# Patient Record
Sex: MALE | Race: WHITE | NOT HISPANIC OR LATINO | Employment: UNEMPLOYED | URBAN - METROPOLITAN AREA
[De-identification: names, ages, dates, MRNs, and addresses within clinical notes are randomized per-mention and may not be internally consistent; named-entity substitution may affect disease eponyms.]

---

## 2024-05-03 ENCOUNTER — HOSPITAL ENCOUNTER (INPATIENT)
Facility: HOSPITAL | Age: 34
LOS: 2 days | Discharge: HOME | DRG: 897 | End: 2024-05-05
Attending: EMERGENCY MEDICINE | Admitting: EMERGENCY MEDICINE
Payer: COMMERCIAL

## 2024-05-03 ENCOUNTER — HOSPITAL ENCOUNTER (EMERGENCY)
Facility: HOSPITAL | Age: 34
End: 2024-05-03
Attending: EMERGENCY MEDICINE
Payer: COMMERCIAL

## 2024-05-03 VITALS
WEIGHT: 192.68 LBS | RESPIRATION RATE: 18 BRPM | TEMPERATURE: 98.5 F | DIASTOLIC BLOOD PRESSURE: 82 MMHG | SYSTOLIC BLOOD PRESSURE: 128 MMHG | HEART RATE: 92 BPM | OXYGEN SATURATION: 96 %

## 2024-05-03 DIAGNOSIS — R07.89 OTHER CHEST PAIN: ICD-10-CM

## 2024-05-03 DIAGNOSIS — F10.20 ALCOHOL USE DISORDER, SEVERE, DEPENDENCE (HCC): Primary | ICD-10-CM

## 2024-05-03 DIAGNOSIS — F10.939 ALCOHOL WITHDRAWAL (HCC): Primary | ICD-10-CM

## 2024-05-03 PROBLEM — F14.90 COCAINE USE: Status: ACTIVE | Noted: 2024-05-03

## 2024-05-03 PROBLEM — F41.9 ANXIETY: Status: ACTIVE | Noted: 2024-05-03

## 2024-05-03 PROBLEM — K29.20 ALCOHOLIC GASTRITIS: Status: ACTIVE | Noted: 2024-05-03

## 2024-05-03 PROBLEM — R07.9 CHEST PAIN: Status: ACTIVE | Noted: 2024-05-03

## 2024-05-03 LAB
ALBUMIN SERPL BCP-MCNC: 4.1 G/DL (ref 3.5–5)
ALP SERPL-CCNC: 63 U/L (ref 34–104)
ALT SERPL W P-5'-P-CCNC: 34 U/L (ref 7–52)
ANION GAP SERPL CALCULATED.3IONS-SCNC: 11 MMOL/L (ref 4–13)
APAP SERPL-MCNC: <2 UG/ML (ref 10–20)
AST SERPL W P-5'-P-CCNC: 34 U/L (ref 13–39)
ATRIAL RATE: 82 BPM
BASOPHILS # BLD AUTO: 0.09 THOUSANDS/ÂΜL (ref 0–0.1)
BASOPHILS NFR BLD AUTO: 1 % (ref 0–1)
BILIRUB SERPL-MCNC: 0.56 MG/DL (ref 0.2–1)
BUN SERPL-MCNC: 7 MG/DL (ref 5–25)
CALCIUM SERPL-MCNC: 7.8 MG/DL (ref 8.4–10.2)
CARDIAC TROPONIN I PNL SERPL HS: <2 NG/L
CHLORIDE SERPL-SCNC: 105 MMOL/L (ref 96–108)
CO2 SERPL-SCNC: 20 MMOL/L (ref 21–32)
CREAT SERPL-MCNC: 0.87 MG/DL (ref 0.6–1.3)
EOSINOPHIL # BLD AUTO: 0.03 THOUSAND/ÂΜL (ref 0–0.61)
EOSINOPHIL NFR BLD AUTO: 0 % (ref 0–6)
ERYTHROCYTE [DISTWIDTH] IN BLOOD BY AUTOMATED COUNT: 13.8 % (ref 11.6–15.1)
ETHANOL SERPL-MCNC: 129 MG/DL
GFR SERPL CREATININE-BSD FRML MDRD: 112 ML/MIN/1.73SQ M
GLUCOSE SERPL-MCNC: 85 MG/DL (ref 65–140)
HCT VFR BLD AUTO: 44.6 % (ref 36.5–49.3)
HGB BLD-MCNC: 15.3 G/DL (ref 12–17)
IMM GRANULOCYTES # BLD AUTO: 0.03 THOUSAND/UL (ref 0–0.2)
IMM GRANULOCYTES NFR BLD AUTO: 0 % (ref 0–2)
LIPASE SERPL-CCNC: 19 U/L (ref 11–82)
LYMPHOCYTES # BLD AUTO: 2.17 THOUSANDS/ÂΜL (ref 0.6–4.47)
LYMPHOCYTES NFR BLD AUTO: 23 % (ref 14–44)
MCH RBC QN AUTO: 30.8 PG (ref 26.8–34.3)
MCHC RBC AUTO-ENTMCNC: 34.3 G/DL (ref 31.4–37.4)
MCV RBC AUTO: 90 FL (ref 82–98)
MONOCYTES # BLD AUTO: 0.53 THOUSAND/ÂΜL (ref 0.17–1.22)
MONOCYTES NFR BLD AUTO: 6 % (ref 4–12)
NEUTROPHILS # BLD AUTO: 6.72 THOUSANDS/ÂΜL (ref 1.85–7.62)
NEUTS SEG NFR BLD AUTO: 70 % (ref 43–75)
NRBC BLD AUTO-RTO: 0 /100 WBCS
P AXIS: 77 DEGREES
PLATELET # BLD AUTO: 316 THOUSANDS/UL (ref 149–390)
PMV BLD AUTO: 8.7 FL (ref 8.9–12.7)
POTASSIUM SERPL-SCNC: 4 MMOL/L (ref 3.5–5.3)
PR INTERVAL: 144 MS
PROT SERPL-MCNC: 6.1 G/DL (ref 6.4–8.4)
QRS AXIS: 85 DEGREES
QRSD INTERVAL: 88 MS
QT INTERVAL: 346 MS
QTC INTERVAL: 404 MS
RBC # BLD AUTO: 4.97 MILLION/UL (ref 3.88–5.62)
SALICYLATES SERPL-MCNC: <5 MG/DL (ref 3–20)
SODIUM SERPL-SCNC: 136 MMOL/L (ref 135–147)
T WAVE AXIS: 23 DEGREES
VENTRICULAR RATE: 82 BPM
WBC # BLD AUTO: 9.57 THOUSAND/UL (ref 4.31–10.16)

## 2024-05-03 PROCEDURE — 93010 ELECTROCARDIOGRAM REPORT: CPT | Performed by: STUDENT IN AN ORGANIZED HEALTH CARE EDUCATION/TRAINING PROGRAM

## 2024-05-03 PROCEDURE — 82077 ASSAY SPEC XCP UR&BREATH IA: CPT | Performed by: EMERGENCY MEDICINE

## 2024-05-03 PROCEDURE — 96361 HYDRATE IV INFUSION ADD-ON: CPT

## 2024-05-03 PROCEDURE — 96375 TX/PRO/DX INJ NEW DRUG ADDON: CPT

## 2024-05-03 PROCEDURE — 83690 ASSAY OF LIPASE: CPT | Performed by: EMERGENCY MEDICINE

## 2024-05-03 PROCEDURE — 99291 CRITICAL CARE FIRST HOUR: CPT

## 2024-05-03 PROCEDURE — 80143 DRUG ASSAY ACETAMINOPHEN: CPT | Performed by: EMERGENCY MEDICINE

## 2024-05-03 PROCEDURE — 99285 EMERGENCY DEPT VISIT HI MDM: CPT | Performed by: EMERGENCY MEDICINE

## 2024-05-03 PROCEDURE — 93005 ELECTROCARDIOGRAM TRACING: CPT

## 2024-05-03 PROCEDURE — HZ2ZZZZ DETOXIFICATION SERVICES FOR SUBSTANCE ABUSE TREATMENT: ICD-10-PCS | Performed by: EMERGENCY MEDICINE

## 2024-05-03 PROCEDURE — 36415 COLL VENOUS BLD VENIPUNCTURE: CPT | Performed by: EMERGENCY MEDICINE

## 2024-05-03 PROCEDURE — 80179 DRUG ASSAY SALICYLATE: CPT | Performed by: EMERGENCY MEDICINE

## 2024-05-03 PROCEDURE — 96376 TX/PRO/DX INJ SAME DRUG ADON: CPT

## 2024-05-03 PROCEDURE — 84484 ASSAY OF TROPONIN QUANT: CPT | Performed by: EMERGENCY MEDICINE

## 2024-05-03 PROCEDURE — 85025 COMPLETE CBC W/AUTO DIFF WBC: CPT | Performed by: EMERGENCY MEDICINE

## 2024-05-03 PROCEDURE — 99285 EMERGENCY DEPT VISIT HI MDM: CPT

## 2024-05-03 PROCEDURE — 96365 THER/PROPH/DIAG IV INF INIT: CPT

## 2024-05-03 PROCEDURE — 80053 COMPREHEN METABOLIC PANEL: CPT | Performed by: EMERGENCY MEDICINE

## 2024-05-03 RX ORDER — ONDANSETRON 2 MG/ML
4 INJECTION INTRAMUSCULAR; INTRAVENOUS EVERY 6 HOURS PRN
Status: DISCONTINUED | OUTPATIENT
Start: 2024-05-03 | End: 2024-05-05 | Stop reason: HOSPADM

## 2024-05-03 RX ORDER — DEXTROSE, SODIUM CHLORIDE, SODIUM LACTATE, POTASSIUM CHLORIDE, AND CALCIUM CHLORIDE 5; .6; .31; .03; .02 G/100ML; G/100ML; G/100ML; G/100ML; G/100ML
1000 INJECTION, SOLUTION INTRAVENOUS ONCE
Status: COMPLETED | OUTPATIENT
Start: 2024-05-03 | End: 2024-05-03

## 2024-05-03 RX ORDER — ONDANSETRON 2 MG/ML
4 INJECTION INTRAMUSCULAR; INTRAVENOUS ONCE
Status: COMPLETED | OUTPATIENT
Start: 2024-05-03 | End: 2024-05-03

## 2024-05-03 RX ORDER — ONDANSETRON 2 MG/ML
1 INJECTION INTRAMUSCULAR; INTRAVENOUS ONCE
Status: COMPLETED | OUTPATIENT
Start: 2024-05-03 | End: 2024-05-03

## 2024-05-03 RX ORDER — ACETAMINOPHEN 325 MG/1
650 TABLET ORAL EVERY 6 HOURS PRN
Status: DISCONTINUED | OUTPATIENT
Start: 2024-05-03 | End: 2024-05-05 | Stop reason: HOSPADM

## 2024-05-03 RX ORDER — FAMOTIDINE 10 MG/ML
20 INJECTION, SOLUTION INTRAVENOUS ONCE
Status: COMPLETED | OUTPATIENT
Start: 2024-05-03 | End: 2024-05-03

## 2024-05-03 RX ORDER — DIAZEPAM 5 MG/ML
10 INJECTION, SOLUTION INTRAMUSCULAR; INTRAVENOUS ONCE
Status: COMPLETED | OUTPATIENT
Start: 2024-05-03 | End: 2024-05-03

## 2024-05-03 RX ORDER — DROPERIDOL 2.5 MG/ML
0.62 INJECTION, SOLUTION INTRAMUSCULAR; INTRAVENOUS ONCE
Status: COMPLETED | OUTPATIENT
Start: 2024-05-03 | End: 2024-05-03

## 2024-05-03 RX ORDER — MAGNESIUM HYDROXIDE/ALUMINUM HYDROXICE/SIMETHICONE 120; 1200; 1200 MG/30ML; MG/30ML; MG/30ML
30 SUSPENSION ORAL EVERY 4 HOURS PRN
Status: DISCONTINUED | OUTPATIENT
Start: 2024-05-03 | End: 2024-05-05 | Stop reason: HOSPADM

## 2024-05-03 RX ORDER — PHENOBARBITAL SODIUM 130 MG/ML
130 INJECTION, SOLUTION INTRAMUSCULAR; INTRAVENOUS ONCE
Status: COMPLETED | OUTPATIENT
Start: 2024-05-03 | End: 2024-05-03

## 2024-05-03 RX ORDER — SODIUM CHLORIDE 9 MG/ML
100 INJECTION, SOLUTION INTRAVENOUS CONTINUOUS
Status: DISCONTINUED | OUTPATIENT
Start: 2024-05-03 | End: 2024-05-05 | Stop reason: HOSPADM

## 2024-05-03 RX ORDER — ENOXAPARIN SODIUM 100 MG/ML
40 INJECTION SUBCUTANEOUS DAILY
Status: DISCONTINUED | OUTPATIENT
Start: 2024-05-04 | End: 2024-05-05 | Stop reason: HOSPADM

## 2024-05-03 RX ORDER — TRAZODONE HYDROCHLORIDE 50 MG/1
50 TABLET ORAL
Status: DISCONTINUED | OUTPATIENT
Start: 2024-05-03 | End: 2024-05-05 | Stop reason: HOSPADM

## 2024-05-03 RX ORDER — KETOROLAC TROMETHAMINE 30 MG/ML
15 INJECTION, SOLUTION INTRAMUSCULAR; INTRAVENOUS ONCE
Status: COMPLETED | OUTPATIENT
Start: 2024-05-03 | End: 2024-05-03

## 2024-05-03 RX ORDER — MAGNESIUM HYDROXIDE/ALUMINUM HYDROXICE/SIMETHICONE 120; 1200; 1200 MG/30ML; MG/30ML; MG/30ML
30 SUSPENSION ORAL ONCE
Status: COMPLETED | OUTPATIENT
Start: 2024-05-03 | End: 2024-05-03

## 2024-05-03 RX ORDER — ACETAMINOPHEN 325 MG/1
650 TABLET ORAL EVERY 6 HOURS PRN
Status: DISCONTINUED | OUTPATIENT
Start: 2024-05-03 | End: 2024-05-03

## 2024-05-03 RX ORDER — LORAZEPAM 2 MG/ML
1 INJECTION INTRAMUSCULAR ONCE
Status: COMPLETED | OUTPATIENT
Start: 2024-05-03 | End: 2024-05-03

## 2024-05-03 RX ORDER — LOPERAMIDE HYDROCHLORIDE 2 MG/1
2 CAPSULE ORAL EVERY 4 HOURS PRN
Status: DISCONTINUED | OUTPATIENT
Start: 2024-05-03 | End: 2024-05-05 | Stop reason: HOSPADM

## 2024-05-03 RX ADMIN — LORAZEPAM 1 MG: 2 INJECTION INTRAMUSCULAR; INTRAVENOUS at 15:24

## 2024-05-03 RX ADMIN — SODIUM CHLORIDE 100 ML/HR: 0.9 INJECTION, SOLUTION INTRAVENOUS at 18:09

## 2024-05-03 RX ADMIN — Medication 650 MG: at 18:20

## 2024-05-03 RX ADMIN — DROPERIDOL 0.62 MG: 2.5 INJECTION, SOLUTION INTRAMUSCULAR; INTRAVENOUS at 16:53

## 2024-05-03 RX ADMIN — ONDANSETRON 4 MG: 2 INJECTION INTRAMUSCULAR; INTRAVENOUS at 13:04

## 2024-05-03 RX ADMIN — FOLIC ACID: 5 INJECTION, SOLUTION INTRAMUSCULAR; INTRAVENOUS; SUBCUTANEOUS at 13:29

## 2024-05-03 RX ADMIN — ONDANSETRON 4 MG: 2 INJECTION INTRAMUSCULAR; INTRAVENOUS at 15:24

## 2024-05-03 RX ADMIN — ALUMINUM HYDROXIDE, MAGNESIUM HYDROXIDE, DIMETHICONE 30 ML: 200; 200; 20 LIQUID ORAL at 21:55

## 2024-05-03 RX ADMIN — DEXTROSE, SODIUM CHLORIDE, SODIUM LACTATE, POTASSIUM CHLORIDE, AND CALCIUM CHLORIDE 1000 ML: 5; .6; .31; .03; .02 INJECTION, SOLUTION INTRAVENOUS at 14:24

## 2024-05-03 RX ADMIN — FAMOTIDINE 20 MG: 10 INJECTION INTRAVENOUS at 15:11

## 2024-05-03 RX ADMIN — KETOROLAC TROMETHAMINE 15 MG: 30 INJECTION, SOLUTION INTRAMUSCULAR at 15:12

## 2024-05-03 RX ADMIN — LORAZEPAM 1 MG: 2 INJECTION INTRAMUSCULAR; INTRAVENOUS at 13:03

## 2024-05-03 RX ADMIN — DIAZEPAM 10 MG: 5 INJECTION INTRAMUSCULAR; INTRAVENOUS at 18:20

## 2024-05-03 RX ADMIN — ALUMINUM HYDROXIDE, MAGNESIUM HYDROXIDE, DIMETHICONE 30 ML: 400; 400; 40 SUSPENSION ORAL at 15:11

## 2024-05-03 RX ADMIN — PHENOBARBITAL SODIUM 130 MG: 130 INJECTION INTRAMUSCULAR; INTRAVENOUS at 23:11

## 2024-05-03 RX ADMIN — LOPERAMIDE HYDROCHLORIDE 2 MG: 2 CAPSULE ORAL at 22:57

## 2024-05-03 NOTE — ASSESSMENT & PLAN NOTE
Pt with a h/o chronic heavy alcohol use   Has been previously admitted to the hospital for alcohol withdrawal. His most recent admission was at [facility] from 4/18-4/19 where he was loaded with 10 mg/kg of phenobarbital.   Patient reports relapsing shortly after discharge- states he is drinking a fifth of vodka daily. Presented to [facility] for rehab placement however due severity of withdrawal symptoms requesting inpatient detox.     Denies H/o withdrawal seizures  Previously on naltrexone, would like to restart   Withdrawal management as above  Initiate IVFs, IV thiamine, folic acid, and MVI  Consult case management/CRS for assistance with aftercare resources - pt interested in inpatient drug and alcohol rehab  at this time

## 2024-05-03 NOTE — ASSESSMENT & PLAN NOTE
Patient endorses a h/o chronic anxiety and depression  No current medication  Can consider initiation of anxiolytic medication    Denies SI/HI/thoughts of self harm  Continue home medications  Recommend OP f/u with PCP/OP Psychiatry

## 2024-05-03 NOTE — EMTALA/ACUTE CARE TRANSFER
[facility] EMERGENCY DEPARTMENT  [address] [address]  Dept: [phone]      EMTALA TRANSFER CONSENT    NAME Juan GALVAN 1990                              MRN [id number]    I have been informed of my rights regarding examination, treatment, and transfer   by Dr. Gaurav Hawk MD    Benefits: Specialized equipment and/or services available at the receiving facility (Include comment)________________________    Risks: Potential for delay in receiving treatment, Potential deterioration of medical condition, Loss of IV      Consent for Transfer:  I acknowledge that my medical condition has been evaluated and explained to me by the emergency department physician or other qualified medical person and/or my attending physician, who has recommended that I be transferred to the service of  Accepting Physician: Dr. Godoy at  . The above potential benefits of such transfer, the potential risks associated with such transfer, and the probable risks of not being transferred have been explained to me, and I fully understand them.  The doctor has explained that, in my case, the benefits of transfer outweigh the risks.  I agree to be transferred.    I authorize the performance of emergency medical procedures and treatments upon me in both transit and upon arrival at the receiving facility.  Additionally, I authorize the release of any and all medical records to the receiving facility and request they be transported with me, if possible.  I understand that the safest mode of transportation during a medical emergency is an ambulance and that the Hospital advocates the use of this mode of transport. Risks of traveling to the receiving facility by car, including absence of medical control, life sustaining equipment, such as oxygen, and medical personnel has been explained to me and I fully understand them.    (ANCA CORRECT BOX BELOW)  [  ]   I consent to the stated transfer and to be transported by ambulance/helicopter.  [  ]  I consent to the stated transfer, but refuse transportation by ambulance and accept full responsibility for my transportation by car.  I understand the risks of non-ambulance transfers and I exonerate the Hospital and its staff from any deterioration in my condition that results from this refusal.    X___________________________________________    DATE  24  TIME________  Signature of patient or legally responsible individual signing on patient behalf           RELATIONSHIP TO PATIENT_________________________          Provider Certification    NAME Juan Luu                                         1990                              MRN [id number]    A medical screening exam was performed on the above named patient.  Based on the examination:    Condition Necessitating Transfer The encounter diagnosis was Alcohol withdrawal (HCC).    Patient Condition: The patient has been stabilized such that within reasonable medical probability, no material deterioration of the patient condition or the condition of the unborn child(leticia) is likely to result from the transfer    Reason for Transfer: Level of Care needed not available at this facility    Transfer Requirements: Facility     Space available and qualified personnel available for treatment as acknowledged by    Agreed to accept transfer and to provide appropriate medical treatment as acknowledged by       Dr. Godoy  Appropriate medical records of the examination and treatment of the patient are provided at the time of transfer   STAFF INITIAL WHEN COMPLETED _______  Transfer will be performed by qualified personnel from    and appropriate transfer equipment as required, including the use of necessary and appropriate life support measures.    Provider Certification: I have examined the patient and explained the following risks and benefits of being transferred/refusing  transfer to the patient/family:  General risk, such as traffic hazards, adverse weather conditions, rough terrain or turbulence, possible failure of equipment (including vehicle or aircraft), or consequences of actions of persons outside the control of the transport personnel      Based on these reasonable risks and benefits to the patient and/or the unborn child(leticia), and based upon the information available at the time of the patient’s examination, I certify that the medical benefits reasonably to be expected from the provision of appropriate medical treatments at another medical facility outweigh the increasing risks, if any, to the individual’s medical condition, and in the case of labor to the unborn child, from effecting the transfer.    X____________________________________________ DATE 05/03/24        TIME_______      ORIGINAL - SEND TO MEDICAL RECORDS   COPY - SEND WITH PATIENT DURING TRANSFER

## 2024-05-03 NOTE — ED NOTES
HOST on phone for patient. Call transferred to hospital cell phone and given to patient.     Madison Gonzales RN  05/03/24 9041

## 2024-05-03 NOTE — ASSESSMENT & PLAN NOTE
Last drink: 5/3/24   Ethanol lvl: 129  Follow SEWS protocol with symptom-triggered phenobarbital for medically-assisted alcohol withdrawal  Current symptoms include anxiety, tremors, tachycardia, HTN, diaphoresis, nausea   SEWS score upon admission 13  Administer 650 mg phenobarbital + valium 10 mg IV   Telemetry and pulse oximetry monitoring   Continue to monitor vitals, symptoms

## 2024-05-03 NOTE — ASSESSMENT & PLAN NOTE
Chest pain in the setting of concurrent cocaine use  Currently denies any chest pain or palpitations   EKG: no ischemic changes   Initial troponin < 1   Continue to monitor

## 2024-05-03 NOTE — ED CARE HANDOFF
[facility] Warm Handoff Outcome Note    Patient name Juan Luu  Location ED-24/ED-24 MRN [id number]  Age: 34 y.o.          Plan Type:  Warm Handoff                                                                                    Plan Date: 5/3/2024  Service:  ED Warm Handoff      Substance Use History:  Polysub    Warm Handoff Update:  Pt being transported to [facility]    Warm Handoff Outcome: Treatment Related Resources

## 2024-05-03 NOTE — ASSESSMENT & PLAN NOTE
Patient reports intermittent cocaine use   Last used yesterday  Currently denies chest pain or palpitations  Continue to monitor  Encourage Cessation of all substances

## 2024-05-03 NOTE — ED PROVIDER NOTES
History  Chief Complaint   Patient presents with    Chest Pain     [facility] since waking up at 3 am. Pt reports thinking he is going through alcohol withdrawal, woke up at 3 am vomiting/shaking with crushing chest pressure/pain. Last drink about 13 hours ago, previously drinking a fifth of vodka daily. 324 asa given 1145, 4 zofran given 1220. When asked, pt reports left arm is slightly tingly     35 yo M referred to ED from [facility] with concern for alcohol withdrawal, stating he drinks up to 1/5 vodka daily, last drank about 11pm, and he has been intentionally trying to taper himself down to stop drinking, but has begun to experience shakes, nausea, vomiting, and anxiety.  He experienced chest pain since 3am when he woke up and began vomiting. He tried to go to work but the symptoms were too intensive, and apparently his supervisor invoked a policy to help him seek rehab, so they went to [facility] initially.        History provided by:  Patient      None       History reviewed. No pertinent past medical history.    History reviewed. No pertinent surgical history.    History reviewed. No pertinent family history.  I have reviewed and agree with the history as documented.    E-Cigarette/Vaping    E-Cigarette Use Never User      E-Cigarette/Vaping Substances     Social History     Tobacco Use    Smoking status: Never    Smokeless tobacco: Never   Vaping Use    Vaping status: Never Used   Substance Use Topics    Alcohol use: Yes    Drug use: Not Currently       Review of Systems    Physical Exam  Physical Exam  Vitals and nursing note reviewed.   Constitutional:       Appearance: He is well-developed.   HENT:      Head: Normocephalic and atraumatic.      Right Ear: External ear normal.      Left Ear: External ear normal.      Nose: Nose normal.      Mouth/Throat:      Mouth: Mucous membranes are moist.   Eyes:      Conjunctiva/sclera: Conjunctivae normal.      Pupils: Pupils  are equal, round, and reactive to light.   Cardiovascular:      Rate and Rhythm: Normal rate.   Pulmonary:      Effort: Pulmonary effort is normal.   Abdominal:      Tenderness: There is no abdominal tenderness.   Musculoskeletal:         General: Normal range of motion.      Cervical back: Normal range of motion and neck supple.   Skin:     General: Skin is warm and dry.      Capillary Refill: Capillary refill takes less than 2 seconds.   Neurological:      Mental Status: He is alert and oriented to person, place, and time.      GCS: GCS eye subscore is 4. GCS verbal subscore is 5. GCS motor subscore is 6.      Cranial Nerves: No cranial nerve deficit.      Coordination: Coordination normal.   Psychiatric:         Attention and Perception: He does not perceive auditory or visual hallucinations.         Mood and Affect: Mood is anxious.         Speech: Speech is rapid and pressured.         Behavior: Behavior normal.         Thought Content: Thought content normal.         Cognition and Memory: Cognition normal.         Judgment: Judgment normal.         Vital Signs  ED Triage Vitals   Temperature Pulse Respirations Blood Pressure SpO2   05/03/24 1642 05/03/24 1229 05/03/24 1229 05/03/24 1229 05/03/24 1229   98.5 °F (36.9 °C) 91 18 135/83 96 %      Temp Source Heart Rate Source Patient Position - Orthostatic VS BP Location FiO2 (%)   05/03/24 1642 05/03/24 1229 05/03/24 1229 05/03/24 1229 --   Oral Monitor Sitting Right arm       Pain Score       05/03/24 1229       8           Vitals:    05/03/24 1545 05/03/24 1615 05/03/24 1638 05/03/24 1645   BP: 113/69 142/76 128/82 128/82   Pulse: 82 (!) 108 98 92   Patient Position - Orthostatic VS:             Visual Acuity      ED Medications  Medications   folic acid 1 mg, thiamine (VITAMIN B1) 100 mg in sodium chloride 0.9 % 100 mL IV piggyback ( Intravenous Stopped 5/3/24 1425)   ondansetron (FOR EMS ONLY) (ZOFRAN) 4 mg/2 mL injection 4 mg (0 mg Does not apply Given to  EMS 5/3/24 1229)   dextrose 5 % in lactated Ringer's infusion (0 mL Intravenous Stopped 5/3/24 1528)   LORazepam (ATIVAN) injection 1 mg (1 mg Intravenous Given 5/3/24 1303)   ondansetron (ZOFRAN) injection 4 mg (4 mg Intravenous Given 5/3/24 1304)   ketorolac (TORADOL) injection 15 mg (15 mg Intravenous Given 5/3/24 1512)   Famotidine (PF) (PEPCID) injection 20 mg (20 mg Intravenous Given 5/3/24 1511)   aluminum-magnesium hydroxide-simethicone (MAALOX) oral suspension 30 mL (30 mL Oral Given 5/3/24 1511)   ondansetron (ZOFRAN) injection 4 mg (4 mg Intravenous Given 5/3/24 1524)   LORazepam (ATIVAN) injection 1 mg (1 mg Intravenous Given 5/3/24 1524)   droperidol (INAPSINE) injection 0.625 mg (0.625 mg Intravenous Given 5/3/24 1653)       Diagnostic Studies  Results Reviewed       Procedure Component Value Units Date/Time    HS Troponin 0hr (reflex protocol) [[id number]]  (Normal) Collected: 05/03/24 1304    Lab Status: Final result Specimen: Blood from Arm, Left Updated: 05/03/24 1336     hs TnI 0hr <2 ng/L     Comprehensive metabolic panel [[id number]]  (Abnormal) Collected: 05/03/24 1304    Lab Status: Final result Specimen: Blood from Arm, Right Updated: 05/03/24 1336     Sodium 136 mmol/L      Potassium 4.0 mmol/L      Chloride 105 mmol/L      CO2 20 mmol/L      ANION GAP 11 mmol/L      BUN 7 mg/dL      Creatinine 0.87 mg/dL      Glucose 85 mg/dL      Calcium 7.8 mg/dL      AST 34 U/L      ALT 34 U/L      Alkaline Phosphatase 63 U/L      Total Protein 6.1 g/dL      Albumin 4.1 g/dL      Total Bilirubin 0.56 mg/dL      eGFR 112 ml/min/1.73sq m     Narrative:      National Kid[employer] Disease Foundation guidelines for Chronic Kidney Disease (CKD):     Stage 1 with normal or high GFR (GFR > 90 mL/min/1.73 square meters)    Stage 2 Mild CKD (GFR = 60-89 mL/min/1.73 square meters)    Stage 3A Moderate CKD (GFR = 45-59 mL/min/1.73 square meters)    Stage 3B Moderate CKD (GFR = 30-44 mL/min/1.73 square meters)    Stage 4  Severe CKD (GFR = 15-29 mL/min/1.73 square meters)    Stage 5 End Stage CKD (GFR <15 mL/min/1.73 square meters)  Note: GFR calculation is accurate only with a steady state creatinine    Lipase [[id number]]  (Normal) Collected: 05/03/24 1304    Lab Status: Final result Specimen: Blood from Arm, Right Updated: 05/03/24 1336     Lipase 19 u/L     Salicylate level [[id number]]  (Normal) Collected: 05/03/24 1304    Lab Status: Final result Specimen: Blood from Arm, Right Updated: 05/03/24 1336     Salicylate Lvl <5 mg/dL     Acetaminophen level-If concentration is detectable, please discuss with medical  on call. [[id number]]  (Abnormal) Collected: 05/03/24 1304    Lab Status: Final result Specimen: Blood from Arm, Right Updated: 05/03/24 1336     Acetaminophen Level <2 ug/mL     Ethanol [[id number]]  (Abnormal) Collected: 05/03/24 1304    Lab Status: Final result Specimen: Blood from Arm, Left Updated: 05/03/24 1329     Ethanol Lvl 129 mg/dL     CBC and differential [[id number]]  (Abnormal) Collected: 05/03/24 1304    Lab Status: Final result Specimen: Blood from Arm, Left Updated: 05/03/24 1316     WBC 9.57 Thousand/uL      RBC 4.97 Million/uL      Hemoglobin 15.3 g/dL      Hematocrit 44.6 %      MCV 90 fL      MCH 30.8 pg      MCHC 34.3 g/dL      RDW 13.8 %      MPV 8.7 fL      Platelets 316 Thousands/uL      nRBC 0 /100 WBCs      Segmented % 70 %      Immature Grans % 0 %      Lymphocytes % 23 %      Monocytes % 6 %      Eosinophils Relative 0 %      Basophils Relative 1 %      Absolute Neutrophils 6.72 Thousands/µL      Absolute Immature Grans 0.03 Thousand/uL      Absolute Lymphocytes 2.17 Thousands/µL      Absolute Monocytes 0.53 Thousand/µL      Eosinophils Absolute 0.03 Thousand/µL      Basophils Absolute 0.09 Thousands/µL                    No orders to display              Procedures  Procedures         ED Course  ED Course as of 05/03/24 1740   Fri May 03, 2024   1336 ETHANOL(!): 129                                SBIRT 20yo+      Flowsheet Row Most Recent Value   Initial Alcohol Screen: US AUDIT-C     1. How often do you have a drink containing alcohol? 6 Filed at: 05/03/2024 1233   2. How many drinks containing alcohol do you have on a typical day you are drinking?  6 Filed at: 05/03/2024 1233   3a. Male UNDER 65: How often do you have five or more drinks on one occasion? 6 Filed at: 05/03/2024 1233   3b. FEMALE Any Age, or MALE 65+: How often do you have 4 or more drinks on one occassion? 0 Filed at: 05/03/2024 1233   Audit-C Score 18 Filed at: 05/03/2024 1233   Full Alcohol Screen: US AUDIT    4. How often during the last year have you found that you were not able to stop drinking once you had started? 2 Filed at: 05/03/2024 1233   5. How often during past year have you failed to do what was normally expected of you because of drinking?  2 Filed at: 05/03/2024 1233   6. How often in past year have you needed a first drink in the morning to get yourself going after a heavy drinking session?  3 Filed at: 05/03/2024 1233   7. How often in past year have you had feeling of guilt or remorse after drinking?  3 Filed at: 05/03/2024 1233   8. How often in past year have you been unable to remember what happened night before because you had been drinking?  1 Filed at: 05/03/2024 1233   9. Have you or someone else been injured as a result of your drinking?  0 Filed at: 05/03/2024 1233   10. Has a relative, friend, doctor or other health worker been concerned about your drinking and suggested you cut down?  0 Filed at: 05/03/2024 1233   AUDIT Total Score 29 Filed at: 05/03/2024 1233   JEANNETTE: How many times in the past year have you...    Used an illegal drug or used a prescription medication for non-medical reasons? Never Filed at: 05/03/2024 1233                      Medical Decision Making  Based on amount of daily ingestion, and onset of symptoms, I am considering patient for detox unit, to which he  was agreeable . He has spoken with [facility] regarding resources for rehab.      Amount and/or Complexity of Data Reviewed  Labs: ordered. Decision-making details documented in ED Course.    Risk  OTC drugs.  Prescription drug management.             Disposition  Final diagnoses:   Alcohol withdrawal (HCC)     Time reflects when diagnosis was documented in both MDM as applicable and the Disposition within this note       Time User Action Codes Description Comment    5/3/2024  3:04 PM Gaurav Hawk Add [F10.939] Alcohol withdrawal (HCC)           ED Disposition       ED Disposition   Transfer to Another Facility-In Network    Condition   --    Date/Time   Fri May 3, 2024 1503    Comment   Juan Luu should be transferred out to [facility].               MD Documentation      Flowsheet Row Most Recent Value   Patient Condition The patient has been stabilized such that within reasonable medical probability, no material deterioration of the patient condition or the condition of the unborn child(leticia) is likely to result from the transfer   Reason for Transfer Level of Care needed not available at this facility   Benefits of Transfer Specialized equipment and/or services available at the receiving facility (Include comment)________________________   Risks of Transfer Potential for delay in receiving treatment, Potential deterioration of medical condition, Loss of IV   Accepting Physician Dr. Godoy   Sending MD Dr. Hawk   Provider Certification General risk, such as traffic hazards, adverse weather conditions, rough terrain or turbulence, possible failure of equipment (including vehicle or aircraft), or consequences of actions of persons outside the control of the transport personnel          Follow-up Information    None         There are no discharge medications for this patient.      No discharge procedures on file.    PDMP Review       None            ED Provider  Electronically Signed by             Gaurav Armendariz  MD Kenn  05/03/24 2059

## 2024-05-04 LAB
ALBUMIN SERPL BCP-MCNC: 3.9 G/DL (ref 3.5–5)
ALP SERPL-CCNC: 69 U/L (ref 34–104)
ALT SERPL W P-5'-P-CCNC: 29 U/L (ref 7–52)
ANION GAP SERPL CALCULATED.3IONS-SCNC: 10 MMOL/L (ref 4–13)
AST SERPL W P-5'-P-CCNC: 23 U/L (ref 13–39)
BILIRUB SERPL-MCNC: 0.81 MG/DL (ref 0.2–1)
BUN SERPL-MCNC: 8 MG/DL (ref 5–25)
CALCIUM SERPL-MCNC: 8.2 MG/DL (ref 8.4–10.2)
CHLORIDE SERPL-SCNC: 102 MMOL/L (ref 96–108)
CO2 SERPL-SCNC: 24 MMOL/L (ref 21–32)
CREAT SERPL-MCNC: 1.06 MG/DL (ref 0.6–1.3)
ERYTHROCYTE [DISTWIDTH] IN BLOOD BY AUTOMATED COUNT: 13.9 % (ref 11.6–15.1)
GFR SERPL CREATININE-BSD FRML MDRD: 91 ML/MIN/1.73SQ M
GLUCOSE SERPL-MCNC: 84 MG/DL (ref 65–140)
HCT VFR BLD AUTO: 45.3 % (ref 36.5–49.3)
HGB BLD-MCNC: 14.5 G/DL (ref 12–17)
MAGNESIUM SERPL-MCNC: 2 MG/DL (ref 1.9–2.7)
MCH RBC QN AUTO: 32.2 PG (ref 26.8–34.3)
MCHC RBC AUTO-ENTMCNC: 32 G/DL (ref 31.4–37.4)
MCV RBC AUTO: 100 FL (ref 82–98)
PLATELET # BLD AUTO: 233 THOUSANDS/UL (ref 149–390)
PMV BLD AUTO: 9.2 FL (ref 8.9–12.7)
POTASSIUM SERPL-SCNC: 3.7 MMOL/L (ref 3.5–5.3)
PROT SERPL-MCNC: 6.3 G/DL (ref 6.4–8.4)
RBC # BLD AUTO: 4.5 MILLION/UL (ref 3.88–5.62)
SODIUM SERPL-SCNC: 136 MMOL/L (ref 135–147)
WBC # BLD AUTO: 10.39 THOUSAND/UL (ref 4.31–10.16)

## 2024-05-04 PROCEDURE — 99233 SBSQ HOSP IP/OBS HIGH 50: CPT | Performed by: EMERGENCY MEDICINE

## 2024-05-04 PROCEDURE — 83735 ASSAY OF MAGNESIUM: CPT

## 2024-05-04 PROCEDURE — 85027 COMPLETE CBC AUTOMATED: CPT

## 2024-05-04 PROCEDURE — 80053 COMPREHEN METABOLIC PANEL: CPT

## 2024-05-04 RX ORDER — PHENOBARBITAL SODIUM 130 MG/ML
130 INJECTION, SOLUTION INTRAMUSCULAR; INTRAVENOUS ONCE
Status: COMPLETED | OUTPATIENT
Start: 2024-05-04 | End: 2024-05-04

## 2024-05-04 RX ORDER — PHENOBARBITAL 64.8 MG/1
64.8 TABLET ORAL ONCE
Status: COMPLETED | OUTPATIENT
Start: 2024-05-04 | End: 2024-05-04

## 2024-05-04 RX ADMIN — ONDANSETRON 4 MG: 2 INJECTION INTRAMUSCULAR; INTRAVENOUS at 16:32

## 2024-05-04 RX ADMIN — FOLIC ACID: 5 INJECTION, SOLUTION INTRAMUSCULAR; INTRAVENOUS; SUBCUTANEOUS at 08:38

## 2024-05-04 RX ADMIN — PHENOBARBITAL SODIUM 130 MG: 130 INJECTION INTRAMUSCULAR; INTRAVENOUS at 11:02

## 2024-05-04 RX ADMIN — SODIUM CHLORIDE 100 ML/HR: 0.9 INJECTION, SOLUTION INTRAVENOUS at 16:32

## 2024-05-04 RX ADMIN — PHENOBARBITAL SODIUM 130 MG: 130 INJECTION INTRAMUSCULAR; INTRAVENOUS at 20:10

## 2024-05-04 RX ADMIN — PHENOBARBITAL 64.8 MG: 64.8 TABLET ORAL at 22:44

## 2024-05-04 RX ADMIN — TRAZODONE HYDROCHLORIDE 50 MG: 50 TABLET ORAL at 21:01

## 2024-05-04 RX ADMIN — Medication 650 MG: at 11:20

## 2024-05-04 RX ADMIN — PHENOBARBITAL SODIUM 130 MG: 130 INJECTION INTRAMUSCULAR; INTRAVENOUS at 16:32

## 2024-05-04 RX ADMIN — ALUMINUM HYDROXIDE, MAGNESIUM HYDROXIDE, DIMETHICONE 30 ML: 200; 200; 20 LIQUID ORAL at 08:40

## 2024-05-04 RX ADMIN — PHENOBARBITAL SODIUM 130 MG: 130 INJECTION INTRAMUSCULAR; INTRAVENOUS at 08:40

## 2024-05-04 RX ADMIN — SODIUM CHLORIDE 100 ML/HR: 0.9 INJECTION, SOLUTION INTRAVENOUS at 04:32

## 2024-05-04 RX ADMIN — ONDANSETRON 4 MG: 2 INJECTION INTRAMUSCULAR; INTRAVENOUS at 08:41

## 2024-05-04 NOTE — UTILIZATION REVIEW
Initial Clinical Review    Pt initially presented to [facility] ED. Pt was transferred by EMS to [facility] for its Level IV medically managed intensive inpatient detox unit, not available at [facility].    Admission: Date/Time/Statement:   Admission Orders (From admission, onward)       Ordered        05/03/24 1754  Inpatient Admission  Once                          Orders Placed This Encounter   Procedures    Inpatient Admission     Standing Status:   Standing     Number of Occurrences:   1     Order Specific Question:   Level of Care     Answer:   Med Surg [16]     Order Specific Question:   Estimated length of stay     Answer:   More than 2 Midnights     Order Specific Question:   Certification     Answer:   I certify that inpatient services are medically necessary for this patient for a duration of greater than two midnights. See H&P and MD Progress Notes for additional information about the patient's course of treatment.     ED Arrival Information      Initial Presentation: 34 y.o. male who presented to medical detox. Inpatient admission for evaluation and treatment of alcohol withdrawal syndrome. Presented w/ need for detox from alcohol. Serum ETOH: 129. Reports vodka 1/5 daily, last drink on 5/2 @ 2300. Has prior rehab treatment for withdrawal. Reports no hx of withdrawal seizures. On exam, anxiety, insomnia, tremor, diaphoresis, tachycardia, hypertension, and headache.  SEWS 16. Plan: SEWS monitoring w/ phenobarbital management, IV thiamine/folic acid supplement, IVF, telemetry, continuous pulse ox, continue PTA meds, trend labs, replete electrolytes as needed.     Wt Readings from Last 1 Encounters:   05/03/24 83.5 kg (184 lb)     Vital Signs:   Date/Time Temp Pulse Resp BP MAP (mmHg) SpO2 O2 Device Patient Position - Orthostatic VS   05/04/24 0254 98.9 °F (37.2 °C) 66 18 137/87 103 97 % None (Room air) Lying   05/03/24 2305 97.7 °F (36.5 °C) 85 18 126/65 85 98 %  None (Room air) Lying   05/03/24 2001 98.5 °F (36.9 °C) 80 20 147/83 -- 96 % None (Room air) Lying   05/03/24 1757 98.2 °F (36.8 °C) 76 18 129/92 -- 96 % None (Room air) Lying       Severity of Ethanol Withdrawal Scale (SEWS):   SEWS    Row Name 05/04/24 0819 05/04/24 0258 05/03/24 2306 05/03/24 2000 05/03/24 1801   Severity of Ethanol Withdrawal Scale (SEWS)   ANXIETY: Do you feel that something bad is about to happen to you right now? 3  -RG 0  -DE 3  -DE 0  -DE 3  -LL   NAUSEA and DRY HEAVES or VOMITING? 0  -RG 0  -DE 0  -DE 0  -DE 3  -LL   SWEATING: (includes moist palms, sweating now)? Score 0 or 2 2  -RG 0  -DE 2  -DE 0  -DE 2  -LL   TREMOR: with arms extended eyes closed? 2  -RG 0  -DE 2  -DE 0  -DE 2  -LL   AGITATION: Fidgety, restless, pacing? 0  -RG 0  -DE 0  -DE 0  -DE 3  -LL   DISORIENTATION: 0  -RG 0  -DE 0  -DE 0  -DE 0  -LL   HALLUCINATIONS: 0  -RG 0  -DE 0  -DE 0  -DE 0  -LL   VITAL SIGNS: ANY (Pulse >110, Diastolic BP >90, Temp >99.6) 0  -RG 0  -DE 0  -DE 0  -DE 3  -LL   SEWS Total Score 7  -RG 0  -DE 7  -DE 0  -DE 16  -LL   Amry Agitation Sedation Scale (RASS)   Mary Agitation Sedation Scale (RASS) 0  -RG -2  -DE -1  -DE -2  -DE --       Pertinent Labs/Diagnostic Test Results:     5/3 ECG - Sinus rhythm with sinus arrhythmia  Normal ECG      Results from last 7 days   Lab Units 05/04/24  0431 05/03/24  1304   WBC Thousand/uL 10.39* 9.57   HEMOGLOBIN g/dL 14.5 15.3   HEMATOCRIT % 45.3 44.6   PLATELETS Thousands/uL 233 316   TOTAL NEUT ABS Thousands/µL  --  6.72         Results from last 7 days   Lab Units 05/04/24  0431 05/03/24  1304   SODIUM mmol/L 136 136   POTASSIUM mmol/L 3.7 4.0   CHLORIDE mmol/L 102 105   CO2 mmol/L 24 20*   ANION GAP mmol/L 10 11   BUN mg/dL 8 7   CREATININE mg/dL 1.06 0.87   EGFR ml/min/1.73sq m 91 112   CALCIUM mg/dL 8.2* 7.8*   MAGNESIUM mg/dL 2.0  --      Results from last 7 days   Lab Units 05/04/24  0431 05/03/24  1304   AST U/L 23 34   ALT U/L 29 34   ALK PHOS  U/L 69 63   TOTAL PROTEIN g/dL 6.3* 6.1*   ALBUMIN g/dL 3.9 4.1   TOTAL BILIRUBIN mg/dL 0.81 0.56         Results from last 7 days   Lab Units 05/04/24  0431 05/03/24  1304   GLUCOSE RANDOM mg/dL 84 85     Results from last 7 days   Lab Units 05/03/24  1304   HS TNI 0HR ng/L <2     Results from last 7 days   Lab Units 05/03/24  1304   LIPASE u/L 19     Results from last 7 days   Lab Units 05/03/24  1304   ETHANOL LVL mg/dL 129*   ACETAMINOPHEN LVL ug/mL <2*   SALICYLATE LVL mg/dL <5       Admitting Diagnosis: Alcohol withdrawal (HCC)  Age/Sex: 34 y.o. male  Admission Orders:  Regular Diet. SCDs.  Fall & Seizure Precautions.  SEWS monitoring.  Telemetry & Continuous Pulse Ox.    Scheduled Medications:  enoxaparin, 40 mg, Subcutaneous, Daily  folic acid 1 mg, thiamine (VITAMIN B1) 100 mg in sodium chloride 0.9 % 100 mL IV piggyback, , Intravenous, Daily  PHENobarbital, 130 mg, Intravenous, Once      Continuous IV Infusions:  sodium chloride, 100 mL/hr, Intravenous, Continuous      PRN Meds:  acetaminophen, 650 mg, Oral, Q6H PRN  aluminum-magnesium hydroxide-simethicone, 30 mL, Oral, Q4H PRN - x 1 5/3  loperamide, 2 mg, Oral, Q4H PRN - x 1 5/3  ondansetron, 4 mg, Intravenous, Q6H PRN  traZODone, 50 mg, Oral, HS PRN      diazepam, 10 mg, Intravenous; x 1 in ED  phenobarbital, 650 mg, Intravenous; x1 ED  phenobarbital, 130 mg, Intravenous; x1 ED      IP CONSULT TO CASE MANAGEMENT    Network Utilization Review Department  ATTENTION: Please call with any questions or concerns to [phone] and carefully listen to the prompts so that you are directed to the right person. All voicemails are confidential.   For Discharge needs, contact Care Management DC Support Team at [phone] opt. 2  Send all requests for admission clinical reviews, approved or denied determinations and any other requests to dedicated fax number below belonging to the campus where the patient is receiving treatment. List of dedicated fax numbers for  the Facilities:  FACILITY NAME UR FAX NUMBER   ADMISSION DENIALS (Administrative/Medical Necessity) [phone]   DISCHARGE SUPPORT TEAM (NETWORK) [phone]   PARENT CHILD HEALTH (Maternity/NICU/Pediatrics) [phone]   [facility] [phone]   [facility] [phone]   Count includes the [facility] [phone]   [facility] [phone]   [facility] [phone]   [facility] [phone]   [facility] [phone]   [facility] [phone]   [facility] [phone]   [facility] [phone]   [facility] [phone]   [facility], [place] [phone]

## 2024-05-04 NOTE — ASSESSMENT & PLAN NOTE
Pt with a h/o chronic heavy alcohol use   Has been previously admitted to the hospital for alcohol withdrawal. His most recent admission was at [facility] from 4/18-4/19 where he was loaded with 10 mg/kg of phenobarbital.   Patient reports relapsing shortly after discharge- states he is drinking a fifth of vodka daily. Presented to [facility] for rehab placement however due severity of withdrawal symptoms requesting inpatient detox.     Denies H/o withdrawal seizures  Previously on naltrexone, would like to restart   Withdrawal management as above  Initiate IVFs, IV thiamine, folic acid, and MVI  Consult case management/CRS for assistance with aftercare resources

## 2024-05-04 NOTE — PROGRESS NOTES
PROGRESS NOTE  DEPARTMENT OF MEDICAL TOXICOLOGY  LEVEL 4 MEDICAL DETOX UNIT  Juan Luu 34 y.o. male MRN: [id number]  Unit/Bed#: 5T DETOX 504-01 Encounter: [id number]      Reason for Admission/Principal Problem: Alcohol withdrawal    Rounding Provider: Daniel Tripathi MD  Attending Provider: Daniel Tripathi MD   5/3/2024  5:52 PM           Alcoholic gastritis  Assessment & Plan  Patient reporting epigastric pain   History of GERD   Will continue PPI   Initiate IVF     Anxiety  Assessment & Plan  Patient endorses a h/o chronic anxiety and depression  No current medication  Can consider initiation of anxiolytic medication    Denies SI/HI/thoughts of self harm  Continue home medications  Recommend OP f/u with PCP/OP Psychiatry     Chest pain  Assessment & Plan  Chest pain in the setting of concurrent cocaine use  Currently denies any chest pain or palpitations   EKG: no ischemic changes   Initial troponin < 1   Continue to monitor     Alcohol use disorder, severe, dependence (HCC)  Assessment & Plan  Pt with a h/o chronic heavy alcohol use   Has been previously admitted to the hospital for alcohol withdrawal. His most recent admission was at [facility] from 4/18-4/19 where he was loaded with 10 mg/kg of phenobarbital.   Patient reports relapsing shortly after discharge- states he is drinking a fifth of vodka daily. Presented to [facility] for rehab placement however due severity of withdrawal symptoms requesting inpatient detox.     Denies H/o withdrawal seizures  Previously on naltrexone, would like to restart   Withdrawal management as above  Initiate IVFs, IV thiamine, folic acid, and MVI  Consult case management/CRS for assistance with aftercare resources - pt interested in inpatient drug and alcohol rehab  at this time     * Alcohol withdrawal syndrome with complication (HCC)  Assessment & Plan  Continue SEWS  Continuous telemetry and pulse-ox              VTE Pharmacologic Prophylaxis:   Pharmacologic:  Enoxaparin (Lovenox)  Mechanical VTE Prophylaxis in Place: yes    Code Status: Level 1 - Full Code    Patient Centered Rounds: I have performed bedside rounds with nursing staff today.    Discussions with Specialists or Other Care Team Provider: Case management     Education and Discussions with Family / Patient: Discussed with patient    Time Spent for Care: 30 minutes.  More than 50% of total time spent on counseling and coordination of care as described above.    Current Length of Stay: 1 day(s)    Current Patient Status: Inpatient     Certification Statement: The patient will continue to require additional inpatient hospital stay due to Alcohol withdrawal  Discharge Plan: Case management consulted for assistance with disposition when patient medically stable      Subjective:   Patient continues to have withdrawal symptoms today including headache, nausea and vomiting, tremulousness.  He is having difficulty tolerating p.o. due to the nausea and vomiting.  He does endorse improvement of the abdominal pain with Maalox and says that now his abdominal muscles are just feeling achy from the vomiting.    Objective:     Clinical Opiate Withdrawal Scale  Pulse: 66    SEWS Total Score: 10 (5/4/2024 10:57 AM)        Last 24 Hours Medication List:   Current Facility-Administered Medications   Medication Dose Route Frequency Provider Last Rate    acetaminophen  650 mg Oral Q6H PRN Kathleen Greenberg PA-C      aluminum-magnesium hydroxide-simethicone  30 mL Oral Q4H PRN Crystal Andrade PA-C      enoxaparin  40 mg Subcutaneous Daily Kathleen Greenberg PA-C      folic acid 1 mg, thiamine (VITAMIN B1) 100 mg in sodium chloride 0.9 % 100 mL IV piggyback   Intravenous Daily Kathleen Greenberg PA-C      loperamide  2 mg Oral Q4H PRN Crystal Andrade PA-C      ondansetron  4 mg Intravenous Q6H PRN Kathleen Greenberg PA-C      PHENobarbital  650 mg Intravenous Once Daniel Tripathi MD      sodium  chloride  100 mL/hr Intravenous Continuous Kathleen Greenberg PA-C 100 mL/hr (24 0432)    traZODone  50 mg Oral HS PRN Kathleen Greenberg PA-C           Vitals:   Temp (24hrs), Av.3 °F (36.8 °C), Min:97.7 °F (36.5 °C), Max:98.9 °F (37.2 °C)    Temp:  [97.7 °F (36.5 °C)-98.9 °F (37.2 °C)] 98.2 °F (36.8 °C)  HR:  [] 66  Resp:  [18-20] 18  BP: (113-147)/(65-92) 127/81  SpO2:  [95 %-98 %] 95 %  Body mass index is 26.4 kg/m².     Input and Output Summary (last 24 hours):No intake or output data in the 24 hours ending 24 1111    Physical Exam:   Physical Exam  Vitals reviewed.   Constitutional:       Appearance: Normal appearance.      Comments: Uncomfortable appearing   HENT:      Head: Normocephalic.      Mouth/Throat:      Mouth: Mucous membranes are moist.   Eyes:      Conjunctiva/sclera: Conjunctivae normal.      Pupils: Pupils are equal, round, and reactive to light.   Cardiovascular:      Rate and Rhythm: Normal rate and regular rhythm.      Heart sounds: No murmur heard.     No friction rub. No gallop.   Pulmonary:      Effort: Pulmonary effort is normal. No respiratory distress.      Breath sounds: Normal breath sounds.   Abdominal:      General: There is no distension.      Palpations: Abdomen is soft.      Tenderness: There is no abdominal tenderness.   Musculoskeletal:      Cervical back: Neck supple.      Right lower leg: No edema.      Left lower leg: No edema.   Skin:     General: Skin is warm and dry.   Neurological:      General: No focal deficit present.      Mental Status: He is alert.      Comments: Tremulous   Psychiatric:      Comments: Anxious appearing         Additional Data:     Labs:   Results from last 7 days   Lab Units 24  0431 24  1304   WBC Thousand/uL 10.39* 9.57   HEMOGLOBIN g/dL 14.5 15.3   HEMATOCRIT % 45.3 44.6   PLATELETS Thousands/uL 233 316   SEGS PCT %  --  70   LYMPHO PCT %  --  23   MONO PCT %  --  6   EOS PCT %  --  0      Results from  last 7 days   Lab Units 05/04/24  0431   SODIUM mmol/L 136   POTASSIUM mmol/L 3.7   CHLORIDE mmol/L 102   CO2 mmol/L 24   BUN mg/dL 8   CREATININE mg/dL 1.06   ANION GAP mmol/L 10   CALCIUM mg/dL 8.2*   ALBUMIN g/dL 3.9   TOTAL BILIRUBIN mg/dL 0.81   ALK PHOS U/L 69   ALT U/L 29   AST U/L 23   GLUCOSE RANDOM mg/dL 84                              * I Have Reviewed All Lab Data Listed Above.  * Additional Pertinent Lab Tests Reviewed: All Labs For Current Hospital Admission Reviewed      Imaging Studies: I have personally reviewed pertinent reports.        Recent Cultures (last 7 days):          Today, Patient Was Seen By: Daniel Tripathi MD    ** Please Note: Dictation voice to text software may have been used in the creation of this document. **

## 2024-05-04 NOTE — DISCHARGE INSTR - OTHER ORDERS
CRISIS INFORMATION  If you are experiencing a mental health emergency, you may call the [facility] Crisis Intervention Office 24 hours a day, 7 days per week at [phone].    In [facility], call [phone].    Warmline is a confidential 24/7 telephone support service manned by trained mental health consumers.  [facility] provides support, a listening ear and can provide information about available services.[facility] specializes in the concerns of mental health consumers, their families and friends.  However, we are also here for anyone who has a mental health concern, is confused about or just doesn't know anything about mental health or where to get information.  To reach [facility], call [phone].    HOW TO GET SUBSTANCE ABUSE HELP:  If you or someone you know has a drug or alcohol problem, there is help:  [facility] Drug & Alcohol Abuse Services: [phone]  [facility] Drug & Alcohol Abuse Services: [phone]  An assessment is the first step.   In addition to those listed there are other programs available in the area but assessment is best to determine an appropriate level of care.  If you DO NOT have Medical Assistance (MA) or Private Insurance, an assessment can be scheduled at one of these providers:  [facility]  [address] [place] [address]  [phone]   [facility]  [address] [place] [address]  [phone]   [address] [place] [address]  [phone]   [facility]  [address] [place] [address]  [phone]   [facility]  [address] [place] [address]  [phone]   [facility]  [address] [place] [address]  [phone]   [facility]  [address], [place] [address]  [phone]     If you HAVE Medical Assistance, an assessment can be scheduled at one of these providers:  [facility]  [address] [place] [address]  [phone]   [facility]  [address] [place] [address]  [phone]   [facility] D[facility]ake [facility]  [address], 1st Floor, [facility] [address]  [phone]  [address] [place] [address], [address], [place] [address] [phone]   [address] [address]  [phone]   [address] [place] [address]  [phone]   [facility] ([facility])  [address] [place] [address]  [phone]   [facility]  [address] [place] [address]  [phone]   [facility]  [address] [place] [address]  [phone]   [facility]  [address] [place] [address]  [phone]   [facility]  [address], [place] [address]  [phone]     If you HAVE Private Insurance, an assessment can be scheduled at one of these providers.  Please contact these Providers to determine if they are in your network plan:  [facility]  [address], [place] [address]  [phone]   [address] [address]  [phone]   [address] [place] [address]  [phone]   [facility] ([facility])  [address] [facility] [place] [address]  [phone]   [facility]  [address] [place] [address]  [phone]   [facility]  [address], [place] [address]  [phone]

## 2024-05-05 VITALS
HEIGHT: 70 IN | RESPIRATION RATE: 16 BRPM | SYSTOLIC BLOOD PRESSURE: 128 MMHG | OXYGEN SATURATION: 97 % | TEMPERATURE: 97.6 F | WEIGHT: 184 LBS | HEART RATE: 82 BPM | DIASTOLIC BLOOD PRESSURE: 82 MMHG | BODY MASS INDEX: 26.34 KG/M2

## 2024-05-05 LAB
ALBUMIN SERPL BCP-MCNC: 4 G/DL (ref 3.5–5)
ALP SERPL-CCNC: 108 U/L (ref 34–104)
ALT SERPL W P-5'-P-CCNC: 31 U/L (ref 7–52)
ANION GAP SERPL CALCULATED.3IONS-SCNC: 10 MMOL/L (ref 4–13)
AST SERPL W P-5'-P-CCNC: 29 U/L (ref 13–39)
BILIRUB SERPL-MCNC: 0.67 MG/DL (ref 0.2–1)
BUN SERPL-MCNC: 7 MG/DL (ref 5–25)
CALCIUM SERPL-MCNC: 8.3 MG/DL (ref 8.4–10.2)
CHLORIDE SERPL-SCNC: 104 MMOL/L (ref 96–108)
CO2 SERPL-SCNC: 22 MMOL/L (ref 21–32)
CREAT SERPL-MCNC: 0.98 MG/DL (ref 0.6–1.3)
ERYTHROCYTE [DISTWIDTH] IN BLOOD BY AUTOMATED COUNT: 13.5 % (ref 11.6–15.1)
GFR SERPL CREATININE-BSD FRML MDRD: 100 ML/MIN/1.73SQ M
GLUCOSE SERPL-MCNC: 93 MG/DL (ref 65–140)
HCT VFR BLD AUTO: 43.6 % (ref 36.5–49.3)
HGB BLD-MCNC: 13.6 G/DL (ref 12–17)
MAGNESIUM SERPL-MCNC: 2.2 MG/DL (ref 1.9–2.7)
MCH RBC QN AUTO: 31.3 PG (ref 26.8–34.3)
MCHC RBC AUTO-ENTMCNC: 31.2 G/DL (ref 31.4–37.4)
MCV RBC AUTO: 101 FL (ref 82–98)
PLATELET # BLD AUTO: 201 THOUSANDS/UL (ref 149–390)
PMV BLD AUTO: 9.3 FL (ref 8.9–12.7)
POTASSIUM SERPL-SCNC: 3.6 MMOL/L (ref 3.5–5.3)
PROT SERPL-MCNC: 6.3 G/DL (ref 6.4–8.4)
RBC # BLD AUTO: 4.34 MILLION/UL (ref 3.88–5.62)
SODIUM SERPL-SCNC: 136 MMOL/L (ref 135–147)
WBC # BLD AUTO: 6.96 THOUSAND/UL (ref 4.31–10.16)

## 2024-05-05 PROCEDURE — 85027 COMPLETE CBC AUTOMATED: CPT | Performed by: EMERGENCY MEDICINE

## 2024-05-05 PROCEDURE — 83735 ASSAY OF MAGNESIUM: CPT | Performed by: EMERGENCY MEDICINE

## 2024-05-05 PROCEDURE — 80053 COMPREHEN METABOLIC PANEL: CPT | Performed by: EMERGENCY MEDICINE

## 2024-05-05 PROCEDURE — 99238 HOSP IP/OBS DSCHRG MGMT 30/<: CPT | Performed by: EMERGENCY MEDICINE

## 2024-05-05 RX ORDER — POTASSIUM CHLORIDE 14.9 MG/ML
20 INJECTION INTRAVENOUS ONCE
Qty: 100 ML | Refills: 0 | Status: COMPLETED | OUTPATIENT
Start: 2024-05-05 | End: 2024-05-05

## 2024-05-05 RX ORDER — NALTREXONE HYDROCHLORIDE 50 MG/1
50 TABLET, FILM COATED ORAL DAILY
Qty: 30 TABLET | Refills: 0 | Status: SHIPPED | OUTPATIENT
Start: 2024-05-05 | End: 2024-06-04

## 2024-05-05 RX ORDER — LORAZEPAM 2 MG/ML
1 INJECTION INTRAMUSCULAR ONCE
Status: COMPLETED | OUTPATIENT
Start: 2024-05-05 | End: 2024-05-05

## 2024-05-05 RX ORDER — MAGNESIUM HYDROXIDE/ALUMINUM HYDROXICE/SIMETHICONE 120; 1200; 1200 MG/30ML; MG/30ML; MG/30ML
30 SUSPENSION ORAL EVERY 4 HOURS PRN
Qty: 355 ML | Refills: 0 | Status: SHIPPED | OUTPATIENT
Start: 2024-05-05

## 2024-05-05 RX ORDER — NALTREXONE HYDROCHLORIDE 50 MG/1
50 TABLET, FILM COATED ORAL DAILY
Status: DISCONTINUED | OUTPATIENT
Start: 2024-05-05 | End: 2024-05-05 | Stop reason: HOSPADM

## 2024-05-05 RX ADMIN — POTASSIUM CHLORIDE 20 MEQ: 14.9 INJECTION, SOLUTION INTRAVENOUS at 12:11

## 2024-05-05 RX ADMIN — NALTREXONE HYDROCHLORIDE 50 MG: 50 TABLET, FILM COATED ORAL at 12:11

## 2024-05-05 RX ADMIN — LORAZEPAM 1 MG: 2 INJECTION INTRAMUSCULAR; INTRAVENOUS at 10:25

## 2024-05-05 RX ADMIN — POTASSIUM CHLORIDE 20 MEQ: 14.9 INJECTION, SOLUTION INTRAVENOUS at 08:10

## 2024-05-05 RX ADMIN — FOLIC ACID: 5 INJECTION, SOLUTION INTRAMUSCULAR; INTRAVENOUS; SUBCUTANEOUS at 08:11

## 2024-05-05 RX ADMIN — SODIUM CHLORIDE 100 ML/HR: 0.9 INJECTION, SOLUTION INTRAVENOUS at 00:49

## 2024-05-05 NOTE — CASE MANAGEMENT
Case Management Discharge Planning Note    Patient name Juan Luu  Location 5T DETOX 504/5T DETOX 50* MRN [id number]  : 1990 Date 2024       Current Admission Date: 5/3/2024  Current Admission Diagnosis:Alcohol withdrawal syndrome with complication (HCC)   Patient Active Problem List    Diagnosis Date Noted    Alcohol withdrawal syndrome with complication (HCC) 2024    Alcohol use disorder, severe, dependence (HCC) 2024    Chest pain 2024    Anxiety 2024    Alcoholic gastritis 2024      LOS (days): 2  Geometric Mean LOS (GMLOS) (days):   Days to GMLOS:     OBJECTIVE:  Risk of Unplanned Readmission Score: 9.13         Current admission status: Inpatient   Preferred Pharmacy:   [id number] [place] - [address] [place] [address] 100  [address] [place] [id number]  Phone: [phone] Fax: [phone]    Primary Care Provider: No primary care provider on file.    Primary Insurance:   Secondary Insurance:     DISCHARGE DETAILS:    Discharge planning discussed with:: Juan Martinez will discharge to his home today at 1730pm. Juan' boss will provide transportation. Juan declined family contacts at this time. Preferred pharmacy: [facility], [address] [place] [address], [phone].                  Other Referral/Resources/Interventions Provided:  Referrals Provided:: IOP, Crisis Hotline, Peer Specialist, Support Group, Therapist, Other (Specify) (information on SA recovery providers)    Would you like to participate in our [facility] service program?  : No - Declined          Transport at Discharge : Other (Comment) (friend will provide transport)                                   Family notified:: declined

## 2024-05-05 NOTE — PROGRESS NOTES
05/05/24 1147   Financial Resource Strain   How hard is it for you to pay for the very basics like food, housing, medical care, and heating? Not hard   Housing Stability   In the last 12 months, was there a time when you were not able to pay the mortgage or rent on time? N   In the last 12 months, how many places have you lived? 2   In the last 12 months, was there a time when you did not have a steady place to sleep or slept in a shelter (including now)? N   Transportation Needs   In the past 12 months, has lack of transportation kept you from medical appointments or from getting medications? no   In the past 12 months, has lack of transportation kept you from meetings, work, or from getting things needed for daily living? No   Food Insecurity   Within the past 12 months, you worried that your food would run out before you got the money to buy more. Never true   Within the past 12 months, the food you bought just didn't last and you didn't have money to get more. Never true   Intimate Partner Violence   Within the last year, have you been afraid of your partner or ex-partner? No   Within the last year, have you been humiliated or emotionally abused in other ways by your partner or ex-partner? No   Within the last year, have you been kicked, hit, slapped, or otherwise physically hurt by your partner or ex-partner? No   Within the last year, have you been raped or forced to have any kind of sexual activity by your partner or ex-partner? No   Alcohol Use   Q1: How often do you have a drink containing alcohol? 4 or more ti   Q2: How many drinks containing alcohol do you have on a typical day when you are drinking? 10 or more   Q3: How often do you have six or more drinks on one occasion? Daily   Utilities   In the past 12 months has the electric, gas, oil, or water company threatened to shut off services in your home? No   Health Literacy   How often do you need to have someone help you when you read instructions,  pamphlets, or other written material from your doctor or pharmacy? Never

## 2024-05-05 NOTE — PROGRESS NOTES
05/05/24 1125   [facility] Information   [facility]   Patient Information   Mental Status Alert   Primary Caregiver Self   Support System Extended family;Friends;Community   Hindu/Cultural Requests [employer]beterian   Legal Information   Legal Issues hx DUI (15 yrs ago); no current   Advance Directives   (none)   Advance Directives Status Discussed - Patient/Family Declined   Health Care Proxy Appointed No   Activities of Daily Living Prior to Admission   Functional Status Independent   Assistive Device No device needed   Living Arrangement Apartment;Lives alone   Ambulation Independent   Access to Firearms   Access to Firearms No   Income Information   Income Source Employed  (employed FT)   Means of Transportation   Means of Transport to Appts: Drives Self        05/05/24 1126   Substance Abuse Addendum Details   History of Withdrawal Symptoms Other withdrawal symptoms (specify in comment)  (shakes, anxiety, sweats, chest pain, vomit)   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 Yes   Alcohol Use Frequency Daily   Alcohol Drink of Choice Vodka   1st Use of Alcohol 15 yo   Last Use of Alcohol & Amount 5/3/24; one-fifth   Longest Abstinence from Alcohol 2.5 yrs     Pt is a 34 yr old, single male who admitted to the detox unit for alcohol withdrawal. Pt had presented to the [facility] ED requesting detoxification. Pt's name, address, date of birth and telephone number were confirmed with patient. Pt was informed of case management role and the purpose of completion of intake.      Pt states he lives alone in an apartment. Pt states trigger for relapse is recent move from [place] to [place]. Pt was cooperative with no withdrawal symptoms noted. Pt endorses daily ETOH use to include 1/5 vodka daily, last use: date of  "admission. Age of first use: 14 yrs old, longest sobriety: 2.5 yrs. Heavy use started approx 8-9 mons ago. Pt admits previous naltrexone use - prescribed by ED - no follow up. Pt reports history of withdrawal symptoms to include vomiting, anxious, sweats, shakes and, most recently, chest pain. Pt denies any hx withdrawal seizures; denies hallucinations. Pt denies hx family substance abuse.    AUDIT: 29  PAWSS: 4  UDS: not completed  Ethanol: 129     Pt denies hx of mental health treatment and diagnosis. Pt denies current SI/HI/AH/VH and hx suicide attempt. Pt endorses continued anxiety \"a lot right now.\" Pt denies hx family mental health.     Pt has no chronic medical conditions and has no current PCP. Pt expressed interest in appointment with cardiologist only. Pt states he has current health insurance but does not have card with him. Preferred pharmacy is [facility] [address], Ph: [phone].     Pt denies current legal issues; admits hx DUI (15 yrs ago). Pt denies access to firearms.    Pt reports she is currently employed FT; requested physician note for work. Pt has 0 children; no current relationship. Pt completed h/s - BS Engineering; no  hx. Pt states supports are his twin brother, Kp, and sister, Nohemy. Pt has reliable transportation; drives self to appts. No ROIs for family.     Pt and SW completed relapse prevention plan, signed and a copy was provided to pt. Pt declined referrals to IOP or IP Substance Abuse rehab. Pt states he will meet with human resources at his employment on Monday to work through the EAP program for assistance with payment for rehab services. Pt presents in the pre-contemplation stage of change.    "

## 2024-05-05 NOTE — DISCHARGE SUMMARY
[facility]  Discharge- Juan Luu 1990, 34 y.o. male MRN: [id number]  Unit/Bed#: 5T DETOX 504-01 Encounter: [id number]  Primary Care Provider: No primary care provider on file.   Date and time admitted to hospital: 5/3/2024  5:52 PM    Alcoholic gastritis  Assessment & Plan  Patient reporting epigastric pain   History of GERD   Will continue PPI   Initiate IVF     Anxiety  Assessment & Plan  Patient endorses a h/o chronic anxiety and depression  No current medication  Can consider initiation of anxiolytic medication    Denies SI/HI/thoughts of self harm  Continue home medications  Recommend OP f/u with PCP/OP Psychiatry     Chest pain  Assessment & Plan  Chest pain in the setting of concurrent cocaine use  Currently denies any chest pain or palpitations   EKG: no ischemic changes   Initial troponin < 1   Continue to monitor     Alcohol use disorder, severe, dependence (HCC)  Assessment & Plan  Pt with a h/o chronic heavy alcohol use   Has been previously admitted to the hospital for alcohol withdrawal. His most recent admission was at [facility] from 4/18-4/19 where he was loaded with 10 mg/kg of phenobarbital.   Patient reports relapsing shortly after discharge- states he is drinking a fifth of vodka daily. Presented to [facility] for rehab placement however due severity of withdrawal symptoms requesting inpatient detox.     Denies H/o withdrawal seizures  Previously on naltrexone, would like to restart   Withdrawal management as above  Initiate IVFs, IV thiamine, folic acid, and MVI  Consult case management/CRS for assistance with aftercare resources    * Alcohol withdrawal syndrome with complication (HCC)  Assessment & Plan  Completed SEWS protocol with resolution of symptoms  Continuous telemetry and pulse-ox                  Medical Problems        Admission Date:   Admission Orders (From admission, onward)       Ordered        05/03/24 1754  Inpatient Admission  Once                             Admitting Diagnosis: Alcohol withdrawal (HCC)    HPI: Patient initially presented to the [facility] ED requesting detoxification from alcohol. Endorsed 1/5 vodka daily with last drink the day prior at 11pm. He tried to self taper from drinking and started to experience withdrawal symptoms. He went to work and supervisor recommended him seek out rehab.    Procedures Performed: No orders of the defined types were placed in this encounter.      Summary of Hospital Course: Patient was admitted to the [facility] medical detox unit [facility] for medically assisted alcohol withdrawal and received a total of 2015 mg phenobarbital without complication, with last dose of phenobarbital administered 5/4 @ 22:44. Patient's alcohol withdrawal symptoms subsequently resolved, and he has remained without objective evidence of alcohol withdrawal at this time. During this hospitalization, patient was found to have chest pain, which had workup including nonischemic ECG and negative troponin, and has since resolved. Also experienced epigastric discomfort which improved. Case management and CRS were consulted for assistance with aftercare resources, and patient will be discharged home to follow up with EAP tomorrow.    Subjective: No complaints.    Discharge Day Exam:    Vitals:    05/05/24 0707   BP: 128/82   Pulse: 82   Resp: 16   Temp: 97.6 °F (36.4 °C)   SpO2: 97%       GENERAL APPEARANCE: NAD  NEURO: GCS 15, neuro grossly intact  HEENT: MMM  CV: RRR  LUNGS: CTAB  GI: soft, NT, ND  MSK: moves all extremities  SKIN: intact    Significant Findings, Care, Treatment and Services Provided:   -Alcohol withdrawal, severe, as above  -Alcoholic gastritis- symptoms resolved with supportive care    Complications: none    Condition at Discharge: good         Discharge instructions/Information to patient and family:   See after visit summary for information provided to patient and family.      Provisions for Follow-Up Care:  See  after visit summary for information related to follow-up care and any pertinent home health orders.      PCP: No primary care provider on file.    Disposition: Home    Planned Readmission: No    Discharge Statement   I spent 14 minutes discharging the patient. This time was spent on the day of discharge. I had direct contact with the patient on the day of discharge. Additional documentation is required if more than 30 minutes were spent on discharge.     Discharge Medications:  See after visit summary for reconciled discharge medications provided to patient and family.

## 2024-05-05 NOTE — NURSING NOTE
Patient discharged, IV removed, belongings accounted for. AVS reviewed with patient, questions answered and understanding stated. Patient ambulated to lobby with RN.

## 2024-05-06 NOTE — UTILIZATION REVIEW
NOTIFICATION OF INPATIENT MEDICAL ADMISSION   AUTHORIZATION REQUEST   SERVICING FACILITY:   [address] [address]  Tax ID: [id number]  NPI: [id number] ATTENDING PROVIDER:  Attending Name and NPI#: Daniel Tripathi Md [[id number]]  Address: [address] [place] [address]  Phone: [phone]     ADMISSION INFORMATION:  Place of Service: Inpatient Acute Care Hospital  Place of Service Code: 21  Inpatient Admission Date/Time: 5/3/24  5:52 PM  Discharge Date/Time: 5/5/2024  3:45 PM  Admitting Diagnosis Code/Description:  Alcohol withdrawal (HCC)     UTILIZATION REVIEW CONTACT:  Lupe Klein, Utilization   Network Utilization Review Department  Phone: [phone]  Fax [phone]  Email: [email or web address]  Contact for approvals/pending authorizations, clinical reviews, and discharge.     PHYSICIAN ADVISORY SERVICES:  Medical Necessity Denial & Skuz-kj-Ktzc Review  Phone: [phone]  Fax: [phone]  Email: [email or web address]     DISCHARGE SUPPORT TEAM:  For Patients Discharge Needs & Updates  Phone: [phone] opt. 2 Fax: [phone]  Email: [email or web address]           Larisa Eldridge RN  Registered Nurse  Specialty: Medical Surgical     Utilization Review      Signed     Date of Service: 5/4/2024  8:22 AM     Signed         Initial Clinical Review     Pt initially presented to [facility] ED. Pt was transferred by EMS to [facility] for its Level IV medically managed intensive inpatient detox unit, not available at [facility].     Admission: Date/Time/Statement:   Admission Orders (From admission, onward)          Ordered         05/03/24 1754   Inpatient Admission  Once                                     Orders Placed This Encounter   Procedures    Inpatient Admission       Standing Status:   Standing       Number of Occurrences:   1       Order  Specific Question:   Level of Care       Answer:   Med Surg [16]       Order Specific Question:   Estimated length of stay       Answer:   More than 2 Midnights       Order Specific Question:   Certification       Answer:   I certify that inpatient services are medically necessary for this patient for a duration of greater than two midnights. See H&P and MD Progress Notes for additional information about the patient's course of treatment.      ED Arrival Information       Initial Presentation: 34 y.o. male who presented to medical detox. Inpatient admission for evaluation and treatment of alcohol withdrawal syndrome. Presented w/ need for detox from alcohol. Serum ETOH: 129. Reports vodka 1/5 daily, last drink on 5/2 @ 2300. Has prior rehab treatment for withdrawal. Reports no hx of withdrawal seizures. On exam, anxiety, insomnia, tremor, diaphoresis, tachycardia, hypertension, and headache.  SEWS 16. Plan: SEWS monitoring w/ phenobarbital management, IV thiamine/folic acid supplement, IVF, telemetry, continuous pulse ox, continue PTA meds, trend labs, replete electrolytes as needed.          Wt Readings from Last 1 Encounters:   05/03/24 83.5 kg (184 lb)      Vital Signs:   Date/Time Temp Pulse Resp BP MAP (mmHg) SpO2 O2 Device Patient Position - Orthostatic VS   05/04/24 0254 98.9 °F (37.2 °C) 66 18 137/87 103 97 % None (Room air) Lying   05/03/24 2305 97.7 °F (36.5 °C) 85 18 126/65 85 98 % None (Room air) Lying   05/03/24 2001 98.5 °F (36.9 °C) 80 20 147/83 -- 96 % None (Room air) Lying   05/03/24 1757 98.2 °F (36.8 °C) 76 18 129/92 -- 96 % None (Room air) Lying         Severity of Ethanol Withdrawal Scale (SEWS):   SEWS     Row Name 05/04/24 0819 05/04/24 0258 05/03/24 2306 05/03/24 2000 05/03/24 1801   Severity of Ethanol Withdrawal Scale (SEWS)   ANXIETY: Do you feel that something bad is about to happen to you right now? 3  -RG 0  -DE 3  -DE 0  -DE 3  -LL   NAUSEA and DRY HEAVES or VOMITING? 0  -RG 0  -DE 0   -DE 0  -DE 3  -LL   SWEATING: (includes moist palms, sweating now)? Score 0 or 2 2  -RG 0  -DE 2  -DE 0  -DE 2  -LL   TREMOR: with arms extended eyes closed? 2  -RG 0  -DE 2  -DE 0  -DE 2  -LL   AGITATION: Fidgety, restless, pacing? 0  -RG 0  -DE 0  -DE 0  -DE 3  -LL   DISORIENTATION: 0  -RG 0  -DE 0  -DE 0  -DE 0  -LL   HALLUCINATIONS: 0  -RG 0  -DE 0  -DE 0  -DE 0  -LL   VITAL SIGNS: ANY (Pulse >110, Diastolic BP >90, Temp >99.6) 0  -RG 0  -DE 0  -DE 0  -DE 3  -LL   SEWS Total Score 7  -RG 0  -DE 7  -DE 0  -DE 16  -LL   Mary Agitation Sedation Scale (RASS)   Mary Agitation Sedation Scale (RASS) 0  -RG -2  -DE -1  -DE -2  -DE --         Pertinent Labs/Diagnostic Test Results:      5/3 ECG - Sinus rhythm with sinus arrhythmia  Normal ECG            Results from last 7 days   Lab Units 05/04/24  0431 05/03/24  1304   WBC Thousand/uL 10.39* 9.57   HEMOGLOBIN g/dL 14.5 15.3   HEMATOCRIT % 45.3 44.6   PLATELETS Thousands/uL 233 316   TOTAL NEUT ABS Thousands/µL  --  6.72                Results from last 7 days   Lab Units 05/04/24  0431 05/03/24  1304   SODIUM mmol/L 136 136   POTASSIUM mmol/L 3.7 4.0   CHLORIDE mmol/L 102 105   CO2 mmol/L 24 20*   ANION GAP mmol/L 10 11   BUN mg/dL 8 7   CREATININE mg/dL 1.06 0.87   EGFR ml/min/1.73sq m 91 112   CALCIUM mg/dL 8.2* 7.8*   MAGNESIUM mg/dL 2.0  --             Results from last 7 days   Lab Units 05/04/24  0431 05/03/24  1304   AST U/L 23 34   ALT U/L 29 34   ALK PHOS U/L 69 63   TOTAL PROTEIN g/dL 6.3* 6.1*   ALBUMIN g/dL 3.9 4.1   TOTAL BILIRUBIN mg/dL 0.81 0.56                Results from last 7 days   Lab Units 05/04/24  0431 05/03/24  1304   GLUCOSE RANDOM mg/dL 84 85           Results from last 7 days   Lab Units 05/03/24  1304   HS TNI 0HR ng/L <2           Results from last 7 days   Lab Units 05/03/24  1304   LIPASE u/L 19           Results from last 7 days   Lab Units 05/03/24  1304   ETHANOL LVL mg/dL 129*   ACETAMINOPHEN LVL ug/mL <2*   SALICYLATE LVL  mg/dL <5         Admitting Diagnosis: Alcohol withdrawal (HCC)  Age/Sex: 34 y.o. male  Admission Orders:  Regular Diet.   SCDs.  Fall & Seizure Precautions.  SEWS monitoring.  Telemetry & Continuous Pulse Ox.     Scheduled Medications:  enoxaparin, 40 mg, Subcutaneous, Daily  folic acid 1 mg, thiamine (VITAMIN B1) 100 mg in sodium chloride 0.9 % 100 mL IV piggyback, , Intravenous, Daily  PHENobarbital, 130 mg, Intravenous, Once        Continuous IV Infusions:  sodium chloride, 100 mL/hr, Intravenous, Continuous        PRN Meds:  acetaminophen, 650 mg, Oral, Q6H PRN  aluminum-magnesium hydroxide-simethicone, 30 mL, Oral, Q4H PRN - x 1 5/3  loperamide, 2 mg, Oral, Q4H PRN - x 1 5/3  ondansetron, 4 mg, Intravenous, Q6H PRN  traZODone, 50 mg, Oral, HS PRN        diazepam, 10 mg, Intravenous; x 1 in ED  phenobarbital, 650 mg, Intravenous; x1 ED  phenobarbital, 130 mg, Intravenous; x1 ED        IP CONSULT TO CASE MANAGEMENT     Network Utilization Review Department  ATTENTION: Please call with any questions or concerns to [phone] and carefully listen to the prompts so that you are directed to the right person. All voicemails are confidential.   For Discharge needs, contact Care Management DC Support Team at [phone] opt. 2  Send all requests for admission clinical reviews, approved or denied determinations and any other requests to dedicated fax number below belonging to the campus where the patient is receiving treatment. List of dedicated fax numbers for the Facilities:  FACILITY NAME UR FAX NUMBER   ADMISSION DENIALS (Administrative/Medical Necessity) [phone]   DISCHARGE SUPPORT TEAM (NETWORK) [phone]   PARENT CHILD HEALTH (Maternity/NICU/Pediatrics) [phone]   General acute hospital [phone]   [facility] [phone]   [facility] [phone]   [facility] [phone]   [facility]  [facility] [phone]   [facility] [phone]   [facility] [phone]   [facility] [phone]   [facility] [phone]   [facility] [phone]   [facility] [phone]   [facility] [phone]

## 2024-05-08 NOTE — UTILIZATION REVIEW
NOTIFICATION OF ADMISSION DISCHARGE   This is a Notification of Discharge from [facility]. Please be advised that this patient has been discharge from our facility. Below you will find the admission and discharge date and time including the patient’s disposition.   UTILIZATION REVIEW CONTACT:  Lupe Klein MA  Utilization   Network Utilization Review Department  Phone: [phone] x carefully listen to the prompts. All voicemails are confidential.  Email: [email or web address]     ADMISSION INFORMATION  PRESENTATION DATE: 5/3/2024  5:52 PM  OBERVATION ADMISSION DATE:   INPATIENT ADMISSION DATE: 5/3/24  5:52 PM   DISCHARGE DATE: 5/5/2024  3:45 PM   DISPOSITION:Home/Self Care    Network Utilization Review Department  ATTENTION: Please call with any questions or concerns to [phone] and carefully listen to the prompts so that you are directed to the right person. All voicemails are confidential.   For Discharge needs, contact Care Management DC Support Team at [phone] opt. 2  Send all requests for admission clinical reviews, approved or denied determinations and any other requests to dedicated fax number below belonging to the campus where the patient is receiving treatment. List of dedicated fax numbers for the Facilities:  FACILITY NAME UR FAX NUMBER   ADMISSION DENIALS (Administrative/Medical Necessity) [phone]   DISCHARGE SUPPORT TEAM ([facility]) [phone]   PARENT CHILD HEALTH (Maternity/NICU/Pediatrics) [phone]   [facility] [phone]   [facility] [phone]   [facility] [phone]   [facility] [phone]   Critical access hospital [phone]   [facility] [phone]   [facility] [phone]   [facility] [phone]    [facility] [phone]   [facility] [phone]   [facility] [phone]   [facility] [phone]

## 2024-05-10 ENCOUNTER — HOSPITAL ENCOUNTER (EMERGENCY)
Facility: HOSPITAL | Age: 34
Discharge: HOME | End: 2024-05-10
Attending: EMERGENCY MEDICINE
Payer: COMMERCIAL

## 2024-05-10 VITALS
BODY MASS INDEX: 26.6 KG/M2 | WEIGHT: 185.41 LBS | DIASTOLIC BLOOD PRESSURE: 88 MMHG | SYSTOLIC BLOOD PRESSURE: 135 MMHG | TEMPERATURE: 98.3 F | HEART RATE: 109 BPM | OXYGEN SATURATION: 97 % | RESPIRATION RATE: 22 BRPM

## 2024-05-10 DIAGNOSIS — Z78.9 ALCOHOL USE: ICD-10-CM

## 2024-05-10 DIAGNOSIS — F43.10 PTSD (POST-TRAUMATIC STRESS DISORDER): ICD-10-CM

## 2024-05-10 DIAGNOSIS — S81.811A LACERATION OF RIGHT LOWER EXTREMITY, INITIAL ENCOUNTER: Primary | ICD-10-CM

## 2024-05-10 LAB
BASOPHILS # BLD AUTO: 0.06 THOUSANDS/ÂΜL (ref 0–0.1)
BASOPHILS NFR BLD AUTO: 1 % (ref 0–1)
EOSINOPHIL # BLD AUTO: 0.08 THOUSAND/ÂΜL (ref 0–0.61)
EOSINOPHIL NFR BLD AUTO: 1 % (ref 0–6)
ERYTHROCYTE [DISTWIDTH] IN BLOOD BY AUTOMATED COUNT: 14.3 % (ref 11.6–15.1)
ETHANOL EXG-MCNC: 0.11 MG/DL
ETHANOL EXG-MCNC: 0.18 MG/DL
ETHANOL EXG-MCNC: 0.2 MG/DL
ETHANOL EXG-MCNC: 0.29 MG/DL
ETHANOL EXG-MCNC: 0.31 MG/DL
HCT VFR BLD AUTO: 47.4 % (ref 36.5–49.3)
HGB BLD-MCNC: 16.5 G/DL (ref 12–17)
IMM GRANULOCYTES # BLD AUTO: 0.03 THOUSAND/UL (ref 0–0.2)
IMM GRANULOCYTES NFR BLD AUTO: 0 % (ref 0–2)
LYMPHOCYTES # BLD AUTO: 2.09 THOUSANDS/ÂΜL (ref 0.6–4.47)
LYMPHOCYTES NFR BLD AUTO: 28 % (ref 14–44)
MCH RBC QN AUTO: 31.7 PG (ref 26.8–34.3)
MCHC RBC AUTO-ENTMCNC: 34.8 G/DL (ref 31.4–37.4)
MCV RBC AUTO: 91 FL (ref 82–98)
MONOCYTES # BLD AUTO: 0.35 THOUSAND/ÂΜL (ref 0.17–1.22)
MONOCYTES NFR BLD AUTO: 5 % (ref 4–12)
NEUTROPHILS # BLD AUTO: 5 THOUSANDS/ÂΜL (ref 1.85–7.62)
NEUTS SEG NFR BLD AUTO: 65 % (ref 43–75)
NRBC BLD AUTO-RTO: 0 /100 WBCS
PLATELET # BLD AUTO: 330 THOUSANDS/UL (ref 149–390)
PMV BLD AUTO: 8.9 FL (ref 8.9–12.7)
RBC # BLD AUTO: 5.21 MILLION/UL (ref 3.88–5.62)
WBC # BLD AUTO: 7.61 THOUSAND/UL (ref 4.31–10.16)

## 2024-05-10 PROCEDURE — 93005 ELECTROCARDIOGRAM TRACING: CPT

## 2024-05-10 PROCEDURE — 96361 HYDRATE IV INFUSION ADD-ON: CPT

## 2024-05-10 PROCEDURE — 85025 COMPLETE CBC W/AUTO DIFF WBC: CPT

## 2024-05-10 PROCEDURE — 99284 EMERGENCY DEPT VISIT MOD MDM: CPT

## 2024-05-10 PROCEDURE — 82075 ASSAY OF BREATH ETHANOL: CPT

## 2024-05-10 PROCEDURE — 96375 TX/PRO/DX INJ NEW DRUG ADDON: CPT

## 2024-05-10 PROCEDURE — 36415 COLL VENOUS BLD VENIPUNCTURE: CPT

## 2024-05-10 PROCEDURE — 12002 RPR S/N/AX/GEN/TRNK2.6-7.5CM: CPT

## 2024-05-10 PROCEDURE — 99285 EMERGENCY DEPT VISIT HI MDM: CPT

## 2024-05-10 PROCEDURE — 90715 TDAP VACCINE 7 YRS/> IM: CPT

## 2024-05-10 PROCEDURE — 96374 THER/PROPH/DIAG INJ IV PUSH: CPT

## 2024-05-10 PROCEDURE — 90471 IMMUNIZATION ADMIN: CPT

## 2024-05-10 RX ORDER — ONDANSETRON 4 MG/1
4 TABLET, ORALLY DISINTEGRATING ORAL EVERY 6 HOURS PRN
Qty: 12 TABLET | Refills: 0 | Status: SHIPPED | OUTPATIENT
Start: 2024-05-10

## 2024-05-10 RX ORDER — METOCLOPRAMIDE HYDROCHLORIDE 5 MG/ML
10 INJECTION INTRAMUSCULAR; INTRAVENOUS ONCE
Status: COMPLETED | OUTPATIENT
Start: 2024-05-10 | End: 2024-05-10

## 2024-05-10 RX ORDER — OXYCODONE HYDROCHLORIDE 5 MG/1
5 TABLET ORAL ONCE
Status: COMPLETED | OUTPATIENT
Start: 2024-05-10 | End: 2024-05-10

## 2024-05-10 RX ORDER — ONDANSETRON 2 MG/ML
4 INJECTION INTRAMUSCULAR; INTRAVENOUS ONCE
Status: COMPLETED | OUTPATIENT
Start: 2024-05-10 | End: 2024-05-10

## 2024-05-10 RX ORDER — LIDOCAINE HYDROCHLORIDE AND EPINEPHRINE 10; 10 MG/ML; UG/ML
10 INJECTION, SOLUTION INFILTRATION; PERINEURAL ONCE
Status: COMPLETED | OUTPATIENT
Start: 2024-05-10 | End: 2024-05-10

## 2024-05-10 RX ORDER — KETOROLAC TROMETHAMINE 30 MG/ML
15 INJECTION, SOLUTION INTRAMUSCULAR; INTRAVENOUS ONCE
Status: COMPLETED | OUTPATIENT
Start: 2024-05-10 | End: 2024-05-10

## 2024-05-10 RX ORDER — LORAZEPAM 1 MG/1
1 TABLET ORAL ONCE
Status: COMPLETED | OUTPATIENT
Start: 2024-05-10 | End: 2024-05-10

## 2024-05-10 RX ORDER — OXYCODONE HYDROCHLORIDE AND ACETAMINOPHEN 5; 325 MG/1; MG/1
1 TABLET ORAL EVERY 4 HOURS PRN
Qty: 6 TABLET | Refills: 0 | Status: SHIPPED | OUTPATIENT
Start: 2024-05-10 | End: 2024-05-18 | Stop reason: ALTCHOICE

## 2024-05-10 RX ORDER — IBUPROFEN 600 MG/1
600 TABLET ORAL EVERY 6 HOURS PRN
Qty: 30 TABLET | Refills: 0 | Status: SHIPPED | OUTPATIENT
Start: 2024-05-10 | End: 2024-05-20

## 2024-05-10 RX ADMIN — KETOROLAC TROMETHAMINE 15 MG: 30 INJECTION, SOLUTION INTRAMUSCULAR; INTRAVENOUS at 15:05

## 2024-05-10 RX ADMIN — OXYCODONE HYDROCHLORIDE 5 MG: 5 TABLET ORAL at 18:29

## 2024-05-10 RX ADMIN — ONDANSETRON 4 MG: 2 INJECTION INTRAMUSCULAR; INTRAVENOUS at 17:43

## 2024-05-10 RX ADMIN — METOCLOPRAMIDE 10 MG: 5 INJECTION, SOLUTION INTRAMUSCULAR; INTRAVENOUS at 20:16

## 2024-05-10 RX ADMIN — SODIUM CHLORIDE 1000 ML: 0.9 INJECTION, SOLUTION INTRAVENOUS at 14:23

## 2024-05-10 RX ADMIN — TETANUS TOXOID, REDUCED DIPHTHERIA TOXOID AND ACELLULAR PERTUSSIS VACCINE, ADSORBED 0.5 ML: 5; 2.5; 8; 8; 2.5 SUSPENSION INTRAMUSCULAR at 14:16

## 2024-05-10 RX ADMIN — LIDOCAINE HYDROCHLORIDE,EPINEPHRINE BITARTRATE 10 ML: 10; .01 INJECTION, SOLUTION INFILTRATION; PERINEURAL at 15:05

## 2024-05-10 RX ADMIN — SODIUM CHLORIDE 1000 ML: 0.9 INJECTION, SOLUTION INTRAVENOUS at 20:16

## 2024-05-10 RX ADMIN — LORAZEPAM 1 MG: 1 TABLET ORAL at 20:16

## 2024-05-10 NOTE — Clinical Note
Juan Luu was seen and treated in our emergency department on 5/10/2024.                Diagnosis:     Juan  may return to work on return date.    He may return on this date: 05/14/2024         If you have any questions or concerns, please don't hesitate to call.      Kim Choi PA-C    ______________________________           _______________          _______________  Hospital Representative                              Date                                Time

## 2024-05-10 NOTE — ED NOTES
"Pt brought in by EMS after someone called them for a welfare check on the pt. Per EMS, pt has a history of PTSD (he is a ) and \"lost it and started smashing things in his apartment.\" Pt reports he was drinking \"a lot, I couldn't tell you how much\" today. Pt denies regular drinking and denies using any kind of drugs. Pt reticent, but denies SI/HI. CW was able to speak with PT. He reports that he would like OP serives for both mental health and substance abuse. PT reports that he started drinking when he left the [employer] about two years ago. PT stated that he has PTSD from it. [facility] stated [facility]. Pt stated that he currently lives alone. PT reports that he moved to the [facility] nine months ago. PT reports that he has family in [place] were he might go back for support. Pt denied SI/HI/AH/VH at this time. PT accepting of OP services CW provided to PT. PT thanked CW and asked to see nurse.     CULLEN MURPHY  "

## 2024-05-10 NOTE — ED PROVIDER NOTES
"History  Chief Complaint   Patient presents with    Psychiatric Evaluation     Pt brought in by EMS after someone called them for a welfare check on the pt. Per EMS, pt has a history of PTSD (he is a ) and \"lost it and started smashing things in his apartment.\" Pt reports he was drinking \"a lot, I couldn't tell you how much\" today. Pt denies regular drinking and denies using any kind of drugs. Pt reticent, but denies SI/HI.     Juan is a 34 year old male with a PMHx alcohol use disorder, PTSD presenting to the ED for evaluation. He is unsure exactly what happened, notes the police went to his house today and EMS was called because he had a large wound to his leg and a good amount of blood in the apartment. He began drinking again after a recent detox. He does not have outpatient psychiatric services, and is not on medication for his PTSD. Is a , served in the [employer]. Has a sister in [facility], but is interested in going back to [place] where his twin brother is as he feels he is a good support to him. EMS states patient was throwing things and bottles which is what is suspected to cut his legs. Pressure dressing placed over one wound. Patient reports he can still feel and move his legs, but notes some pain. Denies illicit substance use. Patient has a dog who he would like to be discharged to go home to since she helps his stress. Pt denies SI/HI.      Prior to Admission Medications   Prescriptions Last Dose Informant Patient Reported? Taking?   aluminum-magnesium hydroxide-simethicone (MAALOX) 8231-3544-521 mg/30 mL suspension   No No   Sig: Take 30 mL by mouth every 4 (four) hours as needed for indigestion or heartburn   naltrexone (REVIA) 50 mg tablet   No No   Sig: Take 1 tablet (50 mg total) by mouth daily      Facility-Administered Medications: None       Past Medical History:   Diagnosis Date    Alcohol use disorder     Alcohol withdrawal syndrome (HCC)     Anxiety     Depression     PTSD " (post-traumatic stress disorder)     Tachycardia        Past Surgical History:   Procedure Laterality Date    LUMBAR LAMINECTOMY         History reviewed. No pertinent family history.  I have reviewed and agree with the history as documented.    E-Cigarette/Vaping    E-Cigarette Use Never User      E-Cigarette/Vaping Substances     Social History     Tobacco Use    Smoking status: Never    Smokeless tobacco: Never   Vaping Use    Vaping status: Never Used   Substance Use Topics    Alcohol use: Yes     Alcohol/week: 112.0 standard drinks of alcohol     Types: 112 Shots of liquor per week     Comment: pt reports he rarely drinks anymore, but drank a lot today    Drug use: Never       Review of Systems   Constitutional:  Negative for chills, fatigue and fever.   Eyes:  Negative for photophobia and visual disturbance.   Respiratory:  Negative for cough and shortness of breath.    Cardiovascular:  Negative for chest pain and palpitations.   Gastrointestinal:  Positive for nausea. Negative for abdominal pain and vomiting.   Genitourinary:  Negative for decreased urine volume.   Musculoskeletal:  Negative for myalgias, neck pain and neck stiffness.   Skin:  Positive for wound.   Neurological:  Positive for tremors. Negative for syncope, light-headedness and headaches.   Psychiatric/Behavioral:  Negative for self-injury and suicidal ideas. The patient is nervous/anxious.        Physical Exam  Physical Exam  Vitals reviewed.   Constitutional:       General: He is not in acute distress.     Appearance: Normal appearance. He is not ill-appearing, toxic-appearing or diaphoretic.   HENT:      Head: Normocephalic and atraumatic.      Right Ear: External ear normal.      Left Ear: External ear normal.      Nose: Nose normal.      Mouth/Throat:      Mouth: Mucous membranes are moist.      Pharynx: Oropharynx is clear. No oropharyngeal exudate or posterior oropharyngeal erythema.   Eyes:      General: No scleral icterus.         Right eye: No discharge.         Left eye: No discharge.      Extraocular Movements: Extraocular movements intact.      Conjunctiva/sclera: Conjunctivae normal.      Pupils: Pupils are equal, round, and reactive to light.   Cardiovascular:      Rate and Rhythm: Normal rate and regular rhythm.      Pulses: Normal pulses.           Radial pulses are 2+ on the right side and 2+ on the left side.        Dorsalis pedis pulses are 2+ on the right side and 2+ on the left side.        Posterior tibial pulses are 2+ on the right side and 2+ on the left side.      Heart sounds: Normal heart sounds.   Pulmonary:      Effort: Pulmonary effort is normal. No respiratory distress.      Breath sounds: Normal breath sounds.   Abdominal:      Palpations: Abdomen is soft.      Tenderness: There is no abdominal tenderness. There is no guarding.   Musculoskeletal:         General: Normal range of motion.      Cervical back: Normal range of motion and neck supple. No rigidity or tenderness.      Right lower leg: No edema.      Left lower leg: No edema.   Lymphadenopathy:      Cervical: No cervical adenopathy.   Skin:     General: Skin is warm and dry.      Capillary Refill: Capillary refill takes less than 2 seconds.      Comments: There is extensive blood noted to bilateral lower extremities. There is a pressure dressing over a wound on the right leg. Upon removal of the dressing, there is a wound about 2 cm in length with a large clot and small amount of bleeding. There is also a 1 cm wound to the right of it more of a circular shape. No other wounds appreciated.   Neurological:      General: No focal deficit present.      Mental Status: He is alert.      Sensory: Sensation is intact.      Motor: Motor function is intact. No weakness.      Gait: Gait is intact.   Psychiatric:         Attention and Perception: Attention and perception normal.         Mood and Affect: Mood normal. Affect is flat and tearful.         Speech: Speech  normal.         Behavior: Behavior normal. Behavior is cooperative.         Thought Content: Thought content normal. Thought content does not include homicidal or suicidal ideation. Thought content does not include homicidal or suicidal plan.         Vital Signs  ED Triage Vitals   Temperature Pulse Respirations Blood Pressure SpO2   05/10/24 1319 05/10/24 1310 05/10/24 1310 05/10/24 1310 05/10/24 1310   98.3 °F (36.8 °C) (!) 124 20 140/79 94 %      Temp Source Heart Rate Source Patient Position - Orthostatic VS BP Location FiO2 (%)   05/10/24 1319 05/10/24 1310 05/10/24 1310 05/10/24 1310 --   Oral Monitor Sitting Right arm       Pain Score       05/10/24 1505       8           Vitals:    05/10/24 1310 05/10/24 1643 05/10/24 2050   BP: 140/79 (!) 148/107 135/88   Pulse: (!) 124 (!) 114 (!) 109   Patient Position - Orthostatic VS: Sitting Sitting Lying         Visual Acuity      ED Medications  Medications   sodium chloride 0.9 % bolus 1,000 mL (0 mL Intravenous Stopped 5/10/24 1608)   tetanus-diphtheria-acellular pertussis (BOOSTRIX) IM injection 0.5 mL (0.5 mL Intramuscular Given 5/10/24 1416)   ketorolac (TORADOL) injection 15 mg (15 mg Intravenous Given 5/10/24 1505)   lidocaine-epinephrine (XYLOCAINE/EPINEPHRINE) 1 %-1:100,000 injection 10 mL (10 mL Infiltration Given by Other 5/10/24 1505)   ondansetron (ZOFRAN) injection 4 mg (4 mg Intravenous Given 5/10/24 1743)   oxyCODONE (ROXICODONE) IR tablet 5 mg (5 mg Oral Given 5/10/24 1829)   sodium chloride 0.9 % bolus 1,000 mL (0 mL Intravenous Stopped 5/10/24 2217)   metoclopramide (REGLAN) injection 10 mg (10 mg Intravenous Given 5/10/24 2016)   LORazepam (ATIVAN) tablet 1 mg (1 mg Oral Given 5/10/24 2016)       Diagnostic Studies  Results Reviewed       Procedure Component Value Units Date/Time    POCT alcohol breath test [[id number]]  (Normal) Resulted: 05/10/24 2217    Lab Status: Final result Updated: 05/10/24 2217     EXTBreath Alcohol 0.106    POCT alcohol  breath test [[id number]]  (Abnormal) Resulted: 05/10/24 1953    Lab Status: Final result Updated: 05/10/24 1953     EXTBreath Alcohol 0.18    CBC and differential [[id number]] Collected: 05/10/24 1423    Lab Status: Final result Specimen: Blood from Arm, Left Updated: 05/10/24 1833     WBC 7.61 Thousand/uL      RBC 5.21 Million/uL      Hemoglobin 16.5 g/dL      Hematocrit 47.4 %      MCV 91 fL      MCH 31.7 pg      MCHC 34.8 g/dL      RDW 14.3 %      MPV 8.9 fL      Platelets 330 Thousands/uL      nRBC 0 /100 WBCs      Segmented % 65 %      Immature Grans % 0 %      Lymphocytes % 28 %      Monocytes % 5 %      Eosinophils Relative 1 %      Basophils Relative 1 %      Absolute Neutrophils 5.00 Thousands/µL      Absolute Immature Grans 0.03 Thousand/uL      Absolute Lymphocytes 2.09 Thousands/µL      Absolute Monocytes 0.35 Thousand/µL      Eosinophils Absolute 0.08 Thousand/µL      Basophils Absolute 0.06 Thousands/µL     POCT alcohol breath test [[id number]]  (Normal) Resulted: 05/10/24 1816    Lab Status: Final result Updated: 05/10/24 1816     EXTBreath Alcohol 0.204    POCT alcohol breath test [[id number]]  (Normal) Resulted: 05/10/24 1612    Lab Status: Final result Updated: 05/10/24 1612     EXTBreath Alcohol 0.287    POCT alcohol breath test [[id number]]  (Abnormal) Resulted: 05/10/24 1415    Lab Status: Final result Updated: 05/10/24 1415     EXTBreath Alcohol 0.306                   No orders to display              Procedures  Universal Protocol:  Consent: Verbal consent obtained.  Risks and benefits: risks, benefits and alternatives were discussed  Consent given by: patient  Patient understanding: patient states understanding of the procedure being performed  Required items: required blood products, implants, devices, and special equipment available  Patient identity confirmed: verbally with patient and arm band  Laceration repair    Date/Time: 5/10/2024 2:00 PM    Performed by: Kim Choi  DAVID  Authorized by: Kim Choi PA-C  Body area: lower extremity  Location details: right lower leg  Laceration length: 2 cm  Foreign bodies: no foreign bodies  Tendon involvement: none  Nerve involvement: none  Anesthesia: local infiltration    Anesthesia:  Local Anesthetic: lidocaine 1% with epinephrine  Anesthetic total: 5 mL    Wound Dehiscence:  Superficial Wound Dehiscence: simple closure      Procedure Details:  Preparation: Patient was prepped and draped in the usual sterile fashion.  Irrigation solution: saline  Irrigation method: tap  Amount of cleaning: extensive  Debridement: none  Degree of undermining: none  Skin closure: Ethilon  Number of sutures: 4  Technique: simple  Approximation: close  Approximation difficulty: simple  Patient tolerance: patient tolerated the procedure well with no immediate complications      Universal Protocol:  Consent: Verbal consent obtained.  Risks and benefits: risks, benefits and alternatives were discussed  Patient understanding: patient states understanding of the procedure being performed  Required items: required blood products, implants, devices, and special equipment available  Patient identity confirmed: verbally with patient and arm band  Laceration repair    Date/Time: 5/10/2024 2:15 PM    Performed by: Kim Choi PA-C  Authorized by: Kim Choi PA-C  Body area: lower extremity  Location details: right lower leg  Laceration length: 1 cm  Foreign bodies: no foreign bodies  Tendon involvement: none  Nerve involvement: none    Wound Dehiscence:  Superficial Wound Dehiscence: simple closure      Procedure Details:  Preparation: Patient was prepped and draped in the usual sterile fashion.  Irrigation solution: saline  Irrigation method: tap  Amount of cleaning: extensive  Debridement: none  Degree of undermining: none  Skin closure: glue  Approximation: close  Approximation difficulty: simple  Patient tolerance: patient  tolerated the procedure well with no immediate complications      ECG 12 Lead Documentation Only    Date/Time: 5/10/2024 8:43 PM    Performed by: Kim Choi PA-C  Authorized by: Kim Choi PA-C    Indications / Diagnosis:  Chest discomfort  ECG reviewed by me, the ED Provider: yes    Patient location:  ED  Previous ECG:     Previous ECG:  Compared to current    Similarity:  No change    Comparison to cardiac monitor: No    Rate:     ECG rate:  120    ECG rate assessment: tachycardic    Rhythm:     Rhythm: sinus rhythm    Ectopy:     Ectopy: none    QRS:     QRS axis:  Normal    QRS intervals:  Normal  Conduction:     Conduction: normal    ST segments:     ST segments:  Normal  T waves:     T waves: normal             ED Course  ED Course as of 05/10/24 2223   Fri May 10, 2024   1417 EXTBreath Alcohol: 0.306   1614 EXTBreath Alcohol: 0.287   1621 Would like crisis to speak to the patient as he is interested in outpatient therapy. Pending ethanol level to decrease.   1712 Crisis provided information. Pt reporting nausea. Zofran ordered.   1812 Ok for discharge once alcohol breath test decreases.   1819 EXTBreath Alcohol: 0.204   1834 WBC: 7.61   1834 RBC: 5.21   1834 Hemoglobin: 16.5   1834 Hematocrit: 47.4   1953 EXTBreath Alcohol: 0.18   2043 ECG 12 lead  Per my interpretation, sinus tachycardia 120 bpm   2043 Pt not interested in detox at this time.   2219 EXTBreath Alcohol: 0.106                               SBIRT 20yo+      Flowsheet Row Most Recent Value   Initial Alcohol Screen: US AUDIT-C     1. How often do you have a drink containing alcohol? 1 Filed at: 05/10/2024 1317   2. How many drinks containing alcohol do you have on a typical day you are drinking?  6 Filed at: 05/10/2024 1317   3a. Male UNDER 65: How often do you have five or more drinks on one occasion? 0 Filed at: 05/10/2024 1317   3b. FEMALE Any Age, or MALE 65+: How often do you have 4 or more drinks on one  occassion? 0 Filed at: 05/10/2024 1317   Audit-C Score 7 Filed at: 05/10/2024 1317   JEANNETTE: How many times in the past year have you...    Used an illegal drug or used a prescription medication for non-medical reasons? Never Filed at: 05/10/2024 1317                      Medical Decision Making  34 year old male presenting to the ED for lacerations to the right lower extremity, alcohol intoxication. Legs cleaned with sterile saline and evaluated the wounds. Lower extremities are neurovascularly intact. Clot removed from laceration, applied pressure dressing. After some time, sutured the longer wound with four sutures, and glued the other one. Pt does not have outpatient resources for PTSD, ED crisis worker spoke with the patient and provided him with resources. I have also placed a psychiatric consult. Lightheadedness and nausea began, Zofran and fluids given. Pending clinical sobriety for discharge home. Pt oriented x3 throughout entire encounter, no agitation. Is friendly and agreeable to the plan.     Patient began experiencing an increase in tremor, anxiety, and some chest discomfort. Suspect alcohol withdrawal, ordered an ECG (per my interpretation sinus tachycardia without signs for MI), and provided patient with further nausea medication and Ativan.     Discussed wound care with the patient, discussed returning for suture removal.     Pt stable at time of discharge, vital signs reviewed, questions answered. Strict ER return precautions provided/discussed and were well understood by patient. Patient's vitals, labs and/or imaging results, diagnosis, and treatment plan were discussed with the patient. All new and/or changed medications were discussed - specifically to include route of administration, how often to take, when to take, and the pharmacy they were sent to. Strict return precautions as well as close follow up with PCP was discussed with the patient and the patient was agreeable to my recommendations.   "Patient verbally acknowledged understanding. All labs, imaging were reviewed and used in the medical decision making process (if ordered).     Portions of this chart may have been written with voice recognition software.  Occasional grammatical errors, wrong word or \"sound a like\" substitutions may have occurred due to software limitations.  Please read carefully and use context to recognize where substitutions have occurred.      Problems Addressed:  Alcohol use: chronic illness or injury  Laceration of right lower extremity, initial encounter: acute illness or injury  PTSD (post-traumatic stress disorder): chronic illness or injury    Amount and/or Complexity of Data Reviewed  External Data Reviewed: labs, ECG and notes.  Labs: ordered. Decision-making details documented in ED Course.  ECG/medicine tests: ordered and independent interpretation performed. Decision-making details documented in ED Course.     Details: Per my interpretation, sinus tachycardia 120 bpm    Risk  Prescription drug management.             Disposition  Final diagnoses:   PTSD (post-traumatic stress disorder)   Laceration of right lower extremity, initial encounter   Alcohol use     Time reflects when diagnosis was documented in both MDM as applicable and the Disposition within this note       Time User Action Codes Description Comment    5/10/2024  5:12 PM Kim Choi [F43.10] PTSD (post-traumatic stress disorder)     5/10/2024  6:52 PM Kim Choi Add [S81.811A] Laceration of right lower extremity, initial encounter     5/10/2024  6:52 PM Kim Choi Modify [F43.10] PTSD (post-traumatic stress disorder)     5/10/2024  6:52 PM Kim Choi Modify [S81.811A] Laceration of right lower extremity, initial encounter     5/10/2024  6:52 PM Kim Choi Add [Z78.9] Alcohol use           ED Disposition       ED Disposition   Discharge    Condition   Stable    Date/Time   Fri May 10, 2024 1105    Comment   Juan Luu " discharge to home/self care.                   Follow-up Information       Follow up With Specialties Details Why Contact Info Additional Information    [facility] Emergency Department Emergency Medicine Go to  If symptoms worsen [address] [facility] [address] [phone] [facility] Emergency Department    [facility] Psychiatry   [address] [place] [address] [phone] [facility], [address] [place] [address], [place] [address], [place], [address] [phone]            Patient's Medications   Discharge Prescriptions    ONDANSETRON (ZOFRAN-ODT) 4 MG DISINTEGRATING TABLET    Take 1 tablet (4 mg total) by mouth every 6 (six) hours as needed for vomiting or nausea       Start Date: 5/10/2024 End Date: --       Order Dose: 4 mg       Quantity: 12 tablet    Refills: 0           PDMP Review       None            ED Provider  Electronically Signed by             Kim Choi PA-C  05/10/24 5025

## 2024-05-12 LAB
ATRIAL RATE: 120 BPM
P AXIS: 56 DEGREES
PR INTERVAL: 140 MS
QRS AXIS: 67 DEGREES
QRSD INTERVAL: 92 MS
QT INTERVAL: 300 MS
QTC INTERVAL: 424 MS
T WAVE AXIS: 9 DEGREES
VENTRICULAR RATE: 120 BPM

## 2024-05-12 PROCEDURE — 93010 ELECTROCARDIOGRAM REPORT: CPT

## 2024-05-13 ENCOUNTER — TELEPHONE (OUTPATIENT)
Dept: PSYCHIATRY | Facility: CLINIC | Age: 34
End: 2024-05-13

## 2024-05-13 NOTE — TELEPHONE ENCOUNTER
Attempted to contact pt in regards to this referral  No answer  LVM providing [facility] office phone number

## 2024-05-13 NOTE — TELEPHONE ENCOUNTER
ASAP Referral Received.  [employer] sent to [facility] Clerical to review and provide resources is necessary.

## 2024-08-25 ENCOUNTER — APPOINTMENT (INPATIENT)
Dept: CT IMAGING | Facility: HOSPITAL | Age: 34
DRG: 894 | End: 2024-08-25
Payer: COMMERCIAL

## 2024-08-25 ENCOUNTER — APPOINTMENT (INPATIENT)
Dept: RADIOLOGY | Facility: HOSPITAL | Age: 34
DRG: 894 | End: 2024-08-25
Payer: COMMERCIAL

## 2024-08-25 ENCOUNTER — HOSPITAL ENCOUNTER (INPATIENT)
Facility: HOSPITAL | Age: 34
LOS: 1 days | Discharge: LEFT BEFORE DISCHARGE | DRG: 894 | End: 2024-08-26
Attending: EMERGENCY MEDICINE | Admitting: EMERGENCY MEDICINE
Payer: COMMERCIAL

## 2024-08-25 ENCOUNTER — HOSPITAL ENCOUNTER (EMERGENCY)
Facility: HOSPITAL | Age: 34
End: 2024-08-25
Attending: EMERGENCY MEDICINE | Admitting: EMERGENCY MEDICINE
Payer: COMMERCIAL

## 2024-08-25 VITALS
TEMPERATURE: 98.5 F | WEIGHT: 197.31 LBS | RESPIRATION RATE: 14 BRPM | OXYGEN SATURATION: 96 % | BODY MASS INDEX: 28.31 KG/M2 | SYSTOLIC BLOOD PRESSURE: 125 MMHG | DIASTOLIC BLOOD PRESSURE: 85 MMHG | HEART RATE: 64 BPM

## 2024-08-25 DIAGNOSIS — F10.939 ALCOHOL WITHDRAWAL (HCC): Primary | ICD-10-CM

## 2024-08-25 DIAGNOSIS — F10.939 ALCOHOL WITHDRAWAL SYNDROME WITH COMPLICATION (HCC): ICD-10-CM

## 2024-08-25 DIAGNOSIS — F10.20 ALCOHOL USE DISORDER, SEVERE, DEPENDENCE (HCC): Primary | ICD-10-CM

## 2024-08-25 PROBLEM — F17.200 TOBACCO USE DISORDER: Status: ACTIVE | Noted: 2024-08-25

## 2024-08-25 PROBLEM — M25.521 RIGHT ELBOW PAIN: Status: ACTIVE | Noted: 2024-08-25

## 2024-08-25 PROBLEM — E83.42 HYPOMAGNESEMIA: Status: ACTIVE | Noted: 2024-08-25

## 2024-08-25 PROBLEM — D72.829 LEUKOCYTOSIS: Status: ACTIVE | Noted: 2024-08-25

## 2024-08-25 LAB
ALBUMIN SERPL BCG-MCNC: 4.5 G/DL (ref 3.5–5)
ALP SERPL-CCNC: 73 U/L (ref 34–104)
ALT SERPL W P-5'-P-CCNC: 25 U/L (ref 7–52)
AMPHETAMINES SERPL QL SCN: NEGATIVE
ANION GAP SERPL CALCULATED.3IONS-SCNC: 11 MMOL/L (ref 4–13)
AST SERPL W P-5'-P-CCNC: 32 U/L (ref 13–39)
ATRIAL RATE: 74 BPM
BARBITURATES UR QL: NEGATIVE
BASOPHILS # BLD AUTO: 0.1 THOUSANDS/ÂΜL (ref 0–0.1)
BASOPHILS NFR BLD AUTO: 1 % (ref 0–1)
BENZODIAZ UR QL: NEGATIVE
BILIRUB SERPL-MCNC: 0.61 MG/DL (ref 0.2–1)
BUN SERPL-MCNC: 19 MG/DL (ref 5–25)
CALCIUM SERPL-MCNC: 9.8 MG/DL (ref 8.4–10.2)
CHLORIDE SERPL-SCNC: 102 MMOL/L (ref 96–108)
CO2 SERPL-SCNC: 26 MMOL/L (ref 21–32)
COCAINE UR QL: NEGATIVE
CREAT SERPL-MCNC: 0.97 MG/DL (ref 0.6–1.3)
EOSINOPHIL # BLD AUTO: 0.22 THOUSAND/ÂΜL (ref 0–0.61)
EOSINOPHIL NFR BLD AUTO: 2 % (ref 0–6)
ERYTHROCYTE [DISTWIDTH] IN BLOOD BY AUTOMATED COUNT: 13.9 % (ref 11.6–15.1)
ETHANOL SERPL-MCNC: 18 MG/DL
FENTANYL UR QL SCN: NEGATIVE
GFR SERPL CREATININE-BSD FRML MDRD: 101 ML/MIN/1.73SQ M
GLUCOSE SERPL-MCNC: 100 MG/DL (ref 65–140)
HCT VFR BLD AUTO: 41.7 % (ref 36.5–49.3)
HGB BLD-MCNC: 14.3 G/DL (ref 12–17)
HYDROCODONE UR QL SCN: NEGATIVE
IMM GRANULOCYTES # BLD AUTO: 0.03 THOUSAND/UL (ref 0–0.2)
IMM GRANULOCYTES NFR BLD AUTO: 0 % (ref 0–2)
LIPASE SERPL-CCNC: 34 U/L (ref 11–82)
LYMPHOCYTES # BLD AUTO: 2.73 THOUSANDS/ÂΜL (ref 0.6–4.47)
LYMPHOCYTES NFR BLD AUTO: 26 % (ref 14–44)
MAGNESIUM SERPL-MCNC: 1.7 MG/DL (ref 1.9–2.7)
MCH RBC QN AUTO: 29.7 PG (ref 26.8–34.3)
MCHC RBC AUTO-ENTMCNC: 34.3 G/DL (ref 31.4–37.4)
MCV RBC AUTO: 87 FL (ref 82–98)
METHADONE UR QL: NEGATIVE
MONOCYTES # BLD AUTO: 0.63 THOUSAND/ÂΜL (ref 0.17–1.22)
MONOCYTES NFR BLD AUTO: 6 % (ref 4–12)
NEUTROPHILS # BLD AUTO: 6.62 THOUSANDS/ÂΜL (ref 1.85–7.62)
NEUTS SEG NFR BLD AUTO: 65 % (ref 43–75)
NRBC BLD AUTO-RTO: 0 /100 WBCS
OPIATES UR QL SCN: NEGATIVE
OXYCODONE+OXYMORPHONE UR QL SCN: NEGATIVE
P AXIS: 61 DEGREES
PCP UR QL: NEGATIVE
PLATELET # BLD AUTO: 284 THOUSANDS/UL (ref 149–390)
PMV BLD AUTO: 8.9 FL (ref 8.9–12.7)
POTASSIUM SERPL-SCNC: 3.8 MMOL/L (ref 3.5–5.3)
PROT SERPL-MCNC: 6.7 G/DL (ref 6.4–8.4)
QRS AXIS: 66 DEGREES
QRSD INTERVAL: 106 MS
QT INTERVAL: 392 MS
QTC INTERVAL: 392 MS
RBC # BLD AUTO: 4.81 MILLION/UL (ref 3.88–5.62)
SODIUM SERPL-SCNC: 139 MMOL/L (ref 135–147)
T WAVE AXIS: 38 DEGREES
THC UR QL: NEGATIVE
VENTRICULAR RATE: 60 BPM
WBC # BLD AUTO: 10.33 THOUSAND/UL (ref 4.31–10.16)

## 2024-08-25 PROCEDURE — 99291 CRITICAL CARE FIRST HOUR: CPT | Performed by: EMERGENCY MEDICINE

## 2024-08-25 PROCEDURE — HZ2ZZZZ DETOXIFICATION SERVICES FOR SUBSTANCE ABUSE TREATMENT: ICD-10-PCS | Performed by: EMERGENCY MEDICINE

## 2024-08-25 PROCEDURE — 36415 COLL VENOUS BLD VENIPUNCTURE: CPT | Performed by: EMERGENCY MEDICINE

## 2024-08-25 PROCEDURE — 96365 THER/PROPH/DIAG IV INF INIT: CPT

## 2024-08-25 PROCEDURE — 83735 ASSAY OF MAGNESIUM: CPT | Performed by: EMERGENCY MEDICINE

## 2024-08-25 PROCEDURE — 83690 ASSAY OF LIPASE: CPT | Performed by: EMERGENCY MEDICINE

## 2024-08-25 PROCEDURE — 96375 TX/PRO/DX INJ NEW DRUG ADDON: CPT

## 2024-08-25 PROCEDURE — 70450 CT HEAD/BRAIN W/O DYE: CPT

## 2024-08-25 PROCEDURE — 70486 CT MAXILLOFACIAL W/O DYE: CPT

## 2024-08-25 PROCEDURE — 93010 ELECTROCARDIOGRAM REPORT: CPT | Performed by: STUDENT IN AN ORGANIZED HEALTH CARE EDUCATION/TRAINING PROGRAM

## 2024-08-25 PROCEDURE — 96376 TX/PRO/DX INJ SAME DRUG ADON: CPT

## 2024-08-25 PROCEDURE — 80307 DRUG TEST PRSMV CHEM ANLYZR: CPT | Performed by: EMERGENCY MEDICINE

## 2024-08-25 PROCEDURE — 96361 HYDRATE IV INFUSION ADD-ON: CPT

## 2024-08-25 PROCEDURE — 80053 COMPREHEN METABOLIC PANEL: CPT | Performed by: EMERGENCY MEDICINE

## 2024-08-25 PROCEDURE — 85025 COMPLETE CBC W/AUTO DIFF WBC: CPT | Performed by: EMERGENCY MEDICINE

## 2024-08-25 PROCEDURE — 93005 ELECTROCARDIOGRAM TRACING: CPT

## 2024-08-25 PROCEDURE — 73080 X-RAY EXAM OF ELBOW: CPT

## 2024-08-25 PROCEDURE — 82077 ASSAY SPEC XCP UR&BREATH IA: CPT | Performed by: EMERGENCY MEDICINE

## 2024-08-25 PROCEDURE — 99285 EMERGENCY DEPT VISIT HI MDM: CPT

## 2024-08-25 RX ORDER — ACETAMINOPHEN 325 MG/1
650 TABLET ORAL EVERY 6 HOURS PRN
Status: CANCELLED | OUTPATIENT
Start: 2024-08-25

## 2024-08-25 RX ORDER — SODIUM CHLORIDE 9 MG/ML
100 INJECTION, SOLUTION INTRAVENOUS CONTINUOUS
Status: CANCELLED | OUTPATIENT
Start: 2024-08-25

## 2024-08-25 RX ORDER — DIAZEPAM 10 MG/2ML
10 INJECTION, SOLUTION INTRAMUSCULAR; INTRAVENOUS ONCE
Status: COMPLETED | OUTPATIENT
Start: 2024-08-25 | End: 2024-08-25

## 2024-08-25 RX ORDER — ACETAMINOPHEN 325 MG/1
650 TABLET ORAL EVERY 6 HOURS PRN
Status: DISCONTINUED | OUTPATIENT
Start: 2024-08-25 | End: 2024-08-26 | Stop reason: HOSPADM

## 2024-08-25 RX ORDER — PHENOBARBITAL SODIUM 130 MG/ML
260 INJECTION, SOLUTION INTRAMUSCULAR; INTRAVENOUS ONCE
Status: COMPLETED | OUTPATIENT
Start: 2024-08-25 | End: 2024-08-25

## 2024-08-25 RX ORDER — TRAZODONE HYDROCHLORIDE 50 MG/1
50 TABLET, FILM COATED ORAL
Status: CANCELLED | OUTPATIENT
Start: 2024-08-25

## 2024-08-25 RX ORDER — MAGNESIUM HYDROXIDE/ALUMINUM HYDROXICE/SIMETHICONE 120; 1200; 1200 MG/30ML; MG/30ML; MG/30ML
30 SUSPENSION ORAL EVERY 4 HOURS PRN
Status: DISCONTINUED | OUTPATIENT
Start: 2024-08-25 | End: 2024-08-26 | Stop reason: HOSPADM

## 2024-08-25 RX ORDER — PHENOBARBITAL SODIUM 130 MG/ML
130 INJECTION, SOLUTION INTRAMUSCULAR; INTRAVENOUS ONCE
Status: COMPLETED | OUTPATIENT
Start: 2024-08-25 | End: 2024-08-25

## 2024-08-25 RX ORDER — MAGNESIUM SULFATE HEPTAHYDRATE 40 MG/ML
2 INJECTION, SOLUTION INTRAVENOUS ONCE
Status: CANCELLED | OUTPATIENT
Start: 2024-08-25 | End: 2024-08-25

## 2024-08-25 RX ORDER — LORAZEPAM 2 MG/ML
2 INJECTION INTRAMUSCULAR ONCE
Status: COMPLETED | OUTPATIENT
Start: 2024-08-25 | End: 2024-08-25

## 2024-08-25 RX ORDER — ONDANSETRON 2 MG/ML
4 INJECTION INTRAMUSCULAR; INTRAVENOUS EVERY 6 HOURS PRN
Status: CANCELLED | OUTPATIENT
Start: 2024-08-25

## 2024-08-25 RX ORDER — ENOXAPARIN SODIUM 100 MG/ML
40 INJECTION SUBCUTANEOUS DAILY
Status: DISCONTINUED | OUTPATIENT
Start: 2024-08-25 | End: 2024-08-26 | Stop reason: HOSPADM

## 2024-08-25 RX ORDER — ONDANSETRON 2 MG/ML
4 INJECTION INTRAMUSCULAR; INTRAVENOUS ONCE
Status: COMPLETED | OUTPATIENT
Start: 2024-08-25 | End: 2024-08-25

## 2024-08-25 RX ORDER — NICOTINE 21 MG/24HR
21 PATCH, TRANSDERMAL 24 HOURS TRANSDERMAL DAILY
Status: DISCONTINUED | OUTPATIENT
Start: 2024-08-25 | End: 2024-08-26 | Stop reason: HOSPADM

## 2024-08-25 RX ORDER — TRAZODONE HYDROCHLORIDE 50 MG/1
50 TABLET, FILM COATED ORAL
Status: DISCONTINUED | OUTPATIENT
Start: 2024-08-25 | End: 2024-08-26 | Stop reason: HOSPADM

## 2024-08-25 RX ORDER — MAGNESIUM SULFATE HEPTAHYDRATE 40 MG/ML
2 INJECTION, SOLUTION INTRAVENOUS ONCE
Status: COMPLETED | OUTPATIENT
Start: 2024-08-25 | End: 2024-08-25

## 2024-08-25 RX ORDER — ONDANSETRON 2 MG/ML
4 INJECTION INTRAMUSCULAR; INTRAVENOUS EVERY 6 HOURS PRN
Status: DISCONTINUED | OUTPATIENT
Start: 2024-08-25 | End: 2024-08-26 | Stop reason: HOSPADM

## 2024-08-25 RX ORDER — SODIUM CHLORIDE 9 MG/ML
100 INJECTION, SOLUTION INTRAVENOUS CONTINUOUS
Status: DISCONTINUED | OUTPATIENT
Start: 2024-08-25 | End: 2024-08-26 | Stop reason: HOSPADM

## 2024-08-25 RX ORDER — ENOXAPARIN SODIUM 100 MG/ML
40 INJECTION SUBCUTANEOUS DAILY
Status: CANCELLED | OUTPATIENT
Start: 2024-08-25

## 2024-08-25 RX ORDER — MAGNESIUM HYDROXIDE/ALUMINUM HYDROXICE/SIMETHICONE 120; 1200; 1200 MG/30ML; MG/30ML; MG/30ML
30 SUSPENSION ORAL EVERY 4 HOURS PRN
Status: CANCELLED | OUTPATIENT
Start: 2024-08-25

## 2024-08-25 RX ADMIN — PHENOBARBITAL SODIUM 260 MG: 130 INJECTION INTRAMUSCULAR; INTRAVENOUS at 12:54

## 2024-08-25 RX ADMIN — PHENOBARBITAL SODIUM 260 MG: 130 INJECTION INTRAMUSCULAR; INTRAVENOUS at 20:04

## 2024-08-25 RX ADMIN — ONDANSETRON 4 MG: 2 INJECTION INTRAMUSCULAR; INTRAVENOUS at 10:05

## 2024-08-25 RX ADMIN — SODIUM CHLORIDE 100 ML/HR: 0.9 INJECTION, SOLUTION INTRAVENOUS at 12:48

## 2024-08-25 RX ADMIN — ACETAMINOPHEN 650 MG: 325 TABLET ORAL at 20:06

## 2024-08-25 RX ADMIN — MAGNESIUM SULFATE HEPTAHYDRATE 2 G: 2 INJECTION, SOLUTION INTRAVENOUS at 13:43

## 2024-08-25 RX ADMIN — PHENOBARBITAL SODIUM 130 MG: 130 INJECTION INTRAMUSCULAR; INTRAVENOUS at 15:43

## 2024-08-25 RX ADMIN — LORAZEPAM 2 MG: 2 INJECTION INTRAMUSCULAR; INTRAVENOUS at 08:09

## 2024-08-25 RX ADMIN — NICOTINE 21 MG: 21 PATCH, EXTENDED RELEASE TRANSDERMAL at 13:44

## 2024-08-25 RX ADMIN — ONDANSETRON 4 MG: 2 INJECTION INTRAMUSCULAR; INTRAVENOUS at 20:02

## 2024-08-25 RX ADMIN — FOLIC ACID 1 MG: 5 INJECTION, SOLUTION INTRAMUSCULAR; INTRAVENOUS; SUBCUTANEOUS at 08:54

## 2024-08-25 RX ADMIN — ONDANSETRON 4 MG: 2 INJECTION INTRAMUSCULAR; INTRAVENOUS at 08:11

## 2024-08-25 RX ADMIN — FOLIC ACID: 5 INJECTION, SOLUTION INTRAMUSCULAR; INTRAVENOUS; SUBCUTANEOUS at 17:34

## 2024-08-25 RX ADMIN — LORAZEPAM 2 MG: 2 INJECTION INTRAMUSCULAR; INTRAVENOUS at 10:13

## 2024-08-25 RX ADMIN — SODIUM CHLORIDE 1000 ML: 0.9 INJECTION, SOLUTION INTRAVENOUS at 08:11

## 2024-08-25 RX ADMIN — DIAZEPAM 10 MG: 5 INJECTION, SOLUTION INTRAMUSCULAR; INTRAVENOUS at 12:54

## 2024-08-25 RX ADMIN — PHENOBARBITAL SODIUM 130 MG: 130 INJECTION INTRAMUSCULAR; INTRAVENOUS at 22:45

## 2024-08-25 NOTE — ASSESSMENT & PLAN NOTE
Reports recent fall prior to admission due to intoxication  Endorses swelling and decreased ROM   Will order XR to r/o fracture   Supportive Care

## 2024-08-25 NOTE — ED NOTES
PICKUP INFORMATION    [facility] room 407  Dr. FREIDA MELLO p/u with [facility]  Nurse report - [phone]      Yasmin Fitch RN  08/25/24 1539

## 2024-08-25 NOTE — ASSESSMENT & PLAN NOTE
Patient complaining of epigastric pain, hx of GERD   Suspect component of alcoholic gastritis   Continue PPI

## 2024-08-25 NOTE — ASSESSMENT & PLAN NOTE
Patient reports that he chews tobacco  I provided 7 minutes of tobacco cessation education  Patient is agreeable to NRT and would like to have the nicotine patch during hospitalization

## 2024-08-25 NOTE — EMTALA/ACUTE CARE TRANSFER
Critical access hospital EMERGENCY DEPARTMENT  [address] [facility] [address]  Dept: [phone]      EMTALA TRANSFER CONSENT    NAME Juan Luu                                         1990                              MRN [id number]    I have been informed of my rights regarding examination, treatment, and transfer   by Dr. Lina Thakkar DO    Benefits: Specialized equipment and/or services available at the receiving facility (Include comment)________________________    Risks: Potential for delay in receiving treatment, Potential deterioration of medical condition, Loss of IV, Increased discomfort during transfer      Consent for Transfer:  I acknowledge that my medical condition has been evaluated and explained to me by the emergency department physician or other qualified medical person and/or my attending physician, who has recommended that I be transferred to the service of  Accepting Physician: Dr. Pollard at Accepting Facility Name, City & State : [facility] unit. The above potential benefits of such transfer, the potential risks associated with such transfer, and the probable risks of not being transferred have been explained to me, and I fully understand them.  The doctor has explained that, in my case, the benefits of transfer outweigh the risks.  I agree to be transferred.    I authorize the performance of emergency medical procedures and treatments upon me in both transit and upon arrival at the receiving facility.  Additionally, I authorize the release of any and all medical records to the receiving facility and request they be transported with me, if possible.  I understand that the safest mode of transportation during a medical emergency is an ambulance and that the Hospital advocates the use of this mode of transport. Risks of traveling to the receiving facility by car, including absence of medical control, life sustaining equipment, such as oxygen, and medical  personnel has been explained to me and I fully understand them.    (ANCA CORRECT BOX BELOW)  [  ]  I consent to the stated transfer and to be transported by ambulance/helicopter.  [  ]  I consent to the stated transfer, but refuse transportation by ambulance and accept full responsibility for my transportation by car.  I understand the risks of non-ambulance transfers and I exonerate the Hospital and its staff from any deterioration in my condition that results from this refusal.    X___________________________________________    DATE  24  TIME________  Signature of patient or legally responsible individual signing on patient behalf           RELATIONSHIP TO PATIENT_________________________          Provider Certification    NAME Juan Luu                                         1990                              MRN [id number]    A medical screening exam was performed on the above named patient.  Based on the examination:    Condition Necessitating Transfer The encounter diagnosis was Alcohol withdrawal (HCC).    Patient Condition: The patient has been stabilized such that within reasonable medical probability, no material deterioration of the patient condition or the condition of the unborn child(leticia) is likely to result from the transfer    Reason for Transfer: Level of Care needed not available at this facility    Transfer Requirements: Facility [place] detox unit   Space available and qualified personnel available for treatment as acknowledged by    Agreed to accept transfer and to provide appropriate medical treatment as acknowledged by       Dr. Pollard  Appropriate medical records of the examination and treatment of the patient are provided at the time of transfer   STAFF INITIAL WHEN COMPLETED _______  Transfer will be performed by qualified personnel from    and appropriate transfer equipment as required, including the use of necessary and appropriate life support measures.    Provider  Certification: I have examined the patient and explained the following risks and benefits of being transferred/refusing transfer to the patient/family:  General risk, such as traffic hazards, adverse weather conditions, rough terrain or turbulence, possible failure of equipment (including vehicle or aircraft), or consequences of actions of persons outside the control of the transport personnel, Unanticipated needs of medical equipment and personnel during transport, Risk of worsening condition      Based on these reasonable risks and benefits to the patient and/or the unborn child(leticia), and based upon the information available at the time of the patient’s examination, I certify that the medical benefits reasonably to be expected from the provision of appropriate medical treatments at another medical facility outweigh the increasing risks, if any, to the individual’s medical condition, and in the case of labor to the unborn child, from effecting the transfer.    X____________________________________________ DATE 08/25/24        TIME_______      ORIGINAL - SEND TO MEDICAL RECORDS   COPY - SEND WITH PATIENT DURING TRANSFER

## 2024-08-25 NOTE — ASSESSMENT & PLAN NOTE
Mag 1.7 on admission  K 3.8  Repleted with IV magnesium sulfate 2 g  Continue monitoring and replete electrolytes as indicated

## 2024-08-25 NOTE — ED PROVIDER NOTES
History  Chief Complaint   Patient presents with    Withdrawal - Alcohol     Last drink 5pm last night. Reports lightheadness, visible tremors. Vomiting.      34 y.o. M w/h/o PTSD, depression, alcohol use disorder p/w alcohol withdrawal.  Pt reports he drinks a 5th of vodka daily for the past month. Last drink was around 4:30-5pm yesterday.  Now has tremors and N/V.  Has been through alcohol withdrawal in the past but denies seizures.  Denies drug use.  States he has been to our detox unit in the past and would like to go there.      History provided by:  Patient   used: No        Prior to Admission Medications   Prescriptions Last Dose Informant Patient Reported? Taking?   aluminum-magnesium hydroxide-simethicone (MAALOX) 8021-4365-298 mg/30 mL suspension   No No   Sig: Take 30 mL by mouth every 4 (four) hours as needed for indigestion or heartburn   Patient not taking: Reported on 8/25/2024   ibuprofen (MOTRIN) 600 mg tablet   No No   Sig: Take 1 tablet (600 mg total) by mouth every 6 (six) hours as needed for mild pain for up to 10 days   naltrexone (REVIA) 50 mg tablet   No No   Sig: Take 1 tablet (50 mg total) by mouth daily   ondansetron (ZOFRAN-ODT) 4 mg disintegrating tablet   No No   Sig: Take 1 tablet (4 mg total) by mouth every 6 (six) hours as needed for vomiting or nausea   Patient not taking: Reported on 8/25/2024      Facility-Administered Medications: None       Past Medical History:   Diagnosis Date    Alcohol use disorder     Alcohol withdrawal syndrome (HCC)     Anxiety     Depression     PTSD (post-traumatic stress disorder)     Tachycardia        Past Surgical History:   Procedure Laterality Date    LUMBAR LAMINECTOMY         History reviewed. No pertinent family history.  I have reviewed and agree with the history as documented.    E-Cigarette/Vaping    E-Cigarette Use Never User      E-Cigarette/Vaping Substances     Social History     Tobacco Use    Smoking status: Never     Smokeless tobacco: Never   Vaping Use    Vaping status: Never Used   Substance Use Topics    Alcohol use: Yes     Alcohol/week: 112.0 standard drinks of alcohol     Types: 112 Shots of liquor per week     Comment: pt reports he rarely drinks anymore, but drank a lot today    Drug use: Never       Review of Systems   Constitutional:  Negative for chills and fever.   Respiratory:  Negative for cough and shortness of breath.    Cardiovascular:  Negative for chest pain.   Gastrointestinal:  Positive for nausea and vomiting. Negative for abdominal pain.   Neurological:  Positive for tremors and light-headedness. Negative for headaches.       Physical Exam  Physical Exam  Vitals and nursing note reviewed.   Constitutional:       General: He is not in acute distress.     Appearance: He is well-developed. He is diaphoretic (Clammy). He is not ill-appearing or toxic-appearing.   HENT:      Head: Normocephalic and atraumatic.   Eyes:      General: No scleral icterus.     Extraocular Movements: Extraocular movements intact.      Conjunctiva/sclera:      Right eye: Right conjunctiva is not injected.      Left eye: Left conjunctiva is not injected.      Pupils: Pupils are equal, round, and reactive to light. Pupils are equal.   Neck:      Vascular: No JVD.      Trachea: Trachea normal.   Cardiovascular:      Rate and Rhythm: Normal rate and regular rhythm.      Pulses: Normal pulses.      Heart sounds: Normal heart sounds. No murmur heard.     No friction rub.   Pulmonary:      Effort: Pulmonary effort is normal. No accessory muscle usage or respiratory distress.      Breath sounds: Normal breath sounds. No stridor. No wheezing, rhonchi or rales.   Chest:      Chest wall: No tenderness.   Abdominal:      General: There is no distension.      Palpations: Abdomen is soft. Abdomen is not rigid.      Tenderness: There is no abdominal tenderness. There is no guarding or rebound.   Musculoskeletal:      Cervical back: Normal range  of motion.   Skin:     General: Skin is warm.      Coloration: Skin is not pale.      Findings: No rash.   Neurological:      Mental Status: He is alert.      GCS: GCS eye subscore is 4. GCS verbal subscore is 5. GCS motor subscore is 6.      Motor: Tremor present. No weakness (Grossly intact).         Vital Signs  ED Triage Vitals   Temperature Pulse Respirations Blood Pressure SpO2   08/25/24 0744 08/25/24 0747 08/25/24 0744 08/25/24 0744 08/25/24 0747   98.5 °F (36.9 °C) 96 20 136/93 98 %      Temp Source Heart Rate Source Patient Position - Orthostatic VS BP Location FiO2 (%)   08/25/24 0744 08/25/24 0744 08/25/24 0744 08/25/24 0744 --   Oral Monitor Sitting Right arm       Pain Score       08/25/24 0744       9           Vitals:    08/25/24 1045 08/25/24 1100 08/25/24 1130 08/25/24 1200   BP: 136/87 125/85 141/90 125/85   Pulse: 62 66 66 64   Patient Position - Orthostatic VS:  Lying Lying Lying         Visual Acuity      ED Medications  Medications   sodium chloride 0.9 % bolus 1,000 mL (0 mL Intravenous Stopped 8/25/24 0912)   ondansetron (ZOFRAN) injection 4 mg (4 mg Intravenous Given 8/25/24 0811)   LORazepam (ATIVAN) injection 2 mg (2 mg Intravenous Given 8/25/24 0809)   folic acid 1 mg in sodium chloride 0.9 % 50 mL IVPB (0 mg Intravenous Stopped 8/25/24 0939)   ondansetron (ZOFRAN) injection 4 mg (4 mg Intravenous Given 8/25/24 1005)   LORazepam (ATIVAN) injection 2 mg (2 mg Intravenous Given 8/25/24 1013)       Diagnostic Studies  Results Reviewed       Procedure Component Value Units Date/Time    Rapid drug screen, urine [[id number]]  (Normal) Collected: 08/25/24 0833    Lab Status: Final result Specimen: Urine, Clean Catch Updated: 08/25/24 0912     Amph/Meth UR Negative     Barbiturate Ur Negative     Benzodiazepine Urine Negative     Cocaine Urine Negative     Methadone Urine Negative     Opiate Urine Negative     PCP Ur Negative     THC Urine Negative     Oxycodone Urine Negative     Fentanyl Urine  Negative     HYDROCODONE URINE Negative    Narrative:      FOR MEDICAL PURPOSES ONLY.   IF CONFIRMATION NEEDED PLEASE CONTACT THE LAB WITHIN 5 DAYS.    Drug Screen Cutoff Levels:  AMPHETAMINE/METHAMPHETAMINES  1000 ng/mL  BARBITURATES     200 ng/mL  BENZODIAZEPINES     200 ng/mL  COCAINE      300 ng/mL  METHADONE      300 ng/mL  OPIATES      300 ng/mL  PHENCYCLIDINE     25 ng/mL  THC       50 ng/mL  OXYCODONE      100 ng/mL  FENTANYL      5 ng/mL  HYDROCODONE     300 ng/mL    Comprehensive metabolic panel [[id number]] Collected: 08/25/24 0806    Lab Status: Final result Specimen: Blood from Arm, Right Updated: 08/25/24 0836     Sodium 139 mmol/L      Potassium 3.8 mmol/L      Chloride 102 mmol/L      CO2 26 mmol/L      ANION GAP 11 mmol/L      BUN 19 mg/dL      Creatinine 0.97 mg/dL      Glucose 100 mg/dL      Calcium 9.8 mg/dL      AST 32 U/L      ALT 25 U/L      Alkaline Phosphatase 73 U/L      Total Protein 6.7 g/dL      Albumin 4.5 g/dL      Total Bilirubin 0.61 mg/dL      eGFR 101 ml/min/1.73sq m     Narrative:      National Kid[employer] Disease Foundation guidelines for Chronic Kidney Disease (CKD):     Stage 1 with normal or high GFR (GFR > 90 mL/min/1.73 square meters)    Stage 2 Mild CKD (GFR = 60-89 mL/min/1.73 square meters)    Stage 3A Moderate CKD (GFR = 45-59 mL/min/1.73 square meters)    Stage 3B Moderate CKD (GFR = 30-44 mL/min/1.73 square meters)    Stage 4 Severe CKD (GFR = 15-29 mL/min/1.73 square meters)    Stage 5 End Stage CKD (GFR <15 mL/min/1.73 square meters)  Note: GFR calculation is accurate only with a steady state creatinine    Lipase [[id number]]  (Normal) Collected: 08/25/24 0806    Lab Status: Final result Specimen: Blood from Arm, Right Updated: 08/25/24 0836     Lipase 34 u/L     Magnesium [[id number]]  (Abnormal) Collected: 08/25/24 0806    Lab Status: Final result Specimen: Blood from Arm, Right Updated: 08/25/24 0836     Magnesium 1.7 mg/dL     Ethanol [[id number]]  (Abnormal) Collected:  08/25/24 0806    Lab Status: Final result Specimen: Blood from Arm, Right Updated: 08/25/24 0836     Ethanol Lvl 18 mg/dL     CBC and differential [[id number]]  (Abnormal) Collected: 08/25/24 0806    Lab Status: Final result Specimen: Blood from Arm, Right Updated: 08/25/24 0821     WBC 10.33 Thousand/uL      RBC 4.81 Million/uL      Hemoglobin 14.3 g/dL      Hematocrit 41.7 %      MCV 87 fL      MCH 29.7 pg      MCHC 34.3 g/dL      RDW 13.9 %      MPV 8.9 fL      Platelets 284 Thousands/uL      nRBC 0 /100 WBCs      Segmented % 65 %      Immature Grans % 0 %      Lymphocytes % 26 %      Monocytes % 6 %      Eosinophils Relative 2 %      Basophils Relative 1 %      Absolute Neutrophils 6.62 Thousands/µL      Absolute Immature Grans 0.03 Thousand/uL      Absolute Lymphocytes 2.73 Thousands/µL      Absolute Monocytes 0.63 Thousand/µL      Eosinophils Absolute 0.22 Thousand/µL      Basophils Absolute 0.10 Thousands/µL                    No orders to display              Procedures  ECG 12 Lead Documentation Only    Date/Time: 8/25/2024 8:58 AM    Performed by: Lina Thakkar DO  Authorized by: Lina Thakkar DO    Indications / Diagnosis:  Alcohol withdrawal  ECG reviewed by me, the ED Provider: yes    Patient location:  Bedside  Rate:     ECG rate:  60    ECG rate assessment: normal    Rhythm:     Rhythm: sinus rhythm    Ectopy:     Ectopy: none    QRS:     QRS axis:  Normal    QRS intervals:  Normal  ST segments:     ST segments:  Normal  T waves:     T waves: normal    CriticalCare Time    Date/Time: 8/25/2024 8:00 AM    Performed by: Lina Thakkar DO  Authorized by: Lina Thakkar DO    Critical care provider statement:     Critical care time (minutes):  32    Critical care time was exclusive of:  Separately billable procedures and treating other patients and teaching time    Critical care was necessary to treat or prevent imminent or life-threatening deterioration of the following conditions:   Toxidrome    Critical care was time spent personally by me on the following activities:  Blood draw for specimens, obtaining history from patient or surrogate, development of treatment plan with patient or surrogate, discussions with consultants, evaluation of patient's response to treatment, examination of patient, ordering and performing treatments and interventions, ordering and review of laboratory studies, re-evaluation of patient's condition and review of old charts    I assumed direction of critical care for this patient from another provider in my specialty: no    Comments:      Pt with alcohol withdrawal requiring IV benzos           ED Course  ED Course as of 08/25/24 1302   Sun Aug 25, 2024   0747 Blood Pressure: 136/93  Not hypertensive   0750 Pulse: 96  Not tachycardic   0821 CBC and differential(!)  Unremarkable   0900 Discussed case with Kathleen Greenberg PA-C for detox unit. Pt accepted to [place] detox unit - Dr. Pollard.   6441 Reports nausea is returning. Zofran ordered.   1010 Pt with tremor and clammy.  Ativan ordered.            CIWA-Ar Score       Row Name 08/25/24 1045 08/25/24 1000 08/25/24 0814       CIWA-Ar    Nausea and Vomiting 1 3 3    Tactile Disturbances 1 2 2    Tremor 3 4 4    Auditory Disturbances 0 0 0    Paroxysmal Sweats 3 3 4    Visual Disturbances 0 0 0    Anxiety 3 4 4    Headache, Fullness in Head 2 4 4    Agitation 0 0 0    Orientation and Clouding of Sensorium 0 0 0    CIWA-Ar Total 13 20 21                                                    Medical Decision Making  EtOH withdrawal - Will check labs, EKG, UDS and discuss case with detox unit.    Amount and/or Complexity of Data Reviewed  Labs: ordered. Decision-making details documented in ED Course.    Risk  Prescription drug management.                 Disposition  Final diagnoses:   Alcohol withdrawal (HCC)   Alcohol withdrawal syndrome with complication (HCC)     Time reflects when diagnosis was documented in both  MDM as applicable and the Disposition within this note       Time User Action Codes Description Comment    8/25/2024  8:55 AM Lina Thakkar Add [F10.939] Alcohol withdrawal (HCC)     8/25/2024 10:38 AM Kathleen Greenberg Add [F10.939] Alcohol withdrawal syndrome with complication (HCC)           ED Disposition       ED Disposition   Transfer to Another Facility-In Network    Condition   --    Date/Time   Sun Aug 25, 2024  8:55 AM    Comment   Juan Luu should be transferred out to [facility].               MD Documentation      Flowsheet Row Most Recent Value   Patient Condition The patient has been stabilized such that within reasonable medical probability, no material deterioration of the patient condition or the condition of the unborn child(leticia) is likely to result from the transfer   Reason for Transfer Level of Care needed not available at this facility   Benefits of Transfer Specialized equipment and/or services available at the receiving facility (Include comment)________________________   Risks of Transfer Potential for delay in receiving treatment, Potential deterioration of medical condition, Loss of IV, Increased discomfort during transfer   Accepting Physician Dr. Pollard   Accepting Facility Name, [facility] detox unit   Sending MD Thakkar   Provider Certification General risk, such as traffic hazards, adverse weather conditions, rough terrain or turbulence, possible failure of equipment (including vehicle or aircraft), or consequences of actions of persons outside the control of the transport personnel, Unanticipated needs of medical equipment and personnel during transport, Risk of worsening condition          RN Documentation      Flowsheet Row Most Recent Value   Accepting Facility Name, [facility] unit          Follow-up Information    None         Discharge Medication List as of 8/25/2024 12:17 PM        CONTINUE these medications which have NOT  CHANGED    Details   aluminum-magnesium hydroxide-simethicone (MAALOX) 6003-5594-683 mg/30 mL suspension Take 30 mL by mouth every 4 (four) hours as needed for indigestion or heartburn, Starting Sun 5/5/2024, Normal      ibuprofen (MOTRIN) 600 mg tablet Take 1 tablet (600 mg total) by mouth every 6 (six) hours as needed for mild pain for up to 10 days, Starting Fri 5/10/2024, Until Mon 5/20/2024 at 2359, Normal      naltrexone (REVIA) 50 mg tablet Take 1 tablet (50 mg total) by mouth daily, Starting Sun 5/5/2024, Until Tue 6/4/2024, Normal      ondansetron (ZOFRAN-ODT) 4 mg disintegrating tablet Take 1 tablet (4 mg total) by mouth every 6 (six) hours as needed for vomiting or nausea, Starting Fri 5/10/2024, Normal             No discharge procedures on file.    PDMP Review         Value Time User    PDMP Reviewed  Yes 5/10/2024 10:24 PM Kim Choi PA-C            ED Provider  Electronically Signed by             Lina Thakkar DO  08/25/24 4712

## 2024-08-25 NOTE — ASSESSMENT & PLAN NOTE
Patient endorses a h/o chronic anxiety and depression  Previously prescribed Gabapentin for PAWS during last hospital admission   Reports that he is taking propranolol 20 mg TID   Will hold during acute alcohol withdrawal   Can consider initiation of anxiolytic medication    Denies SI/HI/thoughts of self harm  Continue home medications  Recommend OP f/u with PCP/OP Psychiatry

## 2024-08-25 NOTE — ASSESSMENT & PLAN NOTE
H/o chronic daily alcohol consumption, denies h/o withdrawal seizures  Last drink: 8/25/24   Ethanol lvl: 18 0806 8/25/24   Follow SEWS protocol with symptom-triggered phenobarbital for medically-assisted alcohol withdrawal  Current symptoms include:   SEWS score upon admission  Administer ? mg phenobarbital + valium 10 mg IV   Telemetry and pulse oximetry monitoring   Continue to monitor vitals, symptoms

## 2024-08-25 NOTE — ASSESSMENT & PLAN NOTE
Pt with a h/o chronic heavy alcohol use for the past three months   Drinks 1/5th of vodka daily  Previous admission to the [facility] May 2024  Denies H/o withdrawal seizures  Previously on  naltrexone at this time- not interested in restarting   Withdrawal management as above  Initiate IVFs, IV thiamine, folic acid, and MVI  Consult case management/CRS for assistance with aftercare resources - pt interested in inpatient drug and alcohol rehab upon discharge. States his family will set him up with rehab upon discharge, declines case management assistance at this time

## 2024-08-25 NOTE — H&P
HISTORY & PHYSICAL EXAM  DEPARTMENT OF MEDICAL TOXICOLOGY  LEVEL 4 MEDICAL DETOX UNIT  Juan Luu 34 y.o. male MRN: [id number]  Unit/Bed#: [address] [place] [address] 511-01 Encounter: [id number]      Reason for Admission/Principal Problem: Ethanol withdrawal, Ethanol use disorder  Admitting Provider: Kathleen Greenberg PA-C  Attending Provider: Thierno Mills*   8/25/2024 12:22 PM        * Alcohol withdrawal syndrome with complication (HCC)  Assessment & Plan  H/o chronic daily alcohol consumption, denies h/o withdrawal seizures  Last drink: 8/25/24   Ethanol lvl: 18 0806 8/25/24   Follow SEWS protocol with symptom-triggered phenobarbital for medically-assisted alcohol withdrawal  Current symptoms include:   SEWS score upon admission  Administer ? mg phenobarbital + valium 10 mg IV   Telemetry and pulse oximetry monitoring   Continue to monitor vitals, symptoms        Alcohol use disorder, severe, dependence (HCC)  Assessment & Plan  Pt with a h/o chronic heavy alcohol use for the past three months   Drinks 1/5th of vodka daily  Previous admission to the [facility] Medical Detox Unit May 2024  Denies H/o withdrawal seizures  Previously on  naltrexone at this time- not interested in restarting   Withdrawal management as above  Initiate IVFs, IV thiamine, folic acid, and MVI  Consult case management/CRS for assistance with aftercare resources - pt interested in inpatient drug and alcohol rehab upon discharge. States his family will set him up with rehab upon discharge, declines case management assistance at this time     Tobacco use disorder  Assessment & Plan  Patient reports that he chews tobacco  I provided 7 minutes of tobacco cessation education  Patient is agreeable to NRT and would like to have the nicotine patch during hospitalization       Right elbow pain  Assessment & Plan  Reports recent fall prior to admission due to intoxication  Endorses swelling and decreased ROM   Will order XR to r/o fracture    Supportive Care     Leukocytosis  Assessment & Plan  Lab Results   Component Value Date    WBC 10.33 (H) 08/25/2024        Elevated WBC on admission  Possibly 2/2 leukemoid reaction from acute withdrawal  No evidence of active infection   Pt afebrile, VSS  Continue monitoring CBC, VS     Hypomagnesemia  Assessment & Plan  Mag 1.7 on admission  K 3.8  Repleted with IV magnesium sulfate 2 g  Continue monitoring and replete electrolytes as indicated     Alcoholic gastritis  Assessment & Plan  Patient complaining of epigastric pain, hx of GERD   Suspect component of alcoholic gastritis   Continue PPI     Anxiety  Assessment & Plan  Patient endorses a h/o chronic anxiety and depression  Previously prescribed Gabapentin for PAWS during last hospital admission   Reports that he is taking propranolol 20 mg TID   Will hold during acute alcohol withdrawal   Can consider initiation of anxiolytic medication    Denies SI/HI/thoughts of self harm  Continue home medications  Recommend OP f/u with PCP/OP Psychiatry                   VTE Prophylaxis: Enoxaparin (Lovenox)  / sequential compression device   Code Status: Full Code       Anticipated Length of Stay:  Patient will be admitted on an Inpatient basis with an anticipated length of stay of  2  midnights.   Justification for Hospital Stay: alcohol withdrawal     For any questions or concerns, please Tiger Text the advanced practitioner in the role of [facility] [place] On Call      This patient qualifies for Level IV medically managed intensive inpatient services under the criteria set by the American Society of Addiction Medicine, including dimensions 1-3. The patient is in withdrawal (or is intoxicated with high risk of withdrawal), with severe and unstable medical and/or psychiatric (dual diagnosis) problems, requiring requires 24-hour medical and nursing care and the full resources of a licensed hospital.          NURYS PHENOBARBITAL PROTOCOL FOR ALCOHOL WITHDRAWAL    Admit  patient to medical detox unit and continue supportive care and stabilization of acute ethanol withdrawal per medical toxicology/detox treatment pathway. Monitor ethanol withdrawal severity via the Severity of Ethanol Withdrawal Scale (SEWS) Q4 hours and then hourly if/when SEWS > 6. Treat withdrawal per pathway and reassess Q30-60 minutes.           Mild SEWS Score 1-6  Administer medications* (IV or PO; PO preferred):  If initial SEWS score: diazepam 10mg PO/IV x 1 AND phenobarbital 65 mg PO/IV x 1  If repeat SEWS score 1-6: phenobarbital 65 mg PO/IV q1 hour x 5 doses maximum   Reassessment:   SEWS q1 hour after each dose until SEWS 0 x 2 hours  VS q1 hours (until SEWS 0, then q4 hours)  Notify provider for bedside evaluation if 5-dose maximum is reached, RASS of -3 to -5, or SEWS score escalates to moderate or severe.   Moderate SEWS Score 7-12  Administer medications* (IV):  If initial SEWS score: diazepam 10mg IV x 1 AND phenobarbital 260 mg IV x 1  If repeat SEWS score 7-12 or score escalated from mild: phenobarbital 130 mg IV q30 minutes x 5 doses maximum   Reassessment:  SEWS q30 minutes after each dose until SEWS < 7 (then hourly until SEWS 0 x 2 hours)  VS q30 minutes until SEWS < 7 (then hourly until SEWS 0, then q4 hours)  Notify provider for bedside evaluation if 5-dose maximum is reached, RASS of -3 to -5, or SEWS score escalates to severe.   Severe SEWS Score ? 13  Administer medications* (IV):  If initial SEWS score: Diazepam 10 mg IV x 1 AND phenobarbital 650 mg IV piggyback x 1 over 15-30 minutes  If repeat SEWS score ? 13 or score escalated from mild or moderate: phenobarbital 130 mg IV q30 minutes x 5 doses maximum   Reassessment:  SEWS q30 minutes after each dose until SEWS < 7 (then hourly until SEWS 0 x 2 hours)   VS q30 minutes until SEWS < 7 (then hourly until SEWS 0, then q4 hours)  Notify provider for bedside evaluation if 5-dose maximum is reached or RASS of -3 to -5   *Hold medications  and notify provider if CNS depression, respirations < 10/min, or RASS of -3 to -5.         Medications to be administered adjunctively if more than 2 grams of phenobarbital is needed for stabilization of withdrawal; require attending approval.   Dexmedetomidine infusion 0.1-1mcg/kg/hr IV infusion, titratable to reduced agitation (Goal: RASS -2)  Ketamine   Acute agitated delirium: 1-2 mg/kg IV or 4-5 mg/kg IM  Refractory withdrawal: 0.1-1mg/kg/hr IV infusion, titratable to reduced agitation (Goal: RASS -2)    Further evaluation, screening and treatment:  Evaluate complete metabolic panel, transaminases, INR, and lipase. Assess hepatic ultrasound for any sign of alcoholic liver disease or cirrhosis, and ultimately refer for further hepatic evaluation and care as/if indicated.    Additional medications for ethanol associated malnutrition:  Thiamine 100 mg IV daily, increase to 500 mg TID for signs/symptoms of Wernicke's Encephalopathy or Wernicke Korsakoff Syndrome   Folic acid 1 mg IV daily   Multivitamin PO daily      Will offer first monthly injection of Naltrexone 380 mg IM, once patient is stabilized, as it has been shown to assist in decreasing cravings for ethanol.     Evaluate and treat for coexisting substance use, such as opioids and nicotine. Discuss risk factors for infectious disease, such as history of intravenous drug abuse, and offer hepatitis and HIV screening if indicated.     Case management consultation to assist with coordination of subsequent treatment after discharge.          Hx and PE limited by: none     HPI: Juan Luu is a 34 y.o. year old male who presents with alcohol withdrawal seeking detoxification. Patient initially presented to the [facility]  ED requesting alcohol detox with sx of anxiety, tremors, nausea/vomiting and was subsequently transferred to the [facility] medical detox unit for medical management of alcohol withdrawal. Pt reports drinking 1/5th of vodka daily, and his last  drink was 8/24/24. Serum alcohol was 18 in the ED. Previous history of inpatient medical detox with his last detox in May 2024. He reports that he was previously on naltrexone. Not interested in restarting. Patient reports interest in entering inpatient drug and alcohol rehab through his employer after discharge. Patient states that he never successfully transitioned into rehab following an inpatient medical detox. Discussed Case management involvement with linking patient to rehab, patient declines as he states family member is arranging his rehab stay;     Preferred alcoholic beverage(s): Vodka   Quantity and frequency of alcohol intake: 1/5th of vodka daily   Use of any ethanol substitutes (toxic alcohols): no  Date/Time of last alcohol intake: 8/25/24   Current signs and symptoms of ethanol withdrawal: anxiety    SEWS Total Score: 10 (8/25/2024 12:37 PM)      Ethanol Withdrawal History  Previous ethanol withdrawal? yes  Prior inpatient treatment for ethanol withdrawal? yes  Prior outpatient treatment for ethanol withdrawal? yes  History of seizures with prior ethanol withdrawal? no  Prior treatment with naltrexone (Vivitrol)? yes  Current treatment with naltrexone (Vivitrol)? yes  Other current treatment for ethanol use disorder? no  Co-existing substance use? no    Review of PDMP: yes     Social History     Substance and Sexual Activity   Alcohol Use Yes    Alcohol/week: 112.0 standard drinks of alcohol    Types: 112 Shots of liquor per week    Comment: pt reports he rarely drinks anymore, but drank a lot today     Social History     Substance and Sexual Activity   Drug Use Never     Social History     Tobacco Use   Smoking Status Never   Smokeless Tobacco Never       Review of Systems   Respiratory:  Negative for chest tightness and shortness of breath.    Cardiovascular:  Negative for chest pain.   Gastrointestinal:  Positive for nausea. Negative for abdominal pain and vomiting.   Musculoskeletal:  Positive  for joint swelling (right elbow swelling).   Neurological:  Positive for tremors and headaches.   Psychiatric/Behavioral:  The patient is nervous/anxious.        Historical Information   Past Medical History:   Diagnosis Date    Alcohol use disorder     Alcohol withdrawal syndrome (HCC)     Anxiety     Depression     PTSD (post-traumatic stress disorder)     Tachycardia      Past Surgical History:   Procedure Laterality Date    LUMBAR LAMINECTOMY       No family history on file.  Social History   Marital Status: Single   Occupation: Construction  Patient Pre-hospital Living Situation: Lives alone  Patient Pre-hospital Level of Mobility: Independent  Patient Pre-hospital Diet Restrictions: None     No Known Allergies    Prior to Admission medications    Medication Sig Start Date End Date Taking? Authorizing Provider   aluminum-magnesium hydroxide-simethicone (MAALOX) 1559-8442-548 mg/30 mL suspension Take 30 mL by mouth every 4 (four) hours as needed for indigestion or heartburn  Patient not taking: Reported on 8/25/2024 5/5/24   Marv Franco DO   ibuprofen (MOTRIN) 600 mg tablet Take 1 tablet (600 mg total) by mouth every 6 (six) hours as needed for mild pain for up to 10 days 5/10/24 5/20/24  Kim Choi PA-C   naltrexone (REVIA) 50 mg tablet Take 1 tablet (50 mg total) by mouth daily 5/5/24 6/4/24  Marv Franco DO   ondansetron (ZOFRAN-ODT) 4 mg disintegrating tablet Take 1 tablet (4 mg total) by mouth every 6 (six) hours as needed for vomiting or nausea  Patient not taking: Reported on 8/25/2024 5/10/24   Kmi Choi PA-C       Current Facility-Administered Medications   Medication Dose Route Frequency    acetaminophen (TYLENOL) tablet 650 mg  650 mg Oral Q6H PRN    aluminum-magnesium hydroxide-simethicone (MAALOX) oral suspension 30 mL  30 mL Oral Q4H PRN    enoxaparin (LOVENOX) subcutaneous injection 40 mg  40 mg Subcutaneous Daily    folic acid 1 mg, thiamine  (VITAMIN B1) 100 mg in sodium chloride 0.9 % 100 mL IV piggyback   Intravenous Daily    magnesium sulfate 2 g/50 mL IVPB (premix) 2 g  2 g Intravenous Once    nicotine (NICODERM CQ) 21 mg/24 hr TD 24 hr patch 21 mg  21 mg Transdermal Daily    ondansetron (ZOFRAN) injection 4 mg  4 mg Intravenous Q6H PRN    sodium chloride 0.9 % infusion  100 mL/hr Intravenous Continuous    traZODone (DESYREL) tablet 50 mg  50 mg Oral HS PRN       Continuous Infusions:sodium chloride, 100 mL/hr, Last Rate: 100 mL/hr (08/25/24 1248)             Objective     No intake or output data in the 24 hours ending 08/25/24 1306    Invasive Devices:   Peripheral IV 08/25/24 Dorsal (posterior);Right Forearm (Active)       Vitals   Vitals:    08/25/24 1224   BP: 129/87   TempSrc: Temporal   Pulse: 63   Resp: 16   Patient Position - Orthostatic VS: Sitting   Temp: 97.6 °F (36.4 °C)       Physical Exam  Vitals reviewed.   Constitutional:       General: He is in acute distress.      Appearance: He is diaphoretic.   Cardiovascular:      Rate and Rhythm: Normal rate and regular rhythm.      Heart sounds: No murmur heard.  Pulmonary:      Effort: No respiratory distress.      Breath sounds: Normal breath sounds.   Abdominal:      General: Bowel sounds are normal. There is no distension.      Palpations: Abdomen is soft.   Musculoskeletal:      Right lower leg: No edema.      Left lower leg: No edema.   Neurological:      Mental Status: He is alert.      Motor: Tremor present.      Comments: + tongue fasciculations    Psychiatric:         Mood and Affect: Mood is anxious. Mood is not depressed.           Data:    EKG, Pathology, and Other Studies: I have personally reviewed pertinent reports.    EKG: NSR     Lab Results:  CBC ETOH     Lab Results   Component Value Date    WBC 10.33 (H) 08/25/2024    RBC 4.81 08/25/2024    HGB 14.3 08/25/2024    HCT 41.7 08/25/2024    MCV 87 08/25/2024    MCH 29.7 08/25/2024    MCHC 34.3 08/25/2024    RDW 13.9 08/25/2024  "    08/25/2024    MPV 8.9 08/25/2024      No results found for: \"LACTICACID\"   CMP UA         Component Value Date/Time    K 3.8 08/25/2024 0806    K 3.1 (L) 05/16/2024 0617     08/25/2024 0806    CL 99 (L) 05/16/2024 0617    CO2 26 08/25/2024 0806    CO2 24 05/16/2024 0617    BUN 19 08/25/2024 0806    BUN 8 05/16/2024 0617    CREATININE 0.97 08/25/2024 0806    CREATININE 0.85 05/16/2024 0617         Component Value Date/Time    CALCIUM 9.8 08/25/2024 0806    CALCIUM 8.9 05/16/2024 0617    ALKPHOS 73 08/25/2024 0806    ALKPHOS 82 05/14/2024 1052    AST 32 08/25/2024 0806    AST 32 05/14/2024 1052    AST 42 (H) 01/25/2024 0424    ALT 25 08/25/2024 0806    ALT 25 05/14/2024 1052    ALT 49 01/25/2024 0424      No results found for: \"CLARITYU\", \"COLORU\", \"SPECGRAV\", \"PHUR\", \"GLUCOSEU\", \"KETONESU\", \"BLOODU\", \"PROTEIN UA\", \"NITRITE\", \"BILIRUBINUR\", \"UROBILINOGEN\", \"LEUKOCYTESUR\", \"WBCUA\", \"RBCUA\", \"HYALINE\", \"BACTERIA\", \"EPIS\"     Liver Function Test: ASA     Lab Results   Component Value Date    TBILI 0.61 08/25/2024    TBILI 0.5 05/14/2024    BILIDIR 0.3 01/25/2024    ALKPHOS 73 08/25/2024    ALKPHOS 82 05/14/2024    AST 32 08/25/2024    AST 32 05/14/2024    AST 42 (H) 01/25/2024    ALT 25 08/25/2024    ALT 25 05/14/2024    ALT 49 01/25/2024    TP 6.7 08/25/2024    TP 7.6 05/14/2024    ALB 4.5 08/25/2024    ALB 4.6 05/14/2024      Lab Results   Component Value Date    SALICYLATE <5 05/03/2024      Troponin APAP     Lab Results   Component Value Date    TROPONINI <0.03 08/18/2023      Lab Results   Component Value Date    ACTMNPHEN <2 (L) 05/03/2024      VBG HCG     No results found for: \"PHVEN\", \"LWK9UIC\", \"PO2VEN\", \"SEY1PPL\", \"BEVEN\", \"Q6XVLWKTU\", \"Q8ZLVRG\"   No results found for: \"HCGQUANT\"   ABG Urine Drug Screen     No results found for: \"PHART\", \"GYD8MYY\", \"PO2ART\", \"XCU4VLI\", \"BEART\", \"C3XOVGIBD\", \"O2HGB\", \"SOURC\", \"ANGELIC\", \"VTAC\", \"ACRATE\", \"INSPIREDAIR\", \"PEEP\"   Lab Results   Component Value Date " "   AMPMETHUR Negative 08/25/2024    BARBTUR Negative 08/25/2024    BDZUR Negative 08/25/2024    COCAINEUR Negative 08/25/2024    METHADONEUR Negative 08/25/2024    OPIATEUR Negative 08/25/2024    PCPUR Negative 08/25/2024    THCUR Negative 08/25/2024    OXYCODONEUR Negative 08/25/2024      Lactate INR     No results found for: \"LACTICACID\"   No results found for: \"INR\"   PTT Protime     No results found for: \"PTT\"     No results found for: \"PROTIME\"           Imaging Studies: I have personally reviewed pertinent reports.        Counseling / Coordination of Care  Total floor / unit time spent today 35 minutes. Greater than 50% of total time was spent with the patient and / or family counseling and / or coordination of care.       Minutes of critical care time 32  -Critical care time was exclusive of separately billable procedures and teaching time.   -Critical care was necessary to treat or prevent imminent or life-threatening deterioration of the following condition: CNS failure/compromise, toxidrome (ethanol withdrawal),  withdrawal  -Critical care time was spent personally by me on the following activities as well as the above as per the course and rest of chart: obtaining history from patient/surrogate, development of a treatment plan, discussions with referring provider(s), evaluation of patient's response to the treatment, examination of the patient, performing treatments and interventions, re-evaluation of the patient's condition, review of old charts, ordering/interpreting laboratory studies, ordering/interpreting of radiographic studies.      ** Please Note: This note has been constructed using a voice recognition system. **   "

## 2024-08-25 NOTE — ASSESSMENT & PLAN NOTE
Lab Results   Component Value Date    WBC 10.33 (H) 08/25/2024        Elevated WBC on admission  Possibly 2/2 leukemoid reaction from acute withdrawal  No evidence of active infection   Pt afebrile, VSS  Continue monitoring CBC, VS

## 2024-08-26 VITALS
HEIGHT: 70 IN | DIASTOLIC BLOOD PRESSURE: 83 MMHG | SYSTOLIC BLOOD PRESSURE: 134 MMHG | RESPIRATION RATE: 16 BRPM | BODY MASS INDEX: 27.97 KG/M2 | OXYGEN SATURATION: 99 % | WEIGHT: 195.38 LBS | HEART RATE: 78 BPM | TEMPERATURE: 97.4 F

## 2024-08-26 PROBLEM — R51.9 HEADACHE: Status: ACTIVE | Noted: 2024-08-26

## 2024-08-26 LAB
ALBUMIN SERPL BCG-MCNC: 3.7 G/DL (ref 3.5–5)
ALP SERPL-CCNC: 63 U/L (ref 34–104)
ALT SERPL W P-5'-P-CCNC: 17 U/L (ref 7–52)
ANION GAP SERPL CALCULATED.3IONS-SCNC: 8 MMOL/L (ref 4–13)
AST SERPL W P-5'-P-CCNC: 24 U/L (ref 13–39)
BILIRUB SERPL-MCNC: 0.75 MG/DL (ref 0.2–1)
BUN SERPL-MCNC: 13 MG/DL (ref 5–25)
CALCIUM SERPL-MCNC: 8.3 MG/DL (ref 8.4–10.2)
CHLORIDE SERPL-SCNC: 103 MMOL/L (ref 96–108)
CO2 SERPL-SCNC: 25 MMOL/L (ref 21–32)
CREAT SERPL-MCNC: 0.8 MG/DL (ref 0.6–1.3)
ERYTHROCYTE [DISTWIDTH] IN BLOOD BY AUTOMATED COUNT: 13.6 % (ref 11.6–15.1)
GFR SERPL CREATININE-BSD FRML MDRD: 116 ML/MIN/1.73SQ M
GLUCOSE SERPL-MCNC: 95 MG/DL (ref 65–140)
HCT VFR BLD AUTO: 37.7 % (ref 36.5–49.3)
HGB BLD-MCNC: 12.9 G/DL (ref 12–17)
MAGNESIUM SERPL-MCNC: 2.1 MG/DL (ref 1.9–2.7)
MCH RBC QN AUTO: 30.1 PG (ref 26.8–34.3)
MCHC RBC AUTO-ENTMCNC: 34.2 G/DL (ref 31.4–37.4)
MCV RBC AUTO: 88 FL (ref 82–98)
PLATELET # BLD AUTO: 165 THOUSANDS/UL (ref 149–390)
PMV BLD AUTO: 9.2 FL (ref 8.9–12.7)
POTASSIUM SERPL-SCNC: 3.4 MMOL/L (ref 3.5–5.3)
PROT SERPL-MCNC: 5.8 G/DL (ref 6.4–8.4)
RBC # BLD AUTO: 4.28 MILLION/UL (ref 3.88–5.62)
SODIUM SERPL-SCNC: 136 MMOL/L (ref 135–147)
WBC # BLD AUTO: 8.62 THOUSAND/UL (ref 4.31–10.16)

## 2024-08-26 PROCEDURE — 85027 COMPLETE CBC AUTOMATED: CPT

## 2024-08-26 PROCEDURE — 80053 COMPREHEN METABOLIC PANEL: CPT

## 2024-08-26 PROCEDURE — 83735 ASSAY OF MAGNESIUM: CPT

## 2024-08-26 RX ORDER — PHENOBARBITAL SODIUM 130 MG/ML
260 INJECTION, SOLUTION INTRAMUSCULAR; INTRAVENOUS ONCE
Status: COMPLETED | OUTPATIENT
Start: 2024-08-26 | End: 2024-08-26

## 2024-08-26 RX ORDER — METOCLOPRAMIDE HYDROCHLORIDE 5 MG/ML
10 INJECTION INTRAMUSCULAR; INTRAVENOUS EVERY 6 HOURS PRN
Status: DISCONTINUED | OUTPATIENT
Start: 2024-08-26 | End: 2024-08-26 | Stop reason: HOSPADM

## 2024-08-26 RX ORDER — PHENOBARBITAL SODIUM 130 MG/ML
130 INJECTION, SOLUTION INTRAMUSCULAR; INTRAVENOUS ONCE
Status: COMPLETED | OUTPATIENT
Start: 2024-08-26 | End: 2024-08-26

## 2024-08-26 RX ORDER — KETOROLAC TROMETHAMINE 30 MG/ML
15 INJECTION, SOLUTION INTRAMUSCULAR; INTRAVENOUS ONCE
Status: COMPLETED | OUTPATIENT
Start: 2024-08-26 | End: 2024-08-26

## 2024-08-26 RX ORDER — PHENOBARBITAL SODIUM 65 MG/ML
65 INJECTION, SOLUTION INTRAMUSCULAR; INTRAVENOUS ONCE
Status: COMPLETED | OUTPATIENT
Start: 2024-08-26 | End: 2024-08-26

## 2024-08-26 RX ORDER — DIPHENHYDRAMINE HYDROCHLORIDE 50 MG/ML
25 INJECTION INTRAMUSCULAR; INTRAVENOUS ONCE
Status: COMPLETED | OUTPATIENT
Start: 2024-08-26 | End: 2024-08-26

## 2024-08-26 RX ADMIN — NICOTINE 21 MG: 21 PATCH, EXTENDED RELEASE TRANSDERMAL at 08:38

## 2024-08-26 RX ADMIN — DIPHENHYDRAMINE HYDROCHLORIDE 25 MG: 50 INJECTION, SOLUTION INTRAMUSCULAR; INTRAVENOUS at 11:02

## 2024-08-26 RX ADMIN — METOCLOPRAMIDE 10 MG: 5 INJECTION, SOLUTION INTRAMUSCULAR; INTRAVENOUS at 05:55

## 2024-08-26 RX ADMIN — PHENOBARBITAL SODIUM 130 MG: 130 INJECTION INTRAMUSCULAR; INTRAVENOUS at 05:55

## 2024-08-26 RX ADMIN — ENOXAPARIN SODIUM 40 MG: 40 INJECTION SUBCUTANEOUS at 08:39

## 2024-08-26 RX ADMIN — METOCLOPRAMIDE 10 MG: 5 INJECTION, SOLUTION INTRAMUSCULAR; INTRAVENOUS at 11:02

## 2024-08-26 RX ADMIN — PHENOBARBITAL SODIUM 65 MG: 65 INJECTION INTRAMUSCULAR; INTRAVENOUS at 17:04

## 2024-08-26 RX ADMIN — ACETAMINOPHEN 650 MG: 325 TABLET ORAL at 10:37

## 2024-08-26 RX ADMIN — PHENOBARBITAL SODIUM 130 MG: 130 INJECTION INTRAMUSCULAR; INTRAVENOUS at 02:04

## 2024-08-26 RX ADMIN — TRAZODONE HYDROCHLORIDE 50 MG: 50 TABLET ORAL at 02:04

## 2024-08-26 RX ADMIN — FOLIC ACID: 5 INJECTION, SOLUTION INTRAMUSCULAR; INTRAVENOUS; SUBCUTANEOUS at 08:39

## 2024-08-26 RX ADMIN — ONDANSETRON 4 MG: 2 INJECTION INTRAMUSCULAR; INTRAVENOUS at 02:04

## 2024-08-26 RX ADMIN — PHENOBARBITAL SODIUM 260 MG: 130 INJECTION INTRAMUSCULAR; INTRAVENOUS at 00:10

## 2024-08-26 RX ADMIN — METOCLOPRAMIDE 10 MG: 5 INJECTION, SOLUTION INTRAMUSCULAR; INTRAVENOUS at 00:10

## 2024-08-26 RX ADMIN — PHENOBARBITAL SODIUM 130 MG: 130 INJECTION INTRAMUSCULAR; INTRAVENOUS at 08:48

## 2024-08-26 RX ADMIN — SODIUM CHLORIDE 100 ML/HR: 0.9 INJECTION, SOLUTION INTRAVENOUS at 12:28

## 2024-08-26 RX ADMIN — ACETAMINOPHEN 650 MG: 325 TABLET ORAL at 02:04

## 2024-08-26 RX ADMIN — PHENOBARBITAL SODIUM 65 MG: 65 INJECTION INTRAMUSCULAR; INTRAVENOUS at 10:37

## 2024-08-26 RX ADMIN — KETOROLAC TROMETHAMINE 15 MG: 30 INJECTION, SOLUTION INTRAMUSCULAR; INTRAVENOUS at 11:01

## 2024-08-26 NOTE — DISCHARGE SUMMARY
[facility]  Discharge- Juan Luu 1990, 34 y.o. male MRN: [id number]  Unit/Bed#: 5T DETOX 511-01 Encounter: [id number]  Primary Care Provider: No primary care provider on file.   Date and time admitted to hospital: 8/25/2024 12:22 PM    * Alcohol withdrawal syndrome with complication (HCC)  Assessment & Plan  H/o chronic daily alcohol consumption, denies h/o withdrawal seizures  Last drink: 8/25/24   Ethanol lvl: 18 0806 8/25/24   Given 1300 Phenobarb since admission.  Requesting to leave, filled out AMA paperwork         Anxiety  Assessment & Plan  Patient endorses a h/o chronic anxiety and depression  Previously prescribed Gabapentin for PAWS during last hospital admission   Reports that he is taking propranolol 20 mg TID   Will hold during acute alcohol withdrawal   Can consider initiation of anxiolytic medication    Denies SI/HI/thoughts of self harm  Continue home medications  Recommend OP f/u with PCP/OP Psychiatry        Headache  Assessment & Plan  Pt with worsening headache. Started prior to admission when he was trying to detox himself at home  CT imaging without acute pathology, and no focal deficits  Will treat and re-evaluate    Tobacco use disorder  Assessment & Plan  Patient reports that he chews tobacco  I provided 7 minutes of tobacco cessation education  Patient is agreeable to NRT and would like to have the nicotine patch during hospitalization       Right elbow pain  Assessment & Plan  Reports recent fall prior to admission due to intoxication  XR without acute fracture  Supportive Care     Leukocytosis  Assessment & Plan  Lab Results   Component Value Date    WBC 10.33 (H) 08/25/2024        Elevated WBC on admission  Possibly 2/2 leukemoid reaction from acute withdrawal  No evidence of active infection   Pt afebrile, VSS  Continue monitoring CBC, VS     Afebrile upon leaving AMA    Hypomagnesemia  Assessment & Plan  Mag 1.7 on admission  K 3.4  Repleted with  IV magnesium sulfate 2 g  Left AMA prior to recheck of electrolytes      Alcoholic gastritis  Assessment & Plan  Patient complaining of epigastric pain, hx of GERD   Suspect component of alcoholic gastritis   Continue PPI     Alcohol use disorder, severe, dependence (HCC)  Assessment & Plan  Pt with a h/o chronic heavy alcohol use for the past three months   Drinks 1/5th of vodka daily  Previous admission to the [facility] Medical Detox Unit May 2024  Denies H/o withdrawal seizures  Previously on  naltrexone PO. He is interested in IM naltrexone. No evidence of hepatic dysfunction. We discussed possible GI adverse effects.  Withdrawal management as above  Initiate IVFs, IV thiamine, folic acid, and MVI  Consult case management/CRS for assistance with aftercare resources - today he states he is interested in outpatient rehab upon discharge    Left AMA prior to being seen by CM              Medical Problems       Resolved Problems  Date Reviewed: 8/26/2024   None         Admission Date:   Admission Orders (From admission, onward)       Ordered        08/25/24 1230  Inpatient Admission  Once                            Admitting Diagnosis: Alcohol withdrawal (HCC) [F10.939]    HPI: Patient is 34M with PMH of Tobacco/ETOH use disorder, and anxiety, who presents intoxicated, wishing to detox from ETOH.         Summary of Hospital Course: He was admitted and started on SEWS. During his hospital course had total of 1300mg of Phenobarb. In the evening of 8/26 he reported that he wanted to leave the hospital, and was aware of the risks/benefits.    Significant Findings, Care, Treatment and Services Provided: none     Complications: none    Condition at Discharge: good         Discharge instructions/Information to patient and family:   See after visit summary for information provided to patient and family.      Provisions for Follow-Up Care:  See after visit summary for information related to follow-up care and any pertinent home  health orders.      PCP: pt will find PCP and follow up    Disposition: Left against medical advice    Planned Readmission: No    Discharge Statement   I spent 29 minutes discharging the patient. This time was spent on the day of discharge. I had direct contact with the patient on the day of discharge. Additional documentation is required if more than 30 minutes were spent on discharge.     Discharge Medications:  See after visit summary for reconciled discharge medications provided to patient and family.

## 2024-08-26 NOTE — ASSESSMENT & PLAN NOTE
Mag 1.7 on admission  K 3.4  Repleted with IV magnesium sulfate 2 g  Left AMA prior to recheck of electrolytes

## 2024-08-26 NOTE — UTILIZATION REVIEW
Initial Clinical Review    Pt initially presented to [facility] ED. Pt was transferred by EMS to [facility] for its Level IV medically managed intensive inpatient detox unit, not available at [facility].    Admission: Date/Time/Statement:   Admission Orders (From admission, onward)       Ordered        08/25/24 1230  Inpatient Admission  Once                          Orders Placed This Encounter   Procedures    Inpatient Admission     Standing Status:   Standing     Number of Occurrences:   1     Order Specific Question:   Level of Care     Answer:   Med Surg [16]     Order Specific Question:   Estimated length of stay     Answer:   More than 2 Midnights     Order Specific Question:   Certification     Answer:   I certify that inpatient services are medically necessary for this patient for a duration of greater than two midnights. See H&P and MD Progress Notes for additional information about the patient's course of treatment.        Initial Presentation: 34 y.o. male who presented to medical detox. Inpatient admission for evaluation and treatment of alcohol withdrawal syndrome. Presented w/ need for detox from alcohol. Serum ETOH: 18. Reports a fifth of vodka daily, last drink on 8/24. Has prior rehab treatment for withdrawal. Reports no hx of withdrawal seizures. On exam, anxiety, diaphoretic, tremors, tongue fasciculations. SEWS 10. Plan: SEWS monitoring w/ phenobarbital management, IV thiamine/folic acid supplement, IVF, telemetry, continuous pulse ox, continue PTA meds except propranolol, trend labs, replete electrolytes as needed; I&O, fall & seizure precautions. NRT. Check R elbow XR s/t PTA fall while intoxicated, now w/ swelling and pain.    Anticipated Length of Stay:  Patient will be admitted on an Inpatient basis with an anticipated length of stay of  >2  midnights.   This patient qualifies for Level IV medically managed intensive inpatient services under the  criteria set by the American Society of Addiction Medicine, including dimensions 1-3. The patient is in withdrawal (or is intoxicated with high risk of withdrawal), with severe and unstable medical and/or psychiatric (dual diagnosis) problems, requiring requires 24-hour medical and nursing care and the full resources of a licensed hospital.        Date: 08/26/24       Day 2: Pt notes headache, intermittent nausea, decreased appetite. On exam, anxious, diaphoretic, nausea, ill-appearing, tremors. SEWS 5. Plan: continue SEWS monitoring w/ phenobarbital management, IV thiamine/folic acid supplement, telemetry, continuous pulse ox, continue current meds, trend labs, replete electrolytes as needed -- 2 g IV Mag. Received total of 1,495 mg IV phenobarbital since admission. IV Zofran x2, IV reglan x2.     ED Triage Vitals [08/25/24 1224]   Temperature Pulse Respirations Blood Pressure SpO2 Pain Score   97.6 °F (36.4 °C) 63 16 129/87 99 % 9     Weight (last 2 days)       Date/Time Weight    08/25/24 1224 88.6 (195.38)            Vital Signs (last 3 days)       Date/Time Temp Pulse Resp BP MAP (mmHg) SpO2 O2 Device Patient Position - Orthostatic VS [place] Coma Scale Score SEWS Total Score Pain    08/26/24 1037 -- -- -- -- -- -- -- -- -- -- 9    08/26/24 1031 -- -- -- -- -- -- -- -- -- 5 --    08/26/24 0839 -- -- -- -- -- -- -- -- 15 8 No Pain    08/26/24 0752 -- -- -- -- -- -- -- -- -- 0 --    08/26/24 0747 97.5 °F (36.4 °C) 87 18 132/80 97 99 % None (Room air) Lying -- -- --    08/26/24 0530 -- -- -- -- -- -- -- -- -- 10 --    08/26/24 0515 97.2 °F (36.2 °C) 70 16 128/81 96 99 % None (Room air) Lying -- -- --    08/26/24 0204 -- -- -- -- -- -- -- -- -- -- 9 08/26/24 0155 -- -- -- -- -- -- -- -- -- -- 9 08/26/24 0153 -- -- -- -- -- -- -- -- -- 7 --    08/26/24 0129 96.5 °F (35.8 °C) 66 16 129/84 99 99 % None (Room air) Lying -- -- --    08/26/24 0001 -- -- -- -- -- -- -- -- -- 16 --    08/25/24 4949 97.5 °F (36.4 °C)  65 16 131/91 -- 98 % None (Room air) Lying -- -- 7    08/25/24 2150 -- -- -- -- -- -- -- -- -- 10 --    08/25/24 2100 97.5 °F (36.4 °C) 77 16 144/85 104 98 % None (Room air) Lying -- -- --    08/25/24 2006 -- -- -- -- -- -- -- -- -- -- 8    08/25/24 1951 -- -- -- -- -- -- -- -- -- 13 --    08/25/24 1946 -- -- -- -- -- -- -- -- 15 -- --    08/25/24 1940 97.6 °F (36.4 °C) 62 16 131/95 107 99 % None (Room air) Lying -- -- 8    08/25/24 1537 -- -- -- -- -- -- -- -- -- 8 --    08/25/24 1510 97.5 °F (36.4 °C) 60 16 124/81 95 98 % None (Room air) Lying -- -- --    08/25/24 1237 -- -- -- -- -- -- -- -- 15 10 --    08/25/24 1227 -- -- -- -- -- 99 % None (Room air) -- -- -- --    08/25/24 1224 97.6 °F (36.4 °C) 63 16 129/87 101 99 % None (Room air) Sitting -- -- 9            Pertinent Labs/Diagnostic Test Results:   Radiology:  CT head wo contrast   Final Interpretation by Sameer Arias MD (08/25 4906)      No acute intracranial abnormality.                  Workstation performed: [id number]         CT facial bones wo contrast   Final Interpretation by Sameer Arias MD (08/25 7516)      There is no suspicious facial bone fracture.               Workstation performed: [id number]         XR elbow 3+ vw right   Final Interpretation by James Lowe MD (08/26 0842)      No acute osseous abnormality.         Computerized Assisted Algorithm (CAA) may have been used to analyze all applicable images.         Workstation performed: [id number]               Results from last 7 days   Lab Units 08/25/24  0806   WBC Thousand/uL 10.33*   HEMOGLOBIN g/dL 14.3   HEMATOCRIT % 41.7   PLATELETS Thousands/uL 284   TOTAL NEUT ABS Thousands/µL 6.62         Results from last 7 days   Lab Units 08/26/24  0523 08/25/24  0806   SODIUM mmol/L 136 139   POTASSIUM mmol/L 3.4* 3.8   CHLORIDE mmol/L 103 102   CO2 mmol/L 25 26   ANION GAP mmol/L 8 11   BUN mg/dL 13 19   CREATININE mg/dL 0.80 0.97   EGFR ml/min/1.73sq m 116 101   CALCIUM  mg/dL 8.3* 9.8   MAGNESIUM mg/dL 2.1 1.7*     Results from last 7 days   Lab Units 08/26/24  0523 08/25/24  0806   AST U/L 24 32   ALT U/L 17 25   ALK PHOS U/L 63 73   TOTAL PROTEIN g/dL 5.8* 6.7   ALBUMIN g/dL 3.7 4.5   TOTAL BILIRUBIN mg/dL 0.75 0.61         Results from last 7 days   Lab Units 08/26/24  0523 08/25/24  0806   GLUCOSE RANDOM mg/dL 95 100     Results from last 7 days   Lab Units 08/25/24  0806   LIPASE u/L 34     Results from last 7 days   Lab Units 08/25/24  0833   AMPH/METH  Negative   BARBITURATE UR  Negative   BENZODIAZEPINE UR  Negative   COCAINE UR  Negative   METHADONE URINE  Negative   OPIATE UR  Negative   PCP UR  Negative   THC UR  Negative     Results from last 7 days   Lab Units 08/25/24  0806   ETHANOL LVL mg/dL 18*         Past Medical History:   Diagnosis Date    Alcohol use disorder     Alcohol withdrawal syndrome (HCC)     Anxiety     Depression     PTSD (post-traumatic stress disorder)     Tachycardia      Present on Admission:   Alcohol withdrawal syndrome with complication (HCC)   Anxiety   Alcoholic gastritis   Alcohol use disorder, severe, dependence (HCC)      Admitting Diagnosis: Alcohol withdrawal (HCC) [F10.939]  Age/Sex: 34 y.o. male  Admission Orders:  Scheduled Medications:  diphenhydrAMINE, 25 mg, Intravenous, Once  enoxaparin, 40 mg, Subcutaneous, Daily  folic acid 1 mg, thiamine (VITAMIN B1) 100 mg in sodium chloride 0.9 % 100 mL IV piggyback, , Intravenous, Daily  ketorolac, 15 mg, Intravenous, Once  nicotine, 21 mg, Transdermal, Daily    Continuous IV Infusions:  sodium chloride, 100 mL/hr, Intravenous, Continuous    PRN Meds:  acetaminophen, 650 mg, Oral, Q6H PRN; 8/25 x1, 8/26 x2  aluminum-magnesium hydroxide-simethicone, 30 mL, Oral, Q4H PRN  magnesium sulfate, 2 g, Intravenous; 8/25 x1  metoclopramide, 10 mg, Intravenous, Q6H PRN; 8/26 x2  ondansetron, 4 mg, Intravenous, Q6H PRN; 8/25 x1, 8/26 x1  traZODone, 50 mg, Oral, HS PRN; 8/26 x1  diazepam, 10 mg,  Intravenous; 8/25 x1  phenobarbital, 260 mg, Intravenous; 8/25 x2, 8/26 x1  phenobarbital, 130 mg, Intravenous; 8/25 x2, 8/26 x3  phenobarbital, 65 mg, Intravenous; 8/26 x1        IP CONSULT TO CASE MANAGEMENT    Network Utilization Review Department  ATTENTION: Please call with any questions or concerns to [phone] and carefully listen to the prompts so that you are directed to the right person. All voicemails are confidential.   For Discharge needs, contact Care Management DC Support Team at [phone] opt. 2  Send all requests for admission clinical reviews, approved or denied determinations and any other requests to dedicated fax number below belonging to the campus where the patient is receiving treatment. List of dedicated fax numbers for the Facilities:  FACILITY NAME UR FAX NUMBER   ADMISSION DENIALS (Administrative/Medical Necessity) [phone]   DISCHARGE SUPPORT TEAM (NETWORK) [phone]   PARENT CHILD HEALTH (Maternity/NICU/Pediatrics) [phone]   [facility] [phone]   [facility] [phone]   [facility] [phone]   [facility] [phone]   [facility] [phone]   [facility] [phone]   [facility] [phone]   [facility] [phone]   [facility] [phone]   [facility] [phone]   [facility] [phone]   [facility] [phone]

## 2024-08-26 NOTE — ASSESSMENT & PLAN NOTE
Lab Results   Component Value Date    WBC 10.33 (H) 08/25/2024        Elevated WBC on admission  Possibly 2/2 leukemoid reaction from acute withdrawal  No evidence of active infection   Pt afebrile, VSS  Continue monitoring CBC, VS     Afebrile upon leaving AMA

## 2024-08-26 NOTE — ASSESSMENT & PLAN NOTE
H/o chronic daily alcohol consumption, denies h/o withdrawal seizures  Last drink: 8/25/24   Ethanol lvl: 18 0806 8/25/24   Given 1300 Phenobarb since admission.  Requesting to leave, filled out AMA paperwork

## 2024-08-26 NOTE — PROGRESS NOTES
PROGRESS NOTE  DEPARTMENT OF MEDICAL TOXICOLOGY  LEVEL 4 MEDICAL DETOX UNIT  Juan Luu 34 y.o. male MRN: [id number]  Unit/Bed#:  DETOX 511-01 Encounter: [id number]      Reason for Admission/Principal Problem:  Ethanol withdrawal, Ethanol use disorder   Rounding Provider: Thierno Pollard DO  Attending Provider: Thierno Mills*   8/25/2024 12:22 PM           Headache  Assessment & Plan  Pt with worsening headache. Started prior to admission when he was trying to detox himself at home  CT imaging without acute pathology, and no focal deficits  Will treat and re-evaluate    Right elbow pain  Assessment & Plan  Reports recent fall prior to admission due to intoxication  XR without acute fracture  Supportive Care     Leukocytosis  Assessment & Plan  Lab Results   Component Value Date    WBC 10.33 (H) 08/25/2024        Elevated WBC on admission  Possibly 2/2 leukemoid reaction from acute withdrawal  No evidence of active infection   Pt afebrile, VSS  Continue monitoring CBC, VS     Hypomagnesemia  Assessment & Plan  Mag 1.7 on admission  K 3.4  Repleted with IV magnesium sulfate 2 g  Continue monitoring and replete electrolytes as indicated     Alcoholic gastritis  Assessment & Plan  Patient complaining of epigastric pain, hx of GERD   Suspect component of alcoholic gastritis   Continue PPI     Anxiety  Assessment & Plan  Patient endorses a h/o chronic anxiety and depression  Previously prescribed Gabapentin for PAWS during last hospital admission   Reports that he is taking propranolol 20 mg TID   Will hold during acute alcohol withdrawal   Can consider initiation of anxiolytic medication    Denies SI/HI/thoughts of self harm  Continue home medications  Recommend OP f/u with PCP/OP Psychiatry        Alcohol use disorder, severe, dependence (HCC)  Assessment & Plan  Pt with a h/o chronic heavy alcohol use for the past three months   Drinks 1/5th of vodka daily  Previous admission to the [facility]  Medical Detox Unit May 2024  Denies H/o withdrawal seizures  Previously on  naltrexone PO. He is interested in IM naltrexone. No evidence of hepatic dysfunction. We discussed possible GI adverse effects.  Withdrawal management as above  Initiate IVFs, IV thiamine, folic acid, and MVI  Consult case management/CRS for assistance with aftercare resources - today he states he is interested in outpatient rehab upon discharge    * Alcohol withdrawal syndrome with complication (HCC)  Assessment & Plan  H/o chronic daily alcohol consumption, denies h/o withdrawal seizures  Last drink: 8/25/24   Ethanol lvl: 18 0806 8/25/24   Follow SEWS protocol with symptom-triggered phenobarbital for medically-assisted alcohol withdrawal  Current symptoms include: headache and anxiety. He is not showing any signs of hyperadrenergic state. Given additional 65mg phenobarbital this morning. Will treat headache and monitor  Telemetry and pulse oximetry monitoring   Continue to monitor vitals, symptoms                VTE Pharmacologic Prophylaxis:   Pharmacologic: Enoxaparin (Lovenox)  Mechanical VTE Prophylaxis in Place: no    Code Status: Level 1 - Full Code    Patient Centered Rounds: I have performed bedside rounds with nursing staff today.    Discussions with Specialists or Other Care Team Provider: Nursing     Education and Discussions with Family / Patient: Patient    Time Spent for Care: 45 minutes.  More than 50% of total time spent on counseling and coordination of care as described above.    Current Length of Stay: 1 day(s)    Current Patient Status: Inpatient     Certification Statement: The patient will continue to require additional inpatient hospital stay due to supportive care, monitoring withdrawal, CM evaluation  Discharge Plan: Interested in outpatient management        Subjective:   Patient states that he has a bad headache this morning.  It has been slowly worsening since he started detoxing and was present prior to  admission.  Does have intermittent nausea and decreased intake.  It was hard for him to describe the actual headache, but felt that late was making his headache worse.  He has previously been on oral naltrexone and did have interest in IM naltrexone.  Mother previously reported that he was interested in inpatient rehab, he states that he has not would be interested in outpatient secondary to employment.    Objective:     Clinical Opiate Withdrawal Scale  Pulse: 87    SEWS Total Score: 5 (2024 10:31 AM)        Last 24 Hours Medication List:   Current Facility-Administered Medications   Medication Dose Route Frequency Provider Last Rate    acetaminophen  650 mg Oral Q6H PRN Kathleen Lynne Glendale, PA-C      aluminum-magnesium hydroxide-simethicone  30 mL Oral Q4H PRN Kathleen Lynne Glendale, PA-C      enoxaparin  40 mg Subcutaneous Daily Kathleen Lynne Glendale, PA-C      folic acid 1 mg, thiamine (VITAMIN B1) 100 mg in sodium chloride 0.9 % 100 mL IV piggyback   Intravenous Daily Kathleen Batista Glendale, PA-C      metoclopramide  10 mg Intravenous Q6H PRN Crystal Andrade, PA-C      nicotine  21 mg Transdermal Daily Kathleen Lynne Yari, PA-C      ondansetron  4 mg Intravenous Q6H PRN Kathleen Lynne Glendale, PA-C      sodium chloride  100 mL/hr Intravenous Continuous Kathleen Lynne Yari, PA-C 100 mL/hr (24 1248)    traZODone  50 mg Oral HS PRN Kathleen Batista Glendale, PA-C           Vitals:   Temp (24hrs), Av.4 °F (36.3 °C), Min:96.5 °F (35.8 °C), Max:97.6 °F (36.4 °C)    Temp:  [96.5 °F (35.8 °C)-97.6 °F (36.4 °C)] 97.5 °F (36.4 °C)  HR:  [60-87] 87  Resp:  [12-18] 18  BP: (124-144)/(80-95) 132/80  SpO2:  [96 %-99 %] 99 %  Body mass index is 28.03 kg/m².     Input and Output Summary (last 24 hours):  Intake/Output Summary (Last 24 hours) at 2024 1115  Last data filed at 2024 0555  Gross per 24 hour   Intake 890 ml   Output --   Net 890 ml       Physical Exam:   Physical Exam  Vitals and  nursing note reviewed.   Constitutional:       Appearance: He is well-developed. He is ill-appearing.      Comments: Uncomfortable secondary to headache   HENT:      Head: Normocephalic.   Cardiovascular:      Rate and Rhythm: Normal rate and regular rhythm.      Heart sounds: No murmur heard.  Pulmonary:      Effort: Pulmonary effort is normal. No respiratory distress.      Breath sounds: Normal breath sounds.   Abdominal:      General: Abdomen is flat. Bowel sounds are normal.      Palpations: Abdomen is soft.      Tenderness: There is no abdominal tenderness.   Musculoskeletal:         General: Normal range of motion.      Cervical back: Neck supple.   Skin:     General: Skin is warm and dry.      Capillary Refill: Capillary refill takes less than 2 seconds.   Neurological:      General: No focal deficit present.      Mental Status: He is alert and oriented to person, place, and time.      Cranial Nerves: Cranial nerves 2-12 are intact.      Sensory: Sensation is intact.      Motor: Tremor present. No weakness, abnormal muscle tone or seizure activity.      Coordination: Finger-Nose-Finger Test normal.         Additional Data:     Labs:   Results from last 7 days   Lab Units 08/26/24  0824 08/25/24  0806   WBC Thousand/uL 8.62 10.33*   HEMOGLOBIN g/dL 12.9 14.3   HEMATOCRIT % 37.7 41.7   PLATELETS Thousands/uL 165 284   SEGS PCT %  --  65   LYMPHO PCT %  --  26   MONO PCT %  --  6   EOS PCT %  --  2      Results from last 7 days   Lab Units 08/26/24  0523   SODIUM mmol/L 136   POTASSIUM mmol/L 3.4*   CHLORIDE mmol/L 103   CO2 mmol/L 25   BUN mg/dL 13   CREATININE mg/dL 0.80   ANION GAP mmol/L 8   CALCIUM mg/dL 8.3*   ALBUMIN g/dL 3.7   TOTAL BILIRUBIN mg/dL 0.75   ALK PHOS U/L 63   ALT U/L 17   AST U/L 24   GLUCOSE RANDOM mg/dL 95                              * I Have Reviewed All Lab Data Listed Above.  * Additional Pertinent Lab Tests Reviewed: All Labs For Current Hospital Admission Reviewed      Imaging  Studies: I have personally reviewed pertinent reports.        Recent Cultures (last 7 days):          Today, Patient Was Seen By: Thierno Pollard DO    ** Please Note: Dictation voice to text software may have been used in the creation of this document. **

## 2024-08-26 NOTE — ASSESSMENT & PLAN NOTE
Pt with worsening headache. Started prior to admission when he was trying to detox himself at home  CT imaging without acute pathology, and no focal deficits  Will treat and re-evaluate

## 2024-08-26 NOTE — ASSESSMENT & PLAN NOTE
Reports recent fall prior to admission due to intoxication  XR without acute fracture  Supportive Care

## 2024-08-26 NOTE — NURSING NOTE
At 1855, pt informed day shift RN  that he wants to sign AMA.  Provider aware and AMA FORMED SIGNED BY PT after speaking and informING about what AMA entails.  IV and tele monitor rmoved, personal belongings given.  Pt escorted to [facility] by me at 1920.

## 2024-08-26 NOTE — ASSESSMENT & PLAN NOTE
Pt with a h/o chronic heavy alcohol use for the past three months   Drinks 1/5th of vodka daily  Previous admission to the [facility] Medical Detox Unit May 2024  Denies H/o withdrawal seizures  Previously on  naltrexone PO. He is interested in IM naltrexone. No evidence of hepatic dysfunction. We discussed possible GI adverse effects.  Withdrawal management as above  Initiate IVFs, IV thiamine, folic acid, and MVI  Consult case management/CRS for assistance with aftercare resources - today he states he is interested in outpatient rehab upon discharge    Left AMA prior to being seen by VIVIAN

## 2024-08-27 NOTE — UTILIZATION REVIEW
NOTIFICATION OF ADMISSION DISCHARGE   This is a Notification of Discharge from [facility]. Please be advised that this patient has been discharge from our facility. Below you will find the admission and discharge date and time including the patient’s disposition.   UTILIZATION REVIEW CONTACT:  Lupe Klein MA  Utilization   Network Utilization Review Department  Phone: [phone] x carefully listen to the prompts. All voicemails are confidential.  Email: [email or web address]     ADMISSION INFORMATION  PRESENTATION DATE: 8/25/2024 12:22 PM  OBERVATION ADMISSION DATE: N/A  INPATIENT ADMISSION DATE: 8/25/24 12:22 PM   DISCHARGE DATE: 8/26/2024  7:20 PM   DISPOSITION:Home/Self Care    Network Utilization Review Department  ATTENTION: Please call with any questions or concerns to [phone] and carefully listen to the prompts so that you are directed to the right person. All voicemails are confidential.   For Discharge needs, contact Care Management DC Support Team at [phone] opt. 2  Send all requests for admission clinical reviews, approved or denied determinations and any other requests to dedicated fax number below belonging to the campus where the patient is receiving treatment. List of dedicated fax numbers for the Facilities:  FACILITY NAME UR FAX NUMBER   ADMISSION DENIALS (Administrative/Medical Necessity) [phone]   DISCHARGE SUPPORT TEAM ([facility]) [phone]   PARENT CHILD HEALTH (Maternity/NICU/Pediatrics) [phone]   [facility] [phone]   [facility] [phone]   [facility] [phone]   [facility] [phone]   [facility] [phone]   [facility] [phone]   [facility] [phone]   [facility]  [phone]   [facility] [phone]   [facility] [phone]   [facility] [phone]   [facility] [phone]

## 2024-09-18 ENCOUNTER — HOSPITAL ENCOUNTER (INPATIENT)
Facility: HOSPITAL | Age: 34
LOS: 4 days | Discharge: HOME | End: 2024-09-22
Attending: EMERGENCY MEDICINE | Admitting: INTERNAL MEDICINE
Payer: COMMERCIAL

## 2024-09-18 DIAGNOSIS — F10.939 ALCOHOL WITHDRAWAL (HCC): Primary | ICD-10-CM

## 2024-09-18 DIAGNOSIS — F10.930 ALCOHOL WITHDRAWAL SYNDROME WITHOUT COMPLICATION (HCC): ICD-10-CM

## 2024-09-18 DIAGNOSIS — F41.9 ANXIETY: ICD-10-CM

## 2024-09-18 DIAGNOSIS — F10.20 ALCOHOL USE DISORDER, SEVERE, DEPENDENCE (HCC): ICD-10-CM

## 2024-09-18 LAB
ALBUMIN SERPL BCG-MCNC: 5 G/DL (ref 3.5–5)
ALP SERPL-CCNC: 109 U/L (ref 34–104)
ALT SERPL W P-5'-P-CCNC: 21 U/L (ref 7–52)
ANION GAP SERPL CALCULATED.3IONS-SCNC: 18 MMOL/L (ref 4–13)
AST SERPL W P-5'-P-CCNC: 29 U/L (ref 13–39)
BASOPHILS # BLD AUTO: 0.09 THOUSANDS/ΜL (ref 0–0.1)
BASOPHILS NFR BLD AUTO: 1 % (ref 0–1)
BILIRUB SERPL-MCNC: 0.29 MG/DL (ref 0.2–1)
BUN SERPL-MCNC: 13 MG/DL (ref 5–25)
CALCIUM SERPL-MCNC: 9.2 MG/DL (ref 8.4–10.2)
CHLORIDE SERPL-SCNC: 97 MMOL/L (ref 96–108)
CO2 SERPL-SCNC: 27 MMOL/L (ref 21–32)
CREAT SERPL-MCNC: 0.99 MG/DL (ref 0.6–1.3)
EOSINOPHIL # BLD AUTO: 0.04 THOUSAND/ΜL (ref 0–0.61)
EOSINOPHIL NFR BLD AUTO: 1 % (ref 0–6)
ERYTHROCYTE [DISTWIDTH] IN BLOOD BY AUTOMATED COUNT: 15.7 % (ref 11.6–15.1)
ETHANOL SERPL-MCNC: 408 MG/DL
GFR SERPL CREATININE-BSD FRML MDRD: 98 ML/MIN/1.73SQ M
GLUCOSE SERPL-MCNC: 102 MG/DL (ref 65–140)
HCT VFR BLD AUTO: 45.9 % (ref 36.5–49.3)
HGB BLD-MCNC: 16.1 G/DL (ref 12–17)
IMM GRANULOCYTES # BLD AUTO: 0.01 THOUSAND/UL (ref 0–0.2)
IMM GRANULOCYTES NFR BLD AUTO: 0 % (ref 0–2)
LIPASE SERPL-CCNC: 31 U/L (ref 11–82)
LYMPHOCYTES # BLD AUTO: 2.05 THOUSANDS/ΜL (ref 0.6–4.47)
LYMPHOCYTES NFR BLD AUTO: 27 % (ref 14–44)
MAGNESIUM SERPL-MCNC: 2.5 MG/DL (ref 1.9–2.7)
MCH RBC QN AUTO: 29.9 PG (ref 26.8–34.3)
MCHC RBC AUTO-ENTMCNC: 35.1 G/DL (ref 31.4–37.4)
MCV RBC AUTO: 85 FL (ref 82–98)
MONOCYTES # BLD AUTO: 0.47 THOUSAND/ΜL (ref 0.17–1.22)
MONOCYTES NFR BLD AUTO: 6 % (ref 4–12)
NEUTROPHILS # BLD AUTO: 4.85 THOUSANDS/ΜL (ref 1.85–7.62)
NEUTS SEG NFR BLD AUTO: 65 % (ref 43–75)
NRBC BLD AUTO-RTO: 0 /100 WBCS
PLATELET # BLD AUTO: 390 THOUSANDS/UL (ref 149–390)
PMV BLD AUTO: 8.2 FL (ref 8.9–12.7)
POTASSIUM SERPL-SCNC: 4.1 MMOL/L (ref 3.5–5.3)
PROT SERPL-MCNC: 8.2 G/DL (ref 6.4–8.4)
RBC # BLD AUTO: 5.39 MILLION/UL (ref 3.88–5.62)
SODIUM SERPL-SCNC: 142 MMOL/L (ref 135–147)
WBC # BLD AUTO: 7.51 THOUSAND/UL (ref 4.31–10.16)

## 2024-09-18 PROCEDURE — 85025 COMPLETE CBC W/AUTO DIFF WBC: CPT | Performed by: EMERGENCY MEDICINE

## 2024-09-18 PROCEDURE — 99285 EMERGENCY DEPT VISIT HI MDM: CPT

## 2024-09-18 PROCEDURE — 96374 THER/PROPH/DIAG INJ IV PUSH: CPT

## 2024-09-18 PROCEDURE — 80053 COMPREHEN METABOLIC PANEL: CPT | Performed by: EMERGENCY MEDICINE

## 2024-09-18 PROCEDURE — 36415 COLL VENOUS BLD VENIPUNCTURE: CPT | Performed by: EMERGENCY MEDICINE

## 2024-09-18 PROCEDURE — 96361 HYDRATE IV INFUSION ADD-ON: CPT

## 2024-09-18 PROCEDURE — 82077 ASSAY SPEC XCP UR&BREATH IA: CPT | Performed by: EMERGENCY MEDICINE

## 2024-09-18 PROCEDURE — 83690 ASSAY OF LIPASE: CPT | Performed by: EMERGENCY MEDICINE

## 2024-09-18 PROCEDURE — 83735 ASSAY OF MAGNESIUM: CPT | Performed by: EMERGENCY MEDICINE

## 2024-09-18 RX ORDER — ENOXAPARIN SODIUM 100 MG/ML
40 INJECTION SUBCUTANEOUS DAILY
Status: DISCONTINUED | OUTPATIENT
Start: 2024-09-19 | End: 2024-09-19

## 2024-09-18 RX ORDER — HYDROXYZINE HYDROCHLORIDE 25 MG/1
25 TABLET, FILM COATED ORAL EVERY 6 HOURS PRN
Status: DISCONTINUED | OUTPATIENT
Start: 2024-09-18 | End: 2024-09-19

## 2024-09-18 RX ORDER — NALTREXONE HYDROCHLORIDE 50 MG/1
50 TABLET, FILM COATED ORAL DAILY
Status: DISCONTINUED | OUTPATIENT
Start: 2024-09-19 | End: 2024-09-19

## 2024-09-18 RX ORDER — DEXTROSE MONOHYDRATE AND SODIUM CHLORIDE 5; .9 G/100ML; G/100ML
100 INJECTION, SOLUTION INTRAVENOUS CONTINUOUS
Status: DISCONTINUED | OUTPATIENT
Start: 2024-09-18 | End: 2024-09-20

## 2024-09-18 RX ORDER — LORAZEPAM 2 MG/ML
2 INJECTION INTRAMUSCULAR ONCE
Status: COMPLETED | OUTPATIENT
Start: 2024-09-18 | End: 2024-09-18

## 2024-09-18 RX ORDER — ACETAMINOPHEN 325 MG/1
650 TABLET ORAL EVERY 6 HOURS PRN
Status: DISCONTINUED | OUTPATIENT
Start: 2024-09-18 | End: 2024-09-22 | Stop reason: HOSPADM

## 2024-09-18 RX ORDER — ONDANSETRON 2 MG/ML
4 INJECTION INTRAMUSCULAR; INTRAVENOUS EVERY 6 HOURS PRN
Status: DISCONTINUED | OUTPATIENT
Start: 2024-09-18 | End: 2024-09-22 | Stop reason: HOSPADM

## 2024-09-18 RX ORDER — GABAPENTIN 300 MG/1
300 CAPSULE ORAL EVERY 8 HOURS PRN
Status: DISCONTINUED | OUTPATIENT
Start: 2024-09-18 | End: 2024-09-22 | Stop reason: HOSPADM

## 2024-09-18 RX ORDER — PROPRANOLOL HCL 20 MG
20 TABLET ORAL EVERY 8 HOURS SCHEDULED
COMMUNITY
Start: 2024-08-14 | End: 2024-09-22

## 2024-09-18 RX ORDER — IBUPROFEN 600 MG/1
600 TABLET, FILM COATED ORAL EVERY 6 HOURS PRN
Status: DISCONTINUED | OUTPATIENT
Start: 2024-09-18 | End: 2024-09-22 | Stop reason: HOSPADM

## 2024-09-18 RX ORDER — DIAZEPAM 10 MG/2ML
10 INJECTION, SOLUTION INTRAMUSCULAR; INTRAVENOUS ONCE
Status: COMPLETED | OUTPATIENT
Start: 2024-09-18 | End: 2024-09-18

## 2024-09-18 RX ORDER — TRAZODONE HYDROCHLORIDE 50 MG/1
50 TABLET, FILM COATED ORAL
Status: DISCONTINUED | OUTPATIENT
Start: 2024-09-18 | End: 2024-09-22 | Stop reason: HOSPADM

## 2024-09-18 RX ADMIN — DIAZEPAM 10 MG: 5 INJECTION INTRAMUSCULAR; INTRAVENOUS at 23:04

## 2024-09-18 RX ADMIN — Medication 650 MG: at 23:04

## 2024-09-18 RX ADMIN — LORAZEPAM 2 MG: 2 INJECTION INTRAMUSCULAR; INTRAVENOUS at 20:33

## 2024-09-18 RX ADMIN — DEXTROSE AND SODIUM CHLORIDE 100 ML/HR: 5; .9 INJECTION, SOLUTION INTRAVENOUS at 23:04

## 2024-09-18 RX ADMIN — SODIUM CHLORIDE 1000 ML: 0.9 INJECTION, SOLUTION INTRAVENOUS at 19:44

## 2024-09-18 NOTE — LETTER
September 22, 2024     Patient: Juan Luu   YOB: 1990   Date of Visit: 9/18/2024       To Whom it May Concern:    Juan Luu is under my professional care. He was seen in the hospital from 9/18/2024 to 09/22/24. During this admission patient received a medication called phenobarbital, which is a barbiturate. This medication was not prescribed upon discharge, however this medication will appear on any urinary drug screen within the next 4 weeks.      If you have any questions or concerns, please don't hesitate to call.         Sincerely,          Kathleen Greenberg PA-C

## 2024-09-19 PROBLEM — F39 UNSPECIFIED MOOD (AFFECTIVE) DISORDER (HCC): Status: ACTIVE | Noted: 2024-05-03

## 2024-09-19 LAB
ALBUMIN SERPL BCG-MCNC: 4.2 G/DL (ref 3.5–5)
ALP SERPL-CCNC: 88 U/L (ref 34–104)
ALT SERPL W P-5'-P-CCNC: 18 U/L (ref 7–52)
AMPHETAMINES SERPL QL SCN: NEGATIVE
ANION GAP SERPL CALCULATED.3IONS-SCNC: 14 MMOL/L (ref 4–13)
AST SERPL W P-5'-P-CCNC: 27 U/L (ref 13–39)
BARBITURATES UR QL: POSITIVE
BENZODIAZ UR QL: POSITIVE
BILIRUB SERPL-MCNC: 0.26 MG/DL (ref 0.2–1)
BUN SERPL-MCNC: 13 MG/DL (ref 5–25)
CALCIUM SERPL-MCNC: 8.6 MG/DL (ref 8.4–10.2)
CHLORIDE SERPL-SCNC: 102 MMOL/L (ref 96–108)
CO2 SERPL-SCNC: 26 MMOL/L (ref 21–32)
COCAINE UR QL: NEGATIVE
CREAT SERPL-MCNC: 0.85 MG/DL (ref 0.6–1.3)
ERYTHROCYTE [DISTWIDTH] IN BLOOD BY AUTOMATED COUNT: 16 % (ref 11.6–15.1)
FENTANYL UR QL SCN: NEGATIVE
GFR SERPL CREATININE-BSD FRML MDRD: 113 ML/MIN/1.73SQ M
GLUCOSE SERPL-MCNC: 112 MG/DL (ref 65–140)
HCT VFR BLD AUTO: 41.2 % (ref 36.5–49.3)
HGB BLD-MCNC: 13.9 G/DL (ref 12–17)
HYDROCODONE UR QL SCN: NEGATIVE
MAGNESIUM SERPL-MCNC: 2.3 MG/DL (ref 1.9–2.7)
MCH RBC QN AUTO: 30.1 PG (ref 26.8–34.3)
MCHC RBC AUTO-ENTMCNC: 33.7 G/DL (ref 31.4–37.4)
MCV RBC AUTO: 89 FL (ref 82–98)
METHADONE UR QL: NEGATIVE
OPIATES UR QL SCN: NEGATIVE
OXYCODONE+OXYMORPHONE UR QL SCN: NEGATIVE
PCP UR QL: NEGATIVE
PLATELET # BLD AUTO: 298 THOUSANDS/UL (ref 149–390)
PMV BLD AUTO: 8.3 FL (ref 8.9–12.7)
POTASSIUM SERPL-SCNC: 3.9 MMOL/L (ref 3.5–5.3)
PROT SERPL-MCNC: 6.7 G/DL (ref 6.4–8.4)
RBC # BLD AUTO: 4.62 MILLION/UL (ref 3.88–5.62)
SODIUM SERPL-SCNC: 142 MMOL/L (ref 135–147)
THC UR QL: NEGATIVE
WBC # BLD AUTO: 7.27 THOUSAND/UL (ref 4.31–10.16)

## 2024-09-19 PROCEDURE — 99285 EMERGENCY DEPT VISIT HI MDM: CPT | Performed by: EMERGENCY MEDICINE

## 2024-09-19 PROCEDURE — 80053 COMPREHEN METABOLIC PANEL: CPT | Performed by: PHYSICIAN ASSISTANT

## 2024-09-19 PROCEDURE — 85027 COMPLETE CBC AUTOMATED: CPT | Performed by: PHYSICIAN ASSISTANT

## 2024-09-19 PROCEDURE — 80307 DRUG TEST PRSMV CHEM ANLYZR: CPT | Performed by: EMERGENCY MEDICINE

## 2024-09-19 PROCEDURE — 99255 IP/OBS CONSLTJ NEW/EST HI 80: CPT | Performed by: EMERGENCY MEDICINE

## 2024-09-19 PROCEDURE — 99254 IP/OBS CNSLTJ NEW/EST MOD 60: CPT | Performed by: PSYCHIATRY & NEUROLOGY

## 2024-09-19 PROCEDURE — 99222 1ST HOSP IP/OBS MODERATE 55: CPT | Performed by: INTERNAL MEDICINE

## 2024-09-19 PROCEDURE — 83735 ASSAY OF MAGNESIUM: CPT | Performed by: PHYSICIAN ASSISTANT

## 2024-09-19 RX ORDER — PHENOBARBITAL SODIUM 130 MG/ML
130 INJECTION, SOLUTION INTRAMUSCULAR; INTRAVENOUS ONCE
Status: COMPLETED | OUTPATIENT
Start: 2024-09-19 | End: 2024-09-19

## 2024-09-19 RX ORDER — PHENOBARBITAL SODIUM 130 MG/ML
260 INJECTION, SOLUTION INTRAMUSCULAR; INTRAVENOUS ONCE
Status: COMPLETED | OUTPATIENT
Start: 2024-09-19 | End: 2024-09-19

## 2024-09-19 RX ORDER — PROPRANOLOL HCL 20 MG
20 TABLET ORAL EVERY 8 HOURS SCHEDULED
Status: DISCONTINUED | OUTPATIENT
Start: 2024-09-19 | End: 2024-09-22 | Stop reason: HOSPADM

## 2024-09-19 RX ORDER — ESCITALOPRAM OXALATE 5 MG/1
5 TABLET ORAL DAILY
Status: DISCONTINUED | OUTPATIENT
Start: 2024-09-19 | End: 2024-09-22 | Stop reason: HOSPADM

## 2024-09-19 RX ORDER — HYDROXYZINE HYDROCHLORIDE 50 MG/1
50 TABLET, FILM COATED ORAL EVERY 6 HOURS PRN
Status: DISCONTINUED | OUTPATIENT
Start: 2024-09-19 | End: 2024-09-22 | Stop reason: HOSPADM

## 2024-09-19 RX ADMIN — PHENOBARBITAL SODIUM 130 MG: 130 INJECTION INTRAMUSCULAR; INTRAVENOUS at 03:30

## 2024-09-19 RX ADMIN — PHENOBARBITAL SODIUM 130 MG: 130 INJECTION INTRAMUSCULAR; INTRAVENOUS at 05:03

## 2024-09-19 RX ADMIN — ONDANSETRON 4 MG: 2 INJECTION INTRAMUSCULAR; INTRAVENOUS at 08:28

## 2024-09-19 RX ADMIN — HYDROXYZINE HYDROCHLORIDE 25 MG: 25 TABLET ORAL at 09:18

## 2024-09-19 RX ADMIN — PHENOBARBITAL SODIUM 130 MG: 130 INJECTION INTRAMUSCULAR; INTRAVENOUS at 08:46

## 2024-09-19 RX ADMIN — FOLIC ACID: 5 INJECTION, SOLUTION INTRAMUSCULAR; INTRAVENOUS; SUBCUTANEOUS at 09:09

## 2024-09-19 RX ADMIN — IBUPROFEN 600 MG: 600 TABLET, FILM COATED ORAL at 17:24

## 2024-09-19 RX ADMIN — DEXTROSE AND SODIUM CHLORIDE 100 ML/HR: 5; .9 INJECTION, SOLUTION INTRAVENOUS at 22:15

## 2024-09-19 RX ADMIN — ONDANSETRON 4 MG: 2 INJECTION INTRAMUSCULAR; INTRAVENOUS at 14:25

## 2024-09-19 RX ADMIN — HYDROXYZINE HYDROCHLORIDE 50 MG: 50 TABLET, FILM COATED ORAL at 22:15

## 2024-09-19 RX ADMIN — PHENOBARBITAL SODIUM 130 MG: 130 INJECTION INTRAMUSCULAR; INTRAVENOUS at 10:38

## 2024-09-19 RX ADMIN — HYDROXYZINE HYDROCHLORIDE 50 MG: 50 TABLET, FILM COATED ORAL at 15:58

## 2024-09-19 RX ADMIN — GABAPENTIN 300 MG: 300 CAPSULE ORAL at 22:15

## 2024-09-19 RX ADMIN — GABAPENTIN 300 MG: 300 CAPSULE ORAL at 14:25

## 2024-09-19 RX ADMIN — PHENOBARBITAL SODIUM 130 MG: 130 INJECTION INTRAMUSCULAR; INTRAVENOUS at 09:18

## 2024-09-19 RX ADMIN — IBUPROFEN 600 MG: 600 TABLET, FILM COATED ORAL at 09:18

## 2024-09-19 RX ADMIN — PHENOBARBITAL SODIUM 130 MG: 130 INJECTION INTRAMUSCULAR; INTRAVENOUS at 12:43

## 2024-09-19 RX ADMIN — PHENOBARBITAL SODIUM 130 MG: 130 INJECTION INTRAMUSCULAR; INTRAVENOUS at 00:03

## 2024-09-19 RX ADMIN — ESCITALOPRAM OXALATE 5 MG: 5 TABLET, FILM COATED ORAL at 12:42

## 2024-09-19 RX ADMIN — PROPRANOLOL HYDROCHLORIDE 20 MG: 20 TABLET ORAL at 13:54

## 2024-09-19 RX ADMIN — TRAZODONE HYDROCHLORIDE 50 MG: 50 TABLET ORAL at 22:15

## 2024-09-19 RX ADMIN — PHENOBARBITAL SODIUM 130 MG: 130 INJECTION INTRAMUSCULAR; INTRAVENOUS at 11:36

## 2024-09-19 RX ADMIN — PHENOBARBITAL SODIUM 260 MG: 130 INJECTION INTRAMUSCULAR; INTRAVENOUS at 07:38

## 2024-09-19 RX ADMIN — DEXTROSE AND SODIUM CHLORIDE 100 ML/HR: 5; .9 INJECTION, SOLUTION INTRAVENOUS at 12:40

## 2024-09-19 NOTE — ASSESSMENT & PLAN NOTE
"Patient is a 34-year-old with a history of alcohol use disorder who presented to the hospital for alcohol detox and severe anxiety.  Patient states that he has been having worsening anxiety for the last 5 years since drinking became a problem.  This past month anxiety has become \"crippling.\"  He describes anxiety that is severe due to life stressors.  He also describes some anxiety around people in public places.  He does endorse a history of panic attacks which have been occurring more recently to the point of happening multiple times a day.  He does find himself having anxiety about potentially experiencing a panic attack.  He he has tried Atarax 25 mg 4 times daily previously but this did not help him.  Patient denies suicidal ideation, homicidal ideation, or AVH.  Patient's anxiety is most likely due to alcohol use and also worsened by alcohol use.  At this time is difficult to determine whether this is a primary psychiatric illness versus substance-induced.    Differential diagnosis: Substance-induced mood disorder, panic disorder without agoraphobia, social anxiety disorder, generalized anxiety disorder    Treatment Recommendations/Precautions:  Initiate Lexapro 5 mg daily for anxiety symptoms  Increase Atarax from 25 mg every 6 hours as needed to 50 mg every 6 hours as needed  Admission labs evaluated; see below.  Continue medical management per primary team.  Discharge date per primary team.     No associated orders from this encounter found during lookback period of 72 hours.  "

## 2024-09-19 NOTE — CONSULTS
"Consultation - Medical Toxicology   Name: Juan Luu 34 y.o. male I MRN: [id number]  Unit/Bed#: 5T DETOX 507-01 I Date of Admission: 9/18/2024   Date of Service: 9/19/2024 I Hospital Day: 1       Inpatient consult to Toxicology     Date/Time  9/19/2024 10:14 AM     Performed by  Lidya Martinez MD   Authorized by  Dianne Mooney PA-C           Physician Requesting Evaluation: Gerardo Mueller Pe*   Reason for Evaluation / Principal Problem: Ethanol withdrawal    Assessment & Plan  Alcohol withdrawal syndrome (HCC)  Continue SEWS protocol with symptom triggered phenobarbital for medically assisted alcohol withdrawal with maximum dose of 2 grams  Initial SEWS score: 13  Given Valium 10 mg and Phenobarbital 650 mg on admission  Current symptoms include anxiety, nausea, and diaphoresis  Total phenobarbital received: 1820 mg  Notify toxicology if patient exceeds phenobarbital 2 g; will require evaluation for transfer to higher level of care if precedex is warranted  Consider discontinuation of protocol once patient has received four SEWS scores of \"0\" while awake  Continue thiamine, folic acid, MVI  Continue IVF NS  Monitor vitals, telemetry  Alcohol use disorder, severe, dependence (HCC)  History of binge drinking with medically assisted withdrawal; last detox admission on 8/2024  Last drink 9/18 around 6-7 pm  Drinks \"close to a handle\" of vodka daily  Serum ethanol level 408 in the ED  Interested in naltrexone, held for now due to nausea  Case management and CRS consult placed  Tobacco use disorder  Reports daily use of chewing tobacco  Offer NRT during hospitalization  Cessation encouraged    Medical Toxicology service will follow.      For further questions, please call [facility]  Service or Patient Access Center to reach the medical  on call.     Hx and PE limited by: N/A    HPI: Juan Luu is a 34 y.o. year old male with past medical history of depression, " "anxiety, PTSD, and alcohol use disorder who presents with alcohol withdrawal syndrome requesting medically assisted detoxification. He reports that his sister came to his apartment and found him acutely intoxicated before calling EMS. Work-up in the ED showed UDS positive for barbiturates and benzodiazepines. He was given IVF NS 1L bolus and Ativan 2 mg before being admitted to the [facility] detox unit for medically assisted treatment of ethanol withdrawal.      Patient seen and examined at bedside. No acute events overnight. Alert and oriented x 4. Appears anxious. He endorses diaphoresis, anxiety, nausea, tremor, and inner restlessness but denies chest pain, shortness of breath, hallucinations, seizures, changes in bowel and bladder movements, and abdominal pain. He reports his last drink was 9/18/24 in the evening shortly before EMS transported him to the emergency department and has been drinking \"close to a handle\" of vodka per day. He admits to a recent increase in life stressers with the loss of his job as an  one month ago. He has tried naltrexone in the past and is willing to restart. He would like further treatment for alcohol use disorder and states that he \"can't do this anymore\".     Review of Systems   Constitutional:  Positive for diaphoresis. Negative for chills and fever.   Eyes:  Negative for visual disturbance.   Respiratory:  Negative for shortness of breath and wheezing.    Cardiovascular:  Negative for chest pain.   Gastrointestinal:  Positive for nausea. Negative for constipation and diarrhea.   Genitourinary:  Negative for dysuria, frequency and urgency.   Musculoskeletal:  Negative for arthralgias.   Neurological:  Positive for tremors and headaches. Negative for seizures.   Psychiatric/Behavioral:  Negative for confusion and hallucinations. The patient is nervous/anxious.        Historical Information   Past Medical History:   Diagnosis Date    Alcohol use disorder     Alcohol " withdrawal syndrome (HCC)     Anxiety     Depression     PTSD (post-traumatic stress disorder)     Tachycardia      Past Surgical History:   Procedure Laterality Date    LUMBAR LAMINECTOMY       Social History   Social History     Substance and Sexual Activity   Alcohol Use Yes    Alcohol/week: 112.0 standard drinks of alcohol    Types: 112 Shots of liquor per week    Comment: pt reports he rarely drinks anymore, but drank a lot today     Social History     Substance and Sexual Activity   Drug Use Never     Social History     Tobacco Use   Smoking Status Never   Smokeless Tobacco Never     History reviewed. No pertinent family history.  Prior to Admission medications    Medication Sig Start Date End Date Taking? Authorizing Provider   propranolol (INDERAL) 20 mg tablet Take 20 mg by mouth every 8 (eight) hours 8/14/24  Yes Historical Provider, MD       Current Facility-Administered Medications:     acetaminophen (TYLENOL) tablet 650 mg, Q6H PRN    dextrose 5 % and sodium chloride 0.9 % infusion, Continuous, Last Rate: 100 mL/hr (09/18/24 2304)    folic acid 1 mg, thiamine (VITAMIN B1) 100 mg in sodium chloride 0.9 % 100 mL IV piggyback, Daily    gabapentin (NEURONTIN) capsule 300 mg, Q8H PRN    hydrOXYzine HCL (ATARAX) tablet 25 mg, Q6H PRN    ibuprofen (MOTRIN) tablet 600 mg, Q6H PRN    multivitamin-minerals (CENTRUM) tablet 1 tablet, Daily    naltrexone (REVIA) tablet 50 mg, Daily    ondansetron (ZOFRAN) injection 4 mg, Q6H PRN    propranolol (INDERAL) tablet 20 mg, Q8H CANDICE    traZODone (DESYREL) tablet 50 mg, HS PRN    No Known Allergies    Objective     Intake/Output Summary (Last 24 hours) at 9/19/2024 1030  Last data filed at 9/19/2024 0909  Gross per 24 hour   Intake 1120 ml   Output 700 ml   Net 420 ml       Invasive Devices:   Peripheral IV 09/18/24 Proximal;Right;Ventral (anterior) Forearm (Active)   Site Assessment WDL;Clean;Dry;Intact 09/18/24 1940   Dressing Type Transparent 09/18/24 1940   Line Status  Blood return noted;Flushed;Saline locked 09/18/24 1940   Dressing Status Clean;Dry;Intact 09/18/24 1940       Vitals   Vitals:    09/19/24 0325 09/19/24 0439 09/19/24 0715 09/19/24 0805   BP: 140/91 123/79 125/82 132/85   TempSrc:  Temporal Temporal    Pulse: 97 95 102 91   Resp:  15 16 16   Patient Position - Orthostatic VS: Lying Lying Lying Lying   Temp:  (!) 97.4 °F (36.3 °C) 97.9 °F (36.6 °C)        Physical Exam  Constitutional:       Appearance: Normal appearance. He is diaphoretic.   HENT:      Head: Normocephalic and atraumatic.   Eyes:      Conjunctiva/sclera: Conjunctivae normal.      Pupils: Pupils are equal, round, and reactive to light.   Cardiovascular:      Rate and Rhythm: Normal rate and regular rhythm.      Pulses: Normal pulses.      Heart sounds: Normal heart sounds.   Pulmonary:      Effort: Pulmonary effort is normal.      Breath sounds: Normal breath sounds.   Abdominal:      Tenderness: There is no abdominal tenderness. There is no guarding or rebound.   Musculoskeletal:      Cervical back: Normal range of motion and neck supple.   Skin:     General: Skin is warm.   Neurological:      Mental Status: He is alert and oriented to person, place, and time.      Comments: Mild b/l upper extremity tremor   Psychiatric:         Mood and Affect: Mood is anxious.           EKG, Pathology, and Other Studies: All pertinent studies reviewed    Lab Results: All labs reviewed    Labs:  Results from last 7 days   Lab Units 09/19/24 0443 09/18/24 1945   WBC Thousand/uL 7.27 7.51   HEMOGLOBIN g/dL 13.9 16.1   HEMATOCRIT % 41.2 45.9   PLATELETS Thousands/uL 298 390   SEGS PCT %  --  65   LYMPHO PCT %  --  27   MONO PCT %  --  6   EOS PCT %  --  1      Results from last 7 days   Lab Units 09/19/24 0443   SODIUM mmol/L 142   POTASSIUM mmol/L 3.9   CHLORIDE mmol/L 102   CO2 mmol/L 26   BUN mg/dL 13   CREATININE mg/dL 0.85   CALCIUM mg/dL 8.6   ALK PHOS U/L 88   ALT U/L 18   AST U/L 27   MAGNESIUM mg/dL 2.3               0   Lab Value Date/Time    TROPONINI <0.03 08/18/2023 1418    TROPONINI <0.03 11/06/2022 1435         Results from last 7 days   Lab Units 09/18/24  1945   ETHANOL LVL mg/dL 408*       Imaging Studies: No pertinent imaging studies reviewed.    Counseling / Coordination of Care  Total floor / unit time spent today 56 minutes. Greater than 50% of total time was spent with the patient and / or family counseling and / or coordination of care.

## 2024-09-19 NOTE — ED CARE HANDOFF
[facility] Warm Handoff Outcome Note    Patient name Juan Luu  Location 5T DETOX 507/5T DETOX 50* MRN [id number]  Age: 34 y.o.          Plan Type:  Warm Handoff                                                                                    Plan Date: 9/19/2024  Service:  ED Warm Handoff      Substance Use History:  ETOH    Warm Handoff Update:  Pt admitted to detox    Warm Handoff Outcome: Withdrawal Management

## 2024-09-19 NOTE — UTILIZATION REVIEW
Initial Clinical Review    Pt presented to [facility] for its Level IV medically managed intensive inpatient detox unit.    Admission: Date/Time/Statement:   Admission Orders (From admission, onward)       Ordered        09/18/24 2142  INPATIENT ADMISSION  Once                          Orders Placed This Encounter   Procedures    INPATIENT ADMISSION     Standing Status:   Standing     Number of Occurrences:   1     Order Specific Question:   Level of Care     Answer:   Med Surg [16]     Order Specific Question:   Estimated length of stay     Answer:   More than 2 Midnights     Order Specific Question:   Certification     Answer:   I certify that inpatient services are medically necessary for this patient for a duration of greater than two midnights. See H&P and MD Progress Notes for additional information about the patient's course of treatment.     ED Arrival Information       Expected   -    Arrival   9/18/2024 19:08    Acuity   Urgent              Means of arrival   Ambulance    Escorted by   [facility] ([facility])    Service   Hospitalist    Admission type   Emergency              Arrival complaint   Detox             Chief Complaint   Patient presents with    Detox Evaluation     Ar[facility] with [facility] from [facility]        Initial Presentation: 34 y.o. male who presented to medical detox. Inpatient admission for evaluation and treatment of alcohol withdrawal syndrome. Presented w/ need for detox from alcohol. Serum ETOH: 408. Reports 1-1.5 L vodka daily, last drink on 9/18 evening. On exam, anxiety, nausea, diaphoresis, tremors, tachycardic. SEWS 13. Plan: SEWS monitoring w/ phenobarbital management, IV thiamine/folic acid supplement, IVF, telemetry, continuous pulse ox, trend labs, replete electrolytes as needed; I&O, fall & seizure precautions. Toxicology consulted.    Anticipated Length of Stay:  Patient will be admitted on an inpatient basis with an anticipated length of stay of greater  than 2 midnights secondary to alcohol withdrawal management and safe discharge planning.     Date: 09/19/24       Day 2: Pt reports anxiety and tremors.  On exam, anxiety, nausea, diaphoresis, tearful, agitation. SEWS 9. Plan: continue SEWS monitoring w/ phenobarbital management, IV thiamine/folic acid supplement, telemetry, continuous pulse ox, continue current meds, trend labs, replete electrolytes as needed. Received phenobarbital  mg x1,  mg x1,  mg x5 since admission.    Toxicology: Pt w/ alcohol withdrawal syndrome. Continue SEWS monitoring w/ phenobarbital management. Interested in naltrexone.     ED Triage Vitals   Temperature Pulse Respirations Blood Pressure SpO2 Pain Score   09/18/24 1924 09/18/24 1924 09/18/24 1924 09/18/24 1924 09/18/24 1924 09/18/24 2315   98.6 °F (37 °C) 101 16 155/90 95 % 6     Weight (last 2 days)       Date/Time Weight    09/18/24 2320 88 (194)    09/18/24 1924 84.8 (186.95)            Vital Signs (last 3 days)       Date/Time Temp Pulse Resp BP MAP (mmHg) SpO2 O2 Device Patient Position - Orthostatic VS Kee Coma Scale Score SEWS Total Score Pain    09/19/24 0918 -- -- -- -- -- -- -- -- -- -- 9    09/19/24 0913 -- -- -- -- -- -- -- -- -- 9 --    09/19/24 0833 -- -- -- -- -- -- -- -- -- 11 --    09/19/24 0805 -- 91 16 132/85 100 97 % None (Room air) Lying -- -- --    09/19/24 0728 -- -- -- -- -- -- -- -- -- 13 --    09/19/24 0715 97.9 °F (36.6 °C) 102 16 125/82 96 97 % None (Room air) Lying -- -- --    09/19/24 0500 -- -- -- -- -- -- -- -- -- 7 --    09/19/24 0439 97.4 °F (36.3 °C) 95 15 123/79 93 96 % None (Room air) Lying -- -- --    09/19/24 0326 -- -- -- -- -- -- -- -- -- 11 --    09/19/24 0325 -- 97 -- 140/91 -- -- -- Lying -- -- --    09/19/24 0100 -- -- -- -- -- -- -- -- -- 0 --    09/19/24 0000 -- -- -- -- -- -- -- -- -- 10 --    09/18/24 2320 98.3 °F (36.8 °C) 123 15 107/78 89 95 % None (Room air) Sitting -- -- --    09/18/24 2315 -- -- -- -- -- -- --  -- -- -- 6    09/18/24 2258 -- -- -- -- -- -- -- -- -- 13 --    09/18/24 2247 -- -- -- -- -- 97 % None (Room air) -- -- -- --    09/18/24 2122 -- 103 14 -- -- 96 % None (Room air) -- -- -- --    09/18/24 1924 98.6 °F (37 °C) 101 16 155/90 -- 95 % None (Room air) Sitting -- -- --    09/18/24 1922 -- -- -- -- -- -- -- -- 15 -- --    09/18/24 1913 -- -- -- -- -- -- -- -- 15 -- --            Pertinent Labs/Diagnostic Test Results:   Results from last 7 days   Lab Units 09/19/24 0443 09/18/24 1945   WBC Thousand/uL 7.27 7.51   HEMOGLOBIN g/dL 13.9 16.1   HEMATOCRIT % 41.2 45.9   PLATELETS Thousands/uL 298 390   TOTAL NEUT ABS Thousands/µL  --  4.85         Results from last 7 days   Lab Units 09/19/24 0443 09/18/24 1945   SODIUM mmol/L 142 142   POTASSIUM mmol/L 3.9 4.1   CHLORIDE mmol/L 102 97   CO2 mmol/L 26 27   ANION GAP mmol/L 14* 18*   BUN mg/dL 13 13   CREATININE mg/dL 0.85 0.99   EGFR ml/min/1.73sq m 113 98   CALCIUM mg/dL 8.6 9.2   MAGNESIUM mg/dL 2.3 2.5     Results from last 7 days   Lab Units 09/19/24 0443 09/18/24 1945   AST U/L 27 29   ALT U/L 18 21   ALK PHOS U/L 88 109*   TOTAL PROTEIN g/dL 6.7 8.2   ALBUMIN g/dL 4.2 5.0   TOTAL BILIRUBIN mg/dL 0.26 0.29         Results from last 7 days   Lab Units 09/19/24 0443 09/18/24 1945   GLUCOSE RANDOM mg/dL 112 102          Results from last 7 days   Lab Units 09/18/24 1951   LIPASE u/L 31      Results from last 7 days   Lab Units 09/18/24 1945   ETHANOL LVL mg/dL 408*         ED Treatment-Medication Administration from 09/18/2024 1908 to 09/18/2024 2244         Date/Time Order Dose Route Action     09/18/2024 1944 sodium chloride 0.9 % bolus 1,000 mL 1,000 mL Intravenous New Bag     09/18/2024 2033 LORazepam (ATIVAN) injection 2 mg 2 mg Intravenous Given          Past Medical History:   Diagnosis Date    Alcohol use disorder     Alcohol withdrawal syndrome (HCC)     Anxiety     Depression     PTSD (post-traumatic stress disorder)     Tachycardia       Present on Admission:   Alcohol use disorder, severe, dependence (HCC)      Admitting Diagnosis: Alcohol withdrawal (HCC) [F10.939]  Encounter for assessment of alcohol and drug use [Z76.89]  Age/Sex: 34 y.o. male  Admission Orders:  Scheduled Medications:  enoxaparin, 40 mg, Subcutaneous, Daily  folic acid 1 mg, thiamine (VITAMIN B1) 100 mg in sodium chloride 0.9 % 100 mL IV piggyback, , Intravenous, Daily  multivitamin-minerals, 1 tablet, Oral, Daily  naltrexone, 50 mg, Oral, Daily    Continuous IV Infusions:  dextrose 5 % and sodium chloride 0.9 %, 100 mL/hr, Intravenous, Continuous    PRN Meds:  acetaminophen, 650 mg, Oral, Q6H PRN  gabapentin, 300 mg, Oral, Q8H PRN  hydrOXYzine HCL, 25 mg, Oral, Q6H PRN; 9/19 x1  ibuprofen, 600 mg, Oral, Q6H PRN; 9/19 x1  ondansetron, 4 mg, Intravenous, Q6H PRN; 9/19 x1  traZODone, 50 mg, Oral, HS PRN  diazepam, 10 mg, Intravenous; 9/18 x1  phenobarbital, 650 mg, Intravenous; 9/18 x1  phenobarbital, 260 mg, Intravenous; 9/19 x1  phenobarbital, 130 mg, Intravenous; 9/19 x5        IP CONSULT TO ED CRISIS WORKER  IP CONSULT TO CASE MANAGEMENT  IP CONSULT TO TOXICOLOGY  CONSULT TO CERTIFIED     Network Utilization Review Department  ATTENTION: Please call with any questions or concerns to [phone] and carefully listen to the prompts so that you are directed to the right person. All voicemails are confidential.   For Discharge needs, contact Care Management DC Support Team at [phone] opt. 2  Send all requests for admission clinical reviews, approved or denied determinations and any other requests to dedicated fax number below belonging to the campus where the patient is receiving treatment. List of dedicated fax numbers for the Facilities:  FACILITY NAME UR FAX NUMBER   ADMISSION DENIALS (Administrative/Medical Necessity) [phone]   DISCHARGE SUPPORT TEAM (NETWORK) [phone]   PARENT CHILD HEALTH (Maternity/NICU/Pediatrics) [phone]   [facility]  [facility] [phone]   General acute hospital [phone]   [facility] [phone]   [facility] [phone]   [facility] [phone]   [facility] [phone]   [facility] [phone]   [facility] [phone]   [facility] [phone]   [facility] [phone]   [facility] [phone]   [facility] [phone]

## 2024-09-19 NOTE — ASSESSMENT & PLAN NOTE
"History of binge drinking with medically assisted withdrawal; last detox admission on 8/2024  Last drink 9/18 around 6-7 pm  Drinks \"close to a handle\" of vodka daily  Serum ethanol level 408 in the ED  Interested in naltrexone, held for now due to nausea  Case management and CRS consult placed  "

## 2024-09-19 NOTE — ASSESSMENT & PLAN NOTE
Patient is tearful stating he has profound anxiety poorly controlled by propranolol 20 mg 3 times a day.  Denies suicidal ideation.  Consult psychiatry for anxiety management.

## 2024-09-19 NOTE — H&P
"H&P - Hospitalist   Name: Juan Luu 34 y.o. male I MRN: [id number]  Unit/Bed#: 5T [facility] 507-01 I Date of Admission: 9/18/2024   Date of Service: 9/19/2024 I Hospital Day: 1     Assessment & Plan  Alcohol withdrawal syndrome (HCC)  Admit to inpatient detox unit and initiate SEWS protocol with phenobarbital for symptoms of alcohol withdrawal  Currently endorses anxiety and tremors   Initial SEWS score: 13  Initial dose of pheno: 650mg + 10mg Valium  Patient also received 2mg Ativan in the ED  Continue to monitor for signs of withdrawal- continuous pulse ox, cardiac monitoring  CM consulted for dispo planning  Alcohol use disorder, severe, dependence (HCC)  Admits to drinking 1-1.5L of vodka daily, last drink evening of 9/18 prior to arriving to ED  ETOH level 408  He has been admitted to the detox unit in the past with last admission 8/2024   Agreeable to trying naltrexone again- order placed  Initiate IV fluids, daily thiamine, folic acid and multivitamin  See \"Alcohol withdrawal syndrome\"  Anxiety  Patient is tearful stating he has profound anxiety poorly controlled by propranolol 20 mg 3 times a day.  Denies suicidal ideation.  Consult psychiatry for anxiety management.    VTE Pharmacologic Prophylaxis: VTE Score: 1 Low Risk (Score 0-2) - Encourage Ambulation.  Discontinue Lovenox  Code Status: Level 1 - Full Code discussed with patient  Discussion with family: Updated  (sister) via phone.    Anticipated Length of Stay: Patient will be admitted on an inpatient basis with an anticipated length of stay of greater than 2 midnights secondary to alcohol withdrawal management and safe discharge planning.    History of Present Illness     Chief Complaint: Patient is requesting alcohol detox stating \"I do not want to do this anymore\".    Juan Luu is a 34 y.o. male with a PMH of anxiety and alcohol abuse who presents with alcohol intoxication requesting detox.  Patient's sister " "arrived at his apartment to find him acutely intoxicated and called 911.  Patient has been in detox unit in the past but states he \"had a bad experience\".  At this time he is tearful and complaining of feeling sweaty, having a headache and just overall feeling bad.  He is tearful stating he lost his job as an  1 month ago.  He suffers from profound anxiety and takes propranolol which she does not feel helps.    Review of Systems   Constitutional:  Positive for diaphoresis. Negative for appetite change, chills, fatigue and fever.   Respiratory:  Negative for cough and shortness of breath.    Cardiovascular:  Negative for chest pain and palpitations.   Gastrointestinal:  Positive for nausea. Negative for abdominal pain, constipation, diarrhea and vomiting.   Genitourinary:  Negative for hematuria.   Musculoskeletal:  Negative for back pain and neck pain.   Skin:  Negative for rash.   Neurological:  Positive for tremors and headaches. Negative for dizziness, weakness, light-headedness and numbness.   Psychiatric/Behavioral:  Negative for confusion. The patient is nervous/anxious.    All other systems reviewed and are negative.      I have reviewed the patient's PMH, PSH, Social History, Family History, Meds, and Allergies  Historical Information   Past Medical History:   Diagnosis Date    Alcohol use disorder     Alcohol withdrawal syndrome (HCC)     Anxiety     Depression     PTSD (post-traumatic stress disorder)     Tachycardia      Past Surgical History:   Procedure Laterality Date    LUMBAR LAMINECTOMY       Social History     Tobacco Use    Smoking status: Never    Smokeless tobacco: Never   Vaping Use    Vaping status: Never Used   Substance and Sexual Activity    Alcohol use: Yes     Alcohol/week: 112.0 standard drinks of alcohol     Types: 112 Shots of liquor per week     Comment: pt reports he rarely drinks anymore, but drank a lot today    Drug use: Never    Sexual activity: Not Currently "     E-Cigarette/Vaping    E-Cigarette Use Never User      E-Cigarette/Vaping Substances     History reviewed. No pertinent family history.  Social History     Tobacco Use    Smoking status: Never    Smokeless tobacco: Never   Vaping Use    Vaping status: Never Used   Substance and Sexual Activity    Alcohol use: Yes     Alcohol/week: 112.0 standard drinks of alcohol     Types: 112 Shots of liquor per week     Comment: pt reports he rarely drinks anymore, but drank a lot today    Drug use: Never    Sexual activity: Not Currently       Current Facility-Administered Medications:     acetaminophen (TYLENOL) tablet 650 mg, Q6H PRN    dextrose 5 % and sodium chloride 0.9 % infusion, Continuous, Last Rate: 100 mL/hr (09/18/24 2304)    enoxaparin (LOVENOX) subcutaneous injection 40 mg, Daily    folic acid 1 mg, thiamine (VITAMIN B1) 100 mg in sodium chloride 0.9 % 100 mL IV piggyback, Daily    gabapentin (NEURONTIN) capsule 300 mg, Q8H PRN    hydrOXYzine HCL (ATARAX) tablet 25 mg, Q6H PRN    ibuprofen (MOTRIN) tablet 600 mg, Q6H PRN    multivitamin-minerals (CENTRUM) tablet 1 tablet, Daily    naltrexone (REVIA) tablet 50 mg, Daily    ondansetron (ZOFRAN) injection 4 mg, Q6H PRN    traZODone (DESYREL) tablet 50 mg, HS PRN  Prior to Admission Medications   Prescriptions Last Dose Informant Patient Reported? Taking?   propranolol (INDERAL) 20 mg tablet   Yes Yes   Sig: Take 20 mg by mouth every 8 (eight) hours      Facility-Administered Medications: None     Patient has no known allergies.  Social History:  Marital Status: Single   Occupation: Currently unemployed.  Prior   Patient Pre-hospital Living Situation: Apartment, Alone  Patient Pre-hospital Level of Mobility: walks  Patient Pre-hospital Diet Restrictions: None    Objective     Vitals:   Blood Pressure: 132/85 (09/19/24 0805)  Pulse: 91 (09/19/24 0805)  Temperature: 97.9 °F (36.6 °C) (09/19/24 0715)  Temp Source: Temporal (09/19/24 0715)  Respirations: 16  "(09/19/24 0805)  Height: 5' 11\" (180.3 cm) (09/18/24 2320)  Weight - Scale: 88 kg (194 lb) (09/18/24 2320)  SpO2: 97 % (09/19/24 0805)    Physical Exam  Vitals reviewed.   Constitutional:       Appearance: He is diaphoretic.   HENT:      Head: Normocephalic and atraumatic.      Mouth/Throat:      Mouth: Mucous membranes are moist.   Eyes:      General: No scleral icterus.  Cardiovascular:      Rate and Rhythm: Normal rate and regular rhythm.      Heart sounds: No murmur heard.  Pulmonary:      Breath sounds: Normal breath sounds. No wheezing or rales.   Chest:      Chest wall: No tenderness.   Abdominal:      General: Bowel sounds are normal. There is no distension.      Palpations: Abdomen is soft.      Tenderness: There is no abdominal tenderness.   Musculoskeletal:         General: Normal range of motion.   Skin:     General: Skin is warm.      Findings: No rash.   Neurological:      Motor: Tremor present.   Psychiatric:         Mood and Affect: Mood is anxious and depressed. Affect is tearful.         Behavior: Behavior is cooperative.         Cognition and Memory: Cognition normal.       Lines/Drains:  Lines/Drains/Airways       Active Status       None                  Additional Data:   Lab Results: I have reviewed the following results: CBC/BMP:   .     09/19/24 0443   WBC 7.27   HGB 13.9   HCT 41.2      SODIUM 142   K 3.9      CO2 26   BUN 13   CREATININE 0.85   GLUC 112   MG 2.3      Results from last 7 days   Lab Units 09/19/24 0443 09/18/24  1945   WBC Thousand/uL 7.27 7.51   HEMOGLOBIN g/dL 13.9 16.1   HEMATOCRIT % 41.2 45.9   PLATELETS Thousands/uL 298 390   SEGS PCT %  --  65   LYMPHO PCT %  --  27   MONO PCT %  --  6   EOS PCT %  --  1     Results from last 7 days   Lab Units 09/19/24 0443   SODIUM mmol/L 142   POTASSIUM mmol/L 3.9   CHLORIDE mmol/L 102   CO2 mmol/L 26   BUN mg/dL 13   CREATININE mg/dL 0.85   ANION GAP mmol/L 14*   CALCIUM mg/dL 8.6   ALBUMIN g/dL 4.2   TOTAL " "BILIRUBIN mg/dL 0.26   ALK PHOS U/L 88   ALT U/L 18   AST U/L 27   GLUCOSE RANDOM mg/dL 112     No results found for: \"HGBA1C\"    Imaging Review: No pertinent imaging studies reviewed.  Other Studies: No additional pertinent studies reviewed.    Administrative Statements     ** Please Note: This note has been constructed using a voice recognition system. **    "

## 2024-09-19 NOTE — DISCHARGE INSTR - OTHER ORDERS
You are being discharged home to [address], [address], [place] [address] Phone: [phone].      Sammi Pollard   Certified     [facility]  [phone]    AA meeting guide   https://www.aa.org/find-aa    AA Phone apps:   Meeting Guide  Everything AA  In the Rooms    AA/24 hour hotline   [phone]    [facility]   [address]   [facility] [address]  [phone]    [facility]  [address]   [facility] [address]  [phone]    [facility]  [address] [facility] [address]  [phone]    [facility] [phone]   [place] [phone]  [place] [phone]  [place] [phone]   [phone]  [place] [phone]     [facility]  [address]  [facility] [place] [address]  [phone]    [place]   [phone]    [facility]  [address], 1st [place]  [address]  [phone]    [facility]  [address] [facility] [place]  [address]  [phone]    SMART Recovery Meetings    Self Management and Recovery Training (SMART)  SMART Recovery is an evidenced-informed recovery method grounded in Rational Emotive Behavioral Therapy (REBT) and Cognitive Behavioral Therapy (CBT), that supports people with substance dependencies or problem behaviors to:  Build and maintain motivation  [place] with urges and cravings  Manage thoughts, feelings and behaviors  Live a balanced life    Find meetings and tools: https://[employer] RecoveryrecDocbookMDy.org/    SYNC Recovery Events    [facility] organizes meaningful events for people in recovery and their families. Our main goal is to have fun by connecting with nature and other people. We can then realize that there is a world of possibilities open to us, now that we are no longer in the bondage of addiction. These events are designed to provide  :  Hope for those in early recovery  Tangible proof that life gets better when we’re sober  Service opportunities for people with recovery experience  Social connectedness for everyone involved    [address]  [place] [address]  Phone: [phone]  Email: info@[employer].Hemoteq   Website: [email or web address].Hemoteq/      CRISIS INFORMATION  If you are experiencing a mental health emergency, you may call the Psychiatric Crisis Intervention Office 24 hours a day, 7 days per week at [phone].    In [facility], call [phone].    Warmline is a confidential 24/7 telephone support service manned by trained mental health consumers.  Warmline provides support, a listening ear and can provide information about available services.Warmline specializes in the concerns of mental health consumers, their families and friends.  However, we are also here for anyone who has a mental health concern, is confused about or just doesn't know anything about mental health or where to get information.  To reach [facility], call [phone].    HOW TO GET SUBSTANCE ABUSE HELP:  If you or someone you know has a drug or alcohol problem, there is help:  Psychiatric Drug & Alcohol Abuse Services: [phone]  [facility] Drug & Alcohol Abuse Services: [phone]  An assessment is the first step.   In addition to those listed there are other programs available in the area but assessment is best to determine an appropriate level of care.  If you DO NOT have Medical Assistance (MA) or Private Insurance, an assessment can be scheduled at one of these providers:  [facility]  [address] [facility] [place] [address]  [phone]   [address] [place] [address]  [phone]   [facility]  [address]. [place], [place] [address] [phone]   [facility]  [address] [place], [place] [address] [phone]   [facility]  [address] [place] [address]  [place] [address]  [phone]   [facility]  [address] [place] [address]  [phone]   [facility].  [address], [place] [address]  [phone]       From the [facility] website www.[place].gov/guides/opioid-epidemic/#GetNaloxone    How do I get naloxone?  Family members and friends can access naloxone by:    Obtaining a prescription from their family doctor  Using the standing order issued by Acting Physician General Akilah Gould. A standing order is a prescription written for the general public, rather than specifically for an individual.  To use the standing order, print it and take it with you to the pharmacy or have the digital version on your phone. Download the standing order from the Department of [facility] (PDF).    If you are unable to print it or use the digital version, the standing order is kept on file at many pharmacies. If a pharmacy does not have it on file, they may have the ability to look it up.    Naloxone prescriptions can be filled at most pharmacies. Although the medication might not be available for same-day pickup, it often can be ordered and available within a day or two.      Rental Assistance  Apply online at: https://www.compass.[facility].[place].gov/home/#/    Homeless Assistance Program provides assistance to homeless or near homeless individuals and families. Rental assistance are housing case management are available to individuals and families who are qualified. The agencies that provide Housing Case Management are:    [employer]  [address] [place] [address]  [phone]     [employer]  [address] [place] [address]  [phone]    [facility]  [address] [place] [address]  [phone]    Homeless Supportive Services Project - [facility] provides intensive case management services for disabled people who are  chronically homeless. This program links homeless individuals with housing, job training, physical and behavioral healthcare, income assistance, food and nutrition, and socialization opportunities. Contact [phone].

## 2024-09-19 NOTE — ED CARE HANDOFF
[facility] Warm Handoff Outcome Note    Patient name Juan Luu  Location ED 15/ED 15 MRN [id number]  Age: 34 y.o.          Plan Type:  Warm Handoff                                                                                    Plan Date: 9/18/2024  Service:  ED Warm Handoff      Substance Use History:  ETOH    Warm Handoff Update:  Pt accepted to detox unit at [facility]    Warm Handoff Outcome: Withdrawal Management

## 2024-09-19 NOTE — ASSESSMENT & PLAN NOTE
Admit to inpatient detox unit and initiate SEWS protocol with phenobarbital for symptoms of alcohol withdrawal  Currently endorses anxiety and tremors   Initial SEWS score: 13  Initial dose of pheno: 650mg + 10mg Valium  Patient also received 2mg Ativan in the ED  Continue to monitor for signs of withdrawal- continuous pulse ox, cardiac monitoring  CM consulted for dispo planning

## 2024-09-19 NOTE — CONSULTS
"    Psychiatric Evaluation - Department of Behavioral Health   Juan Luu 34 y.o. male MRN: [id number]  Unit/Bed#: 5T DETOX 507-01 Encounter: [id number]    ASSESSMENT & PLAN     Assessment & Plan  Alcohol use disorder, severe, dependence (HCC)  Management per primary team  Orders from past 72 hours:    Consult to Case Management; Standing    Consult to Case Management    Inpatient consult to Certified ; Standing    Inpatient consult to Certified     Unspecified mood (affective) disorder (HCC)  Patient is a 34-year-old with a history of alcohol use disorder who presented to the hospital for alcohol detox and severe anxiety.  Patient states that he has been having worsening anxiety for the last 5 years since drinking became a problem.  This past month anxiety has become \"crippling.\"  He describes anxiety that is severe due to life stressors.  He also describes some anxiety around people in public places.  He does endorse a history of panic attacks which have been occurring more recently to the point of happening multiple times a day.  He does find himself having anxiety about potentially experiencing a panic attack.  He he has tried Atarax 25 mg 4 times daily previously but this did not help him.  Patient denies suicidal ideation, homicidal ideation, or AVH.  Patient's anxiety is most likely due to alcohol use and also worsened by alcohol use.  At this time is difficult to determine whether this is a primary psychiatric illness versus substance-induced.    Differential diagnosis: Substance-induced mood disorder, panic disorder without agoraphobia, social anxiety disorder, generalized anxiety disorder    Treatment Recommendations/Precautions:  Initiate Lexapro 5 mg daily for anxiety symptoms  Increase Atarax from 25 mg every 6 hours as needed to 50 mg every 6 hours as needed  Admission labs evaluated; see below.  Continue medical management per primary team.  Discharge " "date per primary team.     No associated orders from this encounter found during lookback period of 72 hours.      Medications Risks/Benefits:    Risks, benefits, and possible side effects of medications explained to Juan and he verbalizes understanding and agreement for treatment.     Physician Requesting Consult: Gerardo Mueller Pe*  Reason for Consult / Principal Problem: Anxiety    HISTORY OF PRESENT ILLNESS (HPI)     Juan Luu is a 34 y.o., overtly appearing  male, , domiciled at home alone, possessing pertinent psychiatric history of alcohol use disorder presenting to [facility], subsequent to alcohol detoxification.  Psychiatry was consulted due to the patient endorsing severe anxiety.     Presently, the patient has a anxious and dysphoric affect, is tearful at times, soft/scant and speech, reporting his mood as \"anxious.\"  He states that his anxiety has been worsening over the last 5 years but has been severe the last month.  He describes the anxiety as \"crippling.\"  He states that there is a correlation of his drinking habits and his anxiety.  He states that he first drank when he was 13 but that drinking became a problem in 2019 during the COVID-19 pandemic.  He currently is drinking 1-1/2 L of vodka per day.  He states that he began drinking alcohol heavily to help his anxiety but it has causing the anxiety to worsen over the last 5 years.  He also is experience panic attacks in the past but more recently is experiencing multiple panic attacks a day.  He states that he also is worried about developing panic attacks in the periods between attacks.  He also describes anxiety about stressors including losing his job and potentially losing his house.  He also describes anxiety involving being around people and other social settings.  He denies depressive symptoms but does endorse recent fatigue, poor sleep, and decreased appetite.  He " denies any history of mariaelena or psychosis.  He denies any eating disorders or OCD.  He denies any traumatic events in his life.  He denies abuse.  He states that he plans to go to inpatient rehab or dual diagnosis unit.  He is requesting medications to help with his ongoing anxiety.  It was discussed starting Lexapro and increasing Atarax and patient was in agreement with this plan.    PSYCHIATRIC REVIEW OF SYSTEMS     Appetite: decreased  Adverse eating: no  Weight changes: no  Insomnia/sleeplessness: yes, insomnia  Fatigue/anergy: yes  Anhedonia/lack of interest: no  Attention/concentration: no  Psychomotor agitation/retardation: no  Somatic symptoms: no  Anxiety/panic attack: yes, anxiety and panic attacks  Mariaelena/hypomania: no  Hopelessness/helplessness/worthlessness: no  Self-injurious behavior/high-risk behavior: no  Suicidal ideation: no  Homicidal ideation: no  Auditory hallucinations: no  Visual hallucinations: no  Other perceptual disturbances: no  Delusional thinking: no  Obsessive/compulsive symptoms: no    REVIEW OF SYSTEMS   Pertinent items are noted in HPI.    HISTORICAL INFORMATION     Past Psychiatric History:   Past Psychiatric management: None  Past Suicide attempts/self-injurious behavior: None  Past Violent behavior: None  Past Psychiatric medications: Atarax    Substance Abuse History:  I spent time with patient in counseling and education on risk of substance abuse. Assessed him motivation and encouraged patient for treatment. Brief intervention done.   Social History       Tobacco History       Smoking Status  Never      Smokeless Tobacco Use  Never              Alcohol History       Alcohol Use Status  Yes Drinks/Week  112 Shots of liquor per week Amount  112.0 standard drinks of alcohol/wk Comment  pt reports he rarely drinks anymore, but drank a lot today              Drug Use       Drug Use Status  Never              Sexual Activity       Sexually Active  Not Currently               "Activities of Daily Living    Not Asked                 Additional Substance Use Detail       Questions Responses    Problems Due to Past Use of Alcohol? Yes    Alcohol Use Frequency Daily    Alcohol Drink of Choice Vodka    1st Use of Alcohol 15 yo    Last Use of Alcohol & Amount 5/3/24; one-fifth    Longest Abstinence from Alcohol 2.5 yrs    Last reviewed by More Rachel RN on 9/18/2024          I have assessed this patient for substance use within the past 12 months      Social History:  Education: some college  Learning Disabilities:  No  Marital history: single  Living arrangement, social support:  Lives in home alone. Supportive family.  Occupational History:   Functioning Relationships: good support system.  Other Pertinent History: None      Traumatic History:   Abuse:  None  Other Traumatic Events: None    OBJECTIVE     Past Medical History:   Diagnosis Date    Alcohol use disorder     Alcohol withdrawal syndrome (HCC)     Anxiety     Depression     PTSD (post-traumatic stress disorder)     Tachycardia        Meds/Allergies   all current active meds have been reviewed  No Known Allergies    Vital signs in last 24 hours:  Temp:  [97.4 °F (36.3 °C)-98.6 °F (37 °C)] 97.9 °F (36.6 °C)  HR:  [] 88  Resp:  [14-16] 16  BP: (107-155)/(78-91) 137/83    Intake/Output Summary (Last 24 hours) at 9/19/2024 1417  Last data filed at 9/19/2024 0909  Gross per 24 hour   Intake 1120 ml   Output 700 ml   Net 420 ml       Laboratory results:  I have personally reviewed all pertinent laboratory/tests results.    Imaging Studies: Reviewed    EKG, Pathology, and Other Studies: Reviewed    Mental Status Evaluation:  Appearance:  Lying in bed, wearing hospital attire, disheveled, marginal hygiene, overtly  male   Behavior:  Pleasant, cooperative, anxious, tearful at times   Speech:  soft and scant   Mood:  \"Anxious\"   Affect:  mood-congruent and anxious/constricted   Language: naming objects and " repeating phrases   Thought Process:  goal directed and linear   Thought Content:  No overt delusions or paranoia   Perceptual Disturbances: No auditory or visual hallucinations reported.  Does not appear to be responding to internal stimuli.   Risk Potential: Suicidal Ideations none, Homicidal Ideations none, and Potential for Aggression No   Sensorium:  person, place, time/date, and situation   Cognition:  recent and remote memory grossly intact   Consciousness:  alert and awake    Attention: attention span and concentration were age appropriate   Intellect: within normal limits   Fund of Knowledge: awareness of current events: Yes, past history: Yes, and vocabulary: Yes   Insight:  fair   Judgment: fair   Muscle Strength and Tone: Within normal limits   Gait/Station: normal gait/station   Motor Activity: no abnormal movements     Memory: Short and long term memory  intact       Code Status: Level 1 - Full Code    Counseling/Coordination of Care    I have expended greater than 30 minutes in which more than 50% of this time was expended in counseling/coordination of patient care relating to diagnostic results, prognosis, potential risks and benefits of management options, instructions for appropriate management, patient and/or collateral education, importance of adherence to management and/or risk factor reductions. Patient's rights, confidentiality, exceptions to confidentiality, use of electronic medical record including appropriate staff access to medical record regarding behavioral health services and consent to treatment were reviewed.    Note Share:    This note was not shared with the patient due to reasonable likelihood of causing patient harm    This note has been constructed using a voice recognition system. There may be translation, syntax,  or grammatical errors. If you have any questions, please contact the dictating provider.    Bill Langford DO 09/19/24

## 2024-09-19 NOTE — ASSESSMENT & PLAN NOTE
Management per primary team  Orders from past 72 hours:    Consult to Case Management; Standing    Consult to Case Management    Inpatient consult to Certified ; Standing    Inpatient consult to Certified

## 2024-09-19 NOTE — ASSESSMENT & PLAN NOTE
"Continue SEWS protocol with symptom triggered phenobarbital for medically assisted alcohol withdrawal with maximum dose of 2 grams  Initial SEWS score: 13  Given Valium 10 mg and Phenobarbital 650 mg on admission  Current symptoms include anxiety, nausea, and diaphoresis  Total phenobarbital received: 1820 mg  Notify toxicology if patient exceeds phenobarbital 2 g; will require evaluation for transfer to higher level of care if precedex is warranted  Consider discontinuation of protocol once patient has received four SEWS scores of \"0\" while awake  Continue thiamine, folic acid, MVI  Continue IVF NS  Monitor vitals, telemetry  "

## 2024-09-19 NOTE — ASSESSMENT & PLAN NOTE
"Admits to drinking 1-1.5L of vodka daily, last drink evening of 9/18 prior to arriving to ED  ETOH level 408  He has been admitted to the detox unit in the past with last admission 8/2024   Agreeable to trying naltrexone again- order placed  Initiate IV fluids, daily thiamine, folic acid and multivitamin  See \"Alcohol withdrawal syndrome\"  " Clear bilaterally, pupils equal, round and reactive to light.

## 2024-09-19 NOTE — ED NOTES
Per [facility]:    Recipient  Name: PEGGY COLLINS   Recipient ID: [id number]   YOB: 1990   Gender: Male           Eligibility Summary  Type Name Begin End   Inactive   09/18/2024 09/18/2024           Eligibility Detail  Status: Inactive   Service Type: 30-Health Benefit Plan Coverage   Plan 09/18/2024           Shane Lopez  Crisis Intervention Specialist II  09/18/24

## 2024-09-19 NOTE — ED PROVIDER NOTES
1. Alcohol withdrawal (HCC)    2. Alcohol use disorder, severe, dependence (HCC)    3. Anxiety      ED Disposition       ED Disposition   Admit    Condition   Stable    Date/Time   Wed Sep 18, 2024  9:42 PM    Comment   Case was discussed with Dianne and the patient's admission status was agreed to be Admission Status: inpatient status to the service of Dr. Hennessy .               Assessment & Plan       Medical Decision Making  34-year-old male presented emerged department with request for alcohol detox.  Patient notes last alcohol use 12 hours before arrival although appears quite intoxicated.  Laboratory evaluation shows alcohol level of greater than 400 which is slightly to the level.  Case discussed with detox service who accepts patient for admission.  Mild hypomagnesemia, IV repletion started.    Amount and/or Complexity of Data Reviewed  Labs: ordered.    Risk  Prescription drug management.  Decision regarding hospitalization.                ED Course as of 09/19/24 1302   Wed Sep 18, 2024   2120 HOST referral       Medications   sodium chloride 0.9 % bolus 1,000 mL (0 mL Intravenous Stopped 9/18/24 2044)   LORazepam (ATIVAN) injection 2 mg (2 mg Intravenous Given 9/18/24 2033)       History of Present Illness       34M male presenting emergency department requesting detox from alcohol.  1 L of vodka a day.        Review of Systems   Constitutional:  Negative for chills and fever.   HENT:  Negative for ear pain and sore throat.    Eyes:  Negative for pain and visual disturbance.   Respiratory:  Negative for cough and shortness of breath.    Cardiovascular:  Negative for chest pain and palpitations.   Gastrointestinal:  Negative for abdominal pain and vomiting.   Genitourinary:  Negative for dysuria and hematuria.   Musculoskeletal:  Negative for arthralgias and back pain.   Skin:  Negative for color change and rash.   Neurological:  Negative for seizures and syncope.   All other systems reviewed and are  negative.          Objective     ED Triage Vitals   Temperature Pulse Blood Pressure Respirations SpO2 Patient Position - Orthostatic VS   09/18/24 1924 09/18/24 1924 09/18/24 1924 09/18/24 1924 09/18/24 1924 09/18/24 1924   98.6 °F (37 °C) 101 155/90 16 95 % Sitting      Temp Source Heart Rate Source BP Location FiO2 (%) Pain Score    09/18/24 1924 09/18/24 1924 09/18/24 1924 -- 09/18/24 2315    Oral Monitor Left arm  6        Physical Exam  Vitals and nursing note reviewed.   Constitutional:       General: He is not in acute distress.     Appearance: He is well-developed.   HENT:      Head: Normocephalic and atraumatic.   Eyes:      Conjunctiva/sclera: Conjunctivae normal.   Pulmonary:      Effort: Pulmonary effort is normal. No respiratory distress.   Abdominal:      Palpations: Abdomen is soft.      Tenderness: There is no abdominal tenderness.   Musculoskeletal:      Cervical back: Neck supple.   Skin:     General: Skin is warm and dry.      Capillary Refill: Capillary refill takes less than 2 seconds.   Neurological:      Mental Status: He is alert.         Labs Reviewed   COMPREHENSIVE METABOLIC PANEL - Abnormal       Result Value    Sodium 142      Potassium 4.1      Chloride 97      CO2 27      ANION GAP 18 (*)     BUN 13      Creatinine 0.99      Glucose 102      Calcium 9.2      AST 29      ALT 21      Alkaline Phosphatase 109 (*)     Total Protein 8.2      Albumin 5.0      Total Bilirubin 0.29      eGFR 98      Narrative:     National Kidney Disease Foundation guidelines for Chronic Kidney Disease (CKD):     Stage 1 with normal or high GFR (GFR > 90 mL/min/1.73 square meters)    Stage 2 Mild CKD (GFR = 60-89 mL/min/1.73 square meters)    Stage 3A Moderate CKD (GFR = 45-59 mL/min/1.73 square meters)    Stage 3B Moderate CKD (GFR = 30-44 mL/min/1.73 square meters)    Stage 4 Severe CKD (GFR = 15-29 mL/min/1.73 square meters)    Stage 5 End Stage CKD (GFR <15 mL/min/1.73 square meters)  Note: GFR  calculation is accurate only with a steady state creatinine   MEDICAL ALCOHOL - Abnormal    Ethanol Lvl 408 (*)    CBC AND DIFFERENTIAL - Abnormal    WBC 7.51      RBC 5.39      Hemoglobin 16.1      Hematocrit 45.9      MCV 85      MCH 29.9      MCHC 35.1      RDW 15.7 (*)     MPV 8.2 (*)     Platelets 390      nRBC 0      Segmented % 65      Immature Grans % 0      Lymphocytes % 27      Monocytes % 6      Eosinophils Relative 1      Basophils Relative 1      Absolute Neutrophils 4.85      Absolute Immature Grans 0.01      Absolute Lymphocytes 2.05      Absolute Monocytes 0.47      Eosinophils Absolute 0.04      Basophils Absolute 0.09     RAPID DRUG SCREEN, URINE - Abnormal    Amph/Meth UR Negative      Barbiturate Ur Positive (*)     Benzodiazepine Urine Positive (*)     Cocaine Urine Negative      Methadone Urine Negative      Opiate Urine Negative      PCP Ur Negative      THC Urine Negative      Oxycodone Urine Negative      Fentanyl Urine Negative      HYDROCODONE URINE Negative      Narrative:     Presumptive report. If requested, specimen will be sent to reference lab for confirmation.  FOR MEDICAL PURPOSES ONLY.   IF CONFIRMATION NEEDED PLEASE CONTACT THE LAB WITHIN 5 DAYS.    Drug Screen Cutoff Levels:  AMPHETAMINE/METHAMPHETAMINES  1000 ng/mL  BARBITURATES     200 ng/mL  BENZODIAZEPINES     200 ng/mL  COCAINE      300 ng/mL  METHADONE      300 ng/mL  OPIATES      300 ng/mL  PHENCYCLIDINE     25 ng/mL  THC       50 ng/mL  OXYCODONE      100 ng/mL  FENTANYL      5 ng/mL  HYDROCODONE     300 ng/mL   CBC AND PLATELET - Abnormal    WBC 7.27      RBC 4.62      Hemoglobin 13.9      Hematocrit 41.2      MCV 89      MCH 30.1      MCHC 33.7      RDW 16.0 (*)     Platelets 298      MPV 8.3 (*)    COMPREHENSIVE METABOLIC PANEL - Abnormal    Sodium 142      Potassium 3.9      Chloride 102      CO2 26      ANION GAP 14 (*)     BUN 13      Creatinine 0.85      Glucose 112      Calcium 8.6      AST 27      ALT 18       Alkaline Phosphatase 88      Total Protein 6.7      Albumin 4.2      Total Bilirubin 0.26      eGFR 113      Narrative:     National Kidney Disease Foundation guidelines for Chronic Kidney Disease (CKD):     Stage 1 with normal or high GFR (GFR > 90 mL/min/1.73 square meters)    Stage 2 Mild CKD (GFR = 60-89 mL/min/1.73 square meters)    Stage 3A Moderate CKD (GFR = 45-59 mL/min/1.73 square meters)    Stage 3B Moderate CKD (GFR = 30-44 mL/min/1.73 square meters)    Stage 4 Severe CKD (GFR = 15-29 mL/min/1.73 square meters)    Stage 5 End Stage CKD (GFR <15 mL/min/1.73 square meters)  Note: GFR calculation is accurate only with a steady state creatinine   MAGNESIUM - Normal    Magnesium 2.5     LIPASE - Normal    Lipase 31     MAGNESIUM - Normal    Magnesium 2.3       No orders to display       Procedures    ED Medication and Procedure Management   Prior to Admission Medications   Prescriptions Last Dose Informant Patient Reported? Taking?   propranolol (INDERAL) 20 mg tablet   Yes Yes   Sig: Take 20 mg by mouth every 8 (eight) hours      Facility-Administered Medications: None     Current Discharge Medication List        CONTINUE these medications which have NOT CHANGED    Details   propranolol (INDERAL) 20 mg tablet Take 20 mg by mouth every 8 (eight) hours           No discharge procedures on file.     Maxine Granda MD  09/19/24 1404

## 2024-09-19 NOTE — CERTIFIED RECOVERY SPECIALIST
Certified  Note    Patient name: Juan Luu  Location: 5T DETOX 507/5T DETOX 50*  [facility]: [facility]  Attending:  Gerardo Mueller Pe* MRN [id number]  : 1990  Age: 34 y.o.    Sex: male Date 2024         Substance Use History:     Social History     Substance and Sexual Activity   Alcohol Use Yes    Alcohol/week: 112.0 standard drinks of alcohol    Types: 112 Shots of liquor per week    Comment: pt reports he rarely drinks anymore, but drank a lot today        Social History     Substance and Sexual Activity   Drug Use Never     Time spent with Patient 10    CRS engaged with patient to check in and offer support is desired.  CRS made introduction and  explained CRS services provided at hospital    Patient reports he is doing well and nothing needed at this time, not receptive to conversation regarding CAROLIN/AUD use and would reach out if needed     CRS will continue to follow.      Resources provided.       Sammi Pollard

## 2024-09-20 PROBLEM — F10.939 ALCOHOL WITHDRAWAL SYNDROME (HCC): Status: RESOLVED | Noted: 2024-09-18 | Resolved: 2024-09-20

## 2024-09-20 LAB
ALBUMIN SERPL BCG-MCNC: 3.7 G/DL (ref 3.5–5)
ALP SERPL-CCNC: 88 U/L (ref 34–104)
ALT SERPL W P-5'-P-CCNC: 19 U/L (ref 7–52)
ANION GAP SERPL CALCULATED.3IONS-SCNC: 7 MMOL/L (ref 4–13)
AST SERPL W P-5'-P-CCNC: 27 U/L (ref 13–39)
BASOPHILS # BLD AUTO: 0.03 THOUSANDS/ΜL (ref 0–0.1)
BASOPHILS NFR BLD AUTO: 0 % (ref 0–1)
BILIRUB SERPL-MCNC: 0.62 MG/DL (ref 0.2–1)
BUN SERPL-MCNC: 8 MG/DL (ref 5–25)
CALCIUM SERPL-MCNC: 8.6 MG/DL (ref 8.4–10.2)
CHLORIDE SERPL-SCNC: 105 MMOL/L (ref 96–108)
CO2 SERPL-SCNC: 26 MMOL/L (ref 21–32)
CREAT SERPL-MCNC: 0.87 MG/DL (ref 0.6–1.3)
EOSINOPHIL # BLD AUTO: 0.1 THOUSAND/ΜL (ref 0–0.61)
EOSINOPHIL NFR BLD AUTO: 1 % (ref 0–6)
ERYTHROCYTE [DISTWIDTH] IN BLOOD BY AUTOMATED COUNT: 15.6 % (ref 11.6–15.1)
GFR SERPL CREATININE-BSD FRML MDRD: 112 ML/MIN/1.73SQ M
GLUCOSE SERPL-MCNC: 100 MG/DL (ref 65–140)
HCT VFR BLD AUTO: 40.6 % (ref 36.5–49.3)
HGB BLD-MCNC: 13.6 G/DL (ref 12–17)
IMM GRANULOCYTES # BLD AUTO: 0.03 THOUSAND/UL (ref 0–0.2)
IMM GRANULOCYTES NFR BLD AUTO: 0 % (ref 0–2)
LYMPHOCYTES # BLD AUTO: 1.85 THOUSANDS/ΜL (ref 0.6–4.47)
LYMPHOCYTES NFR BLD AUTO: 26 % (ref 14–44)
MAGNESIUM SERPL-MCNC: 2.1 MG/DL (ref 1.9–2.7)
MCH RBC QN AUTO: 30.3 PG (ref 26.8–34.3)
MCHC RBC AUTO-ENTMCNC: 33.5 G/DL (ref 31.4–37.4)
MCV RBC AUTO: 90 FL (ref 82–98)
MONOCYTES # BLD AUTO: 0.53 THOUSAND/ΜL (ref 0.17–1.22)
MONOCYTES NFR BLD AUTO: 8 % (ref 4–12)
NEUTROPHILS # BLD AUTO: 4.53 THOUSANDS/ΜL (ref 1.85–7.62)
NEUTS SEG NFR BLD AUTO: 65 % (ref 43–75)
NRBC BLD AUTO-RTO: 0 /100 WBCS
PLATELET # BLD AUTO: 230 THOUSANDS/UL (ref 149–390)
PMV BLD AUTO: 8.6 FL (ref 8.9–12.7)
POTASSIUM SERPL-SCNC: 3.8 MMOL/L (ref 3.5–5.3)
PROT SERPL-MCNC: 5.9 G/DL (ref 6.4–8.4)
RBC # BLD AUTO: 4.49 MILLION/UL (ref 3.88–5.62)
SODIUM SERPL-SCNC: 138 MMOL/L (ref 135–147)
WBC # BLD AUTO: 7.07 THOUSAND/UL (ref 4.31–10.16)

## 2024-09-20 PROCEDURE — 83735 ASSAY OF MAGNESIUM: CPT | Performed by: PHYSICIAN ASSISTANT

## 2024-09-20 PROCEDURE — 99232 SBSQ HOSP IP/OBS MODERATE 35: CPT | Performed by: EMERGENCY MEDICINE

## 2024-09-20 PROCEDURE — 85025 COMPLETE CBC W/AUTO DIFF WBC: CPT | Performed by: PHYSICIAN ASSISTANT

## 2024-09-20 PROCEDURE — 99232 SBSQ HOSP IP/OBS MODERATE 35: CPT | Performed by: PHYSICIAN ASSISTANT

## 2024-09-20 PROCEDURE — 80053 COMPREHEN METABOLIC PANEL: CPT | Performed by: PHYSICIAN ASSISTANT

## 2024-09-20 RX ORDER — ESCITALOPRAM OXALATE 5 MG/1
5 TABLET ORAL DAILY
Qty: 30 TABLET | Refills: 0 | Status: SHIPPED | OUTPATIENT
Start: 2024-09-21 | End: 2024-09-21

## 2024-09-20 RX ORDER — LANOLIN ALCOHOL/MO/W.PET/CERES
100 CREAM (GRAM) TOPICAL DAILY
Status: DISCONTINUED | OUTPATIENT
Start: 2024-09-20 | End: 2024-09-22 | Stop reason: HOSPADM

## 2024-09-20 RX ORDER — LANOLIN ALCOHOL/MO/W.PET/CERES
100 CREAM (GRAM) TOPICAL DAILY
Qty: 30 TABLET | Refills: 0 | Status: SHIPPED | OUTPATIENT
Start: 2024-09-21 | End: 2024-09-21

## 2024-09-20 RX ORDER — MIRTAZAPINE 7.5 MG/1
7.5 TABLET, FILM COATED ORAL
Status: DISCONTINUED | OUTPATIENT
Start: 2024-09-20 | End: 2024-09-22 | Stop reason: HOSPADM

## 2024-09-20 RX ORDER — NALTREXONE HYDROCHLORIDE 50 MG/1
50 TABLET, FILM COATED ORAL DAILY
Status: DISCONTINUED | OUTPATIENT
Start: 2024-09-20 | End: 2024-09-22 | Stop reason: HOSPADM

## 2024-09-20 RX ORDER — MIRTAZAPINE 7.5 MG/1
7.5 TABLET, FILM COATED ORAL
Status: ON HOLD | COMMUNITY
Start: 2024-08-12 | End: 2024-09-21

## 2024-09-20 RX ORDER — FOLIC ACID 1 MG/1
1 TABLET ORAL DAILY
Status: DISCONTINUED | OUTPATIENT
Start: 2024-09-20 | End: 2024-09-22 | Stop reason: HOSPADM

## 2024-09-20 RX ORDER — NALTREXONE HYDROCHLORIDE 50 MG/1
50 TABLET, FILM COATED ORAL DAILY
Qty: 30 TABLET | Refills: 0 | Status: SHIPPED | OUTPATIENT
Start: 2024-09-21 | End: 2024-09-21

## 2024-09-20 RX ORDER — FOLIC ACID 1 MG/1
1 TABLET ORAL DAILY
Qty: 30 TABLET | Refills: 0 | Status: SHIPPED | OUTPATIENT
Start: 2024-09-21 | End: 2024-09-21

## 2024-09-20 RX ADMIN — TRAZODONE HYDROCHLORIDE 50 MG: 50 TABLET ORAL at 22:46

## 2024-09-20 RX ADMIN — ESCITALOPRAM OXALATE 5 MG: 5 TABLET, FILM COATED ORAL at 08:52

## 2024-09-20 RX ADMIN — NALTREXONE HYDROCHLORIDE 50 MG: 50 TABLET, FILM COATED ORAL at 11:48

## 2024-09-20 RX ADMIN — FOLIC ACID: 5 INJECTION, SOLUTION INTRAMUSCULAR; INTRAVENOUS; SUBCUTANEOUS at 08:54

## 2024-09-20 RX ADMIN — ONDANSETRON 4 MG: 2 INJECTION INTRAMUSCULAR; INTRAVENOUS at 19:54

## 2024-09-20 RX ADMIN — HYDROXYZINE HYDROCHLORIDE 50 MG: 50 TABLET, FILM COATED ORAL at 05:09

## 2024-09-20 RX ADMIN — GABAPENTIN 300 MG: 300 CAPSULE ORAL at 05:09

## 2024-09-20 RX ADMIN — DEXTROSE AND SODIUM CHLORIDE 100 ML/HR: 5; .9 INJECTION, SOLUTION INTRAVENOUS at 08:53

## 2024-09-20 RX ADMIN — HYDROXYZINE HYDROCHLORIDE 50 MG: 50 TABLET, FILM COATED ORAL at 19:54

## 2024-09-20 RX ADMIN — MULTIPLE VITAMINS W/ MINERALS TAB 1 TABLET: TAB ORAL at 08:52

## 2024-09-20 RX ADMIN — MIRTAZAPINE 7.5 MG: 7.5 TABLET, FILM COATED ORAL at 23:03

## 2024-09-20 RX ADMIN — IBUPROFEN 600 MG: 600 TABLET, FILM COATED ORAL at 08:53

## 2024-09-20 RX ADMIN — GABAPENTIN 300 MG: 300 CAPSULE ORAL at 21:03

## 2024-09-20 NOTE — PLAN OF CARE
Problem: SUBSTANCE USE/ABUSE  Goal: By discharge, will develop insight into their chemical dependency and sustain motivation to continue in recovery  Description: INTERVENTIONS:  - Attends all daily group sessions and scheduled AA groups  - Actively practices coping skills through participation in the therapeutic community and adherence to program rules  - Reviews and completes assignments from individual treatment plan  - Assist patient development of understanding of their personal cycle of addiction and relapse triggers  Outcome: Progressing  Goal: By discharge, patient will have ongoing treatment plan addressing chemical dependency  Description: INTERVENTIONS:  - Assist patient with resources and/or appointments for ongoing recovery based living  Outcome: Progressing     Problem: PAIN - ADULT  Goal: Verbalizes/displays adequate comfort level or baseline comfort level  Description: Interventions:  - Encourage patient to monitor pain and request assistance  - Assess pain using appropriate pain scale  - Administer analgesics based on type and severity of pain and evaluate response  - Implement non-pharmacological measures as appropriate and evaluate response  - Consider cultural and social influences on pain and pain management  - Notify physician/advanced practitioner if interventions unsuccessful or patient reports new pain  Outcome: Progressing     Problem: DISCHARGE PLANNING  Goal: Discharge to home or other facility with appropriate resources  Description: INTERVENTIONS:  - Identify barriers to discharge w/patient and caregiver  - Arrange for needed discharge resources and transportation as appropriate  - Identify discharge learning needs (meds, wound care, etc.)  - Arrange for interpretive services to assist at discharge as needed  - Refer to Case Management Department for coordinating discharge planning if the patient needs post-hospital services based on physician/advanced practitioner order or complex  needs related to functional status, cognitive ability, or social support system  Outcome: Progressing     Problem: Knowledge Deficit  Goal: Patient/family/caregiver demonstrates understanding of disease process, treatment plan, medications, and discharge instructions  Description: Complete learning assessment and assess knowledge base.  Interventions:  - Provide teaching at level of understanding  - Provide teaching via preferred learning methods  Outcome: Progressing     Problem: DISCHARGE PLANNING  Goal: Discharge to home or other facility with appropriate resources  Description: INTERVENTIONS:  - Identify barriers to discharge w/patient and caregiver  - Arrange for needed discharge resources and transportation as appropriate  - Identify discharge learning needs (meds, wound care, etc.)  - Arrange for interpretive services to assist at discharge as needed  - Refer to Case Management Department for coordinating discharge planning if the patient needs post-hospital services based on physician/advanced practitioner order or complex needs related to functional status, cognitive ability, or social support system  Outcome: Progressing

## 2024-09-20 NOTE — ASSESSMENT & PLAN NOTE
"History of binge drinking with medically assisted withdrawal; last detox admission on 8/2024  Last drink 9/18 around 6-7 pm  Drinks \"close to a handle\" of vodka daily  Serum ethanol level 408 in the ED  Naltrexone restarted  Case management and CRS consult placed: pt admits sister taking her to rehab in [place], [place]  "

## 2024-09-20 NOTE — ASSESSMENT & PLAN NOTE
SEWS protocol with symptom triggered phenobarbital for medically assisted alcohol withdrawal   Initial SEWS score: 13  Given Valium 10 mg and Phenobarbital 650 mg on admission  Denies any alcohol withdrawal symptoms except for baseline anxiety  Total phenobarbital received: 1950 mg  D/c SEWs protocol following improved withdrawal symptoms  Cleared from Toxicology standpoint; primary team to plan disposition  Continue thiamine, folic acid, MVI  Monitor vitals, telemetry

## 2024-09-20 NOTE — ASSESSMENT & PLAN NOTE
Seen and evaluated by psychiatry.  Propranolol discontinued and patient has been started on Lexapro.

## 2024-09-20 NOTE — SOCIAL WORK
09/20/24 1658   Referral Data   Referral Source Patient   Referral Name Pt arrived to [facility] ED via [facility] from [facility] in [place] and requested detox.   Referral Reason Drug/Alcohol Abuse   County Information   County of [place]idence [place]   Readmission Root Cause   30 Day Readmission No   Patient Information   Mental Status Alert   Primary Caregiver Self   Accompanied by/Relationship n/a   Support System Immediate family   Gnosticist/Cultural Requests Spiritism   Legal Information   Legal Issues Pt had a DUI 3 years ago. Denies any current legal issues or probation/parole.   Activities of Daily Living Prior to Admission   Functional Status Independent   Assistive Device No device needed   Ambulation Independent   Access to Firearms   Access to Firearms No  (pt denied any access to firearms)   Income Information   Income Source Unemployed   Means of Transportation   Means of Transport to Appts: Drives Self        09/20/24 1702   Substance Abuse Addendum Details   History of Withdrawal Symptoms DT's;Hallucinations;Other withdrawal symptoms (specify in comment)  (anxiety, nausea, diaphoresis, tearful, agitation, tremor, headache)   Medical Complications Alcohol use disorder, severe, dependence (HCC)  Unspecified mood (affective) disorder ([facility])   Sober Supports sister   Present Treatment Pt was attending [facility] but lost job and insurance and can't go there anymore   Substance Abuse Treatment Hx Past Tx, Outpatient;Past Tx, Inpatient;Attends AA/NA   ASAM Level & Criteria 4.0, pt has a hx od DTs, blackouts, visual hallucinations. Currently experiencing w/d sxs of anxiety, nausea, diaphoresis, tearful, agitation, tremor, headache. Pt has a lack of natural and professional supports.   Stage of Change   Stage of Change Contemplation     Pt is a 33 yo male admitted to withdrawal management unit for alcohol withdrawal. He arrived with [facility] from [facility] in [place] to [facility] ED and requested detox. Pt's  name, date of birth, home address and telephone number were verified. Pt was informed of case management role and the purpose of the completion of intake with case management. Pt was cooperative.    SUBSTANCE ABUSE    Stressor/Trigger    Alcohol w/d; pt stated he told sister he needed help and is about to lose his apartment   UDS: Barbituates (+), Benzos (+)  Audit: 31  PAWSS: 5 Nicotine: denies  Ethanol: 408   Prior D&A treatment   [facility] [place]  [facility] [place] 1.5 years ago   AA/NA Meetings   Yes, last meeting (virtual) 2 weeks ago   Withdrawal  Symptoms   anxiety, nausea, diaphoresis, tearful, agitation, tremor, headache   History of OD/blackouts or Withdrawal Seizures   Pt denies hx of withdrawal seizures, but has a hx of Dts and blackouts, visual disturbances and light sensitivity in px.   MENTAL HEALTH INFORMATION    Hx of Mental Health Dx   [facility] 4-5 months ago   Outpatient Mental Health Tx   [facility] [place] [facility], but lost insurance and now cannot go   Inpatient Hospitalizations (Mental Health)   denies   Family History of Mental Health    Mother- AUD  Pt has never met father   Trauma History    Pt has a hx of emotional abuse   Current MH Symptoms   Pt endorsed anxiety. Denied SI/HI/AVH   Access to Firearms    Pt denies any access to firearms.   PHYSICAL HEALTH INFORMATION    Physical Health Conditions (Chronic)   Alcohol use disorder, severe, dependence (HCC)   Unspecified mood (affective) disorder (HCC)      PCP   No PCP   Insurance   MA pending   Preferred Pharmacy   [id number] [place] [facility] [address] [place] [address]  [address]   [address] [place] [address] [address] [facility] [address]   Phone: [phone] Fax: [phone]      LEGAL ISSUES    Past or present legal issues   Pt had a DUI 3 years ago.   Probation/Filer   Pt denies   EMPLOYMENT/INCOME/NEEDS    Education   Pt completed almost all of Associate degree (had one semester left to graduate), certified in engineering  "  Employment Pt reports being fired d/t drinking at work on 8/19/24      History   Pt denies   Spiritual/Baptist Beliefs   Anabaptism   Transportation/Transport Home   Pt has a vehicle and is able to [place] to [place]   DEMOGRAPHICS    Discharge [place]   [address]      [place] [address]    Living Arrangement   Pt has an apt   Can pt return home   yes   External Supports     sister   Phone Number   [phone]   Email   Pt declines   Marital Status/Children   Single, no children     Substance First use Last Use Frequency Amount Used How long Longest period of sobriety and when Method of use   THC            Heroin            Cocaine            ETOH   13 9/18/24 daily Beer, vodka, 1.5L  (\"as much as I could\"  2 years drink   Meth            Benzos            Other:              Pt and CM supervisor completed relapse prevention plan. Both signed and pt received a copy.Pt stated his relapse risks/red flags were \"not going to meetings\" and extreme anxiety. His current coping skills are his service animal and being independent. His supports are his sister. Pt recently lost his job and insurance as well as OP AUD supports. Pt's goals for detox are to successfully complete medical withdrawal and to develop a discharge plan that includes relapse prevention education. He signed ROIs for Nohemy Luu (sister/emergency contact), [facility] (PCP referral), [facility], [facility] (funding) and [facility]. Pt is in the contemplation stage of change.     Discussed with patient: AUDIT score of 31 UDS/Identified Substance(s) used: Barbituates, Benzos, ETOH  Risks discussed included: physical health, mental health, stable employment and housing, possible legal consequences  Recommendations discussed: CM supervisor recommends IP CAROLIN tx  Patient's response: Pt agreed to let CM supervisor schedule LOC assessment in the community.     Social " Determinants of Health     09/20/24 5372   Financial Resource Strain   How hard is it for you to pay for the very basics like food, housing, medical care, and heating? Very Hard   Housing Stability   In the last 12 months, was there a time when you were not able to pay the mortgage or rent on time? Y  ($300-400 behind on rent)   In the past 12 months, how many times have you moved where you were living? 0   At any time in the past 12 months, were you homeless or living in a shelter (including now)? N   Transportation Needs   In the past 12 months, has lack of transportation kept you from medical appointments or from getting medications? no   In the past 12 months, has lack of transportation kept you from meetings, work, or from getting things needed for daily living? No   Food Insecurity   Within the past 12 months, you worried that your food would run out before you got the money to buy more. Never true   Within the past 12 months, the food you bought just didn't last and you didn't have money to get more. Never true   Social Connections   In a typical week, how many times do you talk on the phone with family, friends, or neighbors? Never   How often do you get together with friends or relatives? Never   How often do you attend Methodist or Buddhism services? More than 4   Do you belong to any clubs or organizations such as Methodist groups, unions, fraternal or athletic groups, or school groups? Yes  ([facility] 2-3 times a week)   How often do you attend meetings of the clubs or organizations you belong to? More than 4   Are you , , , , never , or living with a partner? Never marrie   Intimate Partner Violence   Within the last year, have you been afraid of your partner or ex-partner? No   Within the last year, have you been humiliated or emotionally abused in other ways by your partner or ex-partner? No   Within the last year, have you been kicked, hit, slapped, or otherwise  physically hurt by your partner or ex-partner? No   Within the last year, have you been raped or forced to have any kind of sexual activity by your partner or ex-partner? No   Alcohol Use   Q1: How often do you have a drink containing alcohol? 4 or more ti   Q2: How many drinks containing alcohol do you have on a typical day when you are drinking? 10 or more   Q3: How often do you have six or more drinks on one occasion? Daily   Utilities   In the past 12 months has the electric, gas, oil, or water company threatened to shut off services in your home? No     Tobacco Use    Never smoked or used smokeless tobacco.     Vaping Use    Never used     Alcohol Use    Not Currently.   Comments: 1-1.5 L vodka or beer daily     Drug Use    Never.     Sexual Activity    Not currently sexually active.

## 2024-09-20 NOTE — PROGRESS NOTES
"PROGRESS NOTE  DEPARTMENT OF MEDICAL TOXICOLOGY  LEVEL 4 MEDICAL DETOX UNIT  Juan Luu 34 y.o. male MRN: [id number]  Unit/Bed#: 5T [facility] 507-01 Encounter: [id number]      Reason for Admission/Principal Problem: Alcohol withdrawal syndrome  Rounding Provider: Isaias Mensah PA-C  Attending Provider: Gerardo Mueller Pe*   9/18/2024  7:09 PM           Alcohol use disorder, severe, dependence (HCC)  Assessment & Plan  History of binge drinking with medically assisted withdrawal; last detox admission on 8/2024  Last drink 9/18 around 6-7 pm  Drinks \"close to a handle\" of vodka daily  Serum ethanol level 408 in the ED  Naltrexone restarted  Case management and CRS consult placed: pt admits sister taking her to rehab in [place]    * Alcohol withdrawal syndrome (HCC)-resolved as of 9/20/2024  Assessment & Plan  SEWS protocol with symptom triggered phenobarbital for medically assisted alcohol withdrawal   Initial SEWS score: 13  Given Valium 10 mg and Phenobarbital 650 mg on admission  Denies any alcohol withdrawal symptoms except for baseline anxiety  Total phenobarbital received: 1950 mg  D/c SEWs protocol following improved withdrawal symptoms  Cleared from Toxicology standpoint; primary team to plan disposition  Continue thiamine, folic acid, MVI  Monitor vitals, telemetry            VTE Pharmacologic Prophylaxis:   Pharmacologic:  Low risk for VTE  Mechanical VTE Prophylaxis in Place: yes    Code Status: Level 1 - Full Code    Patient Centered Rounds: I have performed bedside rounds with nursing staff today.    Discussions with Specialists or Other Care Team Provider: , toxicology, hospitalist    Education and Discussions with Family / Patient: Patient    Time Spent for Care: 30 minutes.  More than 50% of total time spent on counseling and coordination of care as described above.    Current Length of Stay: 2 day(s)    Current Patient Status: Inpatient     Certification " Statement: The patient will continue to require additional inpatient hospital stay due to alcohol withdrawal syndrome  Discharge Plan: Patient admits sister is picking him up tomorrow for rehab at [place], [place]      Subjective:   Patient was seen and evaluated this morning and admits improvement of his alcohol withdrawal symptoms.  Admits persistence of his baseline anxiety but denies any SI, HI or AVH.  Patient admits has inpatient rehab set up for him and admits plans to be discharged tomorrow for his rehab placement.  Patient otherwise denies any other complaint on review of systems.    Objective:     Clinical Opiate Withdrawal Scale  Pulse: 69    SEWS Total Score: 0 (2024  5:15 AM)        Last 24 Hours Medication List:   Current Facility-Administered Medications   Medication Dose Route Frequency Provider Last Rate    acetaminophen  650 mg Oral Q6H PRN Dianne Mooney PA-C      escitalopram  5 mg Oral Daily Bill Langford, DO      folic acid  1 mg Oral Daily Isaias Mensah PA-C      gabapentin  300 mg Oral Q8H PRN Dianne Mooney PA-C      hydrOXYzine HCL  50 mg Oral Q6H PRN Bill Langford DO      ibuprofen  600 mg Oral Q6H PRN Dianne Mooney PA-C      multivitamin-minerals  1 tablet Oral Daily Dianne Mooney PA-C      naltrexone  50 mg Oral Daily Isaias Mensah PA-C      ondansetron  4 mg Intravenous Q6H PRN Dianne Mooney PA-C      [Held by provider] propranolol  20 mg Oral Q8H CANDICE Lemus PA-C      thiamine  100 mg Oral Daily Isaias Mensah PA-C      traZODone  50 mg Oral HS PRN Dianne Mooney PA-C           Vitals:   Temp (24hrs), Av.5 °F (36.4 °C), Min:97.1 °F (36.2 °C), Max:98 °F (36.7 °C)    Temp:  [97.1 °F (36.2 °C)-98 °F (36.7 °C)] 97.9 °F (36.6 °C)  HR:  [61-96] 69  Resp:  [16-18] 18  BP: (125-137)/(74-94) 125/84  SpO2:  [97 %-100 %] 99 %  Body mass index is 27.06 kg/m².     Input and Output Summary  (last 24 hours):No intake or output data in the 24 hours ending 09/20/24 1029    Physical Exam:   Physical Exam  Vitals and nursing note reviewed.   Constitutional:       General: He is not in acute distress.     Appearance: He is well-developed.   HENT:      Head: Normocephalic and atraumatic.   Eyes:      Conjunctiva/sclera: Conjunctivae normal.   Cardiovascular:      Rate and Rhythm: Normal rate and regular rhythm.      Heart sounds: No murmur heard.  Pulmonary:      Effort: Pulmonary effort is normal. No respiratory distress.      Breath sounds: Normal breath sounds.   Abdominal:      Palpations: Abdomen is soft.      Tenderness: There is no abdominal tenderness.   Musculoskeletal:         General: No swelling.      Cervical back: Neck supple.   Skin:     General: Skin is warm and dry.      Capillary Refill: Capillary refill takes less than 2 seconds.   Neurological:      Mental Status: He is alert and oriented to person, place, and time.      GCS: GCS eye subscore is 4. GCS verbal subscore is 5. GCS motor subscore is 6.      Cranial Nerves: Cranial nerves 2-12 are intact.      Sensory: Sensation is intact.      Motor: Motor function is intact.      Coordination: Coordination is intact.      Gait: Gait is intact.   Psychiatric:         Attention and Perception: He does not perceive auditory or visual hallucinations.         Mood and Affect: Mood is anxious.         Speech: Speech normal.         Behavior: Behavior normal. Behavior is cooperative.         Thought Content: Thought content normal.         Additional Data:     Labs:   Results from last 7 days   Lab Units 09/20/24  0454   WBC Thousand/uL 7.07   HEMOGLOBIN g/dL 13.6   HEMATOCRIT % 40.6   PLATELETS Thousands/uL 230   SEGS PCT % 65   LYMPHO PCT % 26   MONO PCT % 8   EOS PCT % 1      Results from last 7 days   Lab Units 09/20/24  0454   SODIUM mmol/L 138   POTASSIUM mmol/L 3.8   CHLORIDE mmol/L 105   CO2 mmol/L 26   BUN mg/dL 8   CREATININE mg/dL 0.87    ANION GAP mmol/L 7   CALCIUM mg/dL 8.6   ALBUMIN g/dL 3.7   TOTAL BILIRUBIN mg/dL 0.62   ALK PHOS U/L 88   ALT U/L 19   AST U/L 27   GLUCOSE RANDOM mg/dL 100                              * I Have Reviewed All Lab Data Listed Above.  * Additional Pertinent Lab Tests Reviewed: All Labs For Current Hospital Admission Reviewed      Imaging Studies: No pertinent imaging studies reviewed.      Recent Cultures (last 7 days):          Today, Patient Was Seen By: Isaias Mensah PA-C    ** Please Note: Dictation voice to text software may have been used in the creation of this document. **

## 2024-09-20 NOTE — PROGRESS NOTES
"Progress Note - Hospitalist   Name: Juan Luu 34 y.o. male I MRN: [id number]  Unit/Bed#: 5T [facility] 507-01 I Date of Admission: 9/18/2024   Date of Service: 9/20/2024 I Hospital Day: 2    Assessment & Plan  Alcohol withdrawal syndrome (HCC) (Resolved: 9/20/2024)  Admit to inpatient detox unit and initiate SEWS protocol with phenobarbital for symptoms of alcohol withdrawal  Currently endorses anxiety   Initial SEWS score: 13  Initial dose of pheno: 650mg + 10mg Valium  Patient also received 2mg Ativan in the ED  D/c SEWs following withdrawal symptom improvement  Total Pheno received 1950 mg  Cleared from Toxicology Standpoint; primary team to plan disposition  CM consulted for dispo planning  Alcohol use disorder, severe, dependence (HCC)  Admits to drinking 1-1.5L of vodka daily, last drink evening of 9/18 prior to arriving to ED  ETOH level 408  He has been admitted to the detox unit in the past with last admission 8/2024   Agreeable to trying naltrexone again- order placed  Initiate IV fluids, daily thiamine, folic acid and multivitamin  See \"Alcohol withdrawal syndrome\"  Unspecified mood (affective) disorder (HCC)  Seen and evaluated by psychiatry.  Propranolol discontinued and patient has been started on Lexapro.    VTE Pharmacologic Prophylaxis: VTE Score: 1 Low Risk (Score 0-2) - Encourage Ambulation.    Mobility:   Basic Mobility Inpatient Raw Score: 24  JH-HLM Goal: 8: Walk 250 feet or more  JH-HLM Achieved: 6: Walk 10 steps or more  JH-HLM Goal NOT achieved. Continue with multidisciplinary rounding and encourage appropriate mobility to improve upon JH-HLM goals.    Patient Centered Rounds: I performed bedside rounds with nursing staff today.   Discussions with Specialists or Other Care Team Provider: Toxicology    Education and Discussions with Family / Patient: Patient declined call to .     Current Length of Stay: 2 day(s)  Current Patient Status: Inpatient   Certification " "Statement: The patient will continue to require additional inpatient hospital stay due to persistent withdrawal symptoms  Discharge Plan: Anticipate discharge tomorrow to in-patient D&A rehab    Code Status: Level 1 - Full Code    Subjective   He is feeling \"foggy\" and reports that he has \"$9 to his name and can't afford the medications\".    Objective     Vitals:   Temp (24hrs), Av.5 °F (36.4 °C), Min:97.1 °F (36.2 °C), Max:98 °F (36.7 °C)    Temp:  [97.1 °F (36.2 °C)-98 °F (36.7 °C)] 97.9 °F (36.6 °C)  HR:  [61-96] 69  Resp:  [16-18] 18  BP: (125-137)/(74-94) 125/84  SpO2:  [97 %-100 %] 99 %  Body mass index is 27.06 kg/m².     Input and Output Summary (last 24 hours):   No intake or output data in the 24 hours ending 24 1031    Physical Exam  Vitals reviewed.   HENT:      Head: Normocephalic and atraumatic.      Mouth/Throat:      Mouth: Mucous membranes are moist.   Eyes:      General: No scleral icterus.  Cardiovascular:      Rate and Rhythm: Normal rate and regular rhythm.      Heart sounds: No murmur heard.  Pulmonary:      Breath sounds: Normal breath sounds. No wheezing or rales.   Chest:      Chest wall: No tenderness.   Abdominal:      General: Bowel sounds are normal. There is no distension.      Palpations: Abdomen is soft.      Tenderness: There is no abdominal tenderness.   Musculoskeletal:         General: Normal range of motion.   Skin:     General: Skin is warm and dry.      Findings: No rash.   Psychiatric:         Mood and Affect: Mood normal.         Cognition and Memory: Cognition normal.        Lines/Drains:  Lines/Drains/Airways       Active Status       None                  Telemetry:  Telemetry Orders (From admission, onward)               24 Hour Telemetry Monitoring  Continuous x 24 Hours (Telem)        Question:  Reason for 24 Hour Telemetry  Answer:  Alcohol withdrawal and CIWA >7, electrolyte abnormalities, abnormal ECG and/or heart disease                     Telemetry " Reviewed: Normal Sinus Rhythm  Indication for Continued Telemetry Use: No indication for continued use. Will discontinue.            Lab Results: I have reviewed the following results: CBC/BMP:   .     09/20/24  0454   WBC 7.07   HGB 13.6   HCT 40.6      SODIUM 138   K 3.8      CO2 26   BUN 8   CREATININE 0.87   GLUC 100   MG 2.1       Results from last 7 days   Lab Units 09/20/24  0454   WBC Thousand/uL 7.07   HEMOGLOBIN g/dL 13.6   HEMATOCRIT % 40.6   PLATELETS Thousands/uL 230   SEGS PCT % 65   LYMPHO PCT % 26   MONO PCT % 8   EOS PCT % 1     Results from last 7 days   Lab Units 09/20/24  0454   SODIUM mmol/L 138   POTASSIUM mmol/L 3.8   CHLORIDE mmol/L 105   CO2 mmol/L 26   BUN mg/dL 8   CREATININE mg/dL 0.87   ANION GAP mmol/L 7   CALCIUM mg/dL 8.6   ALBUMIN g/dL 3.7   TOTAL BILIRUBIN mg/dL 0.62   ALK PHOS U/L 88   ALT U/L 19   AST U/L 27   GLUCOSE RANDOM mg/dL 100     Recent Cultures (last 7 days):       Imaging Review: No pertinent imaging studies reviewed.  Other Studies: No additional pertinent studies reviewed.    Last 24 Hours Medication List:     Current Facility-Administered Medications:     acetaminophen (TYLENOL) tablet 650 mg, Q6H PRN    escitalopram (LEXAPRO) tablet 5 mg, Daily    folic acid (FOLVITE) tablet 1 mg, Daily    gabapentin (NEURONTIN) capsule 300 mg, Q8H PRN    hydrOXYzine HCL (ATARAX) tablet 50 mg, Q6H PRN    ibuprofen (MOTRIN) tablet 600 mg, Q6H PRN    multivitamin-minerals (CENTRUM) tablet 1 tablet, Daily    naltrexone (REVIA) tablet 50 mg, Daily    ondansetron (ZOFRAN) injection 4 mg, Q6H PRN    [Held by provider] propranolol (INDERAL) tablet 20 mg, Q8H CANDICE    thiamine tablet 100 mg, Daily    traZODone (DESYREL) tablet 50 mg, HS PRN    Administrative Statements   Today, Patient Was Seen By: Whitney Lemus PA-C    **Please Note: This note may have been constructed using a voice recognition system.**

## 2024-09-20 NOTE — ASSESSMENT & PLAN NOTE
"Admits to drinking 1-1.5L of vodka daily, last drink evening of 9/18 prior to arriving to ED  ETOH level 408  He has been admitted to the detox unit in the past with last admission 8/2024   Agreeable to trying naltrexone again- order placed  Initiate IV fluids, daily thiamine, folic acid and multivitamin  See \"Alcohol withdrawal syndrome\"  "

## 2024-09-20 NOTE — ASSESSMENT & PLAN NOTE
Admit to inpatient detox unit and initiate SEWS protocol with phenobarbital for symptoms of alcohol withdrawal  Currently endorses anxiety   Initial SEWS score: 13  Initial dose of pheno: 650mg + 10mg Valium  Patient also received 2mg Ativan in the ED  D/c SEWs following withdrawal symptom improvement  Total Pheno received 1950 mg  Cleared from Toxicology Standpoint; primary team to plan disposition  CM consulted for dispo planning

## 2024-09-21 PROCEDURE — 99232 SBSQ HOSP IP/OBS MODERATE 35: CPT

## 2024-09-21 RX ORDER — GABAPENTIN 300 MG/1
300 CAPSULE ORAL EVERY 8 HOURS PRN
Qty: 42 CAPSULE | Refills: 0 | Status: SHIPPED | OUTPATIENT
Start: 2024-09-21 | End: 2024-10-05

## 2024-09-21 RX ORDER — ESCITALOPRAM OXALATE 5 MG/1
5 TABLET ORAL DAILY
Qty: 30 TABLET | Refills: 0 | Status: SHIPPED | OUTPATIENT
Start: 2024-09-21 | End: 2024-10-21

## 2024-09-21 RX ORDER — KETOCONAZOLE 20 MG/G
1 CREAM TOPICAL DAILY
COMMUNITY
Start: 2024-05-16 | End: 2024-09-22

## 2024-09-21 RX ORDER — MIRTAZAPINE 7.5 MG/1
7.5 TABLET, FILM COATED ORAL
Qty: 30 TABLET | Refills: 0 | Status: SHIPPED | OUTPATIENT
Start: 2024-09-21 | End: 2024-10-21

## 2024-09-21 RX ORDER — NALTREXONE HYDROCHLORIDE 50 MG/1
50 TABLET, FILM COATED ORAL DAILY
Qty: 30 TABLET | Refills: 0 | Status: SHIPPED | OUTPATIENT
Start: 2024-09-21

## 2024-09-21 RX ADMIN — MIRTAZAPINE 7.5 MG: 7.5 TABLET, FILM COATED ORAL at 21:28

## 2024-09-21 RX ADMIN — THIAMINE HCL TAB 100 MG 100 MG: 100 TAB at 08:03

## 2024-09-21 RX ADMIN — GABAPENTIN 300 MG: 300 CAPSULE ORAL at 19:46

## 2024-09-21 RX ADMIN — ACETAMINOPHEN 650 MG: 325 TABLET ORAL at 21:27

## 2024-09-21 RX ADMIN — IBUPROFEN 600 MG: 600 TABLET, FILM COATED ORAL at 08:03

## 2024-09-21 RX ADMIN — ESCITALOPRAM OXALATE 5 MG: 5 TABLET, FILM COATED ORAL at 08:03

## 2024-09-21 RX ADMIN — NALTREXONE HYDROCHLORIDE 50 MG: 50 TABLET, FILM COATED ORAL at 08:03

## 2024-09-21 RX ADMIN — ONDANSETRON 4 MG: 2 INJECTION INTRAMUSCULAR; INTRAVENOUS at 08:10

## 2024-09-21 RX ADMIN — MULTIPLE VITAMINS W/ MINERALS TAB 1 TABLET: TAB ORAL at 08:03

## 2024-09-21 RX ADMIN — FOLIC ACID 1 MG: 1 TABLET ORAL at 08:03

## 2024-09-21 NOTE — PROGRESS NOTES
Progress Note - Hospitalist   Name: Juan Luu 34 y.o. male I MRN: [id number]  Unit/Bed#: 5T [facility] 507-01 I Date of Admission: 9/18/2024   Date of Service: 9/21/2024 I Hospital Day: 3    Assessment & Plan  Alcoholic gastritis  Patient is s/p alcohol withdrawal treatment. He reports to poor appetite. Endorses nausea but no further episodes of vomiting.  Will continue with Protonix, add carafate   If patient is able to tolerate PO diet- he can then be discharged to home.   Alcohol use disorder, severe, dependence (HCC)  Admits to drinking 1-1.5L of vodka daily, last drink evening of 9/18 prior to arriving to ED  ETOH level 408  He has been admitted to the detox unit in the past with last admission 8/2024   Started on naltrexone   Continue daily thiamine, folic acid and multivitamin  Case Management consulted- patient is requesting outpatient services upon discharge   Unspecified mood (affective) disorder (HCC)  Seen and evaluated by psychiatry.  Propranolol discontinued and patient has been started on Lexapro.    VTE Pharmacologic Prophylaxis: VTE Score: 1 Low Risk (Score 0-2) - Encourage Ambulation.    Mobility:   Basic Mobility Inpatient Raw Score: 24  JH-HLM Goal: 8: Walk 250 feet or more  JH-HLM Achieved: 8: Walk 250 feet ot more  JH-HLM Goal achieved. Continue to encourage appropriate mobility.    Patient Centered Rounds: I performed bedside rounds with nursing staff today.   Discussions with Specialists or Other Care Team Provider: Case Management     Education and Discussions with Family / Patient: Patient declined call to .     Current Length of Stay: 3 day(s)  Current Patient Status: Inpatient   Certification Statement: The patient will continue to require additional inpatient hospital stay due to not tolerating PO diet   Discharge Plan: Anticipate discharge tomorrow to home.    Code Status: Level 1 - Full Code    Subjective   Patient seen and examined at bedside. He is endorsig to  having a poor appetite since admission. Reports that he has not been able to eat his meal trays upon admission. He reports that he did not tolerate breakfast due to nausea. Encouraged better PO intake today.     Objective     Vitals:   Temp (24hrs), Av.4 °F (36.3 °C), Min:97.2 °F (36.2 °C), Max:97.5 °F (36.4 °C)    Temp:  [97.2 °F (36.2 °C)-97.5 °F (36.4 °C)] 97.2 °F (36.2 °C)  HR:  [55-61] 58  Resp:  [18] 18  BP: (120-130)/(84-91) 120/85  SpO2:  [97 %-100 %] 100 %  Body mass index is 27.06 kg/m².     Input and Output Summary (last 24 hours):   No intake or output data in the 24 hours ending 24 1337    Physical Exam  Vitals and nursing note reviewed.   Constitutional:       General: He is not in acute distress.     Appearance: He is not diaphoretic.   Eyes:      General: No scleral icterus.     Pupils: Pupils are equal, round, and reactive to light.   Cardiovascular:      Rate and Rhythm: Normal rate and regular rhythm.      Heart sounds: No murmur heard.  Pulmonary:      Effort: No respiratory distress.      Breath sounds: Normal breath sounds.   Abdominal:      General: There is no distension.      Palpations: Abdomen is soft.      Tenderness: There is no abdominal tenderness.   Neurological:      Mental Status: He is alert and oriented to person, place, and time.      Motor: No tremor.   Psychiatric:         Mood and Affect: Mood is anxious.          Lines/Drains:  Lines/Drains/Airways       Active Status       None                            Lab Results: I have reviewed the following results: CBC/BMP: No new results in last 24 hours.    Results from last 7 days   Lab Units 24  0454   WBC Thousand/uL 7.07   HEMOGLOBIN g/dL 13.6   HEMATOCRIT % 40.6   PLATELETS Thousands/uL 230   SEGS PCT % 65   LYMPHO PCT % 26   MONO PCT % 8   EOS PCT % 1     Results from last 7 days   Lab Units 24  0454   SODIUM mmol/L 138   POTASSIUM mmol/L 3.8   CHLORIDE mmol/L 105   CO2 mmol/L 26   BUN mg/dL 8    CREATININE mg/dL 0.87   ANION GAP mmol/L 7   CALCIUM mg/dL 8.6   ALBUMIN g/dL 3.7   TOTAL BILIRUBIN mg/dL 0.62   ALK PHOS U/L 88   ALT U/L 19   AST U/L 27   GLUCOSE RANDOM mg/dL 100                       Recent Cultures (last 7 days):         Imaging Review: No pertinent imaging studies reviewed.  Other Studies: No additional pertinent studies reviewed.    Last 24 Hours Medication List:     Current Facility-Administered Medications:     acetaminophen (TYLENOL) tablet 650 mg, Q6H PRN    escitalopram (LEXAPRO) tablet 5 mg, Daily    folic acid (FOLVITE) tablet 1 mg, Daily    gabapentin (NEURONTIN) capsule 300 mg, Q8H PRN    hydrOXYzine HCL (ATARAX) tablet 50 mg, Q6H PRN    ibuprofen (MOTRIN) tablet 600 mg, Q6H PRN    mirtazapine (REMERON) tablet 7.5 mg, HS    multivitamin-minerals (CENTRUM) tablet 1 tablet, Daily    naltrexone (REVIA) tablet 50 mg, Daily    ondansetron (ZOFRAN) injection 4 mg, Q6H PRN    [Held by provider] propranolol (INDERAL) tablet 20 mg, Q8H CANDICE    thiamine tablet 100 mg, Daily    traZODone (DESYREL) tablet 50 mg, HS PRN    Administrative Statements   Today, Patient Was Seen By: Kathleen Greenberg PA-C      **Please Note: This note may have been constructed using a voice recognition system.**

## 2024-09-21 NOTE — PLAN OF CARE
Problem: Potential for Falls  Goal: Patient will remain free of falls  Description: INTERVENTIONS:  - Educate patient/family on patient safety including physical limitations  - Instruct patient to call for assistance with activity   - Consult OT/PT to assist with strengthening/mobility   - Keep Call bell within reach  - Keep bed low and locked with side rails adjusted as appropriate  - Keep care items and personal belongings within reach    - Apply yellow socks and bracelet for high fall risk patients  - Consider moving patient to room near nurses station  Outcome: Completed

## 2024-09-21 NOTE — DISCHARGE INSTR - APPOINTMENTS
The treatment team recommended inpatient substance use treatment; however, you declined a referral.  You are being sent home with a 30 day supply of your medications.  You have a level of care assessment scheduled with [facility] ([address], [place] [address] Phone: [phone]) for September 26, 2024 at 12:00PM; it is important that you keep this appointment in order to stay on medication assisted treatment.    Additionally, you have an appointment scheduled with [facility] for psychiatric medication management scheduled for 09/23/2024 at 10:15AM; it is important that you keep this appointment in order to continue your psychiatric medications.      Lastly, you have been set up with [facility] ([address], [address], [place] [address] Phone: [phone]) for primary care services.  An appointment was scheduled on your behalf for September 30th, 2024 at 1:00PM; your summary of care will be faxed to this provider for continuity of care.

## 2024-09-21 NOTE — ASSESSMENT & PLAN NOTE
Patient is s/p alcohol withdrawal treatment. He reports to poor appetite. Endorses nausea but no further episodes of vomiting.  Will continue with Protonix, add carafate   If patient is able to tolerate PO diet- he can then be discharged to home.

## 2024-09-21 NOTE — ASSESSMENT & PLAN NOTE
Admits to drinking 1-1.5L of vodka daily, last drink evening of 9/18 prior to arriving to ED  ETOH level 408  He has been admitted to the detox unit in the past with last admission 8/2024   Started on naltrexone   Continue daily thiamine, folic acid and multivitamin  Case Management consulted- patient is requesting outpatient services upon discharge

## 2024-09-21 NOTE — CASE MANAGEMENT
CM met with patient to follow-up on discharge plan.  Patient expressed that he is aware he has an alcohol use problem, but is no longer able to go to rehab.  CM reminded patient that he has a LOC scheduled with [facility] next week and a video appointment with [facility] for his psychiatric medications.  CM spoke to him about the importance of keeping the appointments in order to stay on his medications.  Patient informed that he is being sent home with a 30 day supply of the medications to get him started.  Additionally, patient informed that a PCP appointment was also scheduled on his behalf.  Patient reports he needs to discharge tomorrow because he has an interview on Monday.  Patient reports that he needs transport home tomorrow.

## 2024-09-22 VITALS
TEMPERATURE: 97.5 F | HEART RATE: 75 BPM | RESPIRATION RATE: 18 BRPM | HEIGHT: 71 IN | WEIGHT: 194 LBS | SYSTOLIC BLOOD PRESSURE: 115 MMHG | DIASTOLIC BLOOD PRESSURE: 77 MMHG | BODY MASS INDEX: 27.16 KG/M2 | OXYGEN SATURATION: 98 %

## 2024-09-22 PROCEDURE — 99239 HOSP IP/OBS DSCHRG MGMT >30: CPT

## 2024-09-22 RX ADMIN — IBUPROFEN 600 MG: 600 TABLET, FILM COATED ORAL at 08:08

## 2024-09-22 RX ADMIN — THIAMINE HCL TAB 100 MG 100 MG: 100 TAB at 08:08

## 2024-09-22 RX ADMIN — ESCITALOPRAM OXALATE 5 MG: 5 TABLET, FILM COATED ORAL at 08:08

## 2024-09-22 RX ADMIN — FOLIC ACID 1 MG: 1 TABLET ORAL at 08:08

## 2024-09-22 RX ADMIN — NALTREXONE HYDROCHLORIDE 50 MG: 50 TABLET, FILM COATED ORAL at 08:08

## 2024-09-22 RX ADMIN — MULTIPLE VITAMINS W/ MINERALS TAB 1 TABLET: TAB ORAL at 08:07

## 2024-09-22 RX ADMIN — ONDANSETRON 4 MG: 2 INJECTION INTRAMUSCULAR; INTRAVENOUS at 08:07

## 2024-09-22 NOTE — DISCHARGE INSTR - AVS FIRST PAGE
Dear Juan Luu,     It was our pleasure to care for you here at [facility].  It is our hope that we were always able to meet and exceed the expected standards for your care during your stay.  You were hospitalized due to alcohol withdrawal .  You were cared for on the 5th floor under the service of Kathleen Greenberg PA-C with the [facility]ist [facility] who covers for your primary care physician (PCP), No primary care provider on file., while you were hospitalized.  If you have any questions or concerns related to this hospitalization, you may contact us at .  For follow up, we recommend that you follow up with your primary care physician.  Please review this entire discharge summary as additional general instructions may be provided later as well.  However, at this time we provide for you here, the most important instructions / recommendations at discharge:     Continue current medication of Naltrexone, Gabapentin, Lexapro, and Remeron  Propranolol was discontinued during this admission   Please follow up with [facility] on 9/26/24 for continuation of medication that was prescribed to you during this hospitalization       Sincerely,     Kathleen Greenberg PA-C

## 2024-09-22 NOTE — DISCHARGE SUMMARY
Discharge Summary - Hospitalist   Name: Juan Luu 34 y.o. male I MRN: [id number]  Unit/Bed#: 5T [facility] 507-01 I Date of Admission: 9/18/2024   Date of Service: 9/22/2024 I Hospital Day: 4     Assessment & Plan  Alcoholic gastritis  Patient is s/p alcohol withdrawal treatment. Patient able to tolerate diet. No further reports of nausea or vomiting  Encouraged alcohol cessation   Alcohol use disorder, severe, dependence (HCC)  Admits to drinking 1-1.5L of vodka daily, last drink evening of 9/18 prior to arriving to ED  ETOH level 408  He has been admitted to the detox unit in the past with last admission 8/2024   Started on naltrexone   Continue daily thiamine, folic acid and multivitamin  Case Management consulted- patient is requesting outpatient services upon discharge - [facility] outpatient appointment established upon discharge  Unspecified mood (affective) disorder (HCC)  Seen and evaluated by psychiatry.  Propranolol discontinued and patient has been started on Lexapro.     Medical Problems       Resolved Problems  Date Reviewed: 9/21/2024            Resolved    * (Principal) Alcohol withdrawal syndrome (HCC) 9/20/2024     Resolved by  Isaias Mensah PA-C        Discharging Physician / Practitioner: Kathleen Greenberg PA-C  PCP: No primary care provider on file.  Admission Date:   Admission Orders (From admission, onward)       Ordered        09/18/24 2142  INPATIENT ADMISSION  Once                          Discharge Date: 09/22/24    Consultations During Hospital Stay:  Toxicology     Procedures Performed:   None    Significant Findings / Test Results:   none    Incidental Findings:   None     Test Results Pending at Discharge (will require follow up):   None      Outpatient Tests Requested:  None    Complications:  none    Reason for Admission: alcohol withdrawal     Hospital Course:   Juan Luu is a 34 y.o. male patient who originally presented to the hospital on  "9/18/2024 due to alcohol withdrawal. Patient initially presented to the [facility] ED requesting detoxification from alcohol. Patient was admitted to the [facility] medical detox unit under [facility] protocol for medically assisted alcohol withdrawal and received a total of 1950 mg phenobarbital without complication, with last dose of phenobarbital administered 9/20/24. Patient's alcohol withdrawal symptoms subsequently resolved, and he has remained without objective evidence of alcohol withdrawal at this time. During this hospitalization, patient was found to have alcoholic gastritis. He reports that he was able to tolerate PO diet without any complications prior to discharge. Patient was also restarted on Naltrexone.  Case management and CRS were consulted for assistance with aftercare resources, and patient will be discharged to outpatient follow up at [facility].     Please see above list of diagnoses and related plan for additional information.     Condition at Discharge: stable    Discharge Day Visit / Exam:   Subjective:  No acute overnight complaints. Patient reports able to tolerate food yesterday.   Vitals: Blood Pressure: 115/77 (09/22/24 0713)  Pulse: 75 (09/22/24 0713)  Temperature: 97.5 °F (36.4 °C) (09/22/24 0713)  Temp Source: Temporal (09/22/24 0713)  Respirations: 18 (09/22/24 0713)  Height: 5' 11\" (180.3 cm) (09/18/24 2320)  Weight - Scale: 88 kg (194 lb) (09/18/24 2320)  SpO2: 98 % (09/22/24 0713)  Exam:   Physical Exam  Vitals reviewed.   Constitutional:       General: He is not in acute distress.     Appearance: He is not diaphoretic.   Eyes:      General: No scleral icterus.     Pupils: Pupils are equal, round, and reactive to light.   Cardiovascular:      Rate and Rhythm: Normal rate and regular rhythm.   Pulmonary:      Effort: No respiratory distress.      Breath sounds: Normal breath sounds.   Abdominal:      General: Bowel sounds are normal. There is no distension.      Palpations: Abdomen is soft. "      Tenderness: There is no abdominal tenderness.   Neurological:      Mental Status: He is alert and oriented to person, place, and time.   Psychiatric:         Mood and Affect: Mood is anxious. Mood is not depressed.          Discussion with Family: Patient declined call to .     Discharge instructions/Information to patient and family:   See after visit summary for information provided to patient and family.      Provisions for Follow-Up Care:  See after visit summary for information related to follow-up care and any pertinent home health orders.      Mobility at time of Discharge:   Basic Mobility Inpatient Raw Score: 24  JH-HLM Goal: 8: Walk 250 feet or more  JH-HLM Achieved: 8: Walk 250 feet ot more  HLM Goal achieved. Continue to encourage appropriate mobility.     Disposition:   Home    Planned Readmission: none     Discharge Medications:  See after visit summary for reconciled discharge medications provided to patient and/or family.      Administrative Statements   Discharge Statement:  I have spent a total time of 32 minutes in caring for this patient on the day of the visit/encounter. .    **Please Note: This note may have been constructed using a voice recognition system**

## 2024-09-22 NOTE — ASSESSMENT & PLAN NOTE
Admits to drinking 1-1.5L of vodka daily, last drink evening of 9/18 prior to arriving to ED  ETOH level 408  He has been admitted to the detox unit in the past with last admission 8/2024   Started on naltrexone   Continue daily thiamine, folic acid and multivitamin  Case Management consulted- patient is requesting outpatient services upon discharge - [facility] outpatient appointment established upon discharge

## 2024-09-22 NOTE — ASSESSMENT & PLAN NOTE
Patient is s/p alcohol withdrawal treatment. Patient able to tolerate diet. No further reports of nausea or vomiting  Encouraged alcohol cessation

## 2024-09-23 NOTE — PROGRESS NOTES
09/23/24 1243   Other Referral/Resources/Interventions Provided:   Financial Resources Provided Indigent Transportation  (Pt provided with Ly[employer] at discharge)   Referrals Provided: Crisis Hotline;Support Group;Other (Specify)  (IP and OP [facility] tx resources; Rental Assistance programs as pt reported he was behind on his rent)   Discharge Communications   Discharge planning discussed with: pt   Freedom of Choice Yes   Transportation at Discharge? Yes   Transport at Discharge  Auto with designated    Dispatcher Contacted Yes   Number/Name of Dispatcher Hector   Transported by (Company and Unit #) Claudine   ETA of Transport 11:20 am   Transport Service Arrived Yes   Transfer Mode Ambulate   Accompanied by Alone   CM Handoff Comments CM scheduled level of care assessment for pt at [facility] on 9/26/24 at 12 pm, also appt for PCP f/u at [facility] on 9/30/24 at 1 pm and appt with [facility] on 9/23/24 at 10:15 am for psychiatric medication management     CM was made aware by medical provider that pt was medically clear for discharge. Pt to discharge same day 9/22/24. Pt denies any withdrawal symptoms at this time. Pt to discharge to home and will   be provided with Ly[employer] upon discharge. Pt to follow up with [facility], [facility] and [facility] as listed above. Pt's medications were sent to preferred pharmacy.      Discharge Address: [address] [place] [address] [address]   [facility] [address]   Phone Number: [phone] (Mobile)   [phone] (Home Phone)

## 2024-09-30 ENCOUNTER — TELEPHONE (OUTPATIENT)
Dept: FAMILY MEDICINE CLINIC | Facility: CLINIC | Age: 34
End: 2024-09-30

## 2024-09-30 NOTE — TELEPHONE ENCOUNTER
Pt called and left VM. Wanted to cancel todays appointment.      Called Pt to reschedule appointment and left message on machine. Ask for pt to call back

## 2024-11-11 ENCOUNTER — HOSPITAL ENCOUNTER (INPATIENT)
Facility: HOSPITAL | Age: 34
LOS: 2 days | Discharge: HOME | DRG: 422 | End: 2024-11-14
Attending: EMERGENCY MEDICINE | Admitting: STUDENT IN AN ORGANIZED HEALTH CARE EDUCATION/TRAINING PROGRAM
Payer: COMMERCIAL

## 2024-11-11 DIAGNOSIS — F10.930 ALCOHOL WITHDRAWAL SYNDROME WITHOUT COMPLICATION (HCC): ICD-10-CM

## 2024-11-11 DIAGNOSIS — F10.939 ALCOHOL WITHDRAWAL SYNDROME WITH COMPLICATION (HCC): ICD-10-CM

## 2024-11-11 DIAGNOSIS — E87.5 HYPERKALEMIA: ICD-10-CM

## 2024-11-11 DIAGNOSIS — F10.20 ALCOHOL USE DISORDER, SEVERE, DEPENDENCE (HCC): ICD-10-CM

## 2024-11-11 DIAGNOSIS — F10.90 ALCOHOL USE DISORDER: Primary | ICD-10-CM

## 2024-11-11 DIAGNOSIS — L21.9 SEBORRHEIC DERMATITIS: ICD-10-CM

## 2024-11-11 DIAGNOSIS — F10.932 ALCOHOL WITHDRAWAL HALLUCINOSIS (HCC): ICD-10-CM

## 2024-11-11 DIAGNOSIS — F41.9 ANXIETY: ICD-10-CM

## 2024-11-11 PROBLEM — F10.951 ALCOHOL-INDUCED HALLUCINATIONS (HCC): Status: ACTIVE | Noted: 2024-11-11

## 2024-11-11 PROBLEM — E87.29 ALCOHOLIC KETOACIDOSIS: Status: ACTIVE | Noted: 2024-11-11

## 2024-11-11 LAB
ALBUMIN SERPL BCG-MCNC: 4.6 G/DL (ref 3.5–5)
ALBUMIN SERPL BCG-MCNC: 5.5 G/DL (ref 3.5–5)
ALP SERPL-CCNC: 86 U/L (ref 34–104)
ALP SERPL-CCNC: 96 U/L (ref 34–104)
ALT SERPL W P-5'-P-CCNC: 22 U/L (ref 7–52)
ALT SERPL W P-5'-P-CCNC: 29 U/L (ref 7–52)
ANION GAP SERPL CALCULATED.3IONS-SCNC: 12 MMOL/L (ref 4–13)
ANION GAP SERPL CALCULATED.3IONS-SCNC: 14 MMOL/L (ref 4–13)
ANION GAP SERPL CALCULATED.3IONS-SCNC: 16 MMOL/L (ref 4–13)
AST SERPL W P-5'-P-CCNC: 20 U/L (ref 13–39)
AST SERPL W P-5'-P-CCNC: 28 U/L (ref 13–39)
ATRIAL RATE: 64 BPM
BASOPHILS # BLD AUTO: 0.08 THOUSANDS/ÂΜL (ref 0–0.1)
BASOPHILS NFR BLD AUTO: 1 % (ref 0–1)
BILIRUB SERPL-MCNC: 1.71 MG/DL (ref 0.2–1)
BILIRUB SERPL-MCNC: 1.85 MG/DL (ref 0.2–1)
BUN SERPL-MCNC: 14 MG/DL (ref 5–25)
BUN SERPL-MCNC: 15 MG/DL (ref 5–25)
BUN SERPL-MCNC: 15 MG/DL (ref 5–25)
CALCIUM SERPL-MCNC: 10.8 MG/DL (ref 8.4–10.2)
CALCIUM SERPL-MCNC: 9.5 MG/DL (ref 8.4–10.2)
CALCIUM SERPL-MCNC: 9.7 MG/DL (ref 8.4–10.2)
CHLORIDE SERPL-SCNC: 94 MMOL/L (ref 96–108)
CHLORIDE SERPL-SCNC: 98 MMOL/L (ref 96–108)
CHLORIDE SERPL-SCNC: 98 MMOL/L (ref 96–108)
CO2 SERPL-SCNC: 19 MMOL/L (ref 21–32)
CO2 SERPL-SCNC: 24 MMOL/L (ref 21–32)
CO2 SERPL-SCNC: 28 MMOL/L (ref 21–32)
CREAT SERPL-MCNC: 0.83 MG/DL (ref 0.6–1.3)
CREAT SERPL-MCNC: 0.86 MG/DL (ref 0.6–1.3)
CREAT SERPL-MCNC: 1.06 MG/DL (ref 0.6–1.3)
EOSINOPHIL # BLD AUTO: 0.02 THOUSAND/ÂΜL (ref 0–0.61)
EOSINOPHIL NFR BLD AUTO: 0 % (ref 0–6)
ERYTHROCYTE [DISTWIDTH] IN BLOOD BY AUTOMATED COUNT: 14 % (ref 11.6–15.1)
ETHANOL SERPL-MCNC: <10 MG/DL
GFR SERPL CREATININE-BSD FRML MDRD: 113 ML/MIN/1.73SQ M
GFR SERPL CREATININE-BSD FRML MDRD: 114 ML/MIN/1.73SQ M
GFR SERPL CREATININE-BSD FRML MDRD: 91 ML/MIN/1.73SQ M
GLUCOSE SERPL-MCNC: 104 MG/DL (ref 65–140)
GLUCOSE SERPL-MCNC: 112 MG/DL (ref 65–140)
GLUCOSE SERPL-MCNC: 95 MG/DL (ref 65–140)
HCT VFR BLD AUTO: 47.7 % (ref 36.5–49.3)
HGB BLD-MCNC: 16.5 G/DL (ref 12–17)
IMM GRANULOCYTES # BLD AUTO: 0.05 THOUSAND/UL (ref 0–0.2)
IMM GRANULOCYTES NFR BLD AUTO: 0 % (ref 0–2)
LIPASE SERPL-CCNC: 19 U/L (ref 11–82)
LYMPHOCYTES # BLD AUTO: 1.56 THOUSANDS/ÂΜL (ref 0.6–4.47)
LYMPHOCYTES NFR BLD AUTO: 10 % (ref 14–44)
MAGNESIUM SERPL-MCNC: 2 MG/DL (ref 1.9–2.7)
MCH RBC QN AUTO: 30.2 PG (ref 26.8–34.3)
MCHC RBC AUTO-ENTMCNC: 34.6 G/DL (ref 31.4–37.4)
MCV RBC AUTO: 87 FL (ref 82–98)
MONOCYTES # BLD AUTO: 0.77 THOUSAND/ÂΜL (ref 0.17–1.22)
MONOCYTES NFR BLD AUTO: 5 % (ref 4–12)
NEUTROPHILS # BLD AUTO: 13.24 THOUSANDS/ÂΜL (ref 1.85–7.62)
NEUTS SEG NFR BLD AUTO: 84 % (ref 43–75)
NRBC BLD AUTO-RTO: 0 /100 WBCS
P AXIS: 51 DEGREES
PLATELET # BLD AUTO: 412 THOUSANDS/UL (ref 149–390)
PMV BLD AUTO: 8.4 FL (ref 8.9–12.7)
POTASSIUM SERPL-SCNC: 4.3 MMOL/L (ref 3.5–5.3)
POTASSIUM SERPL-SCNC: 5 MMOL/L (ref 3.5–5.3)
POTASSIUM SERPL-SCNC: 6.2 MMOL/L (ref 3.5–5.3)
PR INTERVAL: 148 MS
PROT SERPL-MCNC: 7.3 G/DL (ref 6.4–8.4)
PROT SERPL-MCNC: 8.7 G/DL (ref 6.4–8.4)
QRS AXIS: 65 DEGREES
QRSD INTERVAL: 98 MS
QT INTERVAL: 398 MS
QTC INTERVAL: 411 MS
RBC # BLD AUTO: 5.46 MILLION/UL (ref 3.88–5.62)
SODIUM SERPL-SCNC: 133 MMOL/L (ref 135–147)
SODIUM SERPL-SCNC: 134 MMOL/L (ref 135–147)
SODIUM SERPL-SCNC: 136 MMOL/L (ref 135–147)
T WAVE AXIS: 40 DEGREES
VENTRICULAR RATE: 64 BPM
WBC # BLD AUTO: 15.72 THOUSAND/UL (ref 4.31–10.16)

## 2024-11-11 PROCEDURE — 99283 EMERGENCY DEPT VISIT LOW MDM: CPT

## 2024-11-11 PROCEDURE — 99222 1ST HOSP IP/OBS MODERATE 55: CPT | Performed by: STUDENT IN AN ORGANIZED HEALTH CARE EDUCATION/TRAINING PROGRAM

## 2024-11-11 PROCEDURE — 96365 THER/PROPH/DIAG IV INF INIT: CPT

## 2024-11-11 PROCEDURE — 80048 BASIC METABOLIC PNL TOTAL CA: CPT | Performed by: PHYSICIAN ASSISTANT

## 2024-11-11 PROCEDURE — 85025 COMPLETE CBC W/AUTO DIFF WBC: CPT | Performed by: PHYSICIAN ASSISTANT

## 2024-11-11 PROCEDURE — 36415 COLL VENOUS BLD VENIPUNCTURE: CPT | Performed by: PHYSICIAN ASSISTANT

## 2024-11-11 PROCEDURE — 96366 THER/PROPH/DIAG IV INF ADDON: CPT

## 2024-11-11 PROCEDURE — 80053 COMPREHEN METABOLIC PANEL: CPT | Performed by: PHYSICIAN ASSISTANT

## 2024-11-11 PROCEDURE — 93010 ELECTROCARDIOGRAM REPORT: CPT | Performed by: INTERNAL MEDICINE

## 2024-11-11 PROCEDURE — 83690 ASSAY OF LIPASE: CPT | Performed by: PHYSICIAN ASSISTANT

## 2024-11-11 PROCEDURE — 82077 ASSAY SPEC XCP UR&BREATH IA: CPT | Performed by: PHYSICIAN ASSISTANT

## 2024-11-11 PROCEDURE — 93005 ELECTROCARDIOGRAM TRACING: CPT

## 2024-11-11 PROCEDURE — 99285 EMERGENCY DEPT VISIT HI MDM: CPT | Performed by: PHYSICIAN ASSISTANT

## 2024-11-11 PROCEDURE — 83735 ASSAY OF MAGNESIUM: CPT | Performed by: PHYSICIAN ASSISTANT

## 2024-11-11 PROCEDURE — 96375 TX/PRO/DX INJ NEW DRUG ADDON: CPT

## 2024-11-11 RX ORDER — DEXTROSE MONOHYDRATE AND SODIUM CHLORIDE 5; .9 G/100ML; G/100ML
100 INJECTION, SOLUTION INTRAVENOUS CONTINUOUS
Status: DISCONTINUED | OUTPATIENT
Start: 2024-11-11 | End: 2024-11-13

## 2024-11-11 RX ORDER — PANTOPRAZOLE SODIUM 40 MG/1
40 TABLET, DELAYED RELEASE ORAL
Status: DISCONTINUED | OUTPATIENT
Start: 2024-11-12 | End: 2024-11-14 | Stop reason: HOSPADM

## 2024-11-11 RX ORDER — MIRTAZAPINE 15 MG/1
15 TABLET, FILM COATED ORAL
Status: DISCONTINUED | OUTPATIENT
Start: 2024-11-11 | End: 2024-11-14 | Stop reason: HOSPADM

## 2024-11-11 RX ORDER — FAMOTIDINE 10 MG/ML
20 INJECTION, SOLUTION INTRAVENOUS ONCE
Status: COMPLETED | OUTPATIENT
Start: 2024-11-11 | End: 2024-11-11

## 2024-11-11 RX ORDER — LORAZEPAM 1 MG/1
2 TABLET ORAL ONCE
Status: COMPLETED | OUTPATIENT
Start: 2024-11-11 | End: 2024-11-11

## 2024-11-11 RX ORDER — ESCITALOPRAM OXALATE 5 MG/1
5 TABLET ORAL DAILY
Status: DISCONTINUED | OUTPATIENT
Start: 2024-11-11 | End: 2024-11-14 | Stop reason: HOSPADM

## 2024-11-11 RX ORDER — PHENOBARBITAL SODIUM 130 MG/ML
130 INJECTION, SOLUTION INTRAMUSCULAR; INTRAVENOUS ONCE
Status: COMPLETED | OUTPATIENT
Start: 2024-11-11 | End: 2024-11-11

## 2024-11-11 RX ORDER — ENOXAPARIN SODIUM 100 MG/ML
40 INJECTION SUBCUTANEOUS DAILY
Status: DISCONTINUED | OUTPATIENT
Start: 2024-11-12 | End: 2024-11-14 | Stop reason: HOSPADM

## 2024-11-11 RX ORDER — LORAZEPAM 2 MG/ML
2 INJECTION INTRAMUSCULAR ONCE
Status: COMPLETED | OUTPATIENT
Start: 2024-11-11 | End: 2024-11-11

## 2024-11-11 RX ORDER — MAGNESIUM HYDROXIDE/ALUMINUM HYDROXICE/SIMETHICONE 120; 1200; 1200 MG/30ML; MG/30ML; MG/30ML
30 SUSPENSION ORAL EVERY 6 HOURS PRN
Status: DISCONTINUED | OUTPATIENT
Start: 2024-11-11 | End: 2024-11-14 | Stop reason: HOSPADM

## 2024-11-11 RX ORDER — ONDANSETRON 2 MG/ML
4 INJECTION INTRAMUSCULAR; INTRAVENOUS EVERY 6 HOURS PRN
Status: DISCONTINUED | OUTPATIENT
Start: 2024-11-11 | End: 2024-11-14 | Stop reason: HOSPADM

## 2024-11-11 RX ORDER — DIAZEPAM 10 MG/2ML
10 INJECTION, SOLUTION INTRAMUSCULAR; INTRAVENOUS ONCE
Status: COMPLETED | OUTPATIENT
Start: 2024-11-11 | End: 2024-11-11

## 2024-11-11 RX ORDER — ONDANSETRON 2 MG/ML
4 INJECTION INTRAMUSCULAR; INTRAVENOUS ONCE
Status: COMPLETED | OUTPATIENT
Start: 2024-11-11 | End: 2024-11-11

## 2024-11-11 RX ORDER — ACETAMINOPHEN 325 MG/1
650 TABLET ORAL EVERY 6 HOURS PRN
Status: DISCONTINUED | OUTPATIENT
Start: 2024-11-11 | End: 2024-11-14 | Stop reason: HOSPADM

## 2024-11-11 RX ORDER — LANOLIN ALCOHOL/MO/W.PET/CERES
100 CREAM (GRAM) TOPICAL ONCE
Status: COMPLETED | OUTPATIENT
Start: 2024-11-11 | End: 2024-11-11

## 2024-11-11 RX ADMIN — FAMOTIDINE 20 MG: 10 INJECTION, SOLUTION INTRAVENOUS at 12:29

## 2024-11-11 RX ADMIN — Medication 650 MG: at 16:05

## 2024-11-11 RX ADMIN — ONDANSETRON 4 MG: 2 INJECTION INTRAMUSCULAR; INTRAVENOUS at 12:29

## 2024-11-11 RX ADMIN — LORAZEPAM 2 MG: 2 INJECTION INTRAMUSCULAR; INTRAVENOUS at 15:05

## 2024-11-11 RX ADMIN — MULTIPLE VITAMINS W/ MINERALS TAB 1 TABLET: TAB ORAL at 16:05

## 2024-11-11 RX ADMIN — MIRTAZAPINE 15 MG: 15 TABLET, FILM COATED ORAL at 21:38

## 2024-11-11 RX ADMIN — LORAZEPAM 2 MG: 1 TABLET ORAL at 12:37

## 2024-11-11 RX ADMIN — FOLIC ACID 1 MG: 5 INJECTION, SOLUTION INTRAMUSCULAR; INTRAVENOUS; SUBCUTANEOUS at 16:35

## 2024-11-11 RX ADMIN — THIAMINE HYDROCHLORIDE 500 MG: 100 INJECTION, SOLUTION INTRAMUSCULAR; INTRAVENOUS at 16:35

## 2024-11-11 RX ADMIN — ESCITALOPRAM OXALATE 5 MG: 5 TABLET, FILM COATED ORAL at 16:05

## 2024-11-11 RX ADMIN — DEXTROSE AND SODIUM CHLORIDE 100 ML/HR: 5; .9 INJECTION, SOLUTION INTRAVENOUS at 16:36

## 2024-11-11 RX ADMIN — DIAZEPAM 10 MG: 5 INJECTION INTRAMUSCULAR; INTRAVENOUS at 16:05

## 2024-11-11 RX ADMIN — DEXTROSE AND SODIUM CHLORIDE 1000 ML: 5; .9 INJECTION, SOLUTION INTRAVENOUS at 15:24

## 2024-11-11 RX ADMIN — THIAMINE HCL TAB 100 MG 100 MG: 100 TAB at 12:29

## 2024-11-11 RX ADMIN — PHENOBARBITAL SODIUM 130 MG: 130 INJECTION INTRAMUSCULAR at 20:15

## 2024-11-11 RX ADMIN — SODIUM CHLORIDE, SODIUM LACTATE, POTASSIUM CHLORIDE, AND CALCIUM CHLORIDE 1000 ML: .6; .31; .03; .02 INJECTION, SOLUTION INTRAVENOUS at 12:29

## 2024-11-11 RX ADMIN — THIAMINE HYDROCHLORIDE 500 MG: 100 INJECTION, SOLUTION INTRAMUSCULAR; INTRAVENOUS at 21:38

## 2024-11-11 NOTE — ASSESSMENT & PLAN NOTE
Pt with a h/o chronic heavy alcohol use; multiple admissions to the medical detox unit.   Drinks 1-2L of wine daily  Denies H/o withdrawal seizures  Previously on naltrexone at this time; contemplating restarting   Withdrawal management as above  Initiate IVFs, HD thiamine, folic acid, and MVI  Consult case management/CRS for assistance with aftercare resources - pt interested in outpatient resources at this time

## 2024-11-11 NOTE — DISCHARGE INSTR - OTHER ORDERS
Sammi Pollard   Certified     [facility]  [phone]     [address]  [address] [phone]  [email or web address]    [facility]   [email or web address]  [phone]    AA meeting guide   https://www.aa.org/find-aa    AA Phone apps:   Meeting Guide  Everything AA  In the Rooms    AA/24 hour hotline   [phone]    [facility]   [email or web address]    [facility]   [address] [address] [place] [address]  [phone]    [place] [id number] [facility] [place] [address]  [phone]    [place] [address] [facility] [place] [address]  [phone]    [facility]   [address] [place] [address] [phone]

## 2024-11-11 NOTE — ED PROVIDER NOTES
"Time reflects when diagnosis was documented in both MDM as applicable and the Disposition within this note       Time User Action Codes Description Comment    11/11/2024  3:04 PM Shantell Myers [F10.90] Alcohol use disorder     11/11/2024  3:06 PM Shantell Myers [F10.932] Alcohol withdrawal hallucinosis (HCC)     11/11/2024  3:06 PM Shantell Myers [E87.5] Hyperkalemia           ED Disposition       ED Disposition   Admit    Condition   Stable    Date/Time   Mon Nov 11, 2024  3:04 PM    Comment   Case was discussed with  and the patient's admission status was agreed to be Admission Status: observation status to the service of Dr. King .               Assessment & Plan       Medical Decision Making  34-year-old male current every day use of alcohol 1 to 2 L of wine per day last drink last evening requesting detox admission today, prior detox admission approximately 1 to 2 months ago reports that he was prescribed naltrexone on discharge and has not been compliant with this medication for over 1 month.  Reports withdrawal symptoms at this time of shakiness, abdominal pain nausea and vomiting.  He denies withdrawal seizures in the past.    Workup to evaluate for potential sequela of alcohol versus metabolic disturbances initiated, workup demonstrates leukocytosis without additional infectious s/s, elevated potassium, minimally elevated anion gap = repeat potassium ordered to evaluate for lab error/hemolysis versus true hyperkalemia - withdrawal continued despite ativan and patient reports hallucinations - additional ativan ordered and case discussed with inpatient team / detox.     All imaging and/or lab testing discussed with patient. Patient and/or family members verbalizes understanding and agrees with plan for admission. Patient is stable for admission.     Portions of the record may have been created with voice recognition software. Occasional wrong word or \"sound a like\" substitutions may " have occurred due to the inherent limitations of voice recognition software. Read the chart carefully and recognize, using context, where substitutions have occurred.    Amount and/or Complexity of Data Reviewed  Labs: ordered. Decision-making details documented in ED Course.  ECG/medicine tests: ordered.    Risk  OTC drugs.  Prescription drug management.        ED Course as of 11/11/24 1507   Mon Nov 11, 2024   1309 Potassium(!): 6.2   1437 WBC(!): 15.72       Medications   ondansetron (ZOFRAN) injection 4 mg (4 mg Intravenous Given 11/11/24 1229)   Famotidine (PF) (PEPCID) injection 20 mg (20 mg Intravenous Given 11/11/24 1229)   lactated ringers bolus 1,000 mL (0 mL Intravenous Stopped 11/11/24 1438)   thiamine tablet 100 mg (100 mg Oral Given 11/11/24 1229)   LORazepam (ATIVAN) tablet 2 mg (2 mg Oral Given 11/11/24 1237)   LORazepam (ATIVAN) injection 2 mg (2 mg Intravenous Given 11/11/24 1505)       ED Risk Strat Scores                CIWA-Ar Score       Row Name 11/11/24 1230             CIWA-Ar    Nausea and Vomiting 3      Tactile Disturbances 0      Tremor 3      Auditory Disturbances 0      Paroxysmal Sweats 2      Visual Disturbances 0      Anxiety 3      Headache, Fullness in Head 0      Agitation 2      Orientation and Clouding of Sensorium 0      CIWA-Ar Total 13                                                  History of Present Illness       Chief Complaint   Patient presents with    Detox Evaluation     Pt reports he wants detox from ETOH. Pt reports he drinks 1-2 liters of wine daily. Pt states last drink was last night. Pt denies seizure history.       Past Medical History:   Diagnosis Date    Alcohol use disorder     Alcohol withdrawal syndrome (HCC)     Anxiety     Depression     PTSD (post-traumatic stress disorder)     Tachycardia       Past Surgical History:   Procedure Laterality Date    LUMBAR LAMINECTOMY        History reviewed. No pertinent family history.   Social History     Tobacco Use  "   Smoking status: Never    Smokeless tobacco: Never   Vaping Use    Vaping status: Never Used   Substance Use Topics    Alcohol use: Yes     Comment: 1-1.5 L vodka or beer daily    Drug use: Never      E-Cigarette/Vaping    E-Cigarette Use Never User       E-Cigarette/Vaping Substances      I have reviewed and agree with the history as documented.     33 y/o M presenting today requesting admission for detox floor he reports recent admission to the same reports that he was prescribed naltrexone and has not been compliant with this for over 1 month reports he has been drinking \"religiously\" since his discharge over 1 month ago.  He reports he is drinking 1 to 2 L of wine per day last drink was last evening.  He reports no alcohol withdrawal seizures in the past and denies any other recreational substance use.  He reports he typically experiences abdominal pain nausea vomiting and shakiness when he is withdrawing, patient reports he has all of the symptoms at this time currently.      Detox Evaluation  Associated symptoms: abdominal pain, nausea and vomiting    Associated symptoms: no palpitations and no shortness of breath        Review of Systems   Constitutional:  Negative for chills, fatigue and fever.   HENT:  Negative for congestion, ear pain, rhinorrhea and sore throat.    Eyes:  Negative for redness.   Respiratory:  Negative for chest tightness and shortness of breath.    Cardiovascular:  Negative for chest pain and palpitations.   Gastrointestinal:  Positive for abdominal pain, nausea and vomiting.   Genitourinary:  Negative for dysuria and hematuria.   Musculoskeletal: Negative.    Skin:  Negative for rash.   Neurological:  Positive for tremors. Negative for dizziness, syncope, light-headedness and numbness.           Objective       ED Triage Vitals   Temperature Pulse Blood Pressure Respirations SpO2 Patient Position - Orthostatic VS   11/11/24 1059 11/11/24 1059 11/11/24 1059 11/11/24 1059 11/11/24 1059 " 11/11/24 1059   97.8 °F (36.6 °C) 75 147/88 22 98 % Sitting      Temp Source Heart Rate Source BP Location FiO2 (%) Pain Score    11/11/24 1059 11/11/24 1059 11/11/24 1059 -- 11/11/24 1330    Oral Monitor Left arm  No Pain      Vitals      Date and Time Temp Pulse SpO2 Resp BP Pain Score FACES Pain Rating User   11/11/24 1440 -- 71 98 % 18 166/82 No Pain -- TW   11/11/24 1330 -- 65 98 % 18 131/82 No Pain -- TW   11/11/24 1059 97.8 °F (36.6 °C) 75 98 % 22 147/88 -- -- ES            Physical Exam  Vitals and nursing note reviewed.   Constitutional:       Appearance: Normal appearance. He is well-developed.   HENT:      Head: Normocephalic and atraumatic.   Eyes:      General: No scleral icterus.     Pupils: Pupils are equal, round, and reactive to light.   Cardiovascular:      Rate and Rhythm: Normal rate and regular rhythm.      Pulses: Normal pulses.   Pulmonary:      Effort: Pulmonary effort is normal. No respiratory distress.      Breath sounds: No stridor.   Abdominal:      General: There is no distension.      Palpations: There is no mass.   Musculoskeletal:      Cervical back: Normal range of motion.   Skin:     General: Skin is warm and dry.      Capillary Refill: Capillary refill takes less than 2 seconds.      Coloration: Skin is not jaundiced.   Neurological:      Mental Status: He is alert and oriented to person, place, and time.      Gait: Gait normal.   Psychiatric:         Mood and Affect: Mood normal.         Results Reviewed       Procedure Component Value Units Date/Time    Basic metabolic panel [[id number]]  (Abnormal) Collected: 11/11/24 1420    Lab Status: Final result Specimen: Blood from Arm, Left Updated: 11/11/24 1503     Sodium 133 mmol/L      Potassium 5.0 mmol/L      Chloride 98 mmol/L      CO2 19 mmol/L      ANION GAP 16 mmol/L      BUN 14 mg/dL      Creatinine 0.86 mg/dL      Glucose 95 mg/dL      Calcium 9.7 mg/dL      eGFR 113 ml/min/1.73sq m     Narrative:      National Kidney Disease  Foundation guidelines for Chronic Kidney Disease (CKD):     Stage 1 with normal or high GFR (GFR > 90 mL/min/1.73 square meters)    Stage 2 Mild CKD (GFR = 60-89 mL/min/1.73 square meters)    Stage 3A Moderate CKD (GFR = 45-59 mL/min/1.73 square meters)    Stage 3B Moderate CKD (GFR = 30-44 mL/min/1.73 square meters)    Stage 4 Severe CKD (GFR = 15-29 mL/min/1.73 square meters)    Stage 5 End Stage CKD (GFR <15 mL/min/1.73 square meters)  Note: GFR calculation is accurate only with a steady state creatinine    Comprehensive metabolic panel [[id number]] Collected: 11/11/24 1456    Lab Status: No result Specimen: Blood from Hand, Left     CMP [[id number]]  (Abnormal) Collected: 11/11/24 1226    Lab Status: Final result Specimen: Blood from Arm, Right Updated: 11/11/24 1306     Sodium 136 mmol/L      Potassium 6.2 mmol/L      Chloride 94 mmol/L      CO2 28 mmol/L      ANION GAP 14 mmol/L      BUN 15 mg/dL      Creatinine 1.06 mg/dL      Glucose 112 mg/dL      Calcium 10.8 mg/dL      AST 28 U/L      ALT 29 U/L      Alkaline Phosphatase 96 U/L      Total Protein 8.7 g/dL      Albumin 5.5 g/dL      Total Bilirubin 1.85 mg/dL      eGFR 91 ml/min/1.73sq m     Narrative:      National Kidney Disease Foundation guidelines for Chronic Kidney Disease (CKD):     Stage 1 with normal or high GFR (GFR > 90 mL/min/1.73 square meters)    Stage 2 Mild CKD (GFR = 60-89 mL/min/1.73 square meters)    Stage 3A Moderate CKD (GFR = 45-59 mL/min/1.73 square meters)    Stage 3B Moderate CKD (GFR = 30-44 mL/min/1.73 square meters)    Stage 4 Severe CKD (GFR = 15-29 mL/min/1.73 square meters)    Stage 5 End Stage CKD (GFR <15 mL/min/1.73 square meters)  Note: GFR calculation is accurate only with a steady state creatinine    Magnesium [[id number]]  (Normal) Collected: 11/11/24 1226    Lab Status: Final result Specimen: Blood from Arm, Right Updated: 11/11/24 1306     Magnesium 2.0 mg/dL     Lipase [[id number]]  (Normal) Collected: 11/11/24 0483     Lab Status: Final result Specimen: Blood from Arm, Right Updated: 11/11/24 1306     Lipase 19 u/L     Serum ethanol (medical alcohol) [[id number]]  (Normal) Collected: 11/11/24 1226    Lab Status: Final result Specimen: Blood from Arm, Right Updated: 11/11/24 1254     Ethanol Lvl <10 mg/dL     CBC [[id number]]  (Abnormal) Collected: 11/11/24 1226    Lab Status: Final result Specimen: Blood from Arm, Right Updated: 11/11/24 1239     WBC 15.72 Thousand/uL      RBC 5.46 Million/uL      Hemoglobin 16.5 g/dL      Hematocrit 47.7 %      MCV 87 fL      MCH 30.2 pg      MCHC 34.6 g/dL      RDW 14.0 %      MPV 8.4 fL      Platelets 412 Thousands/uL      nRBC 0 /100 WBCs      Segmented % 84 %      Immature Grans % 0 %      Lymphocytes % 10 %      Monocytes % 5 %      Eosinophils Relative 0 %      Basophils Relative 1 %      Absolute Neutrophils 13.24 Thousands/µL      Absolute Immature Grans 0.05 Thousand/uL      Absolute Lymphocytes 1.56 Thousands/µL      Absolute Monocytes 0.77 Thousand/µL      Eosinophils Absolute 0.02 Thousand/µL      Basophils Absolute 0.08 Thousands/µL     Urine drug screen [[id number]]     Lab Status: No result Specimen: Urine             No orders to display       ECG 12 Lead Documentation Only    Date/Time: 11/11/2024 12:07 PM    Performed by: Shantell Myers PA-C  Authorized by: Shantell Myers PA-C    Indications / Diagnosis:  Detox / alcohol use disorder  ECG reviewed by me, the ED Provider: yes    Patient location:  ED  Rate:     ECG rate:  64    ECG rate assessment: normal    Rhythm:     Rhythm: sinus rhythm    Ectopy:     Ectopy: none    QRS:     QRS axis:  Normal    QRS intervals:  Normal  Conduction:     Conduction: normal    ST segments:     ST segments:  Normal  T waves:     T waves: normal    Comments:        QRS 92          ED Medication and Procedure Management   Prior to Admission Medications   Prescriptions Last Dose Informant Patient Reported?  Taking?   escitalopram (LEXAPRO) 5 mg tablet   No No   Sig: Take 1 tablet (5 mg total) by mouth daily   gabapentin (NEURONTIN) 300 mg capsule   No No   Sig: Take 1 capsule (300 mg total) by mouth every 8 (eight) hours as needed (anxiety) for up to 14 days   mirtazapine (REMERON) 7.5 MG tablet   No No   Sig: Take 1 tablet (7.5 mg total) by mouth daily at bedtime   naltrexone (REVIA) 50 mg tablet   No No   Sig: Take 1 tablet (50 mg total) by mouth daily      Facility-Administered Medications: None     Patient's Medications   Discharge Prescriptions    No medications on file     No discharge procedures on file.  ED SEPSIS DOCUMENTATION   Time reflects when diagnosis was documented in both MDM as applicable and the Disposition within this note       Time User Action Codes Description Comment    11/11/2024  3:04 PM Shantell Myers [F10.90] Alcohol use disorder     11/11/2024  3:06 PM Shantell Myers [F10.932] Alcohol withdrawal hallucinosis (HCC)     11/11/2024  3:06 PM Shantell Myers [E87.5] Hyperkalemia                  Shantell Myers PA-C  11/11/24 5439

## 2024-11-11 NOTE — ASSESSMENT & PLAN NOTE
Last drink: 11/10/24   Ethanol lvl: < 10   Received 4 mg of ativan prior to admission   Consult to toxicology; appreciate recommendations   Follow SEWS protocol with symptom-triggered phenobarbital for medically-assisted alcohol withdrawal  Current symptoms include anxiety and agitation/impulsiveness, hallucinations, diaphoresis, tremors   Telemetry and pulse oximetry monitoring   Continue to monitor vitals, symptoms

## 2024-11-11 NOTE — ASSESSMENT & PLAN NOTE
Poor PO intake in the setting of chronic alcohol use  Previously on PPI and carafate  Will restart medication   Denies any hematemesis   Outpatient referral to GI upon discharge

## 2024-11-11 NOTE — ASSESSMENT & PLAN NOTE
CMP on admission revealed anion gap 18, CO2 19  Likely 2/2 AKA and/or starvation ketosis  Initiate IVF hydration with D5 NS + high dose IV thiamine  Continue monitoring CMP for closure of anion gap and adjust IVFs accordingly  Encourage alcohol cessation

## 2024-11-11 NOTE — H&P
H&P - Hospitalist   Name: Juan Luu 34 y.o. male I MRN: [id number]  Unit/Bed#: 5T [facility] 505-01 I Date of Admission: 11/11/2024   Date of Service: 11/11/2024 I Hospital Day: 0     Assessment & Plan  Alcoholic ketoacidosis  CMP on admission revealed anion gap 18, CO2 19  Likely 2/2 AKA and/or starvation ketosis  Initiate IVF hydration with D5 NS + high dose IV thiamine  Continue monitoring CMP for closure of anion gap and adjust IVFs accordingly  Encourage alcohol cessation   Alcohol withdrawal syndrome with complication (HCC)  Last drink: 11/10/24   Ethanol lvl: < 10   Received 4 mg of ativan prior to admission   Consult to toxicology; appreciate recommendations   Follow SEWS protocol with symptom-triggered phenobarbital for medically-assisted alcohol withdrawal  Current symptoms include anxiety and agitation/impulsiveness, hallucinations, diaphoresis, tremors   Telemetry and pulse oximetry monitoring   Continue to monitor vitals, symptoms      Alcohol use disorder, severe, dependence (HCC)  Pt with a h/o chronic heavy alcohol use; multiple admissions to the medical detox unit.   Drinks 1-2L of wine daily  Denies H/o withdrawal seizures  Previously on naltrexone at this time; contemplating restarting   Withdrawal management as above  Initiate IVFs, HD thiamine, folic acid, and MVI  Consult case management/CRS for assistance with aftercare resources - pt interested in outpatient resources at this time   Unspecified mood (affective) disorder (HCC)  Previously on Remeron 7.5 mg  and Lexapro 10mg   Denies SI/HI  Continue medication     Alcoholic gastritis  Poor PO intake in the setting of chronic alcohol use  Previously on PPI and carafate  Will restart medication   Denies any hematemesis   Outpatient referral to GI upon discharge   Leukocytosis  WBC 15.72   Currently afebrile, likely secondary to dehydration or systemic reaction from withdrawal   Will continue to monitor for infectious etiologies   Alcohol  withdrawal hallucinosis (HCC)  In the setting of alcohol withdrawal   Initiate HD thiamine   SEWS protocol       VTE Pharmacologic Prophylaxis:   Moderate Risk (Score 3-4) - Pharmacological DVT Prophylaxis Ordered: enoxaparin (Lovenox).  Code Status: Level 1 - Full Code   Discussion with family: Patient declined call to .     Anticipated Length of Stay: Patient will be admitted on an observation basis with an anticipated length of stay of less than 2 midnights secondary to alcoholic ketoacidosis .    History of Present Illness   Chief Complaint: alcohol withdrawal     Juan Luu is a 34 y.o. male with a PMH of anxiety and alcohol use disorder who presents with alcohol withdrawal seeking detoxification. Patient initially presented to the [facility] ED requesting alcohol detox with sx of anxiety, tremors, and hallucinations. Per ED documentation patient started hallucinating that there were people playing football in his room. He was able to verbalize that he is aware that they are hallucinations.  His admission labs also were consistent with alcoholic ketoacidosis and he was subsequently transferred to the [facility] medical detox unit for medical management of alcohol withdrawal.     Pt reports drinking 1-2L of wine daily, which started shortly after his recent alcohol withdrawal admission in September 2024. His last drink was 11. Serum alcohol was <10  in the ED. Endorses triggers for alcohol use including work stressors. Denies any headache, chest pain, shortness of breath or abdominal pain. Patient reports interest in outpatient resources. He is contemplating restarting naltrexone.     Review of Systems   Constitutional:  Positive for appetite change and diaphoresis.   Respiratory:  Negative for shortness of breath.    Cardiovascular: Negative.  Negative for palpitations.   Gastrointestinal:  Positive for nausea. Negative for abdominal pain.   Neurological:  Positive for tremors.    Psychiatric/Behavioral:  Positive for hallucinations (visual). The patient is nervous/anxious.        Historical Information   Past Medical History:   Diagnosis Date    Alcohol use disorder     Alcohol withdrawal syndrome (HCC)     Anxiety     Depression     PTSD (post-traumatic stress disorder)     Tachycardia      Past Surgical History:   Procedure Laterality Date    LUMBAR LAMINECTOMY       Social History     Tobacco Use    Smoking status: Never    Smokeless tobacco: Never   Vaping Use    Vaping status: Never Used   Substance and Sexual Activity    Alcohol use: Yes     Comment: 1-1.5 L vodka or beer daily    Drug use: Never    Sexual activity: Not Currently     E-Cigarette/Vaping    E-Cigarette Use Never User      E-Cigarette/Vaping Substances       Social History:  Marital Status: Single   Occupation:    Patient Pre-hospital Living Situation: Home  Patient Pre-hospital Level of Mobility: walks  Patient Pre-hospital Diet Restrictions: None     Meds/Allergies   I have reviewed home medications with patient personally.  Prior to Admission medications    Medication Sig Start Date End Date Taking? Authorizing Provider   escitalopram (LEXAPRO) 5 mg tablet Take 1 tablet (5 mg total) by mouth daily 9/21/24 10/21/24  Kathleen Greenberg PA-C   gabapentin (NEURONTIN) 300 mg capsule Take 1 capsule (300 mg total) by mouth every 8 (eight) hours as needed (anxiety) for up to 14 days 9/21/24 10/5/24  Kathleen Greenberg PA-C   mirtazapine (REMERON) 7.5 MG tablet Take 1 tablet (7.5 mg total) by mouth daily at bedtime 9/21/24 10/21/24  Kathleen Greenberg PA-C   naltrexone (REVIA) 50 mg tablet Take 1 tablet (50 mg total) by mouth daily 9/21/24   Kathleen Greenberg PA-C     No Known Allergies    Objective :  Temp:  [97.7 °F (36.5 °C)-97.8 °F (36.6 °C)] 97.7 °F (36.5 °C)  HR:  [65-92] 92  BP: (127-166)/(82-99) 127/99  Resp:  [18-22] 18  SpO2:  [98 %] 98 %  O2 Device: None (Room air)    Physical  "Exam  Constitutional:       Appearance: He is diaphoretic.   Eyes:      General: No scleral icterus.     Pupils: Pupils are equal, round, and reactive to light.   Cardiovascular:      Rate and Rhythm: Normal rate and regular rhythm.   Pulmonary:      Effort: No respiratory distress.      Breath sounds: Normal breath sounds.   Abdominal:      General: Bowel sounds are normal. There is no distension.      Palpations: Abdomen is soft.      Tenderness: There is no abdominal tenderness.   Neurological:      Mental Status: He is alert and oriented to person, place, and time.      Motor: Tremor present.      Comments: + tongue fasciculations    Psychiatric:         Mood and Affect: Mood is anxious.          Lines/Drains:            Lab Results: I have reviewed the following results:  Results from last 7 days   Lab Units 11/11/24  1226   WBC Thousand/uL 15.72*   HEMOGLOBIN g/dL 16.5   HEMATOCRIT % 47.7   PLATELETS Thousands/uL 412*   SEGS PCT % 84*   LYMPHO PCT % 10*   MONO PCT % 5   EOS PCT % 0     Results from last 7 days   Lab Units 11/11/24  1456   SODIUM mmol/L 134*   POTASSIUM mmol/L 4.3   CHLORIDE mmol/L 98   CO2 mmol/L 24   BUN mg/dL 15   CREATININE mg/dL 0.83   ANION GAP mmol/L 12   CALCIUM mg/dL 9.5   ALBUMIN g/dL 4.6   TOTAL BILIRUBIN mg/dL 1.71*   ALK PHOS U/L 86   ALT U/L 22   AST U/L 20   GLUCOSE RANDOM mg/dL 104             No results found for: \"HGBA1C\"        Imaging Results Review: No pertinent imaging studies reviewed.  Other Study Results Review: EKG was reviewed.     Administrative Statements       ** Please Note: This note has been constructed using a voice recognition system. **    "

## 2024-11-11 NOTE — ASSESSMENT & PLAN NOTE
WBC 15.72   Currently afebrile, likely secondary to dehydration or systemic reaction from withdrawal   Will continue to monitor for infectious etiologies

## 2024-11-12 PROBLEM — D72.829 LEUKOCYTOSIS: Status: RESOLVED | Noted: 2024-08-25 | Resolved: 2024-11-12

## 2024-11-12 PROBLEM — E87.29 ALCOHOLIC KETOACIDOSIS: Status: RESOLVED | Noted: 2024-11-11 | Resolved: 2024-11-12

## 2024-11-12 PROBLEM — E87.8 ELECTROLYTE ABNORMALITY: Status: ACTIVE | Noted: 2024-11-12

## 2024-11-12 LAB
ALBUMIN SERPL BCG-MCNC: 4 G/DL (ref 3.5–5)
ALP SERPL-CCNC: 70 U/L (ref 34–104)
ALT SERPL W P-5'-P-CCNC: 18 U/L (ref 7–52)
ANION GAP SERPL CALCULATED.3IONS-SCNC: 10 MMOL/L (ref 4–13)
AST SERPL W P-5'-P-CCNC: 18 U/L (ref 13–39)
BILIRUB SERPL-MCNC: 1.28 MG/DL (ref 0.2–1)
BUN SERPL-MCNC: 18 MG/DL (ref 5–25)
CALCIUM SERPL-MCNC: 8.7 MG/DL (ref 8.4–10.2)
CHLORIDE SERPL-SCNC: 100 MMOL/L (ref 96–108)
CO2 SERPL-SCNC: 27 MMOL/L (ref 21–32)
CREAT SERPL-MCNC: 0.93 MG/DL (ref 0.6–1.3)
ERYTHROCYTE [DISTWIDTH] IN BLOOD BY AUTOMATED COUNT: 13.8 % (ref 11.6–15.1)
GFR SERPL CREATININE-BSD FRML MDRD: 106 ML/MIN/1.73SQ M
GLUCOSE SERPL-MCNC: 87 MG/DL (ref 65–140)
HCT VFR BLD AUTO: 37.3 % (ref 36.5–49.3)
HGB BLD-MCNC: 13.2 G/DL (ref 12–17)
MAGNESIUM SERPL-MCNC: 2.1 MG/DL (ref 1.9–2.7)
MCH RBC QN AUTO: 30.4 PG (ref 26.8–34.3)
MCHC RBC AUTO-ENTMCNC: 35.7 G/DL (ref 31.4–37.4)
MCV RBC AUTO: 85 FL (ref 82–98)
PLATELET # BLD AUTO: 279 THOUSANDS/UL (ref 149–390)
PMV BLD AUTO: 8.9 FL (ref 8.9–12.7)
POTASSIUM SERPL-SCNC: 3.5 MMOL/L (ref 3.5–5.3)
PROT SERPL-MCNC: 6.3 G/DL (ref 6.4–8.4)
RBC # BLD AUTO: 4.38 MILLION/UL (ref 3.88–5.62)
SODIUM SERPL-SCNC: 137 MMOL/L (ref 135–147)
WBC # BLD AUTO: 7.2 THOUSAND/UL (ref 4.31–10.16)

## 2024-11-12 PROCEDURE — 99255 IP/OBS CONSLTJ NEW/EST HI 80: CPT | Performed by: EMERGENCY MEDICINE

## 2024-11-12 PROCEDURE — 85027 COMPLETE CBC AUTOMATED: CPT

## 2024-11-12 PROCEDURE — 99232 SBSQ HOSP IP/OBS MODERATE 35: CPT

## 2024-11-12 PROCEDURE — 80053 COMPREHEN METABOLIC PANEL: CPT

## 2024-11-12 PROCEDURE — 83735 ASSAY OF MAGNESIUM: CPT

## 2024-11-12 PROCEDURE — HZ2ZZZZ DETOXIFICATION SERVICES FOR SUBSTANCE ABUSE TREATMENT: ICD-10-PCS | Performed by: STUDENT IN AN ORGANIZED HEALTH CARE EDUCATION/TRAINING PROGRAM

## 2024-11-12 RX ORDER — NALTREXONE HYDROCHLORIDE 50 MG/1
25 TABLET, FILM COATED ORAL DAILY
Status: COMPLETED | OUTPATIENT
Start: 2024-11-12 | End: 2024-11-12

## 2024-11-12 RX ORDER — PHENOBARBITAL SODIUM 130 MG/ML
130 INJECTION, SOLUTION INTRAMUSCULAR; INTRAVENOUS ONCE
Status: COMPLETED | OUTPATIENT
Start: 2024-11-12 | End: 2024-11-12

## 2024-11-12 RX ORDER — HYDROXYZINE HYDROCHLORIDE 50 MG/1
50 TABLET, FILM COATED ORAL EVERY 6 HOURS PRN
Status: DISCONTINUED | OUTPATIENT
Start: 2024-11-12 | End: 2024-11-14 | Stop reason: HOSPADM

## 2024-11-12 RX ORDER — NALTREXONE HYDROCHLORIDE 50 MG/1
50 TABLET, FILM COATED ORAL DAILY
Status: ACTIVE | OUTPATIENT
Start: 2024-11-13 | End: 2024-11-14

## 2024-11-12 RX ADMIN — THIAMINE HYDROCHLORIDE 500 MG: 100 INJECTION, SOLUTION INTRAMUSCULAR; INTRAVENOUS at 22:41

## 2024-11-12 RX ADMIN — MULTIPLE VITAMINS W/ MINERALS TAB 1 TABLET: TAB ORAL at 10:01

## 2024-11-12 RX ADMIN — PHENOBARBITAL SODIUM 130 MG: 130 INJECTION INTRAMUSCULAR at 10:53

## 2024-11-12 RX ADMIN — THIAMINE HYDROCHLORIDE 500 MG: 100 INJECTION, SOLUTION INTRAMUSCULAR; INTRAVENOUS at 14:24

## 2024-11-12 RX ADMIN — ONDANSETRON 4 MG: 2 INJECTION INTRAMUSCULAR; INTRAVENOUS at 17:38

## 2024-11-12 RX ADMIN — MIRTAZAPINE 15 MG: 15 TABLET, FILM COATED ORAL at 21:34

## 2024-11-12 RX ADMIN — PANTOPRAZOLE SODIUM 40 MG: 40 TABLET, DELAYED RELEASE ORAL at 05:09

## 2024-11-12 RX ADMIN — PHENOBARBITAL SODIUM 130 MG: 130 INJECTION INTRAMUSCULAR at 17:38

## 2024-11-12 RX ADMIN — ENOXAPARIN SODIUM 40 MG: 100 INJECTION SUBCUTANEOUS at 10:00

## 2024-11-12 RX ADMIN — ESCITALOPRAM OXALATE 5 MG: 5 TABLET, FILM COATED ORAL at 10:01

## 2024-11-12 RX ADMIN — THIAMINE HYDROCHLORIDE 500 MG: 100 INJECTION, SOLUTION INTRAMUSCULAR; INTRAVENOUS at 05:09

## 2024-11-12 RX ADMIN — PHENOBARBITAL SODIUM 130 MG: 130 INJECTION INTRAMUSCULAR at 05:09

## 2024-11-12 RX ADMIN — DEXTROSE AND SODIUM CHLORIDE 100 ML/HR: 5; .9 INJECTION, SOLUTION INTRAVENOUS at 17:25

## 2024-11-12 RX ADMIN — ACETAMINOPHEN 650 MG: 325 TABLET ORAL at 05:09

## 2024-11-12 RX ADMIN — DEXTROSE AND SODIUM CHLORIDE 100 ML/HR: 5; .9 INJECTION, SOLUTION INTRAVENOUS at 04:48

## 2024-11-12 RX ADMIN — DEXTROSE AND SODIUM CHLORIDE 100 ML/HR: 5; .9 INJECTION, SOLUTION INTRAVENOUS at 15:32

## 2024-11-12 RX ADMIN — HYDROXYZINE HYDROCHLORIDE 50 MG: 50 TABLET, FILM COATED ORAL at 14:40

## 2024-11-12 RX ADMIN — FOLIC ACID 1 MG: 5 INJECTION, SOLUTION INTRAMUSCULAR; INTRAVENOUS; SUBCUTANEOUS at 10:54

## 2024-11-12 RX ADMIN — PHENOBARBITAL SODIUM 130 MG: 130 INJECTION INTRAMUSCULAR at 20:29

## 2024-11-12 RX ADMIN — NALTREXONE HYDROCHLORIDE 25 MG: 50 TABLET, FILM COATED ORAL at 10:01

## 2024-11-12 NOTE — UTILIZATION REVIEW
Initial Clinical Review    Pt presented to [facility] for its Level IV medically managed intensive inpatient detox unit.    OBS 11/11 @ 1503 UPGRADED TO INPATIENT 11/12 @ 1503 FOR CONTINUED TX OF ALCOHOL KETOACIDOSIS AND ALCOHOL WITHDRAWAL    Admission: Date/Time/Statement:   Admission Orders (From admission, onward)       Ordered        11/12/24 0922  INPATIENT ADMISSION  Once            11/11/24 1503  Place in Observation  Once                          Orders Placed This Encounter   Procedures    INPATIENT ADMISSION     Standing Status:   Standing     Number of Occurrences:   1     Order Specific Question:   Level of Care     Answer:   Med Surg [16]     Order Specific Question:   Estimated length of stay     Answer:   More than 2 Midnights     Order Specific Question:   Certification     Answer:   I certify that inpatient services are medically necessary for this patient for a duration of greater than two midnights. See H&P and MD Progress Notes for additional information about the patient's course of treatment.     ED Arrival Information       Expected   -    Arrival   11/11/2024 10:33    Acuity   Urgent              Means of arrival   Walk-In    Escorted by   Self    Service   Hospitalist    Admission type   Emergency              Arrival complaint   detox             Chief Complaint   Patient presents with    Detox Evaluation     Pt reports he wants detox from ETOH. Pt reports he drinks 1-2 liters of wine daily. Pt states last drink was last night. Pt denies seizure history.       Initial Presentation: 34 y.o. male who presented to [facility]. Inpatient admission for evaluation and treatment of anxiety & alcohol withdrawal syndrome. Presented w/ alcohol withdrawal seeing detoxification. Exam: diaphoretic, + tremor, tongue fasciculation, anxious. CMP with anion gap 18, CO2 19, likely 2/2 to AKA and/or starvation ketosis. Plan: ; IVF, telemetry, continuous pulse ox, continue PTA  meds, trend labs, replete electrolytes as needed; I&O, fall & seizure precautions. Toxicology consulted.     Anticipated Length of Stay: Patient will be admitted on an observation basis with an anticipated length of stay of less than 2 midnights secondary to alcoholic ketoacidosis .     Date: 11/12 --  Upgraded to Inpatient    Toxicology: Presented w/ need for detox from alcohol. Pt reports triggers for alcohol use including work stressors. Serum ETOH: <10. Reports 1-2L wine daily, last drink on 11/10/24. Has prior rehab treatment for withdrawal. Reports hx of withdrawal seizures. SEWS 11. Plan: SEWS monitoring w/ phenobarbital management, high-dose IV thiamine/folic acid supplement.     Pt still c/o headache and poor appetite. On exam, no tremor or tongue fasiculations noted. + anxious. SEWS 0. Plan: continue SEWS monitoring w/ phenobarbital management, IV thiamine/folic acid supplement, telemetry, continuous pulse ox, continue current meds, trend labs, replete electrolytes as needed. Received 910 phenobarbital since admission. D/c telemetry.       ED Treatment-Medication Administration from 11/11/2024 1033 to 11/11/2024 1541         Date/Time Order Dose Route Action     11/11/2024 1229 ondansetron (ZOFRAN) injection 4 mg 4 mg Intravenous Given     11/11/2024 1229 Famotidine (PF) (PEPCID) injection 20 mg 20 mg Intravenous Given     11/11/2024 1229 lactated ringers bolus 1,000 mL 1,000 mL Intravenous New Bag     11/11/2024 1229 thiamine tablet 100 mg 100 mg Oral Given     11/11/2024 1237 LORazepam (ATIVAN) tablet 2 mg 2 mg Oral Given     11/11/2024 1505 LORazepam (ATIVAN) injection 2 mg 2 mg Intravenous Given     11/11/2024 1524 dextrose 5 % and sodium chloride 0.9 % bolus 1,000 mL 1,000 mL Intravenous New Bag         Scheduled Medications:  enoxaparin, 40 mg, Subcutaneous, Daily  escitalopram, 5 mg, Oral, Daily  folic acid 1 mg in sodium chloride 0.9 % 50 mL IVPB, 1 mg, Intravenous, Daily  mirtazapine, 15 mg, Oral,  HS  multivitamin-minerals, 1 tablet, Oral, Daily  [START ON 11/13/2024] naltrexone, 50 mg, Oral, Daily  pantoprazole, 40 mg, Oral, Early Morning  thiamine, 500 mg, Intravenous, Q8H CANDICE    Continuous IV Infusions:  dextrose 5 % and sodium chloride 0.9 %, 100 mL/hr, Intravenous, Continuous    PRN Meds:  acetaminophen, 650 mg, Oral, Q6H PRN 11/12 x1  aluminum-magnesium hydroxide-simethicone, 30 mL, Oral, Q6H PRN  ondansetron, 4 mg, Intravenous, Q6H PRN 11/12 x1, 11/13 x1      phenobarbital, 130 mg, Intravenous;           Medications 11/11 11/12 11/13   phenobarbital in sodium chloride 0.9% 650 mg  Dose: 650 mg  Freq: Once Route: IV  Start: 11/11/24 1615 End: 11/11/24 1635   Admin Instructions:       1605          PHENobarbital injection 130 mg  Dose: 130 mg  Freq: Once Route: IV  Start: 11/12/24 2015 End: 11/12/24 2029   Admin Instructions:        2029         PHENobarbital injection 130 mg  Dose: 130 mg  Freq: Once Route: IV  Start: 11/12/24 1730 End: 11/12/24 1738   Admin Instructions:        1738         PHENobarbital injection 130 mg  Dose: 130 mg  Freq: Once Route: IV  Start: 11/12/24 1045 End: 11/12/24 1053   Admin Instructions:        1053         PHENobarbital injection 130 mg  Dose: 130 mg  Freq: Once Route: IV  Start: 11/12/24 0500 End: 11/12/24 0509   Admin Instructions:        0509         PHENobarbital injection 130 mg  Dose: 130 mg  Freq: Once Route: IV  Start: 11/11/24 2000 End: 11/11/24 2015   Admin Instructions:       2015          PHENobarbital tablet 64.8 mg  Dose: 64.8 mg  Freq: Once Route: PO  Start: 11/13/24 0600 End: 11/13/24 0601   Admin Instructions:         0601              ED Triage Vitals   Temperature Pulse Respirations Blood Pressure SpO2 Pain Score   11/11/24 1059 11/11/24 1059 11/11/24 1059 11/11/24 1059 11/11/24 1059 11/11/24 1330   97.8 °F (36.6 °C) 75 22 147/88 98 % No Pain     Weight (last 2 days)       Date/Time Weight    11/11/24 1532 85.2 (187.83)    11/11/24 1059 85.2 (187.83)               Vital Signs (last 3 days)       Date/Time Temp Pulse Resp BP MAP (mmHg) SpO2 O2 Device Patient Position - Orthostatic VS CIWA-Ar Total SEWS Total Score Pain    11/12/24 1101 -- 73 16 148/86 106 97 % None (Room air) Lying -- -- No Pain    11/12/24 1032 -- -- -- -- -- -- -- -- -- 8 --    11/12/24 0708 98 °F (36.7 °C) 69 16 127/75 92 96 % None (Room air) Lying -- -- --    11/12/24 0509 -- -- -- -- -- -- -- -- -- -- 5    11/12/24 0446 97.1 °F (36.2 °C) 63 16 128/93 104 99 % None (Room air) Lying -- 11 --    11/12/24 0145 -- -- -- -- -- -- -- -- -- 0 --    11/11/24 2306 97.2 °F (36.2 °C) 62 17 124/99 107 100 % None (Room air) Lying -- -- --    11/11/24 2145 -- -- -- -- -- -- -- -- -- 0 --    11/11/24 2045 -- -- -- -- -- -- -- -- -- 0 --    11/11/24 2000 -- -- -- -- -- -- -- -- -- -- No Pain    11/11/24 1953 97.7 °F (36.5 °C) 75 16 136/83 -- 94 % None (Room air) Lying -- 11 --    11/11/24 1907 97.5 °F (36.4 °C) 78 18 144/89 -- 97 % None (Room air) Lying -- -- --    11/11/24 1700 -- -- -- -- -- -- -- -- -- 0 --    11/11/24 1558 -- -- -- -- -- -- -- -- -- 13 --    11/11/24 1549 -- -- -- -- -- -- -- -- -- -- 9    11/11/24 1532 97.7 °F (36.5 °C) 92 18 127/99 108 98 % None (Room air) Lying -- -- --    11/11/24 1440 -- 71 18 166/82 -- 98 % None (Room air) Lying -- -- No Pain    11/11/24 1330 -- 65 18 131/82 -- 98 % None (Room air) Lying -- -- No Pain    11/11/24 1230 -- -- -- -- -- -- -- -- 13 -- --    11/11/24 1059 97.8 °F (36.6 °C) 75 22 147/88 -- 98 % None (Room air) Sitting -- -- --            SEWS:   NURYS    Row Name 11/13/24 0545 11/12/24 2300 11/12/24 2011 11/12/24 1726 11/12/24 1032   Severity of Ethanol Withdrawal Scale (SEWS)   ANXIETY: Do you feel that something bad is about to happen to you right now? 3  -TS 0  -TS 3  -TS 3  -AP 3  -AP   NAUSEA and DRY HEAVES or VOMITING? 3  -TS 0  -TS 3  -TS 3  -AP 3  -AP   SWEATING: (includes moist palms, sweating now)? Score 0 or 2 0  -TS 0  -TS 2  -TS 2  -AP 2  -AP    TREMOR: with arms extended eyes closed? 0  -TS 0  -TS 0  -TS 2  -AP 0  -AP   AGITATION: Fidgety, restless, pacing? 0  -TS 0  -TS 0  -TS 0  -AP 0  -AP   DISORIENTATION: 0  -TS 0  -TS 0  -TS 0  -AP 0  -AP   HALLUCINATIONS: 0  -TS 0  -TS 0  -TS 0  -AP 0  -AP   VITAL SIGNS: ANY (Pulse >110, Diastolic BP >90, Temp >99.6) 0  -TS 0  -TS 0  -TS 0  -AP 0  -AP   SEWS Total Score 6  -TS 0  -TS 8  -TS 10  -AP 8  -AP   Row Name 11/12/24 0446 11/12/24 0145 11/11/24 2145 11/11/24 2045 11/11/24 1953   Severity of Ethanol Withdrawal Scale (SEWS)   ANXIETY: Do you feel that something bad is about to happen to you right now? 3  -KB 0  -KB 0  -KB 0  -KB 3  -KB   NAUSEA and DRY HEAVES or VOMITING? 3  -KB 0  -KB 0  -KB 0  -KB 3  -KB   SWEATING: (includes moist palms, sweating now)? Score 0 or 2 2  -KB 0  -KB 0  -KB 0  -KB 2  -KB   TREMOR: with arms extended eyes closed? 0  -KB 0  -KB 0  -KB 0  -KB 0  -KB   AGITATION: Fidgety, restless, pacing? 0  -KB 0  -KB 0  -KB 0  -KB 3  -KB   DISORIENTATION: 0  -KB 0  -KB 0  -KB 0  -KB 0  -KB   HALLUCINATIONS: 0  -KB 0  -KB 0  -KB 0  -KB 0  -KB   VITAL SIGNS: ANY (Pulse >110, Diastolic BP >90, Temp >99.6) 3  -KB 0  sleeping  -KB 0  sleeping  -KB 0  sleeping  -KB 0  -KB   SEWS Total Score 11  -KB 0  -KB 0  -KB 0  -KB 11  -KB   Mary Agitation Sedation Scale (RASS)   Mary Agitation Sedation Scale (RASS) 0  -KB -1  -KB -1  -KB -1  -KB +1  -KB     Row Name 11/11/24 1700 11/11/24 1558         Pertinent Labs/Diagnostic Test Results:   Cardiology:  ECG 12 lead   Final Result by Abram Snell MD (11/11 1718)   Normal sinus rhythm with sinus arrhythmia   Normal ECG   When compared with ECG of 25-Aug-2024 08:52,   Sinus rhythm is no longer with 2nd degree A-V block ([place]obitz I)   Confirmed by Abram Snell ([id number]) on 11/11/2024 5:18:04 PM          Results from last 7 days   Lab Units 11/12/24  0453 11/11/24  1226   WBC Thousand/uL 7.20 15.72*   HEMOGLOBIN g/dL 13.2 16.5   HEMATOCRIT % 37.3 47.7    PLATELETS Thousands/uL 279 412*   TOTAL NEUT ABS Thousands/µL  --  13.24*         Results from last 7 days   Lab Units 11/12/24  0453 11/11/24  1456 11/11/24  1420 11/11/24  1226   SODIUM mmol/L 137 134* 133* 136   POTASSIUM mmol/L 3.5 4.3 5.0 6.2*   CHLORIDE mmol/L 100 98 98 94*   CO2 mmol/L 27 24 19* 28   ANION GAP mmol/L 10 12 16* 14*   BUN mg/dL 18 15 14 15   CREATININE mg/dL 0.93 0.83 0.86 1.06   EGFR ml/min/1.73sq m 106 114 113 91   CALCIUM mg/dL 8.7 9.5 9.7 10.8*   MAGNESIUM mg/dL 2.1  --   --  2.0     Results from last 7 days   Lab Units 11/12/24  0453 11/11/24  1456 11/11/24  1226   AST U/L 18 20 28   ALT U/L 18 22 29   ALK PHOS U/L 70 86 96   TOTAL PROTEIN g/dL 6.3* 7.3 8.7*   ALBUMIN g/dL 4.0 4.6 5.5*   TOTAL BILIRUBIN mg/dL 1.28* 1.71* 1.85*     Results from last 7 days   Lab Units 11/12/24  0453 11/11/24  1456 11/11/24  1420 11/11/24  1226   GLUCOSE RANDOM mg/dL 87 104 95 112             Results from last 7 days   Lab Units 11/11/24  1226   LIPASE u/L 19           Results from last 7 days   Lab Units 11/11/24  1226   ETHANOL LVL mg/dL <10               Past Medical History:   Diagnosis Date    Alcohol use disorder     Alcohol withdrawal syndrome (HCC)     Anxiety     Depression     PTSD (post-traumatic stress disorder)     Tachycardia      Present on Admission:   Alcohol use disorder, severe, dependence (HCC)   Alcohol withdrawal syndrome with complication (HCC)   Alcoholic gastritis   (Resolved) Leukocytosis   Unspecified mood (affective) disorder (HCC)      Admitting Diagnosis: Hyperkalemia [E87.5]  Alcohol withdrawal hallucinosis (HCC) [F10.932]  Alcohol withdrawal syndrome with complication (HCC) [F10.939]  Alcohol use disorder [F10.90]  Admitted to substance misuse detoxification center [Z78.9]  Age/Sex: 34 y.o. male    Network Utilization Review Department  ATTENTION: Please call with any questions or concerns to [phone] and carefully listen to the prompts so that you are directed to the  right person. All voicemails are confidential.   For Discharge needs, contact Care Management DC Support Team at [phone] opt. [phone]  Send all requests for admission clinical reviews, approved or denied determinations and any other requests to dedicated fax number below belonging to the campus where the patient is receiving treatment. List of dedicated fax numbers for the Facilities:  FACILITY NAME UR FAX NUMBER   ADMISSION DENIALS (Administrative/Medical Necessity) [phone]   DISCHARGE SUPPORT TEAM (NETWORK) [phone]   PARENT CHILD HEALTH (Maternity/NICU/Pediatrics) [phone]   [facility] [phone]   [facility] [phone]   [facility] [phone]   [facility] [phone]   [facility] [phone]   General acute hospital [phone]   General acute hospital [phone]   [facility] [phone]   [facility] [phone]   [facility] [phone]   [facility] [phone]   [facility] [phone]

## 2024-11-12 NOTE — ASSESSMENT & PLAN NOTE
Last drink: 11/10/24   Ethanol lvl: < 10   Received 4 mg of ativan prior to admission   [facility] symptom-triggered [facility]obarbital for medically-assisted alcohol withdrawal  Patient received a total of 910 mg as of 11/12 0900  Current symptoms include anxiety, diaphoresis  Telemetry and pulse oximetry monitoring   Continue to monitor vitals, symptoms

## 2024-11-12 NOTE — ASSESSMENT & PLAN NOTE
Pt with a h/o chronic heavy alcohol use; multiple admissions to the medical detox unit.   Drinks 1-2L of wine daily  Denies H/o withdrawal seizures  Previously on naltrexone at this time; contemplating restarting   Withdrawal management as above  Continue IVFs, HD thiamine, folic acid, and MVI  Consult case management/CRS for assistance with aftercare resources - pt interested in outpatient resources at this time

## 2024-11-12 NOTE — ASSESSMENT & PLAN NOTE
Pt with a h/o chronic heavy alcohol use; multiple admissions to the medical detox unit.   Drinks 1-2L of wine daily  Denies H/o withdrawal seizures  Previously on naltrexone at this time.  Took for 1 day after 9/2024 discharge.  Interested in vivitrol prior to discharge.  Withdrawal management as above  Continue IVFs, HD thiamine, folic acid, and MVI  Consult case management/CRS for assistance with aftercare resources - pt interested in outpatient resources at this time.  Would like inpatient after insurance/medical assistance is set up.

## 2024-11-12 NOTE — CONSULTS
Consultation - Medical Toxicology   Name: Juan Luu 34 y.o. male I MRN: [id number]  Unit/Bed#: 5T DETOX 505-01 I Date of Admission: 11/11/2024   Date of Service: 11/12/2024 I Hospital Day: 0   Inpatient consult to Toxicology  Consult performed by: Odin Green MD  Consult ordered by: Kathleen Greenberg PA-C        Physician Requesting Evaluation: Luis King MD   Reason for Evaluation / Principal Problem: AUD/withdrawal management    Assessment & Plan  Alcoholic ketoacidosis (Resolved: 11/12/2024)  CMP on admission revealed anion gap 18, CO2 19  Likely 2/2 AKA and/or starvation ketosis  Initiate IVF hydration with D5 NS + high dose IV thiamine  Continue monitoring CMP for closure of anion gap and adjust IVFs accordingly  Encourage alcohol cessation  Alcohol withdrawal syndrome with complication (HCC)  Last drink: 11/10/24   Ethanol lvl: < 10   Received 4 mg of ativan prior to admission   Continue SEWS protocol with symptom-triggered phenobarbital for medically-assisted alcohol withdrawal  Patient received a total of 910 mg as of 11/12 0900  Current symptoms include anxiety, diaphoresis  Telemetry and pulse oximetry monitoring   Continue to monitor vitals, symptoms      Alcohol use disorder, severe, dependence (HCC)  Pt with a h/o chronic heavy alcohol use; multiple admissions to the medical detox unit.   Drinks 1-2L of wine daily  Denies H/o withdrawal seizures  Previously on naltrexone at this time.  Took for 1 day after 9/2024 discharge.  Interested in vivitrol prior to discharge.  Withdrawal management as above  Continue IVFs, HD thiamine, folic acid, and MVI  Consult case management/CRS for assistance with aftercare resources - pt interested in outpatient resources at this time.  Would like inpatient after insurance/medical assistance is set up.  Unspecified mood (affective) disorder (HCC)    Alcoholic gastritis    Alcohol withdrawal hallucinosis (HCC)  In the setting of alcohol  "withdrawal   Initiate HD thiamine received 3/9 doses   Continue SEWS protocol   Electrolyte abnormality          For further questions, please contact the medical  on call via SecureChat between 8am and 9pm. If between 9pm and 8am, please reach out to the Poison Center at [phone].     History of Present Illness   Juan Luu is a 34 y.o. male with a PMH of anxiety and alcohol use disorder who presents with alcohol withdrawal seeking detoxification. Juan was last treated here in  detox at the end of Sept 2024.  He reports being discharged home with IOP treatment.  He was given oral naltrexone on discharge, but only took it for 1-2 days because \"I knew I was going to drink.\"  He reports relapsing approximately 2 weeks ago.    He presented to the [facility] ED requesting alcohol detox with sx of anxiety, tremors, and hallucinations. Per ED documentation patient started hallucinating that there were people playing football in his room. He was able to verbalize that he is aware that they are hallucinations.  His admission labs also were consistent with alcoholic ketoacidosis and he was subsequently transferred to the [facility] medical detox unit for medical management of alcohol withdrawal.      Pt reports drinking 1-2L of wine daily, which started shortly after his recent alcohol withdrawal admission in September 2024. His last drink was 11/11. Serum alcohol was <10  in the ED. Endorses triggers for alcohol use including work stressors. Denies any headache, chest pain, shortness of breath or abdominal pain. Patient reports interest in outpatient resources. He is open to restarting naltrexone, and would like vivitrol shot prior to dischage f possible.    Review of Systems   Constitutional:  Positive for activity change.   Gastrointestinal:  Positive for nausea.   Neurological:  Negative for tremors.   Psychiatric/Behavioral:  The patient is nervous/anxious.          Social History     Tobacco Use    " Smoking status: Never     Passive exposure: Past    Smokeless tobacco: Never   Vaping Use    Vaping status: Never Used   Substance and Sexual Activity    Alcohol use: Yes     Comment: 1-1.5 L vodka or beer daily    Drug use: Never    Sexual activity: Not Currently     History reviewed. No pertinent family history.    Meds/Allergies   Prior to Admission medications    Medication Sig Start Date End Date Taking? Authorizing Provider   naltrexone (REVIA) 50 mg tablet Take 1 tablet (50 mg total) by mouth daily 9/21/24  Yes Kathleen Greenberg PA-C   escitalopram (LEXAPRO) 5 mg tablet Take 1 tablet (5 mg total) by mouth daily 9/21/24 10/21/24  Kathleen Greenberg PA-C   gabapentin (NEURONTIN) 300 mg capsule Take 1 capsule (300 mg total) by mouth every 8 (eight) hours as needed (anxiety) for up to 14 days 9/21/24 10/5/24  Kathleen Greenberg PA-C   mirtazapine (REMERON) 7.5 MG tablet Take 1 tablet (7.5 mg total) by mouth daily at bedtime 9/21/24 10/21/24  Kathleen Greenberg PA-C     Current Facility-Administered Medications:     acetaminophen (TYLENOL) tablet 650 mg, 650 mg, Oral, Q6H PRN, Kathleen Greenberg PA-C, 650 mg at 11/12/24 0509    aluminum-magnesium hydroxide-simethicone (MAALOX) oral suspension 30 mL, 30 mL, Oral, Q6H PRN, Kathleen Greenberg PA-C    dextrose 5 % and sodium chloride 0.9 % infusion, 100 mL/hr, Intravenous, Continuous, Kathleen Greenberg PA-C, Last Rate: 100 mL/hr at 11/12/24 0448, 100 mL/hr at 11/12/24 0448    enoxaparin (LOVENOX) subcutaneous injection 40 mg, 40 mg, Subcutaneous, Daily, Kathleen Greenberg PA-C    escitalopram (LEXAPRO) tablet 5 mg, 5 mg, Oral, Daily, Kathleen Greenberg PA-C, 5 mg at 11/11/24 1605    folic acid 1 mg in sodium chloride 0.9 % 50 mL IVPB, 1 mg, Intravenous, Daily, Kathleen Greenberg PA-C, Last Rate: 100 mL/hr at 11/11/24 1635, 1 mg at 11/11/24 1635    mirtazapine (REMERON) tablet 15 mg, 15 mg, Oral, HS, Kathleen Greenberg,  PA-C, 15 mg at 11/11/24 2138    multivitamin-minerals (CENTRUM) tablet 1 tablet, 1 tablet, Oral, Daily, Kathleen Batista GRACIELA Greenberg-C, 1 tablet at 11/11/24 1605    ondansetron (ZOFRAN) injection 4 mg, 4 mg, Intravenous, Q6H PRN, Kathleen Batista Yari PA-C    pantoprazole (PROTONIX) EC tablet 40 mg, 40 mg, Oral, Early Morning, Kathleen Batista GRACIELA Greenberg-C, 40 mg at 11/12/24 0509    thiamine (VITAMIN B1) 500 mg in sodium chloride 0.9 % 50 mL IVPB, 500 mg, Intravenous, Q8H CANDICE, Kathleen Pariseber Greenberg PA-C, Last Rate: 100 mL/hr at 11/12/24 0509, 500 mg at 11/12/24 0509   No Known Allergies    Objective :  Temp:  [97.1 °F (36.2 °C)-98 °F (36.7 °C)] 98 °F (36.7 °C)  HR:  [62-92] 69  BP: (124-166)/(75-99) 127/75  Resp:  [16-22] 16  SpO2:  [94 %-100 %] 96 %  O2 Device: None (Room air)      Intake/Output Summary (Last 24 hours) at 11/12/2024 0934  Last data filed at 11/11/2024 1438  Gross per 24 hour   Intake 1000 ml   Output --   Net 1000 ml       Physical Exam  Vitals and nursing note reviewed.   Constitutional:       Appearance: Normal appearance. He is normal weight.   HENT:      Mouth/Throat:      Mouth: Mucous membranes are moist.      Pharynx: Oropharynx is clear.   Eyes:      Extraocular Movements: Extraocular movements intact.      Conjunctiva/sclera: Conjunctivae normal.      Pupils: Pupils are equal, round, and reactive to light.   Cardiovascular:      Rate and Rhythm: Normal rate and regular rhythm.      Pulses: Normal pulses.      Heart sounds: Normal heart sounds.   Pulmonary:      Effort: Pulmonary effort is normal.      Breath sounds: Normal breath sounds.   Abdominal:      General: Abdomen is flat. Bowel sounds are normal.      Palpations: Abdomen is soft.   Neurological:      General: No focal deficit present.      Mental Status: He is alert and oriented to person, place, and time. Mental status is at baseline.      Comments: No tremor or tongue fasiculations present           Lab Results: I have reviewed the  following results:  Results from last 7 days   Lab Units 11/12/24  0453 11/11/24  1226   WBC Thousand/uL 7.20 15.72*   HEMOGLOBIN g/dL 13.2 16.5   HEMATOCRIT % 37.3 47.7   PLATELETS Thousands/uL 279 412*   SEGS PCT %  --  84*   LYMPHO PCT %  --  10*   MONO PCT %  --  5   EOS PCT %  --  0      Results from last 7 days   Lab Units 11/12/24  0453   POTASSIUM mmol/L 3.5   CHLORIDE mmol/L 100   CO2 mmol/L 27   BUN mg/dL 18   CREATININE mg/dL 0.93   CALCIUM mg/dL 8.7   ALBUMIN g/dL 4.0   ALK PHOS U/L 70   ALT U/L 18   AST U/L 18   MAGNESIUM mg/dL 2.1                       Results from last 7 days   Lab Units 11/11/24  1226   ETHANOL LVL mg/dL <10           Administrative Statements

## 2024-11-12 NOTE — ASSESSMENT & PLAN NOTE
Last drink: 11/10/24   Ethanol lvl: < 10   Received 4 mg of ativan prior to admission   Consult to toxicology; appreciate recommendations   Continue SEWS protocol with symptom-triggered phenobarbital for medically-assisted alcohol withdrawal  Patient received a total of 910 mg   Current symptoms include anxiety, diaphoresis, tremors   Telemetry and pulse oximetry monitoring   Continue to monitor vitals, symptoms

## 2024-11-12 NOTE — ASSESSMENT & PLAN NOTE
In the setting of alcohol withdrawal   Initiate HD thiamine received 3/9 doses   Continue SEWS protocol

## 2024-11-12 NOTE — PROGRESS NOTES
Progress Note - Hospitalist   Name: Juan Luu 34 y.o. male I MRN: [id number]  Unit/Bed#: 5T [facility] 505-01 I Date of Admission: 11/11/2024   Date of Service: 11/12/2024 I Hospital Day: 0    Assessment & Plan  Alcoholic ketoacidosis (Resolved: 11/12/2024)  CMP on admission revealed anion gap 18, CO2 19  Likely 2/2 AKA and/or starvation ketosis  Initiate IVF hydration with D5 NS + high dose IV thiamine  Continue monitoring CMP for closure of anion gap and adjust IVFs accordingly  Encourage alcohol cessation   Alcohol withdrawal syndrome with complication (HCC)  Last drink: 11/10/24   Ethanol lvl: < 10   Received 4 mg of ativan prior to admission   Consult to toxicology; appreciate recommendations   Continue SEWS protocol with symptom-triggered phenobarbital for medically-assisted alcohol withdrawal  Patient received a total of 910 mg   Current symptoms include anxiety, diaphoresis, tremors   Telemetry and pulse oximetry monitoring   Continue to monitor vitals, symptoms      Alcohol use disorder, severe, dependence (HCC)  Pt with a h/o chronic heavy alcohol use; multiple admissions to the medical detox unit.   Drinks 1-2L of wine daily  Denies H/o withdrawal seizures  Previously on naltrexone at this time; contemplating restarting   Withdrawal management as above  Continue IVFs, HD thiamine, folic acid, and MVI  Consult case management/CRS for assistance with aftercare resources - pt interested in outpatient resources at this time   Unspecified mood (affective) disorder (HCC)  Previously on Remeron 7.5 mg  and Lexapro 10mg   Denies SI/HI  Continue medication     Alcoholic gastritis  Poor PO intake in the setting of chronic alcohol use  Previously on PPI and carafate  Will restart medication   Denies any hematemesis   Outpatient referral to GI upon discharge   Leukocytosis (Resolved: 11/12/2024)  WBC 15.72   Currently afebrile, likely secondary to dehydration or systemic reaction from withdrawal   Will  continue to monitor for infectious etiologies   Alcohol withdrawal hallucinosis (HCC)  In the setting of alcohol withdrawal   Initiate HD thiamine received 3/9 doses   Continue SEWS protocol     VTE Pharmacologic Prophylaxis:   Moderate Risk (Score 3-4) - Pharmacological DVT Prophylaxis Ordered: enoxaparin (Lovenox).    Mobility:   Basic Mobility Inpatient Raw Score: 24  JH-HLM Goal: 8: Walk 250 feet or more  JH-HLM Achieved: 7: Walk 25 feet or more  JH-HLM Goal achieved. Continue to encourage appropriate mobility.    Patient Centered Rounds: I performed bedside rounds with nursing staff today.   Discussions with Specialists or Other Care Team Provider: Case Management     Education and Discussions with Family / Patient: Patient declined call to .     Current Length of Stay: 0 day(s)  Current Patient Status: Inpatient   Certification Statement: The patient will continue to require additional inpatient hospital stay due to alcohol withdrawal on SEWS protocol   Discharge Plan: Anticipate discharge in 24-48 hrs to home.    Code Status: Level 1 - Full Code    Subjective   Patient seen and examined at bedside. Reports improvement in withdrawal symptoms however is still experiencing headache and poor appetite.     Objective :  Temp:  [97.1 °F (36.2 °C)-98 °F (36.7 °C)] 98 °F (36.7 °C)  HR:  [62-92] 69  BP: (124-166)/(75-99) 127/75  Resp:  [16-22] 16  SpO2:  [94 %-100 %] 96 %  O2 Device: None (Room air)    Body mass index is 25.47 kg/m².     Input and Output Summary (last 24 hours):     Intake/Output Summary (Last 24 hours) at 11/12/2024 0928  Last data filed at 11/11/2024 1438  Gross per 24 hour   Intake 1000 ml   Output --   Net 1000 ml       Physical Exam  Vitals reviewed.   Constitutional:       General: He is not in acute distress.     Appearance: He is not diaphoretic.   Eyes:      General: No scleral icterus.     Pupils: Pupils are equal, round, and reactive to light.   Cardiovascular:      Rate and  Rhythm: Normal rate and regular rhythm.      Heart sounds: Normal heart sounds. No murmur heard.  Pulmonary:      Effort: No respiratory distress.      Breath sounds: Normal breath sounds.   Abdominal:      General: Bowel sounds are normal. There is no distension.      Palpations: Abdomen is soft.   Neurological:      Mental Status: He is alert and oriented to person, place, and time.   Psychiatric:         Mood and Affect: Mood is anxious. Mood is not depressed.           Lines/Drains:        Telemetry:  Telemetry Orders (From admission, onward)               24 Hour Telemetry Monitoring  Continuous x 24 Hours (Telem)        Question:  Reason for 24 Hour Telemetry  Answer:  Alcohol withdrawal and CIWA >7, electrolyte abnormalities, abnormal ECG and/or heart disease                     Telemetry Reviewed: Normal Sinus Rhythm  Indication for Continued Telemetry Use: No indication for continued use. Will discontinue.                Lab Results: I have reviewed the following results:   Results from last 7 days   Lab Units 11/12/24  0453 11/11/24  1226   WBC Thousand/uL 7.20 15.72*   HEMOGLOBIN g/dL 13.2 16.5   HEMATOCRIT % 37.3 47.7   PLATELETS Thousands/uL 279 412*   SEGS PCT %  --  84*   LYMPHO PCT %  --  10*   MONO PCT %  --  5   EOS PCT %  --  0     Results from last 7 days   Lab Units 11/12/24  0453   SODIUM mmol/L 137   POTASSIUM mmol/L 3.5   CHLORIDE mmol/L 100   CO2 mmol/L 27   BUN mg/dL 18   CREATININE mg/dL 0.93   ANION GAP mmol/L 10   CALCIUM mg/dL 8.7   ALBUMIN g/dL 4.0   TOTAL BILIRUBIN mg/dL 1.28*   ALK PHOS U/L 70   ALT U/L 18   AST U/L 18   GLUCOSE RANDOM mg/dL 87                       Recent Cultures (last 7 days):         Imaging Results Review: No pertinent imaging studies reviewed.  Other Study Results Review: No additional pertinent studies reviewed.    Last 24 Hours Medication List:     Current Facility-Administered Medications:     acetaminophen (TYLENOL) tablet 650 mg, Q6H PRN     aluminum-magnesium hydroxide-simethicone (MAALOX) oral suspension 30 mL, Q6H PRN    dextrose 5 % and sodium chloride 0.9 % infusion, Continuous, Last Rate: 100 mL/hr (11/12/24 0448)    enoxaparin (LOVENOX) subcutaneous injection 40 mg, Daily    escitalopram (LEXAPRO) tablet 5 mg, Daily    folic acid 1 mg in sodium chloride 0.9 % 50 mL IVPB, Daily, Last Rate: 1 mg (11/11/24 1635)    mirtazapine (REMERON) tablet 15 mg, HS    multivitamin-minerals (CENTRUM) tablet 1 tablet, Daily    ondansetron (ZOFRAN) injection 4 mg, Q6H PRN    pantoprazole (PROTONIX) EC tablet 40 mg, Early Morning    thiamine (VITAMIN B1) 500 mg in sodium chloride 0.9 % 50 mL IVPB, Q8H CANDICE, Last Rate: 500 mg (11/12/24 0500)    Administrative Statements   Today, Patient Was Seen By: Kathleen Greenberg PA-C      **Please Note: This note may have been constructed using a voice recognition system.**

## 2024-11-13 PROBLEM — F10.932 ALCOHOL WITHDRAWAL HALLUCINOSIS (HCC): Status: RESOLVED | Noted: 2024-11-11 | Resolved: 2024-11-13

## 2024-11-13 LAB
ALBUMIN SERPL BCG-MCNC: 3.7 G/DL (ref 3.5–5)
ALP SERPL-CCNC: 72 U/L (ref 34–104)
ALT SERPL W P-5'-P-CCNC: 16 U/L (ref 7–52)
ANION GAP SERPL CALCULATED.3IONS-SCNC: 8 MMOL/L (ref 4–13)
AST SERPL W P-5'-P-CCNC: 16 U/L (ref 13–39)
BILIRUB SERPL-MCNC: 0.35 MG/DL (ref 0.2–1)
BUN SERPL-MCNC: 10 MG/DL (ref 5–25)
CALCIUM SERPL-MCNC: 8.1 MG/DL (ref 8.4–10.2)
CHLORIDE SERPL-SCNC: 108 MMOL/L (ref 96–108)
CO2 SERPL-SCNC: 24 MMOL/L (ref 21–32)
CREAT SERPL-MCNC: 0.77 MG/DL (ref 0.6–1.3)
ERYTHROCYTE [DISTWIDTH] IN BLOOD BY AUTOMATED COUNT: 13.8 % (ref 11.6–15.1)
GFR SERPL CREATININE-BSD FRML MDRD: 118 ML/MIN/1.73SQ M
GLUCOSE SERPL-MCNC: 96 MG/DL (ref 65–140)
HCT VFR BLD AUTO: 35.2 % (ref 36.5–49.3)
HGB BLD-MCNC: 12.4 G/DL (ref 12–17)
MAGNESIUM SERPL-MCNC: 1.9 MG/DL (ref 1.9–2.7)
MCH RBC QN AUTO: 30.2 PG (ref 26.8–34.3)
MCHC RBC AUTO-ENTMCNC: 35.2 G/DL (ref 31.4–37.4)
MCV RBC AUTO: 86 FL (ref 82–98)
PLATELET # BLD AUTO: 225 THOUSANDS/UL (ref 149–390)
PMV BLD AUTO: 9.1 FL (ref 8.9–12.7)
POTASSIUM SERPL-SCNC: 3.6 MMOL/L (ref 3.5–5.3)
PROT SERPL-MCNC: 5.9 G/DL (ref 6.4–8.4)
RBC # BLD AUTO: 4.11 MILLION/UL (ref 3.88–5.62)
SODIUM SERPL-SCNC: 140 MMOL/L (ref 135–147)
WBC # BLD AUTO: 6.06 THOUSAND/UL (ref 4.31–10.16)

## 2024-11-13 PROCEDURE — 99232 SBSQ HOSP IP/OBS MODERATE 35: CPT

## 2024-11-13 PROCEDURE — 85027 COMPLETE CBC AUTOMATED: CPT | Performed by: PHYSICIAN ASSISTANT

## 2024-11-13 PROCEDURE — 80053 COMPREHEN METABOLIC PANEL: CPT | Performed by: PHYSICIAN ASSISTANT

## 2024-11-13 PROCEDURE — 99232 SBSQ HOSP IP/OBS MODERATE 35: CPT | Performed by: PHYSICIAN ASSISTANT

## 2024-11-13 PROCEDURE — 83735 ASSAY OF MAGNESIUM: CPT | Performed by: PHYSICIAN ASSISTANT

## 2024-11-13 RX ORDER — PHENOBARBITAL SODIUM 130 MG/ML
130 INJECTION, SOLUTION INTRAMUSCULAR; INTRAVENOUS ONCE
Status: DISCONTINUED | OUTPATIENT
Start: 2024-11-13 | End: 2024-11-14 | Stop reason: HOSPADM

## 2024-11-13 RX ORDER — GABAPENTIN 300 MG/1
300 CAPSULE ORAL 3 TIMES DAILY
Status: DISCONTINUED | OUTPATIENT
Start: 2024-11-13 | End: 2024-11-14 | Stop reason: HOSPADM

## 2024-11-13 RX ORDER — FOLIC ACID 1 MG/1
1 TABLET ORAL DAILY
Status: DISCONTINUED | OUTPATIENT
Start: 2024-11-14 | End: 2024-11-14 | Stop reason: HOSPADM

## 2024-11-13 RX ORDER — PHENOBARBITAL 64.8 MG/1
64.8 TABLET ORAL ONCE
Status: COMPLETED | OUTPATIENT
Start: 2024-11-13 | End: 2024-11-13

## 2024-11-13 RX ORDER — LANOLIN ALCOHOL/MO/W.PET/CERES
100 CREAM (GRAM) TOPICAL DAILY
Status: DISCONTINUED | OUTPATIENT
Start: 2024-11-14 | End: 2024-11-14 | Stop reason: HOSPADM

## 2024-11-13 RX ADMIN — MIRTAZAPINE 15 MG: 15 TABLET, FILM COATED ORAL at 21:13

## 2024-11-13 RX ADMIN — ONDANSETRON 4 MG: 2 INJECTION INTRAMUSCULAR; INTRAVENOUS at 06:01

## 2024-11-13 RX ADMIN — MULTIPLE VITAMINS W/ MINERALS TAB 1 TABLET: TAB ORAL at 08:45

## 2024-11-13 RX ADMIN — GABAPENTIN 300 MG: 300 CAPSULE ORAL at 11:10

## 2024-11-13 RX ADMIN — ESCITALOPRAM OXALATE 5 MG: 5 TABLET, FILM COATED ORAL at 08:45

## 2024-11-13 RX ADMIN — HYDROXYZINE HYDROCHLORIDE 50 MG: 50 TABLET, FILM COATED ORAL at 09:14

## 2024-11-13 RX ADMIN — PHENOBARBITAL 64.8 MG: 64.8 TABLET ORAL at 06:01

## 2024-11-13 RX ADMIN — FOLIC ACID 1 MG: 5 INJECTION, SOLUTION INTRAMUSCULAR; INTRAVENOUS; SUBCUTANEOUS at 08:45

## 2024-11-13 RX ADMIN — PANTOPRAZOLE SODIUM 40 MG: 40 TABLET, DELAYED RELEASE ORAL at 05:39

## 2024-11-13 RX ADMIN — THIAMINE HYDROCHLORIDE 500 MG: 100 INJECTION, SOLUTION INTRAMUSCULAR; INTRAVENOUS at 05:40

## 2024-11-13 RX ADMIN — GABAPENTIN 300 MG: 300 CAPSULE ORAL at 21:13

## 2024-11-13 RX ADMIN — ACETAMINOPHEN 650 MG: 325 TABLET ORAL at 15:59

## 2024-11-13 RX ADMIN — DEXTROSE AND SODIUM CHLORIDE 100 ML/HR: 5; .9 INJECTION, SOLUTION INTRAVENOUS at 03:04

## 2024-11-13 RX ADMIN — GABAPENTIN 300 MG: 300 CAPSULE ORAL at 15:43

## 2024-11-13 RX ADMIN — HYDROXYZINE HYDROCHLORIDE 50 MG: 50 TABLET, FILM COATED ORAL at 17:59

## 2024-11-13 NOTE — PROGRESS NOTES
Progress Note - Hospitalist   Name: Juan Luu 34 y.o. male I MRN: [id number]  Unit/Bed#: 5T [facility] 505-01 I Date of Admission: 11/11/2024   Date of Service: 11/13/2024 I Hospital Day: 1    Assessment & Plan  Alcohol withdrawal syndrome with complication (HCC)  Last drink: 11/10/24   Ethanol lvl: < 10   Received 4 mg of ativan prior to admission   Consult to toxicology; discontinue SEWS protocol on 11/13/24   Patient received a total of 1365 mg   Telemetry and pulse oximetry monitoring   Continue to monitor vitals, symptoms      Alcohol use disorder, severe, dependence (HCC)  Pt with a h/o chronic heavy alcohol use; multiple admissions to the medical detox unit.   Drinks 1-2L of wine daily  Denies H/o withdrawal seizures  Previously on naltrexone at this time; contemplating restarting   Withdrawal management as above  Discontinue IVFs   Transition to PO thiamine and folic acid   Patient reports that he is interested in Vivitrol injection upon discharge.   Consult case management/CRS for assistance with aftercare resources - pt interested in outpatient resources at this time   Unspecified mood (affective) disorder (HCC)  Previously on Remeron 7.5 mg  and Lexapro 10mg   Denies SI/HI  Continue medication     Alcoholic gastritis  Poor PO intake in the setting of chronic alcohol use  Previously on PPI and carafate  Will restart medication   Denies any hematemesis   Outpatient referral to GI upon discharge - CM to assist   Alcohol withdrawal hallucinosis (HCC) (Resolved: 11/13/2024)  In the setting of alcohol withdrawal   Denies further episodes of hallucinations   Continue SEWS protocol     VTE Pharmacologic Prophylaxis:   Moderate Risk (Score 3-4) - Pharmacological DVT Prophylaxis Ordered: enoxaparin (Lovenox).    Mobility:   Basic Mobility Inpatient Raw Score: 24  JH-HLM Goal: 8: Walk 250 feet or more  JH-HLM Achieved: 7: Walk 25 feet or more  JH-HLM Goal achieved. Continue to encourage appropriate  mobility.    Patient Centered Rounds: I performed bedside rounds with nursing staff today.   Discussions with Specialists or Other Care Team Provider: Case Management; Toxicologyt     Education and Discussions with Family / Patient: Patient declined call to .     Current Length of Stay: 1 day(s)  Current Patient Status: Inpatient   Certification Statement: The patient will continue to require additional inpatient hospital stay due to alcohol withdrawal  Discharge Plan: Anticipate discharge tomorrow to home.    Code Status: Level 1 - Full Code    Subjective   Patient seen and examined at bedside. Endorsing increased anxiety and nausea. Tolerating diet.  Discussed with toxicology and will monitor off of protocol.  Patient is interested in vivitrol injection upon discharge.      Objective :  Temp:  [97.5 °F (36.4 °C)-98.1 °F (36.7 °C)] 97.7 °F (36.5 °C)  HR:  [59-76] 67  BP: (113-133)/(62-89) 131/77  Resp:  [10-18] 18  SpO2:  [97 %-98 %] 97 %  O2 Device: None (Room air)    Body mass index is 25.47 kg/m².     Input and Output Summary (last 24 hours):     Intake/Output Summary (Last 24 hours) at 11/13/2024 1321  Last data filed at 11/13/2024 0902  Gross per 24 hour   Intake 240 ml   Output --   Net 240 ml       Physical Exam  Vitals and nursing note reviewed.   Constitutional:       General: He is not in acute distress.     Appearance: He is not diaphoretic.   Eyes:      General: No scleral icterus.     Pupils: Pupils are equal, round, and reactive to light.   Cardiovascular:      Rate and Rhythm: Normal rate and regular rhythm.      Heart sounds: No murmur heard.  Pulmonary:      Effort: No respiratory distress.      Breath sounds: Normal breath sounds.   Abdominal:      General: There is no distension.      Palpations: Abdomen is soft.   Neurological:      Mental Status: He is alert and oriented to person, place, and time.   Psychiatric:         Mood and Affect: Mood is anxious. Mood is not depressed.            Lines/Drains:              Lab Results: I have reviewed the following results:   Results from last 7 days   Lab Units 11/13/24  0452 11/12/24  0453 11/11/24  1226   WBC Thousand/uL 6.06   < > 15.72*   HEMOGLOBIN g/dL 12.4   < > 16.5   HEMATOCRIT % 35.2*   < > 47.7   PLATELETS Thousands/uL 225   < > 412*   SEGS PCT %  --   --  84*   LYMPHO PCT %  --   --  10*   MONO PCT %  --   --  5   EOS PCT %  --   --  0    < > = values in this interval not displayed.     Results from last 7 days   Lab Units 11/13/24  0452   SODIUM mmol/L 140   POTASSIUM mmol/L 3.6   CHLORIDE mmol/L 108   CO2 mmol/L 24   BUN mg/dL 10   CREATININE mg/dL 0.77   ANION GAP mmol/L 8   CALCIUM mg/dL 8.1*   ALBUMIN g/dL 3.7   TOTAL BILIRUBIN mg/dL 0.35   ALK PHOS U/L 72   ALT U/L 16   AST U/L 16   GLUCOSE RANDOM mg/dL 96                       Recent Cultures (last 7 days):         Imaging Results Review: No pertinent imaging studies reviewed.  Other Study Results Review: No additional pertinent studies reviewed.    Last 24 Hours Medication List:     Current Facility-Administered Medications:     acetaminophen (TYLENOL) tablet 650 mg, Q6H PRN    aluminum-magnesium hydroxide-simethicone (MAALOX) oral suspension 30 mL, Q6H PRN    dextrose 5 % and sodium chloride 0.9 % infusion, Continuous, Last Rate: 100 mL/hr (11/13/24 0304)    enoxaparin (LOVENOX) subcutaneous injection 40 mg, Daily    escitalopram (LEXAPRO) tablet 5 mg, Daily    folic acid 1 mg in sodium chloride 0.9 % 50 mL IVPB, Daily, Last Rate: 1 mg (11/13/24 0845)    gabapentin (NEURONTIN) capsule 300 mg, TID    hydrOXYzine HCL (ATARAX) tablet 50 mg, Q6H PRN    mirtazapine (REMERON) tablet 15 mg, HS    multivitamin-minerals (CENTRUM) tablet 1 tablet, Daily    naltrexone (REVIA) tablet 50 mg, Daily    ondansetron (ZOFRAN) injection 4 mg, Q6H PRN    pantoprazole (PROTONIX) EC tablet 40 mg, Early Morning    PHENobarbital injection 130 mg, Once    thiamine (VITAMIN B1) 500 mg in sodium  chloride 0.9 % 50 mL IVPB, Q8H CANDICE, Last Rate: 500 mg (11/13/24 0098)    Administrative Statements   Today, Patient Was Seen By: Kathleen Greenberg PA-C      **Please Note: This note may have been constructed using a voice recognition system.**

## 2024-11-13 NOTE — PROGRESS NOTES
Progress Note - Medical Toxicology   Name: Juan Luu 34 y.o. male I MRN: [id number]  Unit/Bed#: 5T [facility] 505-01 I Date of Admission: 11/11/2024   Date of Service: 11/13/2024 I Hospital Day: 1    Assessment & Plan  Alcohol withdrawal syndrome with complication (HCC)  Last drink: 11/10/24   Ethanol lvl: < 10   Received 4 mg of ativan prior to admission   Continue SEWS protocol with symptom-triggered phenobarbital for medically-assisted alcohol withdrawal  Patient received a total of 1365 mg as of 11/13 0900  Current symptoms include anxiety, diaphoresis  Telemetry and pulse oximetry monitoring   Continue to monitor vitals, symptoms      Alcohol use disorder, severe, dependence (HCC)  Pt with a h/o chronic heavy alcohol use; multiple admissions to the medical detox unit.   Drinks 1-2L of wine daily  Denies H/o withdrawal seizures  Previously on naltrexone at this time.  Took for 1 day after 9/2024 discharge.  Interested in vivitrol prior to discharge.  Withdrawal management as above  Continue IVFs, HD thiamine, folic acid, and MVI  Consult case management/CRS for assistance with aftercare resources - pt interested in outpatient resources at this time.  Would like inpatient after insurance/medical assistance is set up.  Alcohol withdrawal hallucinosis (HCC)  In the setting of alcohol withdrawal   Initiate HD thiamine received 3/9 doses   Continue SEWS protocol     Reason for current consult:   Alcohol Withdrawal  Alcohol Use Disorder     Subjective   Pt did not have any acute events overnight. He is still on SEWS protocol. Last dose of phenobarbital was this morning. He is feeling better from a detox standpoint. Tox will continue to monitor.    Objective :  Temp:  [97.5 °F (36.4 °C)-98.1 °F (36.7 °C)] 97.7 °F (36.5 °C)  HR:  [59-76] 76  BP: (113-148)/(62-89) 133/85  Resp:  [10-18] 18  SpO2:  [97 %-98 %] 98 %  O2 Device: None (Room air)    No intake or output data in the 24 hours ending 11/13/24  0840    Physical Exam  Vitals reviewed.   Constitutional:       Appearance: Normal appearance.   HENT:      Head: Normocephalic and atraumatic.      Nose: Nose normal.      Mouth/Throat:      Mouth: Mucous membranes are moist.   Eyes:      Extraocular Movements: Extraocular movements intact.   Cardiovascular:      Rate and Rhythm: Normal rate.   Pulmonary:      Effort: Pulmonary effort is normal.   Musculoskeletal:         General: Normal range of motion.      Cervical back: Normal range of motion.   Skin:     General: Skin is warm and dry.      Capillary Refill: Capillary refill takes less than 2 seconds.   Neurological:      General: No focal deficit present.      Mental Status: He is alert and oriented to person, place, and time.   Psychiatric:         Mood and Affect: Mood normal.         Behavior: Behavior normal.         Thought Content: Thought content normal.         Judgment: Judgment normal.           Lab Results: I have reviewed the following results:          Administrative Statements   I have spent a total time of 30 minutes in caring for this patient on the day of the visit/encounter.

## 2024-11-13 NOTE — ASSESSMENT & PLAN NOTE
Pt with a h/o chronic heavy alcohol use; multiple admissions to the medical detox unit.   Drinks 1-2L of wine daily  Denies H/o withdrawal seizures  Previously on naltrexone at this time; contemplating restarting   Withdrawal management as above  Discontinue IVFs   Transition to PO thiamine and folic acid   Patient reports that he is interested in Vivitrol injection upon discharge.   Consult case management/CRS for assistance with aftercare resources - pt interested in outpatient resources at this time

## 2024-11-13 NOTE — ASSESSMENT & PLAN NOTE
Last drink: 11/10/24   Ethanol lvl: < 10   Received 4 mg of ativan prior to admission   [facility] with symptom-triggered phenobarbital for medically-assisted alcohol withdrawal  Patient received a total of 1365 mg as of 11/13 0900  Current symptoms include anxiety, diaphoresis  Telemetry and pulse oximetry monitoring   Continue to monitor vitals, symptoms

## 2024-11-13 NOTE — ASSESSMENT & PLAN NOTE
Last drink: 11/10/24   Ethanol lvl: < 10   Received 4 mg of ativan prior to admission   Consult to toxicology; discontinue SEWS protocol on 11/13/24   Patient received a total of 1365 mg   Telemetry and pulse oximetry monitoring   Continue to monitor vitals, symptoms

## 2024-11-13 NOTE — PROGRESS NOTES
Referral Data   Referral Source Patient   Referral Name [facility] ED   Referral Reason Drug/Alcohol Abuse   County Information   County of Residence [place]   Readmission [facility]   30 Day Readmission No   Patient Information   Mental Status Alert   Primary Caregiver Self   Support System Immediate family   Christianity/Cultural Requests No Temple affiliation   Legal Information   Legal Issues Denies   Activities of Daily Living Prior to Admission   Functional Status Independent   Assistive Device No device needed   Living Arrangement Apartment;Lives alone   Ambulation Independent   Access to Firearms   Access to Firearms No   Income Information   Income Source Unemployed   Means of Transportation   Means of Transport to Appts: Drives Self           Substance Abuse Addendum Details   History of Withdrawal Symptoms Other withdrawal symptoms (specify in comment)  (episodes of nausea, vomiting and tremors)   Medical Complications Alcohol use disorder, severe, dependence, Unspecified mood (affective) disorder, Alcohol withdrawal hallucinosis,Leukocytosis, and Alcohol Gastritis   Sober Supports Sister   Present Treatment detox   Substance Abuse Treatment Hx Past Tx, Inpatient;Past Tx, Outpatient;Past detox;Attends AA/NA   Stage of Change   Stage of Change Contemplation       Additional Substance Use Detail    Questions Responses   Problems Due to Past Use of Alcohol? Yes   Alcohol Use Frequency Daily   Alcohol Drink of Choice Vodka, beer, wine   1st Use of Alcohol 15 yo   Last Use of Alcohol & Amount 11/10/24; 1/5 vodka, 1-2 lts of guy, several beers.   Longest Abstinence from Alcohol 2.5 yrs            SUBSTANCE ABUSE     Stressor/Trigger                  UDS: not completed  Audit: not completed  PAWSS:  3 Nicotine: denies  Ethanol: <10   Prior D&A treatment    [facility] [place] 1.5 years ago   AA/NA Meetings    Yes, last meeting (virtual) 2 weeks ago   Withdrawal  Symptoms     anxiety, nausea, diaphoresis, tearful, agitation, tremor, headache   History of OD/blackouts or Withdrawal Seizures    Pt denies hx of withdrawal seizures, but has a hx of Dts and blackouts, visual disturbances and light sensitivity in px.   MENTAL HEALTH INFORMATION     Hx of Mental Health Dx    [facility] 4-5 months ago   Outpatient Mental Health Tx    [facility], but lost insurance and now cannot go   Inpatient Hospitalizations (Mental Health)    denies   Family History of Mental Health     Mother- AUD  Pt has never met father   Trauma History     Pt has a hx of emotional abuse   Current MH Symptoms    Pt endorsed anxiety. Denied SI/HI/AVH   Access to Firearms                Pt denies any access to firearms.   PHYSICAL HEALTH INFORMATION     Physical Health Conditions (Chronic)    Alcohol use disorder, severe, dependence (HCC)   Unspecified mood (affective) disorder (HCC)      PCP    No PCP   Insurance    MA pending   Preferred Pharmacy    [id number] [place] - [address] [place] [address]  [address]   [address] [place] [address]  [address] [facility] [address]   Phone: [phone] Fax: [phone]      LEGAL ISSUES     Past or present legal issues    Pt had a DUI 3 years ago.   Probation/[place]    Pt denies   EMPLOYMENT/INCOME/NEEDS     Education    Pt completed almost all of Associate degree (had one semester left to graduate), certified in engineering   Employment Pt reports being fired d/t drinking at work on 8/19/24       History    Pt denies   Spiritual/Sabianism Beliefs    Caodaism   Transportation/Transport Home    Pt has a vehicle and is able to drive to [employer]   DEMOGRAPHICS     Discharge Address    [address] [facility]      [facility] [address]    Living Arrangement    Pt has an apt   Can pt return home    yes   External Supports                           sister   Phone Number    [phone]   Email    Pt declines   Marital Status/Children    Single, no children        Juan Montana  Bell is a 34 y.o. male with a PMH of anxiety and alcohol use disorder who presents with alcohol withdrawal seeking detoxification.   Patient initially presented to the [facility] ED requesting alcohol detox with sx of anxiety, tremors, and hallucinations.      ALEXUS signed for  [employer], [facility], [place] medicaid, [facility].

## 2024-11-13 NOTE — ASSESSMENT & PLAN NOTE
In the setting of alcohol withdrawal   Denies further episodes of hallucinations   Continue SEWS protocol

## 2024-11-13 NOTE — ASSESSMENT & PLAN NOTE
Poor PO intake in the setting of chronic alcohol use  Previously on PPI and carafate  Will restart medication   Denies any hematemesis   Outpatient referral to GI upon discharge - CM to assist

## 2024-11-14 VITALS
OXYGEN SATURATION: 97 % | DIASTOLIC BLOOD PRESSURE: 54 MMHG | HEIGHT: 72 IN | SYSTOLIC BLOOD PRESSURE: 102 MMHG | WEIGHT: 187.83 LBS | RESPIRATION RATE: 18 BRPM | BODY MASS INDEX: 25.44 KG/M2 | TEMPERATURE: 97.9 F | HEART RATE: 57 BPM

## 2024-11-14 PROBLEM — K29.20 ALCOHOLIC GASTRITIS: Status: RESOLVED | Noted: 2024-05-03 | Resolved: 2024-11-14

## 2024-11-14 PROBLEM — F10.939 ALCOHOL WITHDRAWAL SYNDROME WITH COMPLICATION (HCC): Status: RESOLVED | Noted: 2024-05-03 | Resolved: 2024-11-14

## 2024-11-14 PROBLEM — E87.8 ELECTROLYTE ABNORMALITY: Status: RESOLVED | Noted: 2024-11-12 | Resolved: 2024-11-14

## 2024-11-14 PROBLEM — F41.1 GENERALIZED ANXIETY DISORDER: Status: ACTIVE | Noted: 2024-05-03

## 2024-11-14 PROCEDURE — 99232 SBSQ HOSP IP/OBS MODERATE 35: CPT | Performed by: EMERGENCY MEDICINE

## 2024-11-14 PROCEDURE — 99239 HOSP IP/OBS DSCHRG MGMT >30: CPT | Performed by: PHYSICIAN ASSISTANT

## 2024-11-14 RX ORDER — BUSPIRONE HYDROCHLORIDE 7.5 MG/1
7.5 TABLET ORAL 3 TIMES DAILY
Qty: 90 TABLET | Refills: 0 | Status: SHIPPED | OUTPATIENT
Start: 2024-11-14

## 2024-11-14 RX ORDER — GABAPENTIN 300 MG/1
300 CAPSULE ORAL EVERY 8 HOURS PRN
Qty: 90 CAPSULE | Refills: 0 | Status: SHIPPED | OUTPATIENT
Start: 2024-11-14

## 2024-11-14 RX ORDER — ESCITALOPRAM OXALATE 10 MG/1
10 TABLET ORAL DAILY
Qty: 30 TABLET | Refills: 0 | Status: SHIPPED | OUTPATIENT
Start: 2024-11-14 | End: 2024-12-14

## 2024-11-14 RX ORDER — FOLIC ACID 1 MG/1
1 TABLET ORAL DAILY
Qty: 30 TABLET | Refills: 0 | Status: SHIPPED | OUTPATIENT
Start: 2024-11-15

## 2024-11-14 RX ORDER — MIRTAZAPINE 15 MG/1
15 TABLET, FILM COATED ORAL
Qty: 30 TABLET | Refills: 0 | Status: SHIPPED | OUTPATIENT
Start: 2024-11-14

## 2024-11-14 RX ORDER — LANOLIN ALCOHOL/MO/W.PET/CERES
100 CREAM (GRAM) TOPICAL DAILY
Qty: 30 TABLET | Refills: 0 | Status: SHIPPED | OUTPATIENT
Start: 2024-11-15

## 2024-11-14 RX ORDER — KETOCONAZOLE 20 MG/G
CREAM TOPICAL DAILY
Qty: 30 G | Refills: 0 | Status: SHIPPED | OUTPATIENT
Start: 2024-11-14

## 2024-11-14 RX ADMIN — NALTREXONE 380 MG: KIT at 10:43

## 2024-11-14 RX ADMIN — MULTIPLE VITAMINS W/ MINERALS TAB 1 TABLET: TAB ORAL at 08:35

## 2024-11-14 RX ADMIN — GABAPENTIN 300 MG: 300 CAPSULE ORAL at 08:35

## 2024-11-14 RX ADMIN — PANTOPRAZOLE SODIUM 40 MG: 40 TABLET, DELAYED RELEASE ORAL at 05:01

## 2024-11-14 RX ADMIN — FOLIC ACID 1 MG: 1 TABLET ORAL at 08:35

## 2024-11-14 RX ADMIN — ESCITALOPRAM OXALATE 5 MG: 5 TABLET, FILM COATED ORAL at 08:35

## 2024-11-14 RX ADMIN — THIAMINE HCL TAB 100 MG 100 MG: 100 TAB at 08:35

## 2024-11-14 NOTE — DISCHARGE SUMMARY
Discharge Summary - Hospitalist   Name: Juan Luu 34 y.o. male I MRN: [id number]  Unit/Bed#: 5T DETOX 505-01 I Date of Admission: 11/11/2024   Date of Service: 11/14/2024 I Hospital Day: 2     Assessment & Plan  Alcohol use disorder, severe, dependence (HCC)  Pt with a h/o chronic heavy alcohol use; multiple admissions to the medical detox unit.   Toxicology consulted and successfully managed patient's withdrawal during his hospitalization.  Previously on naltrexone   Patient received Vivitrol injection prior to discharge.   Patient will receive outpatient resources but will not have medical insurance until January.  Generalized anxiety disorder  Continue Remeron 15 mg, Lexapro 10mg, Gabapentin and Buspar at discharge.     Medical Problems       Resolved Problems  Date Reviewed: 11/14/2024          Resolved    Alcohol withdrawal syndrome with complication (HCC) 11/14/2024     Resolved by  Whitney Lemus PA-C    Alcoholic gastritis 11/14/2024     Resolved by  Whitney Lemus PA-C    Leukocytosis 11/12/2024     Resolved by  Kathleen Greenberg PA-C    * (Principal) Alcoholic ketoacidosis 11/12/2024     Resolved by  Kathleen Greenberg PA-C    Alcohol withdrawal hallucinosis (HCC) 11/13/2024     Resolved by  Kathleen Greenberg PA-C    Electrolyte abnormality 11/14/2024     Resolved by  Whitney Lemus PA-C        Discharging Physician / Practitioner: Whitney Lemus PA-C  PCP: No primary care provider on file.  Admission Date:   Admission Orders (From admission, onward)       Ordered        11/12/24 0922  INPATIENT ADMISSION  Once            11/11/24 1503  Place in Observation  Once                          Discharge Date: 11/14/24    Consultations During Hospital Stay:  Toxicology    Procedures Performed:   None    Significant Findings / Test Results:   None    Incidental Findings:   None    Test Results Pending at Discharge (will require follow up):   None     Outpatient Tests  Requested:  None    Complications:  None    Reason for Admission: Alcohol withdrawal syndrome    Hospital Course:   Juan Luu is a 34 y.o. male patient who originally presented to the hospital on 11/11/2024 due to alcohol intoxication and the desire to seek recovery.  The  patient was accepted to the Medical Withdrawal Unit where he was seen and evaluated by Toxicology.  He was placed on SEWS protocol and tolerated his management well.  He was give Vivotrol injection prior to discharge as he has no insurance until January 1st, 2025.  He will continue with medical management of his anxiety.  He will seek out-patient resources to continue in recovery efforts.    Please see above list of diagnoses and related plan for additional information.     Condition at Discharge: stable    Discharge Day Visit / Exam:   Subjective:  On the day of discharge, the patient is feeling well without any current complaints.  He is looking forward to being discharged home.  He recently started a new job and won't have medical insurance until the first of the year.    Vitals: Blood Pressure: 102/54 (11/14/24 0711)  Pulse: 57 (11/14/24 0711)  Temperature: 97.9 °F (36.6 °C) (11/14/24 0711)  Temp Source: Temporal (11/14/24 0711)  Respirations: 18 (11/14/24 0711)  Height: 6' (182.9 cm) (11/11/24 1532)  Weight - Scale: 85.2 kg (187 lb 13.3 oz) (11/11/24 1532)  SpO2: 97 % (11/14/24 0711)  Physical Exam  Constitutional:       General: He is not in acute distress.     Appearance: Normal appearance. He is normal weight.   HENT:      Head: Normocephalic and atraumatic.      Mouth/Throat:      Mouth: Mucous membranes are moist.   Eyes:      General: No scleral icterus.     Extraocular Movements: Extraocular movements intact.   Cardiovascular:      Rate and Rhythm: Normal rate and regular rhythm.      Heart sounds: No murmur heard.  Pulmonary:      Effort: Pulmonary effort is normal. No respiratory distress.      Breath sounds: Normal  breath sounds. No wheezing, rhonchi or rales.   Abdominal:      General: Bowel sounds are normal.      Palpations: Abdomen is soft.      Tenderness: There is no abdominal tenderness.   Musculoskeletal:         General: Normal range of motion.      Cervical back: Normal range of motion.   Skin:     General: Skin is warm.      Findings: No rash.   Neurological:      General: No focal deficit present.      Mental Status: He is alert and oriented to person, place, and time.   Psychiatric:         Mood and Affect: Mood normal.         Behavior: Behavior normal.          Discussion with Family: Patient declined call to .     Discharge instructions/Information to patient and family:   See after visit summary for information provided to patient and family.      Provisions for Follow-Up Care:  See after visit summary for information related to follow-up care and any pertinent home health orders.      Mobility at time of Discharge:   Basic Mobility Inpatient Raw Score: 24  JH-HLM Goal: 8: Walk 250 feet or more  JH-HLM Achieved: 6: Walk 10 steps or more  HLM Goal NOT achieved. Continue to encourage mobility in post discharge setting.     Disposition:   Home    Planned Readmission: No    Discharge Medications:  See after visit summary for reconciled discharge medications provided to patient and/or family.      Administrative Statements   Discharge Statement:  I have spent a total time of 45 minutes in caring for this patient on the day of the visit/encounter. .    **Please Note: This note may have been constructed using a voice recognition system**

## 2024-11-14 NOTE — CERTIFIED RECOVERY SPECIALIST
Certified  Note    Patient name: Juan Luu  Location: 5T DETOX 505/5T DETOX 50*  [place]: [facility]  Attending:  Luis King MD MRN [id number]  : 1990  Age: 34 y.o.    Sex: male Date 2024         Substance Use History:     Social History     Substance and Sexual Activity   Alcohol Use Yes    Comment: 1-1.5 L vodka or beer daily        Social History     Substance and Sexual Activity   Drug Use Never     Time spent 10 mins     CRS met with patient to offer support and encouragement.     Patient being dc'd from jaci Pollard       
   Certified  Note    Patient name: Juan Luu  Location: 5T DETOX 505/5T DETOX 50*  [place]: [facility]  Attending:  Luis King MD MRN [id number]  : 1990  Age: 34 y.o.    Sex: male Date 2024         Substance Use History:     Social History     Substance and Sexual Activity   Alcohol Use Yes    Comment: 1-1.5 L vodka or beer daily        Social History     Substance and Sexual Activity   Drug Use Never        Time spent with Patient 10    CRS follow up with patient.     CRS and patient had conversation that supports and encourages recovery. Discussed plans for aftercare and continued sobriety. Patient presents as pleasant and optimistic about future. Patient has plan to go to OP support as well as return to work.     Resources provided and contact information given          Sammi Pollard       
   Certified  Note    Patient name: Juan Luu  Location: 5T DETOX 505/5T DETOX 50*  [place]: [facility]  Attending:  Luis King MD MRN [id number]  : 1990  Age: 34 y.o.    Sex: male Date 2024         Substance Use History:     Social History     Substance and Sexual Activity   Alcohol Use Yes    Comment: 1-1.5 L vodka or beer daily        Social History     Substance and Sexual Activity   Drug Use Never        Time spent with Patient 20  mins     CRS follow up with patient.     CRS and patient had conversation that supports and encourages recovery. Discussed plans for aftercare and continued sobriety. Patient has been in in treatment before and on Naltrexone, had a couple weeks sober.     Patient reports that he will follow up with op treatment but no intention on going into IP tx at this time.     Patient was open to suggestions and interested in different types of recovery support.    Patient presents as pleasant and optimistic about future.     Resources provided and contact information given          '          Sammi Pollard       
   Certified  Note    Patient name: Juan Luu  Location: ED 02/ED 02  [place]: [facility]  Attending:  Maxine Granda, * MRN [id number]  : 1990  Age: 34 y.o.    Sex: male Date 2024         Substance Use History:     Social History     Substance and Sexual Activity   Alcohol Use Yes    Comment: 1-1.5 L vodka or beer daily        Social History     Substance and Sexual Activity   Drug Use Never      Time spent 20 mins   Encounter type: Initial   Consult rcvd: Y  Patient seen in: ED  Stage of change:recurrence of use     Substance used: alcohol 2-3 liters   Frequency: daily   Last used: 2024      History of withdrawal seizure: na  Other Medical condition: no   Currently on MOUD was on naltrexone - stop taking     CRS met with patient in ED. CRS provided introductions and explanation of service. CRS and patient engaged in conversation of hospitalization. Patient discussed needs he feels would be appropriate. CRS shared understanding.     Patient wanted to  go into detox and restart on Naltrexone. Patient was sober for a couple months after last detox stay.      Resources and contact information given         Sammi Pollard       
30

## 2024-11-14 NOTE — ASSESSMENT & PLAN NOTE
Pt with a h/o chronic heavy alcohol use; multiple admissions to the medical detox unit.   Toxicology consulted and successfully managed patient's withdrawal during his hospitalization.  Previously on naltrexone   Patient received Vivitrol injection prior to discharge.   Patient will receive outpatient resources but will not have medical insurance until January.

## 2024-11-14 NOTE — ASSESSMENT & PLAN NOTE
Last drink: 11/10/24   Ethanol lvl: < 10   Received 4 mg of ativan prior to admission   [facility] symptom-triggered phenobarbital for medically-assisted alcohol withdrawal  Patient received a total of 1495 mg as of 11/13 0900  Current symptoms include anxiety, diaphoresis  Telemetry and pulse oximetry monitoring   Continue to monitor vitals, symptoms

## 2024-11-14 NOTE — PROGRESS NOTES
Progress Note - Medical Toxicology   Name: Juan Luu 34 y.o. male I MRN: [id number]  Unit/Bed#: 5T [facility] 505-01 I Date of Admission: 11/11/2024   Date of Service: 11/14/2024 I Hospital Day: 2    Assessment & Plan  Alcohol withdrawal syndrome with complication (HCC)  Last drink: 11/10/24   Ethanol lvl: < 10   Received 4 mg of ativan prior to admission   Continue SEWS protocol with symptom-triggered phenobarbital for medically-assisted alcohol withdrawal  Patient received a total of 1495 mg as of 11/13 0900  Current symptoms include anxiety, diaphoresis  Telemetry and pulse oximetry monitoring   Continue to monitor vitals, symptoms      Alcohol use disorder, severe, dependence (HCC)  Pt with a h/o chronic heavy alcohol use; multiple admissions to the medical detox unit.   Drinks 1-2L of wine daily  Denies H/o withdrawal seizures  Previously on naltrexone at this time.  Took for 1 day after 9/2024 discharge.  Interested in vivitrol prior to discharge.  Withdrawal management as above  Continue IVFs, HD thiamine, folic acid, and MVI  Consult case management/CRS for assistance with aftercare resources - pt interested in outpatient resources at this time.  Would like inpatient after insurance/medical assistance is set up.    Reason for current consult:   Alcohol Withdrawal  Alcohol Use Disorder     Subjective   No acute events overnight. Pt has been taken off of SEWS protocol. He has not required phenobarbital in greater than 24 hours. He would like to try Vivitrol and wants outpatient rehab. Tox will sign off appreciate the consult.    Objective :  Temp:  [97.8 °F (36.6 °C)-98 °F (36.7 °C)] 97.9 °F (36.6 °C)  HR:  [57-67] 57  BP: (102-131)/(54-80) 102/54  Resp:  [18] 18  SpO2:  [97 %-98 %] 97 %  O2 Device: None (Room air)      Intake/Output Summary (Last 24 hours) at 11/14/2024 0738  Last data filed at 11/13/2024 1701  Gross per 24 hour   Intake 360 ml   Output --   Net 360 ml       Physical Exam  Vitals and  nursing note reviewed.   Constitutional:       Appearance: Normal appearance.   HENT:      Head: Normocephalic and atraumatic.      Nose: Nose normal.      Mouth/Throat:      Mouth: Mucous membranes are moist.   Eyes:      Extraocular Movements: Extraocular movements intact.   Cardiovascular:      Rate and Rhythm: Normal rate.   Pulmonary:      Effort: Pulmonary effort is normal.   Abdominal:      General: Abdomen is flat.   Musculoskeletal:         General: Normal range of motion.      Cervical back: Normal range of motion.   Skin:     General: Skin is warm and dry.      Capillary Refill: Capillary refill takes less than 2 seconds.   Neurological:      General: No focal deficit present.      Mental Status: He is alert and oriented to person, place, and time.   Psychiatric:         Mood and Affect: Mood normal.         Behavior: Behavior normal.         Thought Content: Thought content normal.         Judgment: Judgment normal.           Lab Results: I have reviewed the following results:          Administrative Statements   I have spent a total time of 30 minutes in caring for this patient on the day of the visit/encounter.

## 2024-11-15 NOTE — PROGRESS NOTES
11/12/24 1555   Housing Stability   In the last 12 months, was there a time when you were not able to pay the mortgage or rent on time? Y   In the past 12 months, how many times have you moved where you were living? 0   At any time in the past 12 months, were you homeless or living in a shelter (including now)? N   Transportation Needs   In the past 12 months, has lack of transportation kept you from medical appointments or from getting medications? no   In the past 12 months, has lack of transportation kept you from meetings, work, or from getting things needed for daily living? No   Food Insecurity   Within the past 12 months, you worried that your food would run out before you got the money to buy more. Sometimes   Within the past 12 months, the food you bought just didn't last and you didn't have money to get more. Sometimes   Social Connections   In a typical week, how many times do you talk on the phone with family, friends, or neighbors? Never   How often do you get together with friends or relatives? Never   How often do you attend Muslim or Lutheran services? More than 4   Do you belong to any clubs or organizations such as Muslim groups, unions, fraternal or athletic groups, or school groups? Yes   How often do you attend meetings of the clubs or organizations you belong to? More than 4   Are you , , , , never , or living with a partner? Never marrie   Intimate Partner Violence   Within the last year, have you been afraid of your partner or ex-partner? No   Within the last year, have you been humiliated or emotionally abused in other ways by your partner or ex-partner? No   Within the last year, have you been kicked, hit, slapped, or otherwise physically hurt by your partner or ex-partner? No   Within the last year, have you been raped or forced to have any kind of sexual activity by your partner or ex-partner? No   Alcohol Use   Q1: How often do you have a drink  containing alcohol? 4 or more ti   Q2: How many drinks containing alcohol do you have on a typical day when you are drinking? 10 or more   Q3: How often do you have six or more drinks on one occasion? Never   Utilities   In the past 12 months has the electric, gas, oil, or water company threatened to shut off services in your home? Yes   Follow-Ups   We make community resources available to all of our patients to assist with everyday needs. We may be able to connect you with those resources. Would you be interested? Y   Would you be interested in assistance with any of these areas? If so, which ones? 10;23

## 2024-11-29 NOTE — PROGRESS NOTES
11/14/24 1534   Other Referral/Resources/Interventions Provided:   Referrals Provided: Crisis Hotline;IOP  (Intake scheduled with [facility])   Discharge Communications   Discharge planning discussed with: Pt whom verbalizes desire to attend OP [facility] T[facility].  Pt denies any symptoms of withdrawal.   Freedom of Choice Yes   Were Treatment Team discharge recommendations reviewed with patient/caregiver? Yes   Did patient/caregiver verbalize understanding of patient care needs? Yes   Were patient/caregiver advised of the risks associated with not following Treatment Team discharge recommendations? Yes   Contacts   Reason/Outcome Discharge Planning   [facility]   Would you like to participate in our [facility] service program?   No - Declined

## 2025-01-03 ENCOUNTER — HOSPITAL ENCOUNTER (EMERGENCY)
Facility: HOSPITAL | Age: 35
Discharge: HOME | End: 2025-01-03
Attending: EMERGENCY MEDICINE
Payer: COMMERCIAL

## 2025-01-03 VITALS
SYSTOLIC BLOOD PRESSURE: 134 MMHG | RESPIRATION RATE: 20 BRPM | OXYGEN SATURATION: 97 % | WEIGHT: 198.3 LBS | DIASTOLIC BLOOD PRESSURE: 93 MMHG | BODY MASS INDEX: 26.89 KG/M2 | TEMPERATURE: 98.3 F | HEART RATE: 106 BPM

## 2025-01-03 DIAGNOSIS — F10.939 ALCOHOL WITHDRAWAL (HCC): Primary | ICD-10-CM

## 2025-01-03 LAB
ALBUMIN SERPL BCG-MCNC: 4.7 G/DL (ref 3.5–5)
ALP SERPL-CCNC: 90 U/L (ref 34–104)
ALT SERPL W P-5'-P-CCNC: 52 U/L (ref 7–52)
ANION GAP SERPL CALCULATED.3IONS-SCNC: 12 MMOL/L (ref 4–13)
AST SERPL W P-5'-P-CCNC: 47 U/L (ref 13–39)
BASOPHILS # BLD AUTO: 0.08 THOUSANDS/ΜL (ref 0–0.1)
BASOPHILS NFR BLD AUTO: 1 % (ref 0–1)
BILIRUB SERPL-MCNC: 0.37 MG/DL (ref 0.2–1)
BUN SERPL-MCNC: 15 MG/DL (ref 5–25)
CALCIUM SERPL-MCNC: 8.7 MG/DL (ref 8.4–10.2)
CHLORIDE SERPL-SCNC: 103 MMOL/L (ref 96–108)
CO2 SERPL-SCNC: 26 MMOL/L (ref 21–32)
CREAT SERPL-MCNC: 0.83 MG/DL (ref 0.6–1.3)
EOSINOPHIL # BLD AUTO: 0.13 THOUSAND/ΜL (ref 0–0.61)
EOSINOPHIL NFR BLD AUTO: 2 % (ref 0–6)
ERYTHROCYTE [DISTWIDTH] IN BLOOD BY AUTOMATED COUNT: 14.7 % (ref 11.6–15.1)
ETHANOL SERPL-MCNC: 163 MG/DL
GFR SERPL CREATININE-BSD FRML MDRD: 114 ML/MIN/1.73SQ M
GLUCOSE SERPL-MCNC: 107 MG/DL (ref 65–140)
HCT VFR BLD AUTO: 43.3 % (ref 36.5–49.3)
HGB BLD-MCNC: 14.1 G/DL (ref 12–17)
IMM GRANULOCYTES # BLD AUTO: 0.02 THOUSAND/UL (ref 0–0.2)
IMM GRANULOCYTES NFR BLD AUTO: 0 % (ref 0–2)
LIPASE SERPL-CCNC: 15 U/L (ref 11–82)
LYMPHOCYTES # BLD AUTO: 2.32 THOUSANDS/ΜL (ref 0.6–4.47)
LYMPHOCYTES NFR BLD AUTO: 30 % (ref 14–44)
MAGNESIUM SERPL-MCNC: 2 MG/DL (ref 1.9–2.7)
MCH RBC QN AUTO: 29.3 PG (ref 26.8–34.3)
MCHC RBC AUTO-ENTMCNC: 32.6 G/DL (ref 31.4–37.4)
MCV RBC AUTO: 90 FL (ref 82–98)
MONOCYTES # BLD AUTO: 0.26 THOUSAND/ΜL (ref 0.17–1.22)
MONOCYTES NFR BLD AUTO: 3 % (ref 4–12)
NEUTROPHILS # BLD AUTO: 4.9 THOUSANDS/ΜL (ref 1.85–7.62)
NEUTS SEG NFR BLD AUTO: 64 % (ref 43–75)
NRBC BLD AUTO-RTO: 0 /100 WBCS
PLATELET # BLD AUTO: 317 THOUSANDS/UL (ref 149–390)
PMV BLD AUTO: 8.6 FL (ref 8.9–12.7)
POTASSIUM SERPL-SCNC: 4 MMOL/L (ref 3.5–5.3)
PROT SERPL-MCNC: 7.3 G/DL (ref 6.4–8.4)
RBC # BLD AUTO: 4.81 MILLION/UL (ref 3.88–5.62)
SODIUM SERPL-SCNC: 141 MMOL/L (ref 135–147)
WBC # BLD AUTO: 7.71 THOUSAND/UL (ref 4.31–10.16)

## 2025-01-03 PROCEDURE — 80053 COMPREHEN METABOLIC PANEL: CPT | Performed by: EMERGENCY MEDICINE

## 2025-01-03 PROCEDURE — 96374 THER/PROPH/DIAG INJ IV PUSH: CPT

## 2025-01-03 PROCEDURE — 82077 ASSAY SPEC XCP UR&BREATH IA: CPT | Performed by: EMERGENCY MEDICINE

## 2025-01-03 PROCEDURE — 36415 COLL VENOUS BLD VENIPUNCTURE: CPT | Performed by: EMERGENCY MEDICINE

## 2025-01-03 PROCEDURE — 83690 ASSAY OF LIPASE: CPT | Performed by: EMERGENCY MEDICINE

## 2025-01-03 PROCEDURE — 96361 HYDRATE IV INFUSION ADD-ON: CPT

## 2025-01-03 PROCEDURE — 99284 EMERGENCY DEPT VISIT MOD MDM: CPT | Performed by: EMERGENCY MEDICINE

## 2025-01-03 PROCEDURE — 99285 EMERGENCY DEPT VISIT HI MDM: CPT

## 2025-01-03 PROCEDURE — 83735 ASSAY OF MAGNESIUM: CPT | Performed by: EMERGENCY MEDICINE

## 2025-01-03 PROCEDURE — 85025 COMPLETE CBC W/AUTO DIFF WBC: CPT | Performed by: EMERGENCY MEDICINE

## 2025-01-03 RX ORDER — ONDANSETRON 2 MG/ML
4 INJECTION INTRAMUSCULAR; INTRAVENOUS ONCE
Status: COMPLETED | OUTPATIENT
Start: 2025-01-03 | End: 2025-01-03

## 2025-01-03 RX ORDER — CHLORDIAZEPOXIDE HYDROCHLORIDE 25 MG/1
50 CAPSULE, GELATIN COATED ORAL ONCE
Status: COMPLETED | OUTPATIENT
Start: 2025-01-03 | End: 2025-01-03

## 2025-01-03 RX ADMIN — SODIUM CHLORIDE 1000 ML: 0.9 INJECTION, SOLUTION INTRAVENOUS at 11:20

## 2025-01-03 RX ADMIN — ONDANSETRON 4 MG: 2 INJECTION INTRAMUSCULAR; INTRAVENOUS at 11:20

## 2025-01-03 RX ADMIN — CHLORDIAZEPOXIDE HYDROCHLORIDE 50 MG: 25 CAPSULE ORAL at 12:45

## 2025-01-03 NOTE — ED CARE HANDOFF
[facility] Warm Handoff Outcome Note    Patient name Juan Luu  Location [facility] Pool Room/[facility] MRN [id number]  Age: 34 y.o.          Plan Type:  Warm Handoff                                                                                    Plan Date: 1/3/2025  Service:  ED Warm Handoff      Substance Use History:  ETOH    Warm Handoff Update:  Pt left ED AMA prior to speaking with HOST    Warm Handoff Outcome: Patient Refused

## 2025-01-03 NOTE — ED NOTES
Patient left facility before speaking to [facility].  Patient was advised that he was not accepted to [facility] detox unit and was being referred to [facility] program.       Little Barrera RN  01/03/25 1335       Little Barrera RN  01/03/25 0898       Little Barrera, TIFFANIE  01/03/25 6230

## 2025-01-03 NOTE — ED PROVIDER NOTES
Time reflects when diagnosis was documented in both MDM as applicable and the Disposition within this note       Time User Action Codes Description Comment    1/4/2025 10:27 AM Maxine Granda Add [F10.939] Alcohol withdrawal (HCC)           ED Disposition       ED Disposition   Left from Room after Provider Exam    Condition   --    Date/Time   Fri Leonel 3, 2025  1:42 PM    Comment   --             Assessment & Plan       Medical Decision Making  34-year-old male with request for detox for alcohol withdrawal.  Laboratory evaluation unremarkable.  Case discussed with detox unit, not a candidate for admission at [facility] as no medical need for admission and no signs of severe withdrawal.  Warm handoff referral placed.  When placement service called to talk with patient, patient had already left the room.  He pulled out his own IV which was laying on the bed.    Amount and/or Complexity of Data Reviewed  Labs: ordered.    Risk  Prescription drug management.             Medications   sodium chloride 0.9 % bolus 1,000 mL (0 mL Intravenous Stopped 1/3/25 1340)   ondansetron (ZOFRAN) injection 4 mg (4 mg Intravenous Given 1/3/25 1120)   chlordiazePOXIDE (LIBRIUM) capsule 50 mg (50 mg Oral Given 1/3/25 1245)       ED Risk Strat Scores               CIWA-Ar Score       Row Name 01/03/25 1133             CIWA-Ar    Nausea and Vomiting 1      Tactile Disturbances 1      Tremor 0      Auditory Disturbances 0      Paroxysmal Sweats 1      Visual Disturbances 1      Anxiety 1      Headache, Fullness in Head 0      Agitation 0      Orientation and Clouding of Sensorium 0      CIWA-Ar Total 5                              SBIRT 20yo+      Flowsheet Row Most Recent Value   Initial Alcohol Screen: US AUDIT-C     1. How often do you have a drink containing alcohol? 6 Filed at: 01/03/2025 1046   2. How many drinks containing alcohol do you have on a typical day you are drinking?  6 Filed at: 01/03/2025 1046   3a. Male UNDER 65:  How often do you have five or more drinks on one occasion? 6 Filed at: 01/03/2025 1046   3b. FEMALE Any Age, or MALE 65+: How often do you have 4 or more drinks on one occassion? 0 Filed at: 01/03/2025 1046   Audit-C Score 18 Filed at: 01/03/2025 1046   Full Alcohol Screen: US AUDIT    4. How often during the last year have you found that you were not able to stop drinking once you had started? 4 Filed at: 01/03/2025 1046   5. How often during past year have you failed to do what was normally expected of you because of drinking?  4 Filed at: 01/03/2025 1046   6. How often in past year have you needed a first drink in the morning to get yourself going after a heavy drinking session?  4 Filed at: 01/03/2025 1046   7. How often in past year have you had feeling of guilt or remorse after drinking?  4 Filed at: 01/03/2025 1046   8. How often in past year have you been unable to remember what happened night before because you had been drinking?  4 Filed at: 01/03/2025 1046   9. Have you or someone else been injured as a result of your drinking?  0 Filed at: 01/03/2025 1046   10. Has a relative, friend, doctor or other health worker been concerned about your drinking and suggested you cut down?  4 Filed at: 01/03/2025 1046   AUDIT Total Score 42 Filed at: 01/03/2025 1046                            History of Present Illness       Chief Complaint   Patient presents with    Drug / Alcohol Assessment     Patient wants help to come off of alcohol, last drink 3pm. Complains of shakes and vomiting.        Past Medical History:   Diagnosis Date    Alcohol use disorder     Alcohol withdrawal syndrome (HCC)     Anxiety     Depression     PTSD (post-traumatic stress disorder)     Tachycardia       Past Surgical History:   Procedure Laterality Date    LUMBAR LAMINECTOMY        History reviewed. No pertinent family history.   Social History     Tobacco Use    Smoking status: Never     Passive exposure: Past    Smokeless tobacco: Never    Vaping Use    Vaping status: Never Used   Substance Use Topics    Alcohol use: Yes     Comment: 1-1.5 L vodka or beer daily    Drug use: Never      E-Cigarette/Vaping    E-Cigarette Use Never User       E-Cigarette/Vaping Substances      I have reviewed and agree with the history as documented.     34-year-old male presenting to the emerged department requesting detox from alcohol.  Patient was last drink yesterday, now having some nausea and tremors.  No fevers chills.  No vomiting.        Review of Systems   Constitutional:  Negative for chills and fever.   HENT:  Negative for ear pain and sore throat.    Eyes:  Negative for pain and visual disturbance.   Respiratory:  Negative for cough and shortness of breath.    Cardiovascular:  Negative for chest pain and palpitations.   Gastrointestinal:  Positive for nausea. Negative for abdominal pain and vomiting.   Genitourinary:  Negative for dysuria and hematuria.   Musculoskeletal:  Negative for arthralgias and back pain.   Skin:  Negative for color change and rash.   Neurological:  Positive for tremors. Negative for seizures and syncope.   All other systems reviewed and are negative.          Objective       ED Triage Vitals [01/03/25 1041]   Temperature Pulse Blood Pressure Respirations SpO2 Patient Position - Orthostatic VS   98.3 °F (36.8 °C) (!) 106 134/93 20 97 % Sitting      Temp Source Heart Rate Source BP Location FiO2 (%) Pain Score    Oral Monitor Left arm -- --      Vitals      Date and Time Temp Pulse SpO2 Resp BP Pain Score FACES Pain Rating User   01/03/25 1041 98.3 °F (36.8 °C) 106 97 % 20 134/93 -- -- KLL            Physical Exam  Vitals and nursing note reviewed.   Constitutional:       General: He is not in acute distress.     Appearance: He is well-developed.   HENT:      Head: Normocephalic and atraumatic.   Eyes:      Conjunctiva/sclera: Conjunctivae normal.   Cardiovascular:      Rate and Rhythm: Normal rate and regular rhythm.      Heart  sounds: No murmur heard.  Pulmonary:      Effort: Pulmonary effort is normal. No respiratory distress.      Breath sounds: Normal breath sounds.   Abdominal:      Palpations: Abdomen is soft.      Tenderness: There is no abdominal tenderness.   Musculoskeletal:         General: No swelling.      Cervical back: Neck supple.   Skin:     General: Skin is warm and dry.      Capillary Refill: Capillary refill takes less than 2 seconds.   Neurological:      Mental Status: He is alert.   Psychiatric:         Mood and Affect: Mood normal.         Results Reviewed       Procedure Component Value Units Date/Time    Comprehensive metabolic panel [[id number]]  (Abnormal) Collected: 01/03/25 1118    Lab Status: Final result Specimen: Blood from Hand, Right Updated: 01/03/25 1146     Sodium 141 mmol/L      Potassium 4.0 mmol/L      Chloride 103 mmol/L      CO2 26 mmol/L      ANION GAP 12 mmol/L      BUN 15 mg/dL      Creatinine 0.83 mg/dL      Glucose 107 mg/dL      Calcium 8.7 mg/dL      AST 47 U/L      ALT 52 U/L      Alkaline Phosphatase 90 U/L      Total Protein 7.3 g/dL      Albumin 4.7 g/dL      Total Bilirubin 0.37 mg/dL      eGFR 114 ml/min/1.73sq m     Narrative:      [employer] Kid[employer] guidelines for Chronic Kidney Disease (CKD):     Stage 1 with normal or high GFR (GFR > 90 mL/min/1.73 square meters)    Stage 2 Mild CKD (GFR = 60-89 mL/min/1.73 square meters)    Stage 3A Moderate CKD (GFR = 45-59 mL/min/1.73 square meters)    Stage 3B Moderate CKD (GFR = 30-44 mL/min/1.73 square meters)    Stage 4 Severe CKD (GFR = 15-29 mL/min/1.73 square meters)    Stage 5 End Stage CKD (GFR <15 mL/min/1.73 square meters)  Note: GFR calculation is accurate only with a steady state creatinine    Magnesium [[id number]]  (Normal) Collected: 01/03/25 1118    Lab Status: Final result Specimen: Blood from Hand, Right Updated: 01/03/25 1146     Magnesium 2.0 mg/dL     Lipase [[id number]]  (Normal) Collected: 01/03/25 1118     Lab Status: Final result Specimen: Blood from Hand, Right Updated: 01/03/25 1146     Lipase 15 u/L     Ethanol [[id number]]  (Abnormal) Collected: 01/03/25 1118    Lab Status: Final result Specimen: Blood from Hand, Right Updated: 01/03/25 1146     Ethanol Lvl 163 mg/dL     CBC and differential [[id number]]  (Abnormal) Collected: 01/03/25 1118    Lab Status: Final result Specimen: Blood from Hand, Right Updated: 01/03/25 1126     WBC 7.71 Thousand/uL      RBC 4.81 Million/uL      Hemoglobin 14.1 g/dL      Hematocrit 43.3 %      MCV 90 fL      MCH 29.3 pg      MCHC 32.6 g/dL      RDW 14.7 %      MPV 8.6 fL      Platelets 317 Thousands/uL      nRBC 0 /100 WBCs      Segmented % 64 %      Immature Grans % 0 %      Lymphocytes % 30 %      Monocytes % 3 %      Eosinophils Relative 2 %      Basophils Relative 1 %      Absolute Neutrophils 4.90 Thousands/µL      Absolute Immature Grans 0.02 Thousand/uL      Absolute Lymphocytes 2.32 Thousands/µL      Absolute Monocytes 0.26 Thousand/µL      Eosinophils Absolute 0.13 Thousand/µL      Basophils Absolute 0.08 Thousands/µL             No orders to display       Procedures    ED Medication and Procedure Management   Prior to Admission Medications   Prescriptions Last Dose Informant Patient Reported? Taking?   busPIRone (BUSPAR) 7.5 mg tablet   No No   Sig: Take 1 tablet (7.5 mg total) by mouth 3 (three) times a day   escitalopram (LEXAPRO) 10 mg tablet   No No   Sig: Take 1 tablet (10 mg total) by mouth daily   folic acid (FOLVITE) 1 mg tablet   No No   Sig: Take 1 tablet (1 mg total) by mouth daily   gabapentin (NEURONTIN) 300 mg capsule   No No   Sig: Take 1 capsule (300 mg total) by mouth every 8 (eight) hours as needed (anxiety)   ketoconazole (NIZORAL) 2 % cream   No No   Sig: Apply topically daily   mirtazapine (REMERON) 15 mg tablet   No No   Sig: Take 1 tablet (15 mg total) by mouth daily at bedtime   thiamine 100 MG tablet   No No   Sig: Take 1 tablet (100 mg total) by  mouth daily      Facility-Administered Medications: None     Discharge Medication List as of 1/3/2025  1:46 PM        CONTINUE these medications which have NOT CHANGED    Details   busPIRone (BUSPAR) 7.5 mg tablet Take 1 tablet (7.5 mg total) by mouth 3 (three) times a day, Starting Thu 11/14/2024, Normal      escitalopram (LEXAPRO) 10 mg tablet Take 1 tablet (10 mg total) by mouth daily, Starting Thu 11/14/2024, Until Sat 12/14/2024, Normal      folic acid (FOLVITE) 1 mg tablet Take 1 tablet (1 mg total) by mouth daily, Starting Fri 11/15/2024, Normal      gabapentin (NEURONTIN) 300 mg capsule Take 1 capsule (300 mg total) by mouth every 8 (eight) hours as needed (anxiety), Starting Thu 11/14/2024, Normal      ketoconazole (NIZORAL) 2 % cream Apply topically daily, Starting Thu 11/14/2024, Normal      mirtazapine (REMERON) 15 mg tablet Take 1 tablet (15 mg total) by mouth daily at bedtime, Starting Thu 11/14/2024, Normal      thiamine 100 MG tablet Take 1 tablet (100 mg total) by mouth daily, Starting Fri 11/15/2024, Normal           No discharge procedures on file.  ED SEPSIS DOCUMENTATION   Time reflects when diagnosis was documented in both MDM as applicable and the Disposition within this note       Time User Action Codes Description Comment    1/4/2025 10:27 AM Maxine Granda Add [F10.939] Alcohol withdrawal (HCC)                  Maxine Granda MD  01/03/25 1344       Maxine Granda MD  01/04/25 1027

## 2025-01-07 ENCOUNTER — HOSPITAL ENCOUNTER (OUTPATIENT)
Facility: HOSPITAL | Age: 35
Setting detail: OBSERVATION
Discharge: LEFT BEFORE DISCHARGE | End: 2025-01-07
Attending: EMERGENCY MEDICINE | Admitting: INTERNAL MEDICINE
Payer: COMMERCIAL

## 2025-01-07 VITALS
HEIGHT: 72 IN | DIASTOLIC BLOOD PRESSURE: 76 MMHG | WEIGHT: 191.8 LBS | OXYGEN SATURATION: 98 % | BODY MASS INDEX: 25.98 KG/M2 | TEMPERATURE: 97.9 F | RESPIRATION RATE: 18 BRPM | HEART RATE: 92 BPM | SYSTOLIC BLOOD PRESSURE: 134 MMHG

## 2025-01-07 DIAGNOSIS — F10.939 ALCOHOL WITHDRAWAL SYNDROME WITH COMPLICATION (HCC): ICD-10-CM

## 2025-01-07 DIAGNOSIS — E87.29 ALCOHOLIC KETOACIDOSIS: ICD-10-CM

## 2025-01-07 DIAGNOSIS — F10.10 ALCOHOL ABUSE: Primary | ICD-10-CM

## 2025-01-07 DIAGNOSIS — R74.01 TRANSAMINITIS: ICD-10-CM

## 2025-01-07 DIAGNOSIS — E87.29 INCREASED ANION GAP METABOLIC ACIDOSIS: ICD-10-CM

## 2025-01-07 PROBLEM — R74.8 ELEVATED LIVER ENZYMES: Status: ACTIVE | Noted: 2023-03-22

## 2025-01-07 PROBLEM — F10.929 ALCOHOL INTOXICATION (HCC): Status: ACTIVE | Noted: 2025-01-07

## 2025-01-07 LAB
ALBUMIN SERPL BCG-MCNC: 4.2 G/DL (ref 3.5–5)
ALBUMIN SERPL BCG-MCNC: 5 G/DL (ref 3.5–5)
ALP SERPL-CCNC: 106 U/L (ref 34–104)
ALP SERPL-CCNC: 114 U/L (ref 34–104)
ALT SERPL W P-5'-P-CCNC: 261 U/L (ref 7–52)
ALT SERPL W P-5'-P-CCNC: 305 U/L (ref 7–52)
AMPHETAMINES SERPL QL SCN: NEGATIVE
ANION GAP SERPL CALCULATED.3IONS-SCNC: 14 MMOL/L (ref 4–13)
ANION GAP SERPL CALCULATED.3IONS-SCNC: 20 MMOL/L (ref 4–13)
AST SERPL W P-5'-P-CCNC: 245 U/L (ref 13–39)
AST SERPL W P-5'-P-CCNC: 305 U/L (ref 13–39)
ATRIAL RATE: 115 BPM
B-OH-BUTYR SERPL-MCNC: 0.34 MMOL/L (ref 0.02–0.27)
BARBITURATES UR QL: NEGATIVE
BASOPHILS # BLD AUTO: 0.07 THOUSANDS/ΜL (ref 0–0.1)
BASOPHILS NFR BLD AUTO: 1 % (ref 0–1)
BENZODIAZ UR QL: POSITIVE
BILIRUB SERPL-MCNC: 0.31 MG/DL (ref 0.2–1)
BILIRUB SERPL-MCNC: 0.32 MG/DL (ref 0.2–1)
BUN SERPL-MCNC: 12 MG/DL (ref 5–25)
BUN SERPL-MCNC: 12 MG/DL (ref 5–25)
CALCIUM SERPL-MCNC: 7.6 MG/DL (ref 8.4–10.2)
CALCIUM SERPL-MCNC: 8.5 MG/DL (ref 8.4–10.2)
CHLORIDE SERPL-SCNC: 100 MMOL/L (ref 96–108)
CHLORIDE SERPL-SCNC: 102 MMOL/L (ref 96–108)
CO2 SERPL-SCNC: 22 MMOL/L (ref 21–32)
CO2 SERPL-SCNC: 23 MMOL/L (ref 21–32)
COCAINE UR QL: NEGATIVE
CREAT SERPL-MCNC: 0.87 MG/DL (ref 0.6–1.3)
CREAT SERPL-MCNC: 0.99 MG/DL (ref 0.6–1.3)
EOSINOPHIL # BLD AUTO: 0.13 THOUSAND/ΜL (ref 0–0.61)
EOSINOPHIL NFR BLD AUTO: 2 % (ref 0–6)
ERYTHROCYTE [DISTWIDTH] IN BLOOD BY AUTOMATED COUNT: 15.6 % (ref 11.6–15.1)
ETHANOL SERPL-MCNC: 426 MG/DL
FENTANYL UR QL SCN: NEGATIVE
GFR SERPL CREATININE-BSD FRML MDRD: 112 ML/MIN/1.73SQ M
GFR SERPL CREATININE-BSD FRML MDRD: 98 ML/MIN/1.73SQ M
GLUCOSE P FAST SERPL-MCNC: 181 MG/DL (ref 65–99)
GLUCOSE SERPL-MCNC: 181 MG/DL (ref 65–140)
GLUCOSE SERPL-MCNC: 87 MG/DL (ref 65–140)
HCT VFR BLD AUTO: 45.3 % (ref 36.5–49.3)
HGB BLD-MCNC: 15.3 G/DL (ref 12–17)
HYDROCODONE UR QL SCN: NEGATIVE
IMM GRANULOCYTES # BLD AUTO: 0.02 THOUSAND/UL (ref 0–0.2)
IMM GRANULOCYTES NFR BLD AUTO: 0 % (ref 0–2)
LIPASE SERPL-CCNC: 29 U/L (ref 11–82)
LYMPHOCYTES # BLD AUTO: 3.07 THOUSANDS/ΜL (ref 0.6–4.47)
LYMPHOCYTES NFR BLD AUTO: 39 % (ref 14–44)
MAGNESIUM SERPL-MCNC: 2.3 MG/DL (ref 1.9–2.7)
MCH RBC QN AUTO: 29.8 PG (ref 26.8–34.3)
MCHC RBC AUTO-ENTMCNC: 33.8 G/DL (ref 31.4–37.4)
MCV RBC AUTO: 88 FL (ref 82–98)
METHADONE UR QL: NEGATIVE
MONOCYTES # BLD AUTO: 0.26 THOUSAND/ΜL (ref 0.17–1.22)
MONOCYTES NFR BLD AUTO: 3 % (ref 4–12)
NEUTROPHILS # BLD AUTO: 4.33 THOUSANDS/ΜL (ref 1.85–7.62)
NEUTS SEG NFR BLD AUTO: 55 % (ref 43–75)
NRBC BLD AUTO-RTO: 0 /100 WBCS
OPIATES UR QL SCN: NEGATIVE
OXYCODONE+OXYMORPHONE UR QL SCN: NEGATIVE
P AXIS: 65 DEGREES
PCP UR QL: NEGATIVE
PLATELET # BLD AUTO: 200 THOUSANDS/UL (ref 149–390)
PMV BLD AUTO: 8.8 FL (ref 8.9–12.7)
POTASSIUM SERPL-SCNC: 3.9 MMOL/L (ref 3.5–5.3)
POTASSIUM SERPL-SCNC: 4 MMOL/L (ref 3.5–5.3)
PR INTERVAL: 134 MS
PROT SERPL-MCNC: 6.7 G/DL (ref 6.4–8.4)
PROT SERPL-MCNC: 7.8 G/DL (ref 6.4–8.4)
QRS AXIS: 80 DEGREES
QRSD INTERVAL: 92 MS
QT INTERVAL: 308 MS
QTC INTERVAL: 426 MS
RBC # BLD AUTO: 5.13 MILLION/UL (ref 3.88–5.62)
SODIUM SERPL-SCNC: 137 MMOL/L (ref 135–147)
SODIUM SERPL-SCNC: 144 MMOL/L (ref 135–147)
T WAVE AXIS: 34 DEGREES
THC UR QL: NEGATIVE
VENTRICULAR RATE: 115 BPM
WBC # BLD AUTO: 7.88 THOUSAND/UL (ref 4.31–10.16)

## 2025-01-07 PROCEDURE — 83690 ASSAY OF LIPASE: CPT

## 2025-01-07 PROCEDURE — 83735 ASSAY OF MAGNESIUM: CPT | Performed by: INTERNAL MEDICINE

## 2025-01-07 PROCEDURE — 96375 TX/PRO/DX INJ NEW DRUG ADDON: CPT

## 2025-01-07 PROCEDURE — 80307 DRUG TEST PRSMV CHEM ANLYZR: CPT

## 2025-01-07 PROCEDURE — 82077 ASSAY SPEC XCP UR&BREATH IA: CPT

## 2025-01-07 PROCEDURE — 93005 ELECTROCARDIOGRAM TRACING: CPT

## 2025-01-07 PROCEDURE — 99284 EMERGENCY DEPT VISIT MOD MDM: CPT

## 2025-01-07 PROCEDURE — 96365 THER/PROPH/DIAG IV INF INIT: CPT

## 2025-01-07 PROCEDURE — 93010 ELECTROCARDIOGRAM REPORT: CPT

## 2025-01-07 PROCEDURE — RECHECK: Performed by: INTERNAL MEDICINE

## 2025-01-07 PROCEDURE — 85025 COMPLETE CBC W/AUTO DIFF WBC: CPT

## 2025-01-07 PROCEDURE — 99222 1ST HOSP IP/OBS MODERATE 55: CPT | Performed by: INTERNAL MEDICINE

## 2025-01-07 PROCEDURE — 80053 COMPREHEN METABOLIC PANEL: CPT

## 2025-01-07 PROCEDURE — 99285 EMERGENCY DEPT VISIT HI MDM: CPT | Performed by: EMERGENCY MEDICINE

## 2025-01-07 PROCEDURE — 96374 THER/PROPH/DIAG INJ IV PUSH: CPT

## 2025-01-07 PROCEDURE — 82010 KETONE BODYS QUAN: CPT | Performed by: INTERNAL MEDICINE

## 2025-01-07 PROCEDURE — 80053 COMPREHEN METABOLIC PANEL: CPT | Performed by: INTERNAL MEDICINE

## 2025-01-07 PROCEDURE — 36415 COLL VENOUS BLD VENIPUNCTURE: CPT | Performed by: INTERNAL MEDICINE

## 2025-01-07 PROCEDURE — NC001 PR NO CHARGE: Performed by: EMERGENCY MEDICINE

## 2025-01-07 RX ORDER — LORAZEPAM 2 MG/ML
2 INJECTION INTRAMUSCULAR ONCE
Status: COMPLETED | OUTPATIENT
Start: 2025-01-07 | End: 2025-01-07

## 2025-01-07 RX ORDER — FOLIC ACID 1 MG/1
1 TABLET ORAL DAILY
Status: DISCONTINUED | OUTPATIENT
Start: 2025-01-07 | End: 2025-01-07 | Stop reason: HOSPADM

## 2025-01-07 RX ORDER — ACETAMINOPHEN 325 MG/1
325 TABLET ORAL EVERY 4 HOURS PRN
Status: DISCONTINUED | OUTPATIENT
Start: 2025-01-07 | End: 2025-01-07 | Stop reason: HOSPADM

## 2025-01-07 RX ORDER — CHLORDIAZEPOXIDE HYDROCHLORIDE 25 MG/1
25 CAPSULE, GELATIN COATED ORAL EVERY 6 HOURS SCHEDULED
Status: DISCONTINUED | OUTPATIENT
Start: 2025-01-07 | End: 2025-01-07 | Stop reason: HOSPADM

## 2025-01-07 RX ORDER — ONDANSETRON 2 MG/ML
4 INJECTION INTRAMUSCULAR; INTRAVENOUS ONCE
Status: COMPLETED | OUTPATIENT
Start: 2025-01-07 | End: 2025-01-07

## 2025-01-07 RX ORDER — LORAZEPAM 2 MG/ML
4 INJECTION INTRAMUSCULAR ONCE
Status: COMPLETED | OUTPATIENT
Start: 2025-01-07 | End: 2025-01-07

## 2025-01-07 RX ORDER — LANOLIN ALCOHOL/MO/W.PET/CERES
100 CREAM (GRAM) TOPICAL DAILY
Status: CANCELLED | OUTPATIENT
Start: 2025-01-08

## 2025-01-07 RX ORDER — ACETAMINOPHEN 325 MG/1
325 TABLET ORAL EVERY 4 HOURS PRN
Status: CANCELLED | OUTPATIENT
Start: 2025-01-07

## 2025-01-07 RX ORDER — ACETAMINOPHEN 325 MG/1
650 TABLET ORAL EVERY 4 HOURS PRN
Status: DISCONTINUED | OUTPATIENT
Start: 2025-01-07 | End: 2025-01-07

## 2025-01-07 RX ORDER — CHLORDIAZEPOXIDE HYDROCHLORIDE 25 MG/1
25 CAPSULE, GELATIN COATED ORAL EVERY 6 HOURS SCHEDULED
Status: CANCELLED | OUTPATIENT
Start: 2025-01-07

## 2025-01-07 RX ORDER — FOLIC ACID 1 MG/1
1 TABLET ORAL DAILY
Status: CANCELLED | OUTPATIENT
Start: 2025-01-08

## 2025-01-07 RX ORDER — ONDANSETRON 2 MG/ML
4 INJECTION INTRAMUSCULAR; INTRAVENOUS EVERY 6 HOURS PRN
Status: DISCONTINUED | OUTPATIENT
Start: 2025-01-07 | End: 2025-01-07 | Stop reason: HOSPADM

## 2025-01-07 RX ORDER — PROPRANOLOL HYDROCHLORIDE 40 MG/1
40 TABLET ORAL 3 TIMES DAILY
Status: ON HOLD | COMMUNITY
End: 2025-01-11

## 2025-01-07 RX ORDER — LANOLIN ALCOHOL/MO/W.PET/CERES
100 CREAM (GRAM) TOPICAL DAILY
Status: DISCONTINUED | OUTPATIENT
Start: 2025-01-07 | End: 2025-01-07 | Stop reason: HOSPADM

## 2025-01-07 RX ORDER — ENOXAPARIN SODIUM 100 MG/ML
40 INJECTION SUBCUTANEOUS DAILY
Status: DISCONTINUED | OUTPATIENT
Start: 2025-01-07 | End: 2025-01-07 | Stop reason: HOSPADM

## 2025-01-07 RX ORDER — ENOXAPARIN SODIUM 100 MG/ML
40 INJECTION SUBCUTANEOUS DAILY
Status: CANCELLED | OUTPATIENT
Start: 2025-01-08

## 2025-01-07 RX ORDER — PANTOPRAZOLE SODIUM 40 MG/10ML
40 INJECTION, POWDER, LYOPHILIZED, FOR SOLUTION INTRAVENOUS
Status: CANCELLED | OUTPATIENT
Start: 2025-01-08

## 2025-01-07 RX ORDER — ONDANSETRON 2 MG/ML
4 INJECTION INTRAMUSCULAR; INTRAVENOUS EVERY 6 HOURS PRN
Status: CANCELLED | OUTPATIENT
Start: 2025-01-07

## 2025-01-07 RX ORDER — MAGNESIUM SULFATE HEPTAHYDRATE 40 MG/ML
2 INJECTION, SOLUTION INTRAVENOUS ONCE
Status: COMPLETED | OUTPATIENT
Start: 2025-01-07 | End: 2025-01-07

## 2025-01-07 RX ORDER — PANTOPRAZOLE SODIUM 40 MG/10ML
40 INJECTION, POWDER, LYOPHILIZED, FOR SOLUTION INTRAVENOUS
Status: DISCONTINUED | OUTPATIENT
Start: 2025-01-07 | End: 2025-01-07 | Stop reason: HOSPADM

## 2025-01-07 RX ADMIN — MAGNESIUM SULFATE HEPTAHYDRATE 2 G: 40 INJECTION, SOLUTION INTRAVENOUS at 03:01

## 2025-01-07 RX ADMIN — LORAZEPAM 2 MG: 2 INJECTION INTRAMUSCULAR; INTRAVENOUS at 04:37

## 2025-01-07 RX ADMIN — FOLIC ACID 1 MG: 1 TABLET ORAL at 08:51

## 2025-01-07 RX ADMIN — ENOXAPARIN SODIUM 40 MG: 40 INJECTION SUBCUTANEOUS at 08:52

## 2025-01-07 RX ADMIN — Medication 1 TABLET: at 08:52

## 2025-01-07 RX ADMIN — ONDANSETRON 4 MG: 2 INJECTION INTRAMUSCULAR; INTRAVENOUS at 02:53

## 2025-01-07 RX ADMIN — THIAMINE HYDROCHLORIDE 100 MG: 100 INJECTION, SOLUTION INTRAMUSCULAR; INTRAVENOUS at 03:11

## 2025-01-07 RX ADMIN — CHLORDIAZEPOXIDE HYDROCHLORIDE 25 MG: 25 CAPSULE ORAL at 10:27

## 2025-01-07 RX ADMIN — SODIUM CHLORIDE 1000 ML: 0.9 INJECTION, SOLUTION INTRAVENOUS at 02:48

## 2025-01-07 RX ADMIN — FOLIC ACID 1 MG: 5 INJECTION, SOLUTION INTRAMUSCULAR; INTRAVENOUS; SUBCUTANEOUS at 03:13

## 2025-01-07 RX ADMIN — LORAZEPAM 4 MG: 2 INJECTION INTRAMUSCULAR; INTRAVENOUS at 06:29

## 2025-01-07 RX ADMIN — ONDANSETRON 4 MG: 2 INJECTION INTRAMUSCULAR; INTRAVENOUS at 04:57

## 2025-01-07 RX ADMIN — PANTOPRAZOLE SODIUM 40 MG: 40 INJECTION, POWDER, LYOPHILIZED, FOR SOLUTION INTRAVENOUS at 08:52

## 2025-01-07 RX ADMIN — THIAMINE HCL TAB 100 MG 100 MG: 100 TAB at 08:52

## 2025-01-07 RX ADMIN — LORAZEPAM 4 MG: 2 INJECTION INTRAMUSCULAR; INTRAVENOUS at 02:37

## 2025-01-07 NOTE — ASSESSMENT & PLAN NOTE
Secondary to alcohol abuse.  Advised cessation    Results from last 7 days   Lab Units 01/07/25  0735 01/07/25  0233 01/03/25  1118   AST U/L 245* 305* 47*   ALT U/L 261* 305* 52   TOTAL BILIRUBIN mg/dL 0.31 0.32 0.37

## 2025-01-07 NOTE — ED PROVIDER NOTES
Time reflects when diagnosis was documented in both MDM as applicable and the Disposition within this note       Time User Action Codes Description Comment    1/7/2025  3:19 AM Lindsey Phan Add [F10.10] Alcohol abuse     1/7/2025  3:19 AM Lindsey Phan Add [R74.01] Transaminitis     1/7/2025  3:20 AM Lindsey Phan Add [E87.29] Increased anion gap metabolic acidosis     1/7/2025  3:20 AM Lindsey Phan Add [E87.29] Alcoholic ketoacidosis     1/7/2025  6:21 AM Octavia Smith Add [F10.939] Alcohol withdrawal syndrome with complication (HCC)           ED Disposition       ED Disposition   Left from Room after Provider Exam    Condition   --    Date/Time   Tue Jan 7, 2025  4:22 PM    Comment   Pt left room prior to EMS arrival for transport to [place].              Assessment & Plan       Medical Decision Making  Amount and/or Complexity of Data Reviewed  Labs: ordered.    Risk  Prescription drug management.    Pt is a 34-year-old male, significant alcohol use, PTSD, depression, presents to the ED requesting for alcohol detox.  States he drinks about 1 bottle of vodka every day last drink 6 hours ago.  Patient has been in and out of detox multiple times in the past.  Denies any history of seizures, currently is anxious.  Also has mild tremors and nausea.    Initial presentation pt is nontoxic, no external signs of trauma    On exam   General: anxious appearing tremulous   Speaking normally in full sentences.   Head: Normocephalic, atraumatic, nontender.  Eyes: PERRL, EOM-I. No diplopia.   No hyphema.   No subconjunctival hemorrhages.  Symmetrical lids.   ENT: Atraumatic external nose and ears.    MMM  No malocclusion. No stridor. Normal phonation. No drooling. Normal swallowing.   Neck: Symmetric, trachea midline. No JVD.  CV: RRR. +S1/S2  No murmurs or gallops  Peripheral pulses +2 throughout. No chest wall tenderness.   Lungs:   Unlabored No retractions  CTAB, lungs sounds equal bilateral.    No tachypnea.   Abd: +BS, soft, NT/ND.   MSK:   FROM   Back:   No rashes  Skin: Dry, intact.   Neuro: AAOx3, GCS 15, CN II-XII grossly intact.   Motor grossly intact.  Psychiatric/Behavioral: Appropriate mood and affect   Exam: deferred    Ddx: alcohol intoxication ,requesting detox.     Plan: Pt was evaluated with labs, treated with fluids, folate + thiamine, planned for inpatient admission for transaminitis and elevated anion gap. Signed out to Dr. Thakkar at signout.       Final Dispo:     Pt is hemodynamically stable and clear for discharge with outpatient f/u with their PCP. Return precautions given pt verbalized understanding      ED Course as of 01/09/25 1914   Tue Jan 07, 2025 0302 Transaminitis + elevated anion gap likely from alcohol ketoacidosis.        Medications   LORazepam (ATIVAN) injection 4 mg (4 mg Intravenous Given 1/7/25 0237)   magnesium sulfate 2 g/50 mL IVPB (premix) 2 g (0 g Intravenous Stopped 1/7/25 0501)   sodium chloride 0.9 % bolus 1,000 mL (0 mL Intravenous Stopped 1/7/25 0551)   thiamine (VITAMIN B1) 100 mg in sodium chloride 0.9 % 50 mL IVPB (0 mg Intravenous Stopped 1/7/25 0341)   folic acid 1 mg in sodium chloride 0.9 % 50 mL IVPB (0 mg Intravenous Stopped 1/7/25 0343)   ondansetron (ZOFRAN) injection 4 mg (4 mg Intravenous Given 1/7/25 0253)   LORazepam (ATIVAN) injection 2 mg (2 mg Intravenous Given 1/7/25 0437)   LORazepam (ATIVAN) injection 4 mg (4 mg Intravenous Given 1/7/25 0629)       ED Risk Strat Scores               CIWA-Ar Score       Row Name 01/07/25 1330 01/07/25 0936 01/07/25 0710       CIWA-Ar    Blood Pressure 134/76 136/86 128/77    Pulse 92 88 95    Nausea and Vomiting 0 0 2    Tactile Disturbances 0 1 3    Tremor 1 1 2    Auditory Disturbances 0 0 0    Paroxysmal Sweats 1 1 0    Visual Disturbances 0 0 0    Anxiety 1 1 3    Headache, Fullness in Head 1 1 3    Agitation 0 0 3    Orientation and Clouding of Sensorium 0 0 0    CIWA-Ar Total 4 5 16      Row Name  01/07/25 0615 01/07/25 0423 01/07/25 0315       CIWA-Ar    Blood Pressure -- 146/82 --    Pulse -- 96 --    Nausea and Vomiting 2 1 1    Tactile Disturbances 3 2 2    Tremor 4 3 3    Auditory Disturbances 0 0 0    Paroxysmal Sweats 0 0 0    Visual Disturbances 0 0 0    Anxiety 5 4 4    Headache, Fullness in Head 4 3 3    Agitation 4 3 3    Orientation and Clouding of Sensorium 0 0 0    CIWA-Ar Total 22 16 16      Row Name 01/07/25 0202             CIWA-Ar    Blood Pressure 136/79      Pulse 104      Nausea and Vomiting 2      Tactile Disturbances 2      Tremor 3      Auditory Disturbances 0      Paroxysmal Sweats 0      Visual Disturbances 0      Anxiety 4      Headache, Fullness in Head 3      Agitation 4      Orientation and Clouding of Sensorium 0      CIWA-Ar Total 18                              SBIRT 20yo+      Flowsheet Row Most Recent Value   Initial Alcohol Screen: US AUDIT-C     1. How often do you have a drink containing alcohol? 6 Filed at: 01/07/2025 0207   2. How many drinks containing alcohol do you have on a typical day you are drinking?  6 Filed at: 01/07/2025 0207   3a. Male UNDER 65: How often do you have five or more drinks on one occasion? 6 Filed at: 01/07/2025 0207   Audit-C Score 18 Filed at: 01/07/2025 0207   Full Alcohol Screen: US AUDIT    4. How often during the last year have you found that you were not able to stop drinking once you had started? 4 Filed at: 01/07/2025 0207   5. How often during past year have you failed to do what was normally expected of you because of drinking?  4 Filed at: 01/07/2025 0207   6. How often in past year have you needed a first drink in the morning to get yourself going after a heavy drinking session?  4 Filed at: 01/07/2025 0207   7. How often in past year have you had feeling of guilt or remorse after drinking?  4 Filed at: 01/07/2025 0207   8. How often in past year have you been unable to remember what happened night before because you had been  drinking?  4 Filed at: 01/07/2025 0207   9. Have you or someone else been injured as a result of your drinking?  4 Filed at: 01/07/2025 0207   10. Has a relative, friend, doctor or other health worker been concerned about your drinking and suggested you cut down?  4 Filed at: 01/07/2025 0207   AUDIT Total Score 46 Filed at: 01/07/2025 0207   JEANNETTE: How many times in the past year have you...    Used an illegal drug or used a prescription medication for non-medical reasons? Never Filed at: 01/07/2025 0207                            History of Present Illness       Chief Complaint   Patient presents with    Detox Evaluation     States wants to go detox for alcohol. [facility] last drink was this afternoon because he wanted to stop shaking. [facility] normally drinks 1 bottle of vodka per day.       Past Medical History:   Diagnosis Date    Alcohol use disorder     Alcohol withdrawal syndrome (HCC)     Anxiety     Depression     PTSD (post-traumatic stress disorder)     Tachycardia       Past Surgical History:   Procedure Laterality Date    LUMBAR LAMINECTOMY        History reviewed. No pertinent family history.   Social History     Tobacco Use    Smoking status: Never     Passive exposure: Past    Smokeless tobacco: Never   Vaping Use    Vaping status: Never Used   Substance Use Topics    Alcohol use: Yes     Comment: 1-1.5 L vodka or beer daily    Drug use: Never      E-Cigarette/Vaping    E-Cigarette Use Never User       E-Cigarette/Vaping Substances    Nicotine No     THC No     CBD No     Flavoring No     Other No     Unknown No       I have reviewed and agree with the history as documented.     HPI    Review of Systems        Objective       ED Triage Vitals [01/07/25 0122]   Temperature Pulse Blood Pressure Respirations SpO2 Patient Position - Orthostatic VS   97.9 °F (36.6 °C) (!) 118 137/97 20 99 % Sitting      Temp Source Heart Rate Source BP Location FiO2 (%) Pain Score    Oral Monitor Right arm -- No Pain       Vitals      Date and Time Temp Pulse SpO2 Resp BP Pain Score FACES Pain Rating User   01/07/25 1430 -- 92 98 % 18 134/76 -- -- NZ   01/07/25 1330 -- 92 -- -- 134/76 -- -- NZ   01/07/25 0937 -- 88 98 % 18 136/86 -- -- NZ   01/07/25 0936 -- 88 -- -- 136/86 -- -- NZ   01/07/25 0710 -- 95 -- -- 128/77 -- -- CO   01/07/25 0700 -- 93 95 % 15 126/76 -- -- NZ   01/07/25 0630 -- 118 98 % 20 119/57 -- -- AA   01/07/25 0600 -- 86 93 % 18 123/73 -- -- AA   01/07/25 0530 -- 100 93 % 19 129/80 -- -- AA   01/07/25 0500 -- 95 93 % 13 135/74 -- -- AA   01/07/25 0430 -- 96 94 % 13 138/76 -- -- AA   01/07/25 0423 -- 96 -- -- 146/82 -- -- AA   01/07/25 0400 -- 110 96 % 20 146/82 -- -- AA   01/07/25 0330 97.9 °F (36.6 °C) 98 95 % 20 131/84 10 - Worst Possible Pain -- AA   01/07/25 0300 -- 94 95 % 20 136/84 -- -- AA   01/07/25 0208 -- -- -- -- -- 10 - Worst Possible Pain -- AB   01/07/25 0202 -- 104 -- -- 136/79 -- -- AB   01/07/25 0153 -- 79 97 % 17 145/97 -- -- RC   01/07/25 0122 97.9 °F (36.6 °C) 118 99 % 20 137/97 No Pain -- RC            Physical Exam    Results Reviewed       Procedure Component Value Units Date/Time    Comprehensive metabolic panel [[id number]]  (Abnormal) Collected: 01/07/25 0751    Lab Status: Final result Specimen: Blood from Arm, Left Updated: 01/07/25 4130     Sodium 137 mmol/L      Potassium 3.9 mmol/L      Chloride 100 mmol/L      CO2 23 mmol/L      ANION GAP 14 mmol/L      BUN 12 mg/dL      Creatinine 0.87 mg/dL      Glucose 181 mg/dL      Glucose, Fasting 181 mg/dL      Calcium 7.6 mg/dL       U/L       U/L      Alkaline Phosphatase 106 U/L      Total Protein 6.7 g/dL      Albumin 4.2 g/dL      Total Bilirubin 0.31 mg/dL      eGFR 112 ml/min/1.73sq m     Narrative:      National Kidney Disease Foundation guidelines for Chronic Kidney Disease (CKD):     Stage 1 with normal or high GFR (GFR > 90 mL/min/1.73 square meters)    Stage 2 Mild CKD (GFR = 60-89 mL/min/1.73 square meters)    Stage  3A Moderate CKD (GFR = 45-59 mL/min/1.73 square meters)    Stage 3B Moderate CKD (GFR = 30-44 mL/min/1.73 square meters)    Stage 4 Severe CKD (GFR = 15-29 mL/min/1.73 square meters)    Stage 5 End Stage CKD (GFR <15 mL/min/1.73 square meters)  Note: GFR calculation is accurate only with a steady state creatinine    Rapid drug screen, urine [[id number]]  (Abnormal) Collected: 01/07/25 0625    Lab Status: Final result Specimen: Urine, Clean Catch Updated: 01/07/25 0703     Amph/Meth UR Negative     Barbiturate Ur Negative     Benzodiazepine Urine Positive     Cocaine Urine Negative     Methadone Urine Negative     Opiate Urine Negative     PCP Ur Negative     THC Urine Negative     Oxycodone Urine Negative     Fentanyl Urine Negative     HYDROCODONE URINE Negative    Narrative:      Presumptive report. If requested, specimen will be sent to reference lab for confirmation.  FOR MEDICAL PURPOSES ONLY.   IF CONFIRMATION NEEDED PLEASE CONTACT THE LAB WITHIN 5 DAYS.    Drug Screen Cutoff Levels:  AMPHETAMINE/METHAMPHETAMINES  1000 ng/mL  BARBITURATES     200 ng/mL  BENZODIAZEPINES     200 ng/mL  COCAINE      300 ng/mL  METHADONE      300 ng/mL  OPIATES      300 ng/mL  PHENCYCLIDINE     25 ng/mL  THC       50 ng/mL  OXYCODONE      100 ng/mL  FENTANYL      5 ng/mL  HYDROCODONE     300 ng/mL    Magnesium [[id number]]  (Normal) Collected: 01/07/25 0233    Lab Status: Final result Specimen: Blood from Arm, Left Updated: 01/07/25 0454     Magnesium 2.3 mg/dL     Beta Hydroxybutyrate [[id number]]  (Abnormal) Collected: 01/07/25 0233    Lab Status: Final result Specimen: Blood from Arm, Left Updated: 01/07/25 0350     Beta- Hydroxybutyrate 0.34 mmol/L     Comprehensive metabolic panel [[id number]]  (Abnormal) Collected: 01/07/25 0233    Lab Status: Final result Specimen: Blood from Arm, Left Updated: 01/07/25 0259     Sodium 144 mmol/L      Potassium 4.0 mmol/L      Chloride 102 mmol/L      CO2 22 mmol/L      ANION GAP 20 mmol/L       BUN 12 mg/dL      Creatinine 0.99 mg/dL      Glucose 87 mg/dL      Calcium 8.5 mg/dL       U/L       U/L      Alkaline Phosphatase 114 U/L      Total Protein 7.8 g/dL      Albumin 5.0 g/dL      Total Bilirubin 0.32 mg/dL      eGFR 98 ml/min/1.73sq m     Narrative:      National Kidney Disease Foundation guidelines for Chronic Kidney Disease (CKD):     Stage 1 with normal or high GFR (GFR > 90 mL/min/1.73 square meters)    Stage 2 Mild CKD (GFR = 60-89 mL/min/1.73 square meters)    Stage 3A Moderate CKD (GFR = 45-59 mL/min/1.73 square meters)    Stage 3B Moderate CKD (GFR = 30-44 mL/min/1.73 square meters)    Stage 4 Severe CKD (GFR = 15-29 mL/min/1.73 square meters)    Stage 5 End Stage CKD (GFR <15 mL/min/1.73 square meters)  Note: GFR calculation is accurate only with a steady state creatinine    Lipase [[id number]]  (Normal) Collected: 01/07/25 0233    Lab Status: Final result Specimen: Blood from Arm, Left Updated: 01/07/25 0259     Lipase 29 u/L     Ethanol [[id number]]  (Abnormal) Collected: 01/07/25 0233    Lab Status: Final result Specimen: Blood from Arm, Left Updated: 01/07/25 0258     Ethanol Lvl 426 mg/dL     CBC and differential [[id number]]  (Abnormal) Collected: 01/07/25 0233    Lab Status: Final result Specimen: Blood from Arm, Left Updated: 01/07/25 0247     WBC 7.88 Thousand/uL      RBC 5.13 Million/uL      Hemoglobin 15.3 g/dL      Hematocrit 45.3 %      MCV 88 fL      MCH 29.8 pg      MCHC 33.8 g/dL      RDW 15.6 %      MPV 8.8 fL      Platelets 200 Thousands/uL      nRBC 0 /100 WBCs      Segmented % 55 %      Immature Grans % 0 %      Lymphocytes % 39 %      Monocytes % 3 %      Eosinophils Relative 2 %      Basophils Relative 1 %      Absolute Neutrophils 4.33 Thousands/µL      Absolute Immature Grans 0.02 Thousand/uL      Absolute Lymphocytes 3.07 Thousands/µL      Absolute Monocytes 0.26 Thousand/µL      Eosinophils Absolute 0.13 Thousand/µL      Basophils Absolute 0.07  Thousands/µL             No orders to display       Procedures    ED Medication and Procedure Management   Prior to Admission Medications   Prescriptions Last Dose Informant Patient Reported? Taking?   busPIRone (BUSPAR) 7.5 mg tablet   No Yes   Sig: Take 1 tablet (7.5 mg total) by mouth 3 (three) times a day   escitalopram (LEXAPRO) 10 mg tablet   No Yes   Sig: Take 1 tablet (10 mg total) by mouth daily   folic acid (FOLVITE) 1 mg tablet   No Yes   Sig: Take 1 tablet (1 mg total) by mouth daily   Patient not taking: Reported on 1/8/2025   gabapentin (NEURONTIN) 300 mg capsule   No Yes   Sig: Take 1 capsule (300 mg total) by mouth every 8 (eight) hours as needed (anxiety)   ketoconazole (NIZORAL) 2 % cream   No Yes   Sig: Apply topically daily   mirtazapine (REMERON) 15 mg tablet   No Yes   Sig: Take 1 tablet (15 mg total) by mouth daily at bedtime   propranolol (INDERAL) 40 mg tablet   Yes Yes   Sig: Take 40 mg by mouth 3 (three) times a day   thiamine 100 MG tablet   No Yes   Sig: Take 1 tablet (100 mg total) by mouth daily   Patient not taking: Reported on 1/8/2025      Facility-Administered Medications: None     Discharge Medication List as of 1/7/2025  4:24 PM        CONTINUE these medications which have NOT CHANGED    Details   busPIRone (BUSPAR) 7.5 mg tablet Take 1 tablet (7.5 mg total) by mouth 3 (three) times a day, Starting Thu 11/14/2024, Normal      escitalopram (LEXAPRO) 10 mg tablet Take 1 tablet (10 mg total) by mouth daily, Starting Thu 11/14/2024, Until Tue 1/7/2025, Normal      folic acid (FOLVITE) 1 mg tablet Take 1 tablet (1 mg total) by mouth daily, Starting Fri 11/15/2024, Normal      gabapentin (NEURONTIN) 300 mg capsule Take 1 capsule (300 mg total) by mouth every 8 (eight) hours as needed (anxiety), Starting Thu 11/14/2024, Normal      ketoconazole (NIZORAL) 2 % cream Apply topically daily, Starting Thu 11/14/2024, Normal      mirtazapine (REMERON) 15 mg tablet Take 1 tablet (15 mg total)  by mouth daily at bedtime, Starting Thu 11/14/2024, Normal      propranolol (INDERAL) 40 mg tablet Take 40 mg by mouth 3 (three) times a day, Historical Med      thiamine 100 MG tablet Take 1 tablet (100 mg total) by mouth daily, Starting Fri 11/15/2024, Normal           No discharge procedures on file.  ED SEPSIS DOCUMENTATION   Time reflects when diagnosis was documented in both MDM as applicable and the Disposition within this note       Time User Action Codes Description Comment    1/7/2025  3:19 AM Lindsey Phan [F10.10] Alcohol abuse     1/7/2025  3:19 AM Lindsey Phan [R74.01] Transaminitis     1/7/2025  3:20 AM Lindsey Phan [E87.29] Increased anion gap metabolic acidosis     1/7/2025  3:20 AM Lindsey Phan [E87.29] Alcoholic ketoacidosis     1/7/2025  6:21 AM Octavia Smith Add [F10.939] Alcohol withdrawal syndrome with complication (HCC)                  Lindsey Phan DO  01/09/25 1922

## 2025-01-07 NOTE — H&P
H&P - Hospitalist   Name: Juan Luu 34 y.o. male I MRN: [id number]  Unit/Bed#: ED-08 I Date of Admission: 1/7/2025   Date of Service: 1/7/2025 I Hospital Day: 0     Assessment & Plan  Alcohol withdrawal syndrome with complication (HCC)  Patient presents with complaints of withdrawal  S/p Ativan 8m total given in ER  Admit to telemetry unit  Placed on CIWA protocol  Continue with symptom based treatment using Ativan as needed elevated CIWA scores  IV fluids  Thiamine/folate/MTV  Will get  input for detox  Transaminitis  Due to alcohol abuse  Trend LFTs  IVF  Generalized anxiety disorder  Suspect worsened by alcohol use  Previously on buspar outpatient   Continue with ativan, resume anxiolytics outpatient   Check drugs of abuse      VTE Pharmacologic Prophylaxis:   Moderate Risk (Score 3-4) - Pharmacological DVT Prophylaxis Ordered: enoxaparin (Lovenox).  Code Status: Prior   Discussion with family: Patient declined call to .     Anticipated Length of Stay: Patient will be admitted on an observation basis with an anticipated length of stay of less than 2 midnights secondary to above.    History of Present Illness   Chief Complaint: Anxiety, alcohol withdrawal     Juan Luu is a 34 y.o. male with a PMH of alcohol use disorder, anxiety disorder, PTSD presenting with complaints of needing detox.  Patient states last drink was earlier today around 11 AM.  Patient had drank a 750 mL bottle of vodka.  Since then he feels upset and wants help to stop drinking.  He has been having hallucinations, visual over the past 2 weeks.  Also feels tactile hallucinations.  Since he started having these sensations he is more determined to stop drinking.  Also reports feeling anxious.  States his symptoms escalated with a death in the family recently.  He had trouble with his insurance that prevented him from seeking care however states this has been sorted out starting this month.  He  denies any chest pain, abdominal pain, fevers, cough.  He does endorse heartburn heart failure nauseous.    Ethanol level 426 on presentation    Labs significant for , , alk phos 114, anion gap 20    Patient received 8 mg of Ativan thus far    Patient will be admitted for further management    Review of Systems   Constitutional:  Negative for chills and fever.   HENT:  Negative for ear pain and sore throat.    Eyes:  Negative for pain and visual disturbance.   Respiratory:  Negative for cough and shortness of breath.    Cardiovascular:  Negative for chest pain and palpitations.   Gastrointestinal:  Positive for nausea. Negative for abdominal distention, abdominal pain, diarrhea and vomiting.   Endocrine: Negative for polyuria.   Genitourinary:  Negative for dysuria and hematuria.   Musculoskeletal:  Negative for arthralgias and back pain.   Skin:  Negative for color change and rash.   Neurological:  Positive for tremors. Negative for dizziness, seizures, syncope, weakness, light-headedness and headaches.   Psychiatric/Behavioral:  The patient is nervous/anxious.    All other systems reviewed and are negative.      Historical Information   Past Medical History:   Diagnosis Date    Alcohol use disorder     Alcohol withdrawal syndrome (HCC)     Anxiety     Depression     PTSD (post-traumatic stress disorder)     Tachycardia      Past Surgical History:   Procedure Laterality Date    LUMBAR LAMINECTOMY       Social History     Tobacco Use    Smoking status: Never     Passive exposure: Past    Smokeless tobacco: Never   Vaping Use    Vaping status: Never Used   Substance and Sexual Activity    Alcohol use: Yes     Comment: 1-1.5 L vodka or beer daily    Drug use: Never    Sexual activity: Not Currently     E-Cigarette/Vaping    E-Cigarette Use Never User      E-Cigarette/Vaping Substances     History reviewed. No pertinent family history.  Social History:  Marital Status: Single       Meds/Allergies   I have  reviewed home medications with patient personally.  Prior to Admission medications    Medication Sig Start Date End Date Taking? Authorizing Provider   busPIRone (BUSPAR) 7.5 mg tablet Take 1 tablet (7.5 mg total) by mouth 3 (three) times a day 11/14/24   Whitney Lemus PA-C   escitalopram (LEXAPRO) 10 mg tablet Take 1 tablet (10 mg total) by mouth daily 11/14/24 12/14/24  Whitney Lemus PA-C   folic acid (FOLVITE) 1 mg tablet Take 1 tablet (1 mg total) by mouth daily 11/15/24   Whitney Lemus PA-C   gabapentin (NEURONTIN) 300 mg capsule Take 1 capsule (300 mg total) by mouth every 8 (eight) hours as needed (anxiety) 11/14/24   Whitney Lemus PA-C   ketoconazole (NIZORAL) 2 % cream Apply topically daily 11/14/24   Whitney Lemus PA-C   mirtazapine (REMERON) 15 mg tablet Take 1 tablet (15 mg total) by mouth daily at bedtime 11/14/24   Whitney Lemus PA-C   thiamine 100 MG tablet Take 1 tablet (100 mg total) by mouth daily 11/15/24   Whitney Lemus PA-C     No Known Allergies    Objective :  Temp:  [97.9 °F (36.6 °C)] 97.9 °F (36.6 °C)  HR:  [] 96  BP: (131-146)/(76-97) 138/76  Resp:  [13-20] 13  SpO2:  [94 %-99 %] 94 %  O2 Device: None (Room air)    Physical Exam  Vitals and nursing note reviewed.   Constitutional:       General: He is not in acute distress.     Appearance: He is well-developed. He is ill-appearing. He is not toxic-appearing or diaphoretic.   HENT:      Head: Normocephalic and atraumatic.   Eyes:      General: No scleral icterus.     Conjunctiva/sclera:      Right eye: Right conjunctiva is injected.      Left eye: Left conjunctiva is injected.   Cardiovascular:      Rate and Rhythm: Normal rate and regular rhythm.      Heart sounds: No murmur heard.  Pulmonary:      Effort: Pulmonary effort is normal. No respiratory distress.      Breath sounds: Normal breath sounds.   Abdominal:      Palpations: Abdomen is soft.      Tenderness: There is no abdominal tenderness.   Musculoskeletal:          "General: No swelling.      Cervical back: Neck supple.   Skin:     General: Skin is warm and dry.      Capillary Refill: Capillary refill takes less than 2 seconds.      Findings: No rash.   Neurological:      General: No focal deficit present.      Mental Status: He is alert and oriented to person, place, and time.      Comments: Mild upper extremity tremors present   Psychiatric:         Mood and Affect: Mood normal.         Lines/Drains:            Lab Results: I have reviewed the following results:  Results from last 7 days   Lab Units 01/07/25  0233   WBC Thousand/uL 7.88   HEMOGLOBIN g/dL 15.3   HEMATOCRIT % 45.3   PLATELETS Thousands/uL 200   SEGS PCT % 55   LYMPHO PCT % 39   MONO PCT % 3*   EOS PCT % 2     Results from last 7 days   Lab Units 01/07/25  0233   SODIUM mmol/L 144   POTASSIUM mmol/L 4.0   CHLORIDE mmol/L 102   CO2 mmol/L 22   BUN mg/dL 12   CREATININE mg/dL 0.99   ANION GAP mmol/L 20*   CALCIUM mg/dL 8.5   ALBUMIN g/dL 5.0   TOTAL BILIRUBIN mg/dL 0.32   ALK PHOS U/L 114*   ALT U/L 305*   AST U/L 305*   GLUCOSE RANDOM mg/dL 87             No results found for: \"HGBA1C\"        Imaging Results Review: No pertinent imaging studies reviewed.  Other Study Results Review: EKG was reviewed.     Administrative Statements   I have spent a total time of 60 minutes in caring for this patient on the day of the visit/encounter including Risks and benefits of tx options.    ** Please Note: This note has been constructed using a voice recognition system. **    "

## 2025-01-07 NOTE — CASE MANAGEMENT
Case Management ED Assessment & Discharge Planning Note    Patient name Juan Luu  Location ED-/ED- MRN [id number]  : 1990 Date 2025        OBJECTIVE:  Predictive Model Details          78%  Factor Value    Risk of Hospital Admission or ED Visit Model 49% Number of ED Visits 4     26% Number of Hospitalizations 4     12% Is in Relationship No     8% Has Anemia Yes     7% Has Depression Yes            Chief Complaint: Alcohol withdrawal syndrome with complication (HCC) .  Patient Class: Observation  Preferred Pharmacy:   [id number] [place] - [address]  [address]  [address] [place] [address]  [address]  [facility] [address]  Phone: [phone] Fax: [phone]     [facility] [place] - [address] [place] [address] STREET  [address] [place] [address]  Phone: [phone] Fax: [phone]    Primary Care Provider: No primary care provider on file.    Primary Insurance:   Secondary Insurance:       ED Assessment:  Active Health Care Proxies    There are no active Health Care Proxies on file.          ED Discharge Details:    Discharge planning discussed with:: Pt      Contacts  Patient Contacts: HOST  Relationship to Patient:: Treatment Provider  Contact Method: Phone  Phone Number: [phone]  Reason/Outcome: Discharge Planning, Referral         Treatment Team Recommendation: Substance Abuse Treatment  Discharge Destination Plan:: Substance Abuse Treatment         Additional Comments: Consult recieved. CM introduced self and role at bedside. Pt endorsed interest in detox treatment and is agreeable to a HOST referral for placement. ALEXUS signed and placed in scan bin. Medical records from current encounter faxed to HOST at . While pt was able to engage, he appeared somnelent. Pt requested to complete assessment portion of consult at a later time. CM dept continues following.

## 2025-01-07 NOTE — EMTALA/ACUTE CARE TRANSFER
[facility] EMERGENCY DEPARTMENT  [address] [facility] [address]  Dept: [phone]      EMTALA TRANSFER CONSENT    NAME Juan Luu                                         1990                              MRN [id number]    I have been informed of my rights regarding examination, treatment, and transfer   by Dr. Deejay Tarango DO    Benefits: Specialized equipment and/or services available at the receiving facility (Include comment)________________________ (WITHDRAWL MANAGEMENT UNIT)    Risks: Potential for delay in receiving treatment, Increased discomfort during transfer, Loss of IV, Possible worsening of condition or death during transfer      Transfer Request   I acknowledge that my medical condition has been evaluated and explained to me by the emergency department physician or other qualified medical person and/or my attending physician who has recommended and offered to me further medical examination and treatment. I understand the Hospital's obligation with respect to the treatment and stabilization of my emergency medical condition. I nevertheless request to be transferred. I release the Hospital, the doctor, and any other persons caring for me from all responsibility or liability for any injury or ill effects that may result from my transfer and agree to accept all responsibility for the consequences of my choice to transfer, rather than receive stabilizing treatment at the Hospital. I understand that because the transfer is my request, my insurance may not provide reimbursement for the services.  The Hospital will assist and direct me and my family in how to make arrangements for transfer, but the hospital is not liable for any fees charged by the transport service.  In spite of this understanding, I refuse to consent to further medical examination and treatment which has been offered to me, and request transfer to Accepting Facility Name, City & State : [facility]T  TOXICOLOGY. I authorize the performance of emergency medical procedures and treatments upon me in both transit and upon arrival at the receiving facility.  Additionally, I authorize the release of any and all medical records to the receiving facility and request they be transported with me, if possible.    I authorize the performance of emergency medical procedures and treatments upon me in both transit and upon arrival at the receiving facility.  Additionally, I authorize the release of any and all medical records to the receiving facility and request they be transported with me, if possible.  I understand that the safest mode of transportation during a medical emergency is an ambulance and that the Hospital advocates the use of this mode of transport. Risks of traveling to the receiving facility by car, including absence of medical control, life sustaining equipment, such as oxygen, and medical personnel has been explained to me and I fully understand them.    (ANCA CORRECT BOX BELOW)  [ x ]  I consent to the stated transfer and to be transported by ambulance/helicopter.  [    ]  I consent to the stated transfer, but refuse transportation by ambulance and accept full responsibility for my transportation by car.  I understand the risks of non-ambulance transfers and I exonerate the Hospital and its staff from any deterioration in my condition that results from this refusal.    X___________________________________________    DATE  25  TIME________  Signature of patient or legally responsible individual signing on patient behalf           RELATIONSHIP TO PATIENT_________________________          Provider Certification    NAME Juan Montana Bell                                         1990                              MRN [id number]    A medical screening exam was performed on the above named patient.  Based on the examination:    Condition Necessitating Transfer The primary encounter diagnosis was  Alcohol abuse. Diagnoses of Alcohol withdrawal syndrome with complication (HCC), Transaminitis, Alcoholic ketoacidosis, and Increased anion gap metabolic acidosis were also pertinent to this visit.    Patient Condition: The patient has been stabilized such that within reasonable medical probability, no material deterioration of the patient condition or the condition of the unborn child(leticia) is likely to result from the transfer    Reason for Transfer: Level of Care needed not available at this facility    Transfer Requirements: Facility [facility] TOXICOLOGY   Space available and qualified personnel available for treatment as acknowledged by    Agreed to accept transfer and to provide appropriate medical treatment as acknowledged by       DR OCONNOR  Appropriate medical records of the examination and treatment of the patient are provided at the time of transfer   STAFF INITIAL WHEN COMPLETED _______  Transfer will be performed by qualified personnel from    and appropriate transfer equipment as required, including the use of necessary and appropriate life support measures.    Provider Certification: I have examined the patient and explained the following risks and benefits of being transferred/refusing transfer to the patient/family:  General risk, such as traffic hazards, adverse weather conditions, rough terrain or turbulence, possible failure of equipment (including vehicle or aircraft), or consequences of actions of persons outside the control of the transport personnel, Unanticipated needs of medical equipment and personnel during transport, Risk of worsening condition, The possibility of a transport vehicle being unavailable, Consent was not obtained as patient is committed to psychiatric facility and transfer is mandated, The patient is stable for psychiatric transfer because they are medically stable, and is protected from harming him/herself or others during transport      Based on these reasonable risks and benefits  to the patient and/or the unborn child(leticia), and based upon the information available at the time of the patient’s examination, I certify that the medical benefits reasonably to be expected from the provision of appropriate medical treatments at another medical facility outweigh the increasing risks, if any, to the individual’s medical condition, and in the case of labor to the unborn child, from effecting the transfer.    X____________________________________________ DATE 01/07/25        TIME_______      ORIGINAL - SEND TO MEDICAL RECORDS   COPY - SEND WITH PATIENT DURING TRANSFER

## 2025-01-07 NOTE — DISCHARGE INSTR - OTHER ORDERS
Sammi Pollard   Certified     [facility] [place] [facility]  [phone]    SHARE (Medication)  [facility]  [address]  [place] [address]  [phone]    SHARE (Therapy, CM and CRS)  [facility]  [address]  [place] [address]  [phone]    AA meeting guide   https://www.aa.org/find-aa    AA Phone apps:   Meeting Guide  Everything AA  In the Rooms    AA/24 hour hotline   [phone]    [facility] ([place])  Recovery Support Services  [address] [facility] [facility]  [place]  [address]  [phone]    [address] [place] [address]  [place] [address]    Hours  Monday-Friday: 8:00 AM - 9:00 PM  Saturday: 9:00 AM - 2:00 PM  Office:  [phone]    Fax:  [phone]

## 2025-01-07 NOTE — ED ATTENDING ATTESTATION
1/7/2025  I, Lina Thakkar DO, saw and evaluated the patient. I have discussed the patient with the resident/non-physician practitioner and agree with the resident's/non-physician practitioner's findings, Plan of Care, and MDM as documented in the resident's/non-physician practitioner's note, except where noted. All available labs and Radiology studies were reviewed.  I was present for key portions of any procedure(s) performed by the resident/non-physician practitioner and I was immediately available to provide assistance.       At this point I agree with the current assessment done in the Emergency Department.  I have conducted an independent evaluation of this patient a history and physical is as follows:    ED Course     34 y.o. M w/h/o alcohol abuse, PTSD, depression p/w request for alcohol detox.  Pt has been to our detox unit multiple times in the past.  States he drinks at least a bottle of vodka per day.  Reports last drink was 6 hours ago.  Denies h/o seizures, but states he's had hallucinations in the past.  Associated with nausea and tremors.  Plan: Alcohol withdrawal - Labs, follow CIWA protocol, and discuss with detox unit.    Critical Care Time  Procedures

## 2025-01-07 NOTE — ASSESSMENT & PLAN NOTE
"Medical Toxicology recommendations for CIWA monitoring using diazepam for treatment:    CIWA score & Treatment     Monitoring:   Modified CIWA Score   Telemetry  Continuous pulse oximetry   Initiate RASS with dosing and hold any sedatives for RASS less than -2  Request provider re-evaluation after every three doses.  Step down for CIWA > 7  Contact Medical Toxicology/Addiction \"Network Detox AP On Call\" via Tiger Text for worsening CIWA, persistent tachycardia or encephalopathy.       0  No intervention  Reassess q4 hours.   Consider discontinuing CIWA 24 hours after ethanol concentration of zero, with a score of zero    1-7  Diazepam 10 mg PO Q4hr PRN score 1-7  Reassess q4hr    8-14  Diazepam 10mg IV Q1hr PRN score 8-14  Reassess q1hr  Contact Medical Toxicology/Addiction \"Network Detox AP On Call\" via Tiger Text to discuss transfer to detox unit, step down or critical care per Detox AP.    15-19  Diazepam 20mg IV Q1hr PRN score 15-19  Reassess q1hr  Contact Medical Toxicology/Addiction \"Network Detox AP On Call\" via Tiger Text to discuss transfer to detox unit, step down or critical care per Detox AP and further treatment recommendations.    >20  Diazepam 40mg IV Q1hr PRN score > 20  Contact Medical Toxicology/Addiction \"Network Detox AP On Call\" via Tiger Text for additional treatment recommendations.       Once the patient's withdrawal is effectively managed, the patient can be placed on a diazepam taper, if needed.    Diazepam can be decreased by 1/2 of the last dose every hour until the patient is not needing more than 10 mg at a time; at which point diazepam 5mg PO  may be given Q6 hours PRN for 24 hours. Prior to discontinuation.     Please reach out to Medical Toxicology/Addiction \"Network Detox AP On Call\" via Tiger Text with any additional concerns.     "

## 2025-01-07 NOTE — DISCHARGE SUMMARY
Discharge Summary - Hospitalist   Name: Juan Luu 34 y.o. male I MRN: [id number]  Unit/Bed#: ED-01 I Date of Admission: 1/7/2025   Date of Service: 1/7/2025 I Hospital Day: 0    Assessment & Plan  Alcohol withdrawal syndrome with complication (HCC)  History of anxiety and alcohol abuse who presents for complaints of alcohol withdrawal and alcohol ketoacidosis  Has been CIWA protocol, started chlordiazepoxide  Continue thiamine and folic acid    Results from last 7 days   Lab Units 01/07/25  0233   ETHANOL LVL mg/dL 426*     Transaminitis  Secondary to alcohol abuse.  Advised cessation    Results from last 7 days   Lab Units 01/07/25  0735 01/07/25  0233 01/03/25  1118   AST U/L 245* 305* 47*   ALT U/L 261* 305* 52   TOTAL BILIRUBIN mg/dL 0.31 0.32 0.37     Unspecified mood (affective) disorder (HCC)  Previously on escitalopram and buspirone.      Medical Problems       Resolved Problems  Date Reviewed: 11/14/2024   None        Discharging Physician / Practitioner: Deejay Tarango DO  PCP: No primary care provider on file.  Admission Date:   Admission Orders (From admission, onward)       Ordered        01/07/25 0320  Place in Observation  Once                        Discharge Date: 01/07/25    Consultations During Hospital Stay:  IP CONSULT TO TOXICOLOGY  IP CONSULT TO CASE MANAGEMENT  CONSULT TO CERTIFIED      Procedures Performed:   * No surgery found *     Images:   No results found.    Lab Results: I have reviewed the following results:  Results from last 7 days   Lab Units 01/07/25  0233 01/03/25  1118   WBC Thousand/uL 7.88 7.71   HEMOGLOBIN g/dL 15.3 14.1   HEMATOCRIT % 45.3 43.3   MCV fL 88 90   PLATELETS Thousands/uL 200 317     Results from last 7 days   Lab Units 01/07/25  0735 01/07/25  0233 01/03/25  1118   SODIUM mmol/L 137 144 141   POTASSIUM mmol/L 3.9 4.0 4.0   CHLORIDE mmol/L 100 102 103   CO2 mmol/L 23 22 26   BUN mg/dL 12 12 15   CREATININE mg/dL 0.87 0.99 0.83    CALCIUM mg/dL 7.6* 8.5 8.7   ALBUMIN g/dL 4.2 5.0 4.7   TOTAL BILIRUBIN mg/dL 0.31 0.32 0.37   ALK PHOS U/L 106* 114* 90   ALT U/L 261* 305* 52   AST U/L 245* 305* 47*   EGFR ml/min/1.73sq m 112 98 114   GLUCOSE RANDOM mg/dL 181* 87 107         Incidental Findings:       Test Results Pending at Discharge (will require follow up):      Reason for Admission:   Detox Evaluation (States wants to go detox for alcohol. States last drink was this afternoon because he wanted to stop shaking.  normally drinks 1 bottle of vodka per day.)    Hospital Course:   Juan Luu is a 34 y.o. male patient who originally presented to the hospital on 1/7/2025 due to withdrawal symptoms.  He wanted to stop alcohol consumption and was intoxicated coming in.  He was seen by toxicology and due to ongoing symptoms being transferred to the [facility].    Please see above list of diagnoses and related plan for additional information.     Condition at Discharge: stable     Discharge Day Visit / Exam:   Subjective: Patient seen and examined.  Tremulous.    Vitals: Blood Pressure: 136/86 (01/07/25 0937)  Pulse: 88 (01/07/25 0937)  Temperature: 97.9 °F (36.6 °C) (01/07/25 0330)  Temp Source: Oral (01/07/25 0330)  Respirations: 18 (01/07/25 0937)  Height: 6' (182.9 cm) (01/07/25 0330)  Weight - Scale: 87 kg (191 lb 12.8 oz) (01/07/25 0330)  SpO2: 98 % (01/07/25 0937)    Exam:   Physical Exam  Vitals reviewed.   Constitutional:       General: He is not in acute distress.  HENT:      Head: Atraumatic.   Eyes:      Conjunctiva/sclera: Conjunctivae normal.   Cardiovascular:      Rate and Rhythm: Regular rhythm.   Pulmonary:      Effort: Pulmonary effort is normal.      Breath sounds: Decreased breath sounds present. No wheezing.   Abdominal:      General: Bowel sounds are normal.      Palpations: Abdomen is soft.      Tenderness: There is no abdominal tenderness.   Musculoskeletal:         General: No swelling.   Skin:     General: Skin is  warm and dry.   Neurological:      General: No focal deficit present.      Mental Status: He is alert.   Psychiatric:         Mood and Affect: Mood normal.       Discharge instructions/Information to patient and family:   See after visit summary for information provided to patient and family.      Provisions for Follow-Up Care:  See after visit summary for information related to follow-up care and any pertinent home health orders.      Mobility at time of Discharge:     -[facility] Goal achieved. Continue to encourage appropriate mobility.    Disposition:   Withdrawal management unit    Planned Readmission: Yes     Discharge Medications:  See after visit summary for reconciled discharge medications provided to patient and family.      Administrative Statements   I spent 35 minutes discharging the patient. This time was spent on the day of discharge. I had direct contact with the patient on the day of discharge. Greater than 50% of the total time was spent examining patient, answering all patient questions, arranging and discussing plan of care with patient as well as directly providing post-discharge instructions.  Additional time then spent on discharge activities.    **Please Note: This note may have been constructed using a voice recognition system**

## 2025-01-07 NOTE — APP STUDENT NOTE
"NANCY STUDENT  Inpatient Progress Note for TRAINING ONLY  Not Part of Legal Medical Record       Progress Note - Juan Luu 34 y.o. male MRN: [id number]    Unit/Bed#: ED-01 Encounter: [id number]      Assessment:  Alcohol use disorder, uncontrolled     Plan:  Administer IV lorazepam prn for anxiety; continue IV NSS; consult with case management about detox programs  Monitor for progressive symptoms of delirium tremens   Administer thiamine     Subjective:   Juan Luu is a 33 y/o male with a PMH of chronic alcohol use disorder presenting with alcohol intoxication. Patient came to the ED to seek detox treatment. He states that he had treatment in 2023 but relapsed in May 2024 and has been drinking 2L of vodka per day since. Last drink was last night around 11 pm. Patient denies any other substance use. Positive for vomiting, nausea, tremors, yawning and confusion. Patient was awake and alert but not oriented to time and place. Denies abdominal pain, chest pain or shortness of breath. Patient states \"I cannot keep living like this, I need help\". Patient has been unable to drink or eat without vomiting. Denies issues passing bowel movements or urinating.       Objective:     Vitals: Blood pressure 136/86, pulse 88, temperature 97.9 °F (36.6 °C), temperature source Oral, resp. rate 18, height 6' (1.829 m), weight 87 kg (191 lb 12.8 oz), SpO2 98%.,Body mass index is 26.01 kg/m².      Intake/Output Summary (Last 24 hours) at 1/7/2025 1005  Last data filed at 1/7/2025 0551  Gross per 24 hour   Intake 1150 ml   Output --   Net 1150 ml       Physical Exam: General appearance: alert and distracted  Lungs: clear to auscultation bilaterally  Heart: regular rate and rhythm, S1, S2 normal, no murmur, click, rub or gallop  Abdomen: soft, non-tender; bowel sounds normal; no masses,  no organomegaly  Neurologic: Mental status: Alert, oriented, thought content appropriate, alertness: alert, affect: flat  Sensory: normal " "    Invasive Devices       Peripheral Intravenous Line  Duration             Peripheral IV 01/07/25 Left;Proximal;Ventral (anterior) Forearm <1 day    Peripheral IV 01/07/25 Left;Upper;Ventral (anterior) Arm <1 day                    Lab, Imaging and other studies: {Results Review Statement:83847::\"No pertinent imaging studies reviewed.\"}  VTE Pharmacologic Prophylaxis: {Pharmacologic VTE Prophylaxis:177314956}  VTE Mechanical Prophylaxis: {Mechanical VTE Prophylaxis:47685}    "

## 2025-01-07 NOTE — ASSESSMENT & PLAN NOTE
History of anxiety and alcohol abuse who presents for complaints of alcohol withdrawal and alcohol ketoacidosis  Has been CIWA protocol, started chlordiazepoxide  Continue thiamine and folic acid    Results from last 7 days   Lab Units 01/07/25  0233   ETHANOL LVL mg/dL 426*

## 2025-01-07 NOTE — ASSESSMENT & PLAN NOTE
Patient presents with complaints of withdrawal  S/p Ativan 8m total given in ER  Admit to telemetry unit  Placed on CIWA protocol  Continue with symptom based treatment using Ativan as needed elevated CIWA scores  IV fluids  Thiamine/folate/MTV  Will get  input for detox

## 2025-01-07 NOTE — CERTIFIED RECOVERY SPECIALIST
Certified  Note    Patient name: Juan Luu  Location: ED-/ED  [place]: [facility]  Attending:  Deejay Tarango DO MRN [id number]  : 1990  Age: 34 y.o.    Sex: male Date 2025         Substance Use History:     Social History     Substance and Sexual Activity   Alcohol Use Yes    Comment: 1-1.5 L vodka or beer daily        Social History     Substance and Sexual Activity   Drug Use Never      CRS attempted to engage, patient resting comfortably.     CRS will continue to follow until discharge     Resources and contact imformation provided       Sammi Pollard

## 2025-01-07 NOTE — ASSESSMENT & PLAN NOTE
Suspect worsened by alcohol use  Previously on buspar outpatient   Continue with ativan, resume anxiolytics outpatient   Check drugs of abuse

## 2025-01-07 NOTE — ED NOTES
Pt left room prior to EMS arrival for transport, pt removed IV's and left them in the room.      Heaven Aragon RN  01/07/25 0227

## 2025-01-07 NOTE — PROGRESS NOTES
Hospitalist service: Postmidnight follow-up    * Alcohol withdrawal syndrome with complication (HCC)  Assessment & Plan  History of anxiety and alcohol abuse who presents for complaints of alcohol withdrawal and alcohol ketoacidosis  Continue CIWA protocol, start chlordiazepoxide  Continue thiamine and folic acid    Results from last 7 days   Lab Units 01/07/25  0233   ETHANOL LVL mg/dL 426*       Transaminitis  Assessment & Plan  Secondary to alcohol abuse.  Advised cessation    Results from last 7 days   Lab Units 01/07/25  0735 01/07/25  0233 01/03/25  1118   AST U/L 245* 305* 47*   ALT U/L 261* 305* 52   TOTAL BILIRUBIN mg/dL 0.31 0.32 0.37       Generalized anxiety disorder  Assessment & Plan  Previously on escitalopram and buspirone.      Deejay Tarango DO FACP

## 2025-01-07 NOTE — ED NOTES
Patient agreeable to be transferred to [place]. Transfer orders placed.      Buster Toussaint RN  01/07/25 9153

## 2025-01-07 NOTE — CONSULTS
"e-Consult (IPC)  - Medical Toxicology   Name: Juan Luu 34 y.o. male I MRN: [id number]  Unit/Bed#: ED-01 I Date of Admission: 1/7/2025   Date of Service: 1/7/2025 I Hospital Day: 0  Inpatient consult to Toxicology  Consult performed by: Jelena Hinds MD  Consult ordered by: Octavia Smith MD        Physician Requesting Evaluation: Deejay Tarango DO     Reason for Evaluation / Principal Problem:   Alcohol withdrawal  Assessment & Plan  Alcohol withdrawal syndrome with complication (HCC)  Medical Toxicology recommendations for CIWA monitoring using diazepam for treatment:    CIWA score & Treatment     Monitoring:   Modified CIWA Score   Telemetry  Continuous pulse oximetry   Initiate RASS with dosing and hold any sedatives for RASS less than -2  Request provider re-evaluation after every three doses.  Step down for CIWA > 7  Contact Medical Toxicology/Addiction \"Network Detox AP On Call\" via Tiger Text for worsening CIWA, persistent tachycardia or encephalopathy.       0  No intervention  Reassess q4 hours.   Consider discontinuing CIWA 24 hours after ethanol concentration of zero, with a score of zero    1-7  Diazepam 10 mg PO Q4hr PRN score 1-7  Reassess q4hr    8-14  Diazepam 10mg IV Q1hr PRN score 8-14  Reassess q1hr  Contact Medical Toxicology/Addiction \"Network Detox AP On Call\" via Tiger Text to discuss transfer to detox unit, step down or critical care per Detox AP.    15-19  Diazepam 20mg IV Q1hr PRN score 15-19  Reassess q1hr  Contact Medical Toxicology/Addiction \"Network Detox AP On Call\" via Tiger Text to discuss transfer to detox unit, step down or critical care per Detox AP and further treatment recommendations.    >20  Diazepam 40mg IV Q1hr PRN score > 20  Contact Medical Toxicology/Addiction \"Network Detox AP On Call\" via Tiger Text for additional treatment recommendations.       Once the patient's withdrawal is effectively managed, the patient can be placed on a diazepam taper, if " "needed.    Diazepam can be decreased by 1/2 of the last dose every hour until the patient is not needing more than 10 mg at a time; at which point diazepam 5mg PO  may be given Q6 hours PRN for 24 hours. Prior to discontinuation.     Please reach out to Medical Toxicology/Addiction \"Network Detox AP On Call\" via Tiger Text with any additional concerns.     Alcohol abuse  Supportive care including IV fluids, antiemetics, non-opioid analgesics, antidiarrheals as needed  Multivitamin, thiamine, folic acid  Encourage cessation  Appreciate case management recommendations in regards to disposition planning and CAROLIN resources  If patient's LFTs improve (AST, ALT< 150), may be a candidate for naltrexone IM or PO to curb cravings.  Please let us know if patient is interested if LFTs improve  Transaminitis  Avoid hepatotoxic medications  Imaging as needed  Alcoholic ketoacidosis  Encourage PO intake +/- dextrose containing fluids  Continue to monitor for gap + acidemia closure    Please see additional teaching note below:     Ethanol Withdrawal  Department of Medical Toxicology  [facility]    Updated June 2019    Ethanol withdrawal can be precipitated by sudden discontinuation of chronic ethanol use. Symptoms of ethanol withdrawal syndrome include tremor, anxiety, headache, palpitations, insomnia, nausea and vomiting, diaphoresis, tachycardia and hypertension. In severe cases, seizures may also occur. Seizures are usually brief and generalized, and often occur within 6-12 hours after cessation of ethanol use. Each time someone goes through ethanol withdrawal, it is generally worse secondary to the Kindling Effect.     Sympathetic nervous system overactivity may progress to delirium tremens.  Delirium tremens is a life-threatening condition that is characterized by tachycardia, diaphoresis, hyperthermia, delirium and hallucinations.  It usually occurs about 48-72 hours after discontinuation of heavy " ethanol use.  If not treated appropriately, significant morbidity and mortality can be associated.    The pathophysiology in ethanol dependence is associated with downregulation of GABAa receptors and upregulation of NMDA receptors. This is secondary to ethanol's continuous GINA agonism and NDMA antagonism. When ethanol use is discontinued, this resulting state leads to the hyperexcitation associated with the withdrawal syndrome. Treatment is primarily targeted at decreasing the excitatory state with sedatives, such as benzodiazepines and phenobarbital.  Adjunctive treatments may include dexmedetomidine or ketamine. Propofol may also be utilized in cases where the airway is protected.      Other complications to consider in the setting of ethanol dependence include hypoglycemia, alcoholic ketoacidosis, Wernicke's Encephalopahty and Wernicke-Korsakoff Syndrome. It is important to routinely provide nutrition, hydration and often thiamine.       Alcoholic Ketoacidosis Discussion  Department of Medical Toxicology  [facility]    In alcoholic patients who present with metabolic acidosis, the differential diagnosis is broad and includes sepsis, methanol or ethylene glycol ingestion, alcoholic ketoacidosis (AKA), and diabetic ketoacidosis (DKA).  AKA should always be considered in chronic alcoholics presenting with metabolic acidosis.      In general AKA is seen in chronic ethanol users who present after a few days of “binge” drinking and become acutely starved due to cessation in oral intake which can be due to either binging itself or to nausea, vomiting, gastritis, hepatitis, pancreatitis, or other concurrent acute illness.  The normal response to starvation and depletion of hepatic glycogen stores is for amino acids to be converted to pyruvate.  Pyruvate can then serve as a substrate for gluconeogenesis, be converted to acetyl-CoA which can enter the Kreb’s cycle, or can be utilized in a  variety of other pathways.  Metabolism of ethanol requires NAD+ as a cofactor, which is reduced to NADH.  After episodes of heavy drinking this leads to a high redox state (low NAD+ and high NADH) which favors the conversion of pyruvate to lactate with decreased ability to use pyruvate as a substrate for gluconeogenesis.  The body responds by mobilizing fat from adipose tissue and increasing fatty acid metabolism, which ultimately leads to the creation and accumulation of acetoacetate and B-hydroxybutyrate.    Patients with AKA will generally present with a low or non-detectable blood ethanol concentration (MARELY) as cessation of drinking will have occurred some hours before.  However, there are still cases where patients with AKA can present with a high MARELY, particularly if their peak MARELY several hours earlier was exceptionally high.  The degree of metabolic acidosis and academia seen in these patients is quite variable, but can be profound with the potential to see serum bicarbonate concentrations well under 10 mEq/L and pH well under 7.0.  Often these findings are surprising given that they can be seen in patients who are awake and conversant.  However it should be noted that this can also be seen in patients with other etiologies including toxic alcohol ingestion, DKA, and in some cases sepsis.  In rare situations it is possible to see a metabolic alkalosis rather than a metabolic acidosis early in the clinical course due to profound volume loss, however this is not the typical presentation.  Patients with AKA will usually have a mildly low to normal blood glucose concentration, and will certainly appear volume depleted.  A variety of electrolyte abnormalities are possible due to volume contraction, GI losses, and metabolic acidosis and include hypernatremia or hyponatremia, and hyperkalemia or hypokalemia.      Management includes adequate crystalloid fluid replacement, dextrose, thiamine, folic acid, and  electrolyte repletion.  Dextrose containing fluids should be initiated as soon as possible, as it is unlikely to see significant improvement without it.  Dextrose facilitates the synthesis of ATP which reverses the pyruvate-to-lactate and NAD+ to NADH ratio abnormalities.  It additionally stimulates the release of insulin, decreases the release of glucagon, and reduces the oxidation of fatty acids.  Thiamine facilitates pyruvate entry into the Kreb’s cycle, increasing ATP production.  Administration of insulin or sodium bicarbonate is generally unnecessary.  Hemodialysis can be considered especially in cases of renal failure where it is felt that volume repletion will not be well tolerated without it, however it is often not necessary for metabolic abnormalities as these will generally improve quite rapidly with adequate medical treatment.      During the initial evaluation other potential etiologies should continue to be considered, and a medical  should be consulted as soon as possible to help evaluate the likelihood of AKA versus other toxicological or medical causes and to guide treatment.    References    poly Friedman al, Ed.  [facility]’s Toxicologic Emergencies, 10th ed.  2015.  [place], [place].  Pierce BONDS.  Ethanol.  pp 2697-6205.  poly Tee al, Ed.  Critical Care Toxicology, 2nd ed.  2017.  [place], [place], [place].  Jesse AL.  Complications of chronic alcoholism that affect critical illness.  pp 249-66.      For further questions, please contact the medical  on call via SecureChat between 8am and 9pm. If between 9pm and 8am, please reach out to the Poison Center at [phone].     Hx and PE limited by the dynamics of a phone consultation. I have not personally interviewed or evaluated the patient, but only advised based on the information provided to me. Primary provider is responsible for all clinical decisions.     History of Present Illness   Juan Montana  Bell is a 34 y.o. year old male who was admitted for alcohol detoxification.  PMH significant for alcohol use disorder, anxiety disorder, PTSD.  Longstanding history of alcohol use disorder with previous admissions for detox.  Recent escalation of alcohol intake, minimum 1 bottle of vodka daily due to recent death in family.  Last drink 11 AM on the 11/6.  Patient was placed on CIWA protocol after receiving 8 mg lorazepam in the emergency department.  Toxicology was consulted for management with alcohol withdrawal.    Historical Information   I have reviewed the patient's PMH, PSH, Social History, Family History, Meds, and Allergies  Social History     Tobacco Use    Smoking status: Never     Passive exposure: Past    Smokeless tobacco: Never   Vaping Use    Vaping status: Never Used   Substance and Sexual Activity    Alcohol use: Yes     Comment: 1-1.5 L vodka or beer daily    Drug use: Never    Sexual activity: Not Currently     History reviewed. No pertinent family history.    Meds/Allergies   Prior to Admission medications    Medication Sig Start Date End Date Taking? Authorizing Provider   busPIRone (BUSPAR) 7.5 mg tablet Take 1 tablet (7.5 mg total) by mouth 3 (three) times a day 11/14/24  Yes Whitney Lemus PA-C   escitalopram (LEXAPRO) 10 mg tablet Take 1 tablet (10 mg total) by mouth daily 11/14/24 1/7/25 Yes Whitney Lemus PA-C   folic acid (FOLVITE) 1 mg tablet Take 1 tablet (1 mg total) by mouth daily 11/15/24  Yes Whitney Lemus PA-C   gabapentin (NEURONTIN) 300 mg capsule Take 1 capsule (300 mg total) by mouth every 8 (eight) hours as needed (anxiety) 11/14/24  Yes Whitney Lemus PA-C   ketoconazole (NIZORAL) 2 % cream Apply topically daily 11/14/24  Yes Whitney Lemus PA-C   mirtazapine (REMERON) 15 mg tablet Take 1 tablet (15 mg total) by mouth daily at bedtime 11/14/24  Yes Whitney Lemus PA-C   propranolol (INDERAL) 40 mg tablet Take 40 mg by mouth 3 (three) times a day   Yes Historical Provider,  MD   thiamine 100 MG tablet Take 1 tablet (100 mg total) by mouth daily 11/15/24  Yes Whitney Lemus PA-C     Current Facility-Administered Medications:     acetaminophen (TYLENOL) tablet 325 mg, 325 mg, Oral, Q4H PRN, Deejay Tarango DO    chlordiazePOXIDE (LIBRIUM) capsule 25 mg, 25 mg, Oral, Q6H CANDICE, Deejay Tarango DO    enoxaparin (LOVENOX) subcutaneous injection 40 mg, 40 mg, Subcutaneous, Daily, Octavia Smith MD, 40 mg at 01/07/25 0852    folic acid (FOLVITE) tablet 1 mg, 1 mg, Oral, Daily, Octavia Smith MD, 1 mg at 01/07/25 0851    multivitamin-minerals (CENTRUM) tablet 1 tablet, 1 tablet, Oral, Daily, Octavia Smith MD, 1 tablet at 01/07/25 0852    ondansetron (ZOFRAN) injection 4 mg, 4 mg, Intravenous, Q6H PRN, Octavia Smith MD, 4 mg at 01/07/25 0457    pantoprazole (PROTONIX) injection 40 mg, 40 mg, Intravenous, Q24H CANDICE, Octavia Smith MD, 40 mg at 01/07/25 0852    thiamine tablet 100 mg, 100 mg, Oral, Daily, Octavia Smith MD, 100 mg at 01/07/25 0852    Current Outpatient Medications:     busPIRone (BUSPAR) 7.5 mg tablet, Take 1 tablet (7.5 mg total) by mouth 3 (three) times a day, Disp: 90 tablet, Rfl: 0    escitalopram (LEXAPRO) 10 mg tablet, Take 1 tablet (10 mg total) by mouth daily, Disp: 30 tablet, Rfl: 0    folic acid (FOLVITE) 1 mg tablet, Take 1 tablet (1 mg total) by mouth daily, Disp: 30 tablet, Rfl: 0    gabapentin (NEURONTIN) 300 mg capsule, Take 1 capsule (300 mg total) by mouth every 8 (eight) hours as needed (anxiety), Disp: 90 capsule, Rfl: 0    ketoconazole (NIZORAL) 2 % cream, Apply topically daily, Disp: 30 g, Rfl: 0    mirtazapine (REMERON) 15 mg tablet, Take 1 tablet (15 mg total) by mouth daily at bedtime, Disp: 30 tablet, Rfl: 0    propranolol (INDERAL) 40 mg tablet, Take 40 mg by mouth 3 (three) times a day, Disp: , Rfl:     thiamine 100 MG tablet, Take 1 tablet (100 mg total) by mouth daily, Disp: 30 tablet, Rfl: 0   No Known Allergies    Objective  :  Temp:  [97.9 °F (36.6 °C)] 97.9 °F (36.6 °C)  HR:  [] 88  BP: (119-146)/(57-97) 136/86  Resp:  [13-20] 18  SpO2:  [93 %-99 %] 98 %  O2 Device: None (Room air)      Intake/Output Summary (Last 24 hours) at 1/7/2025 1022  Last data filed at 1/7/2025 0551  Gross per 24 hour   Intake 1150 ml   Output --   Net 1150 ml       Physical exam not performed.      Lab Results: I have reviewed the following results:  Results from last 7 days   Lab Units 01/07/25  0233 01/03/25  1118   WBC Thousand/uL 7.88 7.71   HEMOGLOBIN g/dL 15.3 14.1   HEMATOCRIT % 45.3 43.3   PLATELETS Thousands/uL 200 317   SEGS PCT % 55 64   LYMPHO PCT % 39 30   MONO PCT % 3* 3*   EOS PCT % 2 2      Results from last 7 days   Lab Units 01/07/25  0735 01/07/25  0233   POTASSIUM mmol/L 3.9 4.0   CHLORIDE mmol/L 100 102   CO2 mmol/L 23 22   BUN mg/dL 12 12   CREATININE mg/dL 0.87 0.99   CALCIUM mg/dL 7.6* 8.5   ALBUMIN g/dL 4.2 5.0   ALK PHOS U/L 106* 114*   ALT U/L 261* 305*   AST U/L 245* 305*   MAGNESIUM mg/dL  --  2.3                       Results from last 7 days   Lab Units 01/07/25  0233   ETHANOL LVL mg/dL 426*     Imaging Results Review: No pertinent imaging studies reviewed.  Other Study Results Review: EKG was personally reviewed and my interpretation is: Sinus Tachycardia. HR sinus tachycardia 115, QRS 92, Qtc 426, no terminal R, no LELE or STD, no ectopy..    Administrative Statements    21-30 minutes, >50% of the total time devoted to medical consultative verbal/EMR discussion between providers. Written report will be generated in the EMR.    Patient or appropriate family member was verbally informed by primary team of this consultative service on their behalf to provide more timely access to specialty care in lieu of an in person consultation. Verbal consent was obtained.

## 2025-01-08 ENCOUNTER — HOSPITAL ENCOUNTER (INPATIENT)
Facility: HOSPITAL | Age: 35
LOS: 3 days | Discharge: HOME | DRG: 641 | End: 2025-01-11
Attending: EMERGENCY MEDICINE
Payer: COMMERCIAL

## 2025-01-08 DIAGNOSIS — R44.3 HALLUCINATIONS: ICD-10-CM

## 2025-01-08 DIAGNOSIS — F10.20 ALCOHOL USE DISORDER, SEVERE, DEPENDENCE (HCC): ICD-10-CM

## 2025-01-08 DIAGNOSIS — R74.01 TRANSAMINITIS: ICD-10-CM

## 2025-01-08 DIAGNOSIS — F10.939 ALCOHOL WITHDRAWAL (HCC): ICD-10-CM

## 2025-01-08 DIAGNOSIS — F10.930 ALCOHOL WITHDRAWAL SYNDROME WITHOUT COMPLICATION (HCC): ICD-10-CM

## 2025-01-08 DIAGNOSIS — F41.9 ANXIETY: ICD-10-CM

## 2025-01-08 DIAGNOSIS — F10.929 ALCOHOL INTOXICATION (HCC): ICD-10-CM

## 2025-01-08 DIAGNOSIS — F10.10 ALCOHOL ABUSE: Primary | ICD-10-CM

## 2025-01-08 DIAGNOSIS — F10.939 ALCOHOL WITHDRAWAL SYNDROME WITH COMPLICATION (HCC): ICD-10-CM

## 2025-01-08 LAB
ALBUMIN SERPL BCG-MCNC: 4.9 G/DL (ref 3.5–5)
ALP SERPL-CCNC: 118 U/L (ref 34–104)
ALT SERPL W P-5'-P-CCNC: 245 U/L (ref 7–52)
ANION GAP SERPL CALCULATED.3IONS-SCNC: 18 MMOL/L (ref 4–13)
AST SERPL W P-5'-P-CCNC: 202 U/L (ref 13–39)
BASOPHILS # BLD AUTO: 0.03 THOUSANDS/ΜL (ref 0–0.1)
BASOPHILS NFR BLD AUTO: 0 % (ref 0–1)
BILIRUB SERPL-MCNC: 0.49 MG/DL (ref 0.2–1)
BUN SERPL-MCNC: 10 MG/DL (ref 5–25)
CALCIUM SERPL-MCNC: 8.9 MG/DL (ref 8.4–10.2)
CHLORIDE SERPL-SCNC: 101 MMOL/L (ref 96–108)
CO2 SERPL-SCNC: 22 MMOL/L (ref 21–32)
CREAT SERPL-MCNC: 0.92 MG/DL (ref 0.6–1.3)
EOSINOPHIL # BLD AUTO: 0.07 THOUSAND/ΜL (ref 0–0.61)
EOSINOPHIL NFR BLD AUTO: 1 % (ref 0–6)
ERYTHROCYTE [DISTWIDTH] IN BLOOD BY AUTOMATED COUNT: 15.3 % (ref 11.6–15.1)
ETHANOL EXG-MCNC: 0.23 MG/DL
ETHANOL SERPL-MCNC: 367 MG/DL
GFR SERPL CREATININE-BSD FRML MDRD: 108 ML/MIN/1.73SQ M
GLUCOSE SERPL-MCNC: 87 MG/DL (ref 65–140)
HCT VFR BLD AUTO: 42.2 % (ref 36.5–49.3)
HGB BLD-MCNC: 13.8 G/DL (ref 12–17)
IMM GRANULOCYTES # BLD AUTO: 0.02 THOUSAND/UL (ref 0–0.2)
IMM GRANULOCYTES NFR BLD AUTO: 0 % (ref 0–2)
LYMPHOCYTES # BLD AUTO: 2.26 THOUSANDS/ΜL (ref 0.6–4.47)
LYMPHOCYTES NFR BLD AUTO: 31 % (ref 14–44)
MAGNESIUM SERPL-MCNC: 2.2 MG/DL (ref 1.9–2.7)
MCH RBC QN AUTO: 29.1 PG (ref 26.8–34.3)
MCHC RBC AUTO-ENTMCNC: 32.7 G/DL (ref 31.4–37.4)
MCV RBC AUTO: 89 FL (ref 82–98)
MONOCYTES # BLD AUTO: 0.35 THOUSAND/ΜL (ref 0.17–1.22)
MONOCYTES NFR BLD AUTO: 5 % (ref 4–12)
NEUTROPHILS # BLD AUTO: 4.69 THOUSANDS/ΜL (ref 1.85–7.62)
NEUTS SEG NFR BLD AUTO: 63 % (ref 43–75)
NRBC BLD AUTO-RTO: 0 /100 WBCS
PLATELET # BLD AUTO: 161 THOUSANDS/UL (ref 149–390)
PMV BLD AUTO: 8.7 FL (ref 8.9–12.7)
POTASSIUM SERPL-SCNC: 4.4 MMOL/L (ref 3.5–5.3)
PROT SERPL-MCNC: 7.7 G/DL (ref 6.4–8.4)
RBC # BLD AUTO: 4.74 MILLION/UL (ref 3.88–5.62)
SODIUM SERPL-SCNC: 141 MMOL/L (ref 135–147)
WBC # BLD AUTO: 7.42 THOUSAND/UL (ref 4.31–10.16)

## 2025-01-08 PROCEDURE — 96366 THER/PROPH/DIAG IV INF ADDON: CPT

## 2025-01-08 PROCEDURE — 93005 ELECTROCARDIOGRAM TRACING: CPT

## 2025-01-08 PROCEDURE — 96368 THER/DIAG CONCURRENT INF: CPT

## 2025-01-08 PROCEDURE — 99222 1ST HOSP IP/OBS MODERATE 55: CPT

## 2025-01-08 PROCEDURE — 82075 ASSAY OF BREATH ETHANOL: CPT | Performed by: PHYSICIAN ASSISTANT

## 2025-01-08 PROCEDURE — 99255 IP/OBS CONSLTJ NEW/EST HI 80: CPT | Performed by: EMERGENCY MEDICINE

## 2025-01-08 PROCEDURE — 96374 THER/PROPH/DIAG INJ IV PUSH: CPT

## 2025-01-08 PROCEDURE — 99285 EMERGENCY DEPT VISIT HI MDM: CPT

## 2025-01-08 PROCEDURE — 99285 EMERGENCY DEPT VISIT HI MDM: CPT | Performed by: PHYSICIAN ASSISTANT

## 2025-01-08 PROCEDURE — 82075 ASSAY OF BREATH ETHANOL: CPT | Performed by: EMERGENCY MEDICINE

## 2025-01-08 PROCEDURE — 80053 COMPREHEN METABOLIC PANEL: CPT | Performed by: PHYSICIAN ASSISTANT

## 2025-01-08 PROCEDURE — 96365 THER/PROPH/DIAG IV INF INIT: CPT

## 2025-01-08 PROCEDURE — 96375 TX/PRO/DX INJ NEW DRUG ADDON: CPT

## 2025-01-08 PROCEDURE — 82077 ASSAY SPEC XCP UR&BREATH IA: CPT | Performed by: PHYSICIAN ASSISTANT

## 2025-01-08 PROCEDURE — 83735 ASSAY OF MAGNESIUM: CPT | Performed by: PHYSICIAN ASSISTANT

## 2025-01-08 PROCEDURE — 36415 COLL VENOUS BLD VENIPUNCTURE: CPT | Performed by: PHYSICIAN ASSISTANT

## 2025-01-08 PROCEDURE — 85025 COMPLETE CBC W/AUTO DIFF WBC: CPT | Performed by: PHYSICIAN ASSISTANT

## 2025-01-08 RX ORDER — PROPRANOLOL HCL 20 MG
40 TABLET ORAL 3 TIMES DAILY
Status: DISCONTINUED | OUTPATIENT
Start: 2025-01-08 | End: 2025-01-11 | Stop reason: HOSPADM

## 2025-01-08 RX ORDER — DEXTROSE MONOHYDRATE AND SODIUM CHLORIDE 5; .9 G/100ML; G/100ML
125 INJECTION, SOLUTION INTRAVENOUS CONTINUOUS
Status: DISCONTINUED | OUTPATIENT
Start: 2025-01-08 | End: 2025-01-09

## 2025-01-08 RX ORDER — LORAZEPAM 2 MG/ML
4 INJECTION INTRAMUSCULAR ONCE
Status: COMPLETED | OUTPATIENT
Start: 2025-01-08 | End: 2025-01-08

## 2025-01-08 RX ORDER — BUSPIRONE HYDROCHLORIDE 15 MG/1
7.5 TABLET ORAL 3 TIMES DAILY
Status: DISCONTINUED | OUTPATIENT
Start: 2025-01-08 | End: 2025-01-11 | Stop reason: HOSPADM

## 2025-01-08 RX ORDER — THIAMINE HYDROCHLORIDE 100 MG/ML
INJECTION, SOLUTION INTRAMUSCULAR; INTRAVENOUS
Status: DISPENSED
Start: 2025-01-08 | End: 2025-01-08

## 2025-01-08 RX ORDER — ONDANSETRON 2 MG/ML
4 INJECTION INTRAMUSCULAR; INTRAVENOUS ONCE
Status: COMPLETED | OUTPATIENT
Start: 2025-01-08 | End: 2025-01-08

## 2025-01-08 RX ORDER — GABAPENTIN 300 MG/1
300 CAPSULE ORAL 3 TIMES DAILY PRN
Status: DISCONTINUED | OUTPATIENT
Start: 2025-01-08 | End: 2025-01-11 | Stop reason: HOSPADM

## 2025-01-08 RX ORDER — DIAZEPAM 10 MG/2ML
10 INJECTION, SOLUTION INTRAMUSCULAR; INTRAVENOUS ONCE
Status: COMPLETED | OUTPATIENT
Start: 2025-01-08 | End: 2025-01-08

## 2025-01-08 RX ORDER — PHENOBARBITAL SODIUM 130 MG/ML
130 INJECTION, SOLUTION INTRAMUSCULAR; INTRAVENOUS ONCE
Status: COMPLETED | OUTPATIENT
Start: 2025-01-08 | End: 2025-01-08

## 2025-01-08 RX ORDER — ESCITALOPRAM OXALATE 10 MG/1
10 TABLET ORAL DAILY
Status: DISCONTINUED | OUTPATIENT
Start: 2025-01-08 | End: 2025-01-11 | Stop reason: HOSPADM

## 2025-01-08 RX ADMIN — ESCITALOPRAM OXALATE 10 MG: 10 TABLET ORAL at 11:36

## 2025-01-08 RX ADMIN — DEXTROSE AND SODIUM CHLORIDE 125 ML/HR: 5; .9 INJECTION, SOLUTION INTRAVENOUS at 13:53

## 2025-01-08 RX ADMIN — PROPRANOLOL HYDROCHLORIDE 40 MG: 20 TABLET ORAL at 11:36

## 2025-01-08 RX ADMIN — FOLIC ACID 1 MG: 5 INJECTION, SOLUTION INTRAMUSCULAR; INTRAVENOUS; SUBCUTANEOUS at 05:47

## 2025-01-08 RX ADMIN — Medication 650 MG: at 11:37

## 2025-01-08 RX ADMIN — BUSPIRONE HYDROCHLORIDE 7.5 MG: 15 TABLET ORAL at 20:08

## 2025-01-08 RX ADMIN — BUSPIRONE HYDROCHLORIDE 7.5 MG: 15 TABLET ORAL at 17:31

## 2025-01-08 RX ADMIN — THIAMINE HYDROCHLORIDE 100 MG: 100 INJECTION, SOLUTION INTRAMUSCULAR; INTRAVENOUS at 06:13

## 2025-01-08 RX ADMIN — DIAZEPAM 10 MG: 5 INJECTION INTRAMUSCULAR; INTRAVENOUS at 11:37

## 2025-01-08 RX ADMIN — DEXTROSE AND SODIUM CHLORIDE 125 ML/HR: 5; .9 INJECTION, SOLUTION INTRAVENOUS at 05:41

## 2025-01-08 RX ADMIN — LORAZEPAM 4 MG: 2 INJECTION INTRAMUSCULAR; INTRAVENOUS at 08:31

## 2025-01-08 RX ADMIN — PROPRANOLOL HYDROCHLORIDE 40 MG: 20 TABLET ORAL at 20:08

## 2025-01-08 RX ADMIN — ONDANSETRON 4 MG: 2 INJECTION INTRAMUSCULAR; INTRAVENOUS at 05:39

## 2025-01-08 RX ADMIN — THIAMINE HYDROCHLORIDE 500 MG: 100 INJECTION, SOLUTION INTRAMUSCULAR; INTRAVENOUS at 13:48

## 2025-01-08 RX ADMIN — BUSPIRONE HYDROCHLORIDE 7.5 MG: 15 TABLET ORAL at 11:36

## 2025-01-08 RX ADMIN — PROPRANOLOL HYDROCHLORIDE 40 MG: 20 TABLET ORAL at 17:31

## 2025-01-08 RX ADMIN — GABAPENTIN 300 MG: 300 CAPSULE ORAL at 23:47

## 2025-01-08 RX ADMIN — THIAMINE HYDROCHLORIDE 500 MG: 100 INJECTION, SOLUTION INTRAMUSCULAR; INTRAVENOUS at 21:29

## 2025-01-08 RX ADMIN — PHENOBARBITAL SODIUM 130 MG: 130 INJECTION INTRAMUSCULAR; INTRAVENOUS at 17:56

## 2025-01-08 RX ADMIN — LORAZEPAM 4 MG: 2 INJECTION INTRAMUSCULAR; INTRAVENOUS at 05:32

## 2025-01-08 NOTE — ASSESSMENT & PLAN NOTE
Patient with a history of chronic daily alcohol use  Last drink today  Serum alcohol 367 in the ED  Received folic acid, thiamine, and Ativan in the ED PTA  Initiate SEWS protocol for medical management of alcohol withdrawal  Current alcohol withdrawal signs/symptoms include hallucinations, anxiety, tremors, nausea vomiting  SEWS score 13 upon admission  Administer 650 mg of phenobarbital as initial dose  Continue monitoring under protocol and administer phenobarbital as indicated  Continuous pulse ox and telemetry monitoring

## 2025-01-08 NOTE — CERTIFIED RECOVERY SPECIALIST
Certified  Note    Patient name: Juan Luu  Location: ED 09/ED 09  [place]: [facility]  Attending:  Lori Jensen MD MRN [id number]  : 1990  Age: 34 y.o.    Sex: male Date 2025         Substance Use History:     Social History     Substance and Sexual Activity   Alcohol Use Yes    Comment: 1-1.5 L vodka or beer daily        Social History     Substance and Sexual Activity   Drug Use Never       Time spent: 30 mins   Consult rcvd: Y  Patient seen in: ED  Stage of change: action     Substance used: alcohol   Frequency/quantity : daily   Last use: 2025    History of withdrawal seizures: no - started having hallucinations   Currently on MOUD: no was on vivitrol   Interested in MOUD: yes     CRS provided introductions and explanation of service. CRS and patient engaged in conversation of hospitalization. CRS familiar with this patient.     Patient has a family history of AUD. Patient was a foster child with twin brother, were  during childhood. Patient has never dealt with family trauma. Patient growing up lived with family friends.     Patient lives alone, is employed and is an .     Per provider patient has been accepted to withdrawal management and will be transferred today.     Patient goals are to starts liking self and working on sobriety.     CRS will continue to follow until discharge                Sammi Pollard

## 2025-01-08 NOTE — ASSESSMENT & PLAN NOTE
Continue daily vitamin supplementation with thiamine, folic acid, and multivitamin  Appreciate certified  and case management consultations for recommendations regarding aftercare resources, recovery support and disposition planning when patient has improved mentation.

## 2025-01-08 NOTE — NURSING NOTE
Nursing evaluated for SEWS however unable to stay awake during assessment. No interventions implemented at this time.

## 2025-01-08 NOTE — CERTIFIED RECOVERY SPECIALIST
Certified  Note    Patient name: Juan Luu  Location: 5T DETOX 514/5T DETOX 51*  [place]: [facility]  Attending:  Lori Jensen MD MRN [id number]  : 1990  Age: 34 y.o.    Sex: male Date 2025         Substance Use History:     Social History     Substance and Sexual Activity   Alcohol Use Yes    Comment: 1-1.5 L vodka or beer daily        Social History     Substance and Sexual Activity   Drug Use Never     ***               Tiffany Pollard

## 2025-01-08 NOTE — ASSESSMENT & PLAN NOTE
Patient with a history of chronic daily alcohol use  Last drink reported to be prior to presentation to  in discussion with staff  Serum alcohol 367 in the ED this morning  Received lorazepam and librium yesterday morning, lorazepam 4mg and 10mg IV valium at this morning in the ED PTA. Also given 650mg phenobarbital at 1137  Initiate SEWS protocol for medical management of alcohol withdrawal  Current alcohol withdrawal signs/symptoms include - unable to assess given change in mental status s/p phenobarbital   SEWS score 16 upon admission  Administered 650 mg of phenobarbital as initial dose  Continue monitoring under protocol and administer phenobarbital as indicated with a maximum of 2g  Continuous pulse ox and telemetry monitoring  Will continue high dose thiamine 500mg TID X 9 doses due to confusion

## 2025-01-08 NOTE — QUICK NOTE
Patient was evaluated as nursing staff reported patient was having elbow pain.  In my discussion patient reportedly fell 2 days ago onto left elbow.  He reportedly did not hit head and sustained no injury outside of elbow.  On examination left elbow has mild erythema and has focal edema.  Range of motion remains intact, strength remains intact, sensation remains intact.  Patient reports mild point tenderness at olecranon.  Discussed need and importance for x-ray of left elbow, patient at this time declined as he would prefer to rest.

## 2025-01-08 NOTE — ASSESSMENT & PLAN NOTE
Pt with a h/o chronic heavy alcohol use  Drinks 1.5 L of vodka daily  Withdrawal management as above  Continue thiamine, folic acid  Consult case management/CRS for assistance with aftercare resources

## 2025-01-08 NOTE — ASSESSMENT & PLAN NOTE
Patient reports poor compliance with outpatient gabapentin, BuSpar, Lexapro  We will continue medications and monitor closely

## 2025-01-08 NOTE — ED NOTES
Assumed care for pt. Pt currently resting on stretcher with no s/s of distress observed.     Madeline Gonzales RN  01/08/25 8006

## 2025-01-08 NOTE — LETTER
January 11, 2025     Patient: Juan Luu   YOB: 1990   Date of Visit: 1/8/2025       To Whom it May Concern:    Juan Luu is under my professional care. He was seen in the hospital from 1/8/2025 to 01/11/25. He may return to work on 1/14/25 without limitations.    If you have any questions or concerns, please don't hesitate to call.         Sincerely,          Viviana Wellington PA-C

## 2025-01-08 NOTE — CONSULTS
Consultation - Medical Toxicology   Name: Juan Luu 34 y.o. male I MRN: [id number]  Unit/Bed#: 5T DETOX 514-01 I Date of Admission: 1/8/2025   Date of Service: 1/8/2025 I Hospital Day: 0   Inpatient consult to Toxicology  Consult performed by: Thierno Pollard DO  Consult ordered by: Fletcher Silva PA-C        Physician Requesting Evaluation: Lori Jensen MD   Reason for Evaluation / Principal Problem: AUD/CODY    Assessment & Plan  Alcohol withdrawal syndrome with complication (HCC)  Patient with a history of chronic daily alcohol use  Last drink reported to be prior to presentation to  in discussion with staff  Serum alcohol 367 in the ED this morning  Received lorazepam and librium yesterday morning, lorazepam 4mg and 10mg IV valium at this morning in the ED PTA. Also given 650mg phenobarbital at 1137  Initiate SEWS protocol for medical management of alcohol withdrawal  Current alcohol withdrawal signs/symptoms include - unable to assess given change in mental status s/p phenobarbital   SEWS score 16 upon admission  Administered 650 mg of phenobarbital as initial dose  Continue monitoring under protocol and administer phenobarbital as indicated with a maximum of 2g  Continuous pulse ox and telemetry monitoring  Will continue high dose thiamine 500mg TID X 9 doses due to confusion    Alcohol use disorder, severe, dependence (HCC)    Continue daily vitamin supplementation with thiamine, folic acid, and multivitamin  Appreciate certified  and case management consultations for recommendations regarding aftercare resources, recovery support and disposition planning when patient has improved mentation.      Alcoholic ketoacidosis  AG 18 this morning  Dextrose containing IVF  Monitor labs until resolution of AG  Transaminitis  In setting of continued alcohol use  Trend (downtrending from yesterday)  Encourage cessation  I have discussed the above management plan in  detail with the primary service.   Medical Toxicology service will follow.      For further questions, please contact the medical  on call via SecureChat between 8am and 9pm. If between 9pm and 8am, please reach out to the [facility] at [phone].     History of Present Illness   Juan Luu is a 34 y.o. year old male who presents with alcohol intoxication seeking assistance with withdrawal. Presented to [facility] yeseterday for detox evaluation and CODY symptoms.  Patient was placed on CIWA, and was given librium and lorazepam.  [facility] accepted [facility] to the  [facility], however he eloped prior to transfer to reportedly drink alcohol. He then presented to the  ED and was subsequently admitted to the [facility]. His ethanol level was 367 on presentation to  ED. Staff reports he was tremulous and he did have elevated SEWS score upon admission. He was s/p phenobarbital load when I tried to evaluate him and was therefore unable to provide much history.      Review of Systems   Unable to perform ROS: Mental status change       Historical Information   I have reviewed the patient's PMH, PSH, Social History, Family History, Meds, and Allergies  Social History     Tobacco Use    Smoking status: Never     Passive exposure: Past    Smokeless tobacco: Never   Vaping Use    Vaping status: Never Used   Substance and Sexual Activity    Alcohol use: Yes     Comment: 1-1.5 L vodka or beer daily    Drug use: Never    Sexual activity: Not Currently     History reviewed. No pertinent family history.    Meds/Allergies   Prior to Admission medications    Medication Sig Start Date End Date Taking? Authorizing Provider   busPIRone (BUSPAR) 7.5 mg tablet Take 1 tablet (7.5 mg total) by mouth 3 (three) times a day 11/14/24  Yes Whitney Lemus PA-C   gabapentin (NEURONTIN) 300 mg capsule Take 1 capsule (300 mg total) by mouth every 8 (eight) hours as needed (anxiety) 11/14/24  Yes Whitney Lemus PA-C   ketoconazole  (NIZORAL) 2 % cream Apply topically daily 11/14/24  Yes Whitney Lemus PA-C   mirtazapine (REMERON) 15 mg tablet Take 1 tablet (15 mg total) by mouth daily at bedtime 11/14/24  Yes Whitney Lemus PA-C   propranolol (INDERAL) 40 mg tablet Take 40 mg by mouth 3 (three) times a day   Yes Historical Provider, MD   escitalopram (LEXAPRO) 10 mg tablet Take 1 tablet (10 mg total) by mouth daily 11/14/24 1/7/25  Whitney Lemus PA-C   folic acid (FOLVITE) 1 mg tablet Take 1 tablet (1 mg total) by mouth daily  Patient not taking: Reported on 1/8/2025 11/15/24   Whitney Lemus PA-C   thiamine 100 MG tablet Take 1 tablet (100 mg total) by mouth daily  Patient not taking: Reported on 1/8/2025 11/15/24   Whitney Lemus PA-C     Current Facility-Administered Medications:     busPIRone (BUSPAR) tablet 7.5 mg, 7.5 mg, Oral, TID, Fletcher Silva PA-C, 7.5 mg at 01/08/25 1136    dextrose 5 % and sodium chloride 0.9 % infusion, 125 mL/hr, Intravenous, Continuous, Fletcher Silva PA-C, Last Rate: 125 mL/hr at 01/08/25 0541, 125 mL/hr at 01/08/25 0541    escitalopram (LEXAPRO) tablet 10 mg, 10 mg, Oral, Daily, Fletcher Silva PA-C, 10 mg at 01/08/25 1136    folic acid 1 mg in sodium chloride 0.9 % 50 mL IVPB, 1 mg, Intravenous, Daily, Fletcher Silva PA-C    gabapentin (NEURONTIN) capsule 300 mg, 300 mg, Oral, TID PRN, Fletcher Silva PA-C    propranolol (INDERAL) tablet 40 mg, 40 mg, Oral, TID, Fletcher Silva PA-C, 40 mg at 01/08/25 1136    thiamine (VITAMIN B1) 100 mg/mL injection **ADS Override Pull**, , , ,     thiamine (VITAMIN B1) 500 mg in sodium chloride 0.9 % 50 mL IVPB, 500 mg, Intravenous, Q8H Fletcher HARVEY PA-C   No Known Allergies    Objective :  Temp:  [97.5 °F (36.4 °C)-98.1 °F (36.7 °C)] 97.5 °F (36.4 °C)  HR:  [] 101  BP: (135-166)/(83-94) 140/84  Resp:  [18-20] 18  SpO2:  [93 %-97 %] 95 %  O2 Device: None (Room air)      Intake/Output Summary (Last 24 hours) at 1/8/2025  1210  Last data filed at 1/8/2025 0632  Gross per 24 hour   Intake 100 ml   Output --   Net 100 ml       Physical Exam  Vitals and nursing note reviewed.   Constitutional:       General: He is sleeping.      Appearance: He is ill-appearing. He is not toxic-appearing.   HENT:      Head: Normocephalic and atraumatic.      Right Ear: External ear normal.      Left Ear: External ear normal.      Nose: Nose normal.   Eyes:      Pupils: Pupils are equal, round, and reactive to light.   Cardiovascular:      Pulses: Normal pulses.   Pulmonary:      Effort: Pulmonary effort is normal.      Breath sounds: Normal breath sounds.   Abdominal:      General: Abdomen is flat.      Palpations: Abdomen is soft.   Musculoskeletal:         General: Normal range of motion.      Cervical back: Normal range of motion and neck supple.      Right lower leg: No edema.      Left lower leg: No edema.   Skin:     General: Skin is warm and dry.      Capillary Refill: Capillary refill takes less than 2 seconds.   Neurological:      General: No focal deficit present.      Mental Status: He is lethargic.      Cranial Nerves: No cranial nerve deficit or facial asymmetry.      Motor: No tremor or seizure activity.      Coordination: Finger-Nose-Finger Test normal.      Comments: S/P phenobarbital load:    Protecting airway but difficult to wake up  He is able to follow simple commands  Moves all extremities, sensation in tact  Slowed  Did not know year and location. Knew it was january   Psychiatric:         Mood and Affect: Mood normal.           Lab Results: I have reviewed the following results:  Results from last 7 days   Lab Units 01/08/25  0545 01/07/25  0233 01/03/25  1118   WBC Thousand/uL 7.42 7.88 7.71   HEMOGLOBIN g/dL 13.8 15.3 14.1   HEMATOCRIT % 42.2 45.3 43.3   PLATELETS Thousands/uL 161 200 317   SEGS PCT % 63 55 64   LYMPHO PCT % 31 39 30   MONO PCT % 5 3* 3*   EOS PCT % 1 2 2      Results from last 7 days   Lab Units 01/08/25  0527    POTASSIUM mmol/L 4.4   CHLORIDE mmol/L 101   CO2 mmol/L 22   BUN mg/dL 10   CREATININE mg/dL 0.92   CALCIUM mg/dL 8.9   ALBUMIN g/dL 4.9   ALK PHOS U/L 118*   ALT U/L 245*   AST U/L 202*   MAGNESIUM mg/dL 2.2                       Results from last 7 days   Lab Units 01/08/25  0529   ETHANOL LVL mg/dL 367*     Imaging Results Review: No pertinent imaging studies reviewed.  Other Study Results Review: EKG was personally reviewed and my interpretation is: Sinus Tachycardia. . Intervals normal. Normal axis. No JACKIE.. (1/7)  1/8: sinus rhythm HR 92. Intervals normal. Normal axis. No JACKIE    Administrative Statements   I have spent a total time of 30 minutes in caring for this patient on the day of the visit/encounter including Diagnostic results, Prognosis, Risks and benefits of tx options, Instructions for management, Patient and family education, Importance of tx compliance, Risk factor reductions, Impressions, Counseling / Coordination of care, Documenting in the medical record, Reviewing / ordering tests, medicine, procedures  , Obtaining or reviewing history  , and Communicating with other healthcare professionals .

## 2025-01-08 NOTE — ED NOTES
Pt removed IV from left arm and cardiac monitoring with intent to leave ED. Provider able to redirect pt back to bed and to continue to wait for transfer to detox unit.      Madeline Gonzales RN  01/08/25 0911

## 2025-01-08 NOTE — ASSESSMENT & PLAN NOTE
Glucose 87 with anion gap 18  Obtain beta hydroxybutyrate, yesterday 0.34  Obtain UA for ketones measurement  Continue with D5W and start IV thiamine for 9 doses  Monitor anion gap until less than 9

## 2025-01-08 NOTE — ED NOTES
This tech placed pillows  x4 @ side rails due to not having seizure pads. Call bell within reach.  Pt is currently resting with no other needs @ this time.     Gracie GUAN [facility]  01/08/25 0608       Gracie GUAN [facility]  01/08/25 0608

## 2025-01-08 NOTE — H&P
H&P - Hospitalist   Name: Juan Luu 34 y.o. male I MRN: [id number]  Unit/Bed#: ED 09 I Date of Admission: 1/8/2025   Date of Service: 1/8/2025 I Hospital Day: 0     Assessment & Plan  Alcohol withdrawal syndrome with complication (HCC)  Patient with a history of chronic daily alcohol use  Last drink today  Serum alcohol 367 in the ED  Received folic acid, thiamine, and Ativan in the ED PTA  Initiate SEWS protocol for medical management of alcohol withdrawal  Current alcohol withdrawal signs/symptoms include hallucinations, anxiety, tremors, nausea vomiting  SEWS score 13 upon admission  Administer 650 mg of phenobarbital as initial dose  Continue monitoring under protocol and administer phenobarbital as indicated  Continuous pulse ox and telemetry monitoring   Alcohol use disorder, severe, dependence (HCC)  Pt with a h/o chronic heavy alcohol use  Drinks 1.5 L of vodka daily  Withdrawal management as above  Continue thiamine, folic acid  Consult case management/CRS for assistance with aftercare resources    Unspecified mood (affective) disorder (HCC)  Patient reports poor compliance with outpatient gabapentin, BuSpar, Lexapro  We will continue medications and monitor closely  Alcoholic ketoacidosis  Glucose 87 with anion gap 18  Obtain beta hydroxybutyrate, yesterday 0.34  Obtain UA for ketones measurement  Continue with D5W and start IV thiamine for 9 doses  Monitor anion gap until less than 9  Transaminitis  Likely in setting of alcohol abuse   AST 2 2, , alk phos 118  Continue to trend LFT      VTE Pharmacologic Prophylaxis: VTE Score: 1 Low Risk (Score 0-2) - Encourage Ambulation.  Code Status: Full code  Discussion with family: Patient declined call to .     Anticipated Length of Stay: Patient will be admitted on an inpatient basis with an anticipated length of stay of greater than 2 midnights secondary to severe alcohol withdrawal and alcoholic ketoacidosis.    History of  Present Illness   Chief Complaint: Nausea, vomiting, tremors    Juan Luu is a 34 y.o. male with a PMH of alcohol abuse, anxiety who presents with alcohol withdrawal. alcohol withdrawal seeking detoxification. Patient initially presented to the [facility] ED requesting alcohol detox with sx of hallucinations, tremor, nausea and vomiting and was subsequently transferred [facility] medical [facility] for medical management of alcohol withdrawal.  He then left AMA prior to transfer.  Patient then presented again to [facility] requesting for alcoholic detox.  Pt reports drinking 1.5 L vodka daily, and multiple shots of vodka last drink was today. Serum alcohol was 367 in the ED. Endorses triggers for alcohol use including anxiety, stressful life. He reports current alcohol withdrawal sx of hallucinations, nausea, vomiting, sweating, anxiety. Denies seizure history.  Admits to a history of chronic alcohol use for several years and no true periods of sobriety, with the longest lasting few days to a. Reports a past AUD treatment history consisting of intermittent hospital stays.  Patient reports interest in inpatient rehab after discharge.     Review of Systems   Constitutional:  Positive for appetite change, chills, diaphoresis and fatigue. Negative for fever.   HENT:  Negative for ear pain, rhinorrhea, sinus pressure and sore throat.    Eyes:  Negative for photophobia, pain and visual disturbance.   Respiratory:  Negative for cough, chest tightness, shortness of breath and wheezing.    Cardiovascular:  Negative for chest pain and palpitations.   Gastrointestinal:  Positive for nausea and vomiting. Negative for abdominal pain.   Genitourinary:  Negative for dysuria and hematuria.   Musculoskeletal:  Positive for back pain. Negative for arthralgias, myalgias and neck stiffness.   Skin:  Negative for color change and rash.   Neurological:  Positive for tremors, weakness, light-headedness and headaches.  Negative for seizures and syncope.   Psychiatric/Behavioral:  Positive for hallucinations (Visual). Negative for agitation and confusion.    All other systems reviewed and are negative.      Historical Information   Past Medical History:   Diagnosis Date    Alcohol use disorder     Alcohol withdrawal syndrome (HCC)     Anxiety     Depression     PTSD (post-traumatic stress disorder)     Tachycardia      Past Surgical History:   Procedure Laterality Date    LUMBAR LAMINECTOMY       Social History     Tobacco Use    Smoking status: Never     Passive exposure: Past    Smokeless tobacco: Never   Vaping Use    Vaping status: Never Used   Substance and Sexual Activity    Alcohol use: Yes     Comment: 1-1.5 L vodka or beer daily    Drug use: Never    Sexual activity: Not Currently     E-Cigarette/Vaping    E-Cigarette Use Never User      E-Cigarette/Vaping Substances     Family history non-contributory  Social History:  Marital Status: Single   Occupation: N/A  Patient Pre-hospital Living Situation: Home  Patient Pre-hospital Level of Mobility: walks  Patient Pre-hospital Diet Restrictions: None    Meds/Allergies   I have reviewed home medications with patient personally.  Prior to Admission medications    Medication Sig Start Date End Date Taking? Authorizing Provider   busPIRone (BUSPAR) 7.5 mg tablet Take 1 tablet (7.5 mg total) by mouth 3 (three) times a day 11/14/24   Whitney Lemus PA-C   escitalopram (LEXAPRO) 10 mg tablet Take 1 tablet (10 mg total) by mouth daily 11/14/24 1/7/25  Whitney Lemus PA-C   folic acid (FOLVITE) 1 mg tablet Take 1 tablet (1 mg total) by mouth daily 11/15/24   Whitney Lemus PA-C   gabapentin (NEURONTIN) 300 mg capsule Take 1 capsule (300 mg total) by mouth every 8 (eight) hours as needed (anxiety) 11/14/24   Whitney Lemus PA-C   ketoconazole (NIZORAL) 2 % cream Apply topically daily 11/14/24   Whitney Lemus PA-C   mirtazapine (REMERON) 15 mg tablet Take 1 tablet (15 mg total) by mouth  daily at bedtime 11/14/24   Whitney Lemus PA-C   propranolol (INDERAL) 40 mg tablet Take 40 mg by mouth 3 (three) times a day    Historical Provider, MD   thiamine 100 MG tablet Take 1 tablet (100 mg total) by mouth daily 11/15/24   Whitney Lemus PA-C     No Known Allergies    Objective :  Temp:  [98.1 °F (36.7 °C)] 98.1 °F (36.7 °C)  HR:  [] 87  BP: (135-166)/(85-94) 144/87  Resp:  [18-20] 18  SpO2:  [93 %-97 %] 96 %  O2 Device: None (Room air)    Physical Exam  Vitals and nursing note reviewed.   Constitutional:       General: He is not in acute distress.     Appearance: He is normal weight. He is not ill-appearing, toxic-appearing or diaphoretic.   HENT:      Head: Normocephalic.      Nose: Nose normal.      Mouth/Throat:      Mouth: Mucous membranes are moist.      Pharynx: Oropharynx is clear.   Eyes:      General: No scleral icterus.     Conjunctiva/sclera: Conjunctivae normal.      Pupils: Pupils are equal, round, and reactive to light.   Cardiovascular:      Rate and Rhythm: Regular rhythm. Tachycardia present.      Heart sounds: No murmur heard.     No friction rub. No gallop.   Pulmonary:      Effort: Pulmonary effort is normal. No respiratory distress.      Breath sounds: Normal breath sounds. No stridor. No wheezing, rhonchi or rales.   Abdominal:      General: Abdomen is flat. There is no distension.      Palpations: Abdomen is soft.      Tenderness: There is no abdominal tenderness.   Musculoskeletal:         General: Normal range of motion.      Cervical back: Normal range of motion and neck supple.      Right lower leg: No edema.      Left lower leg: No edema.   Lymphadenopathy:      Cervical: No cervical adenopathy.   Skin:     General: Skin is warm.      Coloration: Skin is not jaundiced or pale.      Findings: No bruising, erythema or lesion.   Neurological:      General: No focal deficit present.      Mental Status: He is alert and oriented to person, place, and time. Mental status is at  "baseline.      Cranial Nerves: No cranial nerve deficit.      Motor: No weakness.      Comments: Significant tremors appreciated in upper extremities and tongue fasciculations   Psychiatric:         Mood and Affect: Mood normal.         Behavior: Behavior normal.         Thought Content: Thought content normal.                  Lab Results: I have reviewed the following results:  Results from last 7 days   Lab Units 01/08/25  0545   WBC Thousand/uL 7.42   HEMOGLOBIN g/dL 13.8   HEMATOCRIT % 42.2   PLATELETS Thousands/uL 161   SEGS PCT % 63   LYMPHO PCT % 31   MONO PCT % 5   EOS PCT % 1     Results from last 7 days   Lab Units 01/08/25  0527   SODIUM mmol/L 141   POTASSIUM mmol/L 4.4   CHLORIDE mmol/L 101   CO2 mmol/L 22   BUN mg/dL 10   CREATININE mg/dL 0.92   ANION GAP mmol/L 18*   CALCIUM mg/dL 8.9   ALBUMIN g/dL 4.9   TOTAL BILIRUBIN mg/dL 0.49   ALK PHOS U/L 118*   ALT U/L 245*   AST U/L 202*   GLUCOSE RANDOM mg/dL 87             No results found for: \"HGBA1C\"        Imaging Results Review: No pertinent imaging studies reviewed.  Other Study Results Review: No additional pertinent studies reviewed.    Administrative Statements   I have spent a total time of 65 minutes in caring for this patient on the day of the visit/encounter including Counseling / Coordination of care, Documenting in the medical record, Reviewing / ordering tests, medicine, procedures  , Obtaining or reviewing history  , and Communicating with other healthcare professionals .    ** Please Note: This note has been constructed using a voice recognition system. **    "

## 2025-01-08 NOTE — ED PROVIDER NOTES
"Time reflects when diagnosis was documented in both MDM as applicable and the Disposition within this note       Time User Action Codes Description Comment    1/8/2025  6:58 AM MaryálvaroJoceline Add [F10.10] Alcohol abuse     1/8/2025  8:36 AM Rosalia Holland Add [F10.939] Alcohol withdrawal (HCC)     1/8/2025  8:36 AM Rosalia Holland Add [R74.01] Transaminitis     1/8/2025 10:04 AM Fletcher Silva Add [F10.929] Alcohol intoxication (HCC)     1/8/2025 10:16 AM PraFletcher sorto Add [F10.939] Alcohol withdrawal syndrome with complication (HCC)     1/9/2025 10:27 AM Thierno Pollard Add [F10.20] Alcohol use disorder, severe, dependence (HCC)     1/11/2025 10:08 AM Viviana Wellington Add [F41.9] Anxiety     1/11/2025 10:09 AM Viviana Wellington Add [F10.930] Alcohol withdrawal syndrome without complication (HCC)     1/11/2025 10:25 AM Viviana Wellington Add [R44.3] Hallucinations           ED Disposition       ED Disposition   Admit    Condition   Stable    Date/Time   Wed Jan 8, 2025  8:36 AM    Comment   Case was discussed with VI AP and the patient's admission status was agreed to be Admission Status: inpatient status to the service of Dr. Jensen .               Assessment & Plan       Medical Decision Making  Patient was initially seen and evaluated by myself.  Patient was accepted to detox yesterday from [facility] however eloped prior to transfer.  Patient arrives complaining of anxiety tremor diaphoresis nausea vomiting and hallucinations.  Patient complained of visual hallucinations without significant auditory hallucinations.  Patient's alcohol on presentation was 367.  Patient was given Ativan for symptoms prior to ethanol result.  Patient has history of similar complications however states he is never been \"this bad\".  Patient was given medications for his symptoms which seem to show some improvement in clinical picture.  Patient was hemodynamically stable and signed out to attending at change of shift for " evaluation by detox.    Amount and/or Complexity of Data Reviewed  Labs: ordered.    Risk  Prescription drug management.  Decision regarding hospitalization.             Medications   thiamine (VITAMIN B1) 100 mg/mL injection **ADS Override Pull** (  Not Given 1/8/25 0614)   folic acid 1 mg in sodium chloride 0.9 % 50 mL IVPB (0 mg Intravenous Stopped 1/8/25 0612)   ondansetron (ZOFRAN) injection 4 mg (4 mg Intravenous Given 1/8/25 0539)   thiamine (VITAMIN B1) 100 mg in sodium chloride 0.9 % 50 mL IVPB (0 mg Intravenous Stopped 1/8/25 0632)   LORazepam (ATIVAN) injection 4 mg (4 mg Intravenous Given 1/8/25 0532)   LORazepam (ATIVAN) injection 4 mg (4 mg Intravenous Given 1/8/25 0831)       ED Risk Strat Scores                            SBIRT 20yo+      Flowsheet Row Most Recent Value   Initial Alcohol Screen: US AUDIT-C     1. How often do you have a drink containing alcohol? 6 Filed at: 01/08/2025 0551   2. How many drinks containing alcohol do you have on a typical day you are drinking?  6 Filed at: 01/08/2025 0551   3a. Male UNDER 65: How often do you have five or more drinks on one occasion? 5 Filed at: 01/08/2025 0551   Audit-C Score 17 Filed at: 01/08/2025 0551   Full Alcohol Screen: US AUDIT    4. How often during the last year have you found that you were not able to stop drinking once you had started? 4 Filed at: 01/08/2025 0551   5. How often during past year have you failed to do what was normally expected of you because of drinking?  4 Filed at: 01/08/2025 0551   6. How often in past year have you needed a first drink in the morning to get yourself going after a heavy drinking session?  4 Filed at: 01/08/2025 0551   7. How often in past year have you had feeling of guilt or remorse after drinking?  4 Filed at: 01/08/2025 0551   8. How often in past year have you been unable to remember what happened night before because you had been drinking?  4 Filed at: 01/08/2025 0551   9. Have you or someone else  been injured as a result of your drinking?  2 Filed at: 01/08/2025 0551   10. Has a relative, friend, doctor or other health worker been concerned about your drinking and suggested you cut down?  2 Filed at: 01/08/2025 0551   AUDIT Total Score 41 Filed at: 01/08/2025 0551   JEANNETTE: How many times in the past year have you...    Used an illegal drug or used a prescription medication for non-medical reasons? Never Filed at: 01/08/2025 0551                            History of Present Illness       Chief Complaint   Patient presents with    Detox Evaluation     Pt requesting detox services for etoh, reports last drink 14 hours ago and cannot identify quantity. Pt C/o tremors, blurry vision, n/v, visual hallucinations of colors, and acting confused reporting that he received no treatment and saw nobody at his previous visit at [facility] ED yesterday which is why he left his room prior to transfer to detox.     Delirium Tremens (DTS)       Past Medical History:   Diagnosis Date    Alcohol use disorder     Alcohol withdrawal syndrome (HCC)     Anxiety     Depression     PTSD (post-traumatic stress disorder)     Tachycardia       Past Surgical History:   Procedure Laterality Date    LUMBAR LAMINECTOMY        History reviewed. No pertinent family history.   Social History     Tobacco Use    Smoking status: Never     Passive exposure: Past    Smokeless tobacco: Never   Vaping Use    Vaping status: Never Used   Substance Use Topics    Alcohol use: Yes     Comment: 1-1.5 L vodka or beer daily    Drug use: Never      E-Cigarette/Vaping    E-Cigarette Use Never User       E-Cigarette/Vaping Substances    Nicotine No     THC No     CBD No     Flavoring No     Other No     Unknown No       I have reviewed and agree with the history as documented.     34-year-old male with history of alcohol abuse presents requesting detox from alcohol.  Patient was seen at [facility] yesterday once excepted to the detox program at [place]  however while awaiting transfer patient eloped.  Patient told me that he walked home because he did not think he was getting cared for fast enough but that he began to feel ill.  Patient states he has not had any alcohol since leaving [facility].  Patient arrives complaining of nausea, vomiting, headache, diaphoresis, abdominal cramping, tremors and visual hallucinations.  Denies any history of withdrawal seizures. Denies any other complaints       History provided by:  Patient   used: No        Review of Systems   Constitutional:  Positive for chills and diaphoresis. Negative for fatigue.   HENT:  Negative for ear pain and sore throat.    Eyes:  Negative for photophobia and redness.   Respiratory:  Negative for apnea, cough and shortness of breath.    Cardiovascular:  Negative for chest pain.   Gastrointestinal:  Positive for abdominal pain, nausea and vomiting.   Genitourinary:  Negative for dysuria.   Musculoskeletal:  Negative for arthralgias, neck pain and neck stiffness.   Skin:  Negative for rash.   Neurological:  Positive for tremors and headaches. Negative for dizziness, syncope and weakness.   Psychiatric/Behavioral:  Negative for suicidal ideas. The patient is nervous/anxious.            Objective       ED Triage Vitals   Temperature Pulse Blood Pressure Respirations SpO2 Patient Position - Orthostatic VS   01/08/25 0514 01/08/25 0514 01/08/25 0514 01/08/25 0514 01/08/25 0514 01/08/25 0514   98.1 °F (36.7 °C) (!) 111 163/94 20 93 % Sitting      Temp Source Heart Rate Source BP Location FiO2 (%) Pain Score    01/08/25 0514 01/08/25 0514 01/08/25 0514 -- 01/08/25 0659    Oral Monitor Left arm  5      Vitals      Date and Time Temp Pulse SpO2 Resp BP Pain Score FACES Pain Rating User   01/11/25 1452 97.7 °F (36.5 °C) 64 96 % 18 130/68 No Pain -- LL   01/11/25 0849 97.6 °F (36.4 °C) 60 96 % 18 96/50 No Pain -- LL   01/10/25 2232 97.3 °F (36.3 °C) 54 95 % 18 127/76 -- -- JW    01/10/25 1944 97.2 °F (36.2 °C) 56 99 % 18 150/85 No Pain -- DE   01/10/25 1455 97.7 °F (36.5 °C) 60 98 % 18 126/70 6 -- LL   01/10/25 1304 -- -- -- -- -- 8 -- LL   01/10/25 0724 97.3 °F (36.3 °C) 75 96 % 18 124/79 No Pain -- LL   01/10/25 0400 97.3 °F (36.3 °C) 61 -- 17 128/57 -- -- SM   01/09/25 1940 -- -- -- -- -- No Pain -- SM   01/09/25 1615 97.3 °F (36.3 °C) 55 95 % 16 129/82 -- -- LB   01/09/25 1114 -- 65 97 % 18 144/90 -- -- SD   01/09/25 0806 -- -- -- -- -- No Pain -- TB   01/09/25 0722 97.7 °F (36.5 °C) 58 97 % 18 129/76 -- -- SD   01/09/25 0452 97.2 °F (36.2 °C) 61 97 % 17 130/89 -- -- JW   01/08/25 2336 97.7 °F (36.5 °C) 64 96 % 18 121/87 -- -- JW   01/08/25 2100 -- -- 95 % -- -- No Pain -- SM   01/08/25 1949 97.5 °F (36.4 °C) 75 95 % 18 121/89 -- -- EP   01/08/25 1739 97.8 °F (36.6 °C) 72 94 % 16 131/90 8 -- TB   01/08/25 1618 97.2 °F (36.2 °C) 72 -- 18 111/82 10 - Worst Possible Pain -- TB   01/08/25 1507 97.6 °F (36.4 °C) 81 -- 18 123/83 8 -- TB   01/08/25 1118 -- -- -- -- -- 10 - Worst Possible Pain -- TB   01/08/25 1110 97.5 °F (36.4 °C) 101 95 % 18 140/84 -- -- DD   01/08/25 1030 -- 97 97 % 18 144/88 -- -- TW   01/08/25 1000 -- 102 96 % 18 138/83 -- -- TW   01/08/25 0901 -- 87 96 % 18 144/87 No Pain -- TW   01/08/25 0830 -- 92 97 % 18 148/92 5 -- TW   01/08/25 0820 -- -- 95 % -- -- -- -- TB   01/08/25 0800 -- 114 94 % 18 166/89 -- -- TW   01/08/25 0659 -- 97 96 % 18 135/85 5 -- TW   01/08/25 0630 -- 96 97 % 18 135/85 -- -- KS   01/08/25 0600 -- 93 96 % 18 140/92 -- -- KS   01/08/25 0530 -- 111 -- -- 163/94 -- -- KS   01/08/25 0525 -- 111 -- -- 163/94 -- -- MS   01/08/25 0514 98.1 °F (36.7 °C) 111 93 % 20 163/94 -- -- KA            Physical Exam  Constitutional:       General: He is in acute distress.      Appearance: He is well-developed. He is ill-appearing and diaphoretic.   Eyes:      Pupils: Pupils are equal, round, and reactive to light.   Cardiovascular:      Rate and Rhythm: Regular  rhythm. Tachycardia present.   Pulmonary:      Effort: Pulmonary effort is normal. No respiratory distress.      Breath sounds: Normal breath sounds.   Abdominal:      General: Bowel sounds are normal. There is no distension.      Palpations: Abdomen is soft.   Musculoskeletal:         General: Normal range of motion.      Cervical back: Normal range of motion and neck supple.   Skin:     General: Skin is warm.   Neurological:      Mental Status: He is alert and oriented to person, place, and time.      Coordination: Coordination abnormal.      Comments: Tremors present in extremities at rest          Results Reviewed       Procedure Component Value Units Date/Time    Rapid drug screen, urine [[id number]]  (Abnormal) Collected: 01/09/25 1042    Lab Status: Final result Specimen: Urine, Clean Catch Updated: 01/09/25 1128     Amph/Meth UR Negative     Barbiturate Ur Positive     Benzodiazepine Urine Positive     Cocaine Urine Negative     Methadone Urine Negative     Opiate Urine Negative     PCP Ur Negative     THC Urine Negative     Oxycodone Urine Negative     Fentanyl Urine Negative     HYDROCODONE URINE Negative    Narrative:      Presumptive report. If requested, specimen will be sent to reference lab for confirmation.  FOR MEDICAL PURPOSES ONLY.   IF CONFIRMATION NEEDED PLEASE CONTACT THE LAB WITHIN 5 DAYS.    Drug Screen Cutoff Levels:  AMPHETAMINE/METHAMPHETAMINES  1000 ng/mL  BARBITURATES     200 ng/mL  BENZODIAZEPINES     200 ng/mL  COCAINE      300 ng/mL  METHADONE      300 ng/mL  OPIATES      300 ng/mL  PHENCYCLIDINE     25 ng/mL  THC       50 ng/mL  OXYCODONE      100 ng/mL  FENTANYL      5 ng/mL  HYDROCODONE     300 ng/mL    POCT alcohol breath test [[id number]]  (Normal) Resulted: 01/08/25 0750    Lab Status: Final result Updated: 01/08/25 0750     EXTBreath Alcohol 0.233    Comprehensive metabolic panel [[id number]]  (Abnormal) Collected: 01/08/25 0527    Lab Status: Final result Specimen: Blood from  Arm, Left Updated: 01/08/25 0554     Sodium 141 mmol/L      Potassium 4.4 mmol/L      Chloride 101 mmol/L      CO2 22 mmol/L      ANION GAP 18 mmol/L      BUN 10 mg/dL      Creatinine 0.92 mg/dL      Glucose 87 mg/dL      Calcium 8.9 mg/dL       U/L       U/L      Alkaline Phosphatase 118 U/L      Total Protein 7.7 g/dL      Albumin 4.9 g/dL      Total Bilirubin 0.49 mg/dL      eGFR 108 ml/min/1.73sq m     Narrative:      National Kid[employer] Disease Foundation guidelines for Chronic Kidney Disease (CKD):     Stage 1 with normal or high GFR (GFR > 90 mL/min/1.73 square meters)    Stage 2 Mild CKD (GFR = 60-89 mL/min/1.73 square meters)    Stage 3A Moderate CKD (GFR = 45-59 mL/min/1.73 square meters)    Stage 3B Moderate CKD (GFR = 30-44 mL/min/1.73 square meters)    Stage 4 Severe CKD (GFR = 15-29 mL/min/1.73 square meters)    Stage 5 End Stage CKD (GFR <15 mL/min/1.73 square meters)  Note: GFR calculation is accurate only with a steady state creatinine    Magnesium [[id number]]  (Normal) Collected: 01/08/25 0527    Lab Status: Final result Specimen: Blood from Arm, Left Updated: 01/08/25 0554     Magnesium 2.2 mg/dL     Ethanol [[id number]]  (Abnormal) Collected: 01/08/25 0529    Lab Status: Final result Specimen: Blood from Arm, Left Updated: 01/08/25 0553     Ethanol Lvl 367 mg/dL     CBC and differential [[id number]]  (Abnormal) Collected: 01/08/25 0545    Lab Status: Final result Specimen: Blood from Arm, Left Updated: 01/08/25 0550     WBC 7.42 Thousand/uL      RBC 4.74 Million/uL      Hemoglobin 13.8 g/dL      Hematocrit 42.2 %      MCV 89 fL      MCH 29.1 pg      MCHC 32.7 g/dL      RDW 15.3 %      MPV 8.7 fL      Platelets 161 Thousands/uL      nRBC 0 /100 WBCs      Segmented % 63 %      Immature Grans % 0 %      Lymphocytes % 31 %      Monocytes % 5 %      Eosinophils Relative 1 %      Basophils Relative 0 %      Absolute Neutrophils 4.69 Thousands/µL      Absolute Immature Grans 0.02 Thousand/uL       Absolute Lymphocytes 2.26 Thousands/µL      Absolute Monocytes 0.35 Thousand/µL      Eosinophils Absolute 0.07 Thousand/µL      Basophils Absolute 0.03 Thousands/µL     POCT alcohol breath test [[id number]]  (Normal) Resulted: 01/08/25 0529    Lab Status: Final result Updated: 01/08/25 0530     EXTBreath Alcohol --            US right upper quadrant   Final Interpretation by Ligia Robin MD (01/11 1147)      Mild hepatic steatosis.      Resident: Trey Montilla I, the attending radiologist, have reviewed the images and agree with the final report above.      Workstation performed: [id number]         XR elbow 3+ vw left   Final Interpretation by James Lowe MD (01/10 1255)      No acute osseous abnormality.         Computerized Assisted Algorithm (CAA) may have been used to analyze all applicable images.         Workstation performed: [id number]             ECG 12 Lead Documentation Only    Date/Time: 1/8/2025 5:53 AM    Performed by: Joceline Abarca PA-C  Authorized by: Joceline Abarca PA-C    Indications / Diagnosis:  Weakness  ECG reviewed by me, the ED Provider: yes    Patient location:  ED  Interpretation:     Interpretation: normal    Rate:     ECG rate:  92    ECG rate assessment: normal    Rhythm:     Rhythm: sinus rhythm    Ectopy:     Ectopy: none    QRS:     QRS axis:  Normal    QRS intervals:  Normal  Conduction:     Conduction: normal    ST segments:     ST segments:  Normal  T waves:     T waves: normal        ED Medication and Procedure Management   Prior to Admission Medications   Prescriptions Last Dose Informant Patient Reported? Taking?   busPIRone (BUSPAR) 7.5 mg tablet Past Week  No Yes   Sig: Take 1 tablet (7.5 mg total) by mouth 3 (three) times a day   escitalopram (LEXAPRO) 10 mg tablet   No No   Sig: Take 1 tablet (10 mg total) by mouth daily   folic acid (FOLVITE) 1 mg tablet Not Taking  No Yes   Sig: Take 1 tablet (1 mg total) by mouth daily   gabapentin (NEURONTIN) 300  mg capsule Past Week  No Yes   Sig: Take 1 capsule (300 mg total) by mouth every 8 (eight) hours as needed (anxiety)   ketoconazole (NIZORAL) 2 % cream Past Week  No Yes   Sig: Apply topically daily   mirtazapine (REMERON) 15 mg tablet 1/7/2025  No Yes   Sig: Take 1 tablet (15 mg total) by mouth daily at bedtime   propranolol (INDERAL) 40 mg tablet 1/7/2025  Yes Yes   Sig: Take 40 mg by mouth 3 (three) times a day   thiamine 100 MG tablet Not Taking  No Yes   Sig: Take 1 tablet (100 mg total) by mouth daily      Facility-Administered Medications: None     Discharge Medication List as of 1/11/2025  3:48 PM        CONTINUE these medications which have CHANGED    Details   busPIRone (BUSPAR) 7.5 mg tablet Take 1 tablet (7.5 mg total) by mouth 3 (three) times a day, Starting Sat 1/11/2025, Until Mon 2/10/2025, Normal      escitalopram (LEXAPRO) 10 mg tablet Take 1 tablet (10 mg total) by mouth daily, Starting Sat 1/11/2025, Until Mon 2/10/2025, Normal      gabapentin (NEURONTIN) 300 mg capsule Take 1 capsule (300 mg total) by mouth every 8 (eight) hours as needed (anxiety), Starting Sat 1/11/2025, Until Mon 2/10/2025 at 2359, Normal      mirtazapine (REMERON) 15 mg tablet Take 1 tablet (15 mg total) by mouth daily at bedtime, Starting Sat 1/11/2025, Until Mon 2/10/2025, Normal      naltrexone (REVIA) 50 mg tablet Take 1 tablet (50 mg total) by mouth daily, Starting Sat 1/11/2025, Until Mon 2/10/2025, Normal      propranolol (INDERAL) 40 mg tablet Take 1 tablet (40 mg total) by mouth 3 (three) times a day, Starting Sat 1/11/2025, Until Mon 2/10/2025, Normal           CONTINUE these medications which have NOT CHANGED    Details   folic acid (FOLVITE) 1 mg tablet Take 1 tablet (1 mg total) by mouth daily, Starting Fri 11/15/2024, Normal      thiamine 100 MG tablet Take 1 tablet (100 mg total) by mouth daily, Starting Fri 11/15/2024, Normal           STOP taking these medications       ketoconazole (NIZORAL) 2 % cream  Comments:   Reason for Stopping:             Outpatient Discharge Orders   Comprehensive metabolic panel   Standing Status: Future Standing Exp. Date: 01/12/26     Ambulatory referral to Psych Services   Standing Status: Future Standing Exp. Date: 01/11/26      Discharge Diet     Activity as tolerated     Call provider for:  persistent nausea or vomiting     ED SEPSIS DOCUMENTATION   Time reflects when diagnosis was documented in both MDM as applicable and the Disposition within this note       Time User Action Codes Description Comment    1/8/2025  6:58 AM Joceline Abarca Add [F10.10] Alcohol abuse     1/8/2025  8:36 AM Rosalia Holland Add [F10.939] Alcohol withdrawal (HCC)     1/8/2025  8:36 AM Rosalia Holland Add [R74.01] Transaminitis     1/8/2025 10:04 AM Fletcher Silva Add [F10.929] Alcohol intoxication (HCC)     1/8/2025 10:16 AM Fletcher Silva Add [F10.939] Alcohol withdrawal syndrome with complication (HCC)     1/9/2025 10:27 AM Thierno Pollard Add [F10.20] Alcohol use disorder, severe, dependence (HCC)     1/11/2025 10:08 AM Viviana Wellington Add [F41.9] Anxiety     1/11/2025 10:09 AM Viviana Wellington Add [F10.930] Alcohol withdrawal syndrome without complication (HCC)     1/11/2025 10:25 AM Viviana Wellington Add [R44.3] Hallucinations                  Joceline Abarca PA-C  01/12/25 2006

## 2025-01-09 LAB
ALBUMIN SERPL BCG-MCNC: 4 G/DL (ref 3.5–5)
ALP SERPL-CCNC: 102 U/L (ref 34–104)
ALT SERPL W P-5'-P-CCNC: 160 U/L (ref 7–52)
AMORPH PHOS CRY URNS QL MICRO: ABNORMAL /HPF
AMPHETAMINES SERPL QL SCN: NEGATIVE
ANION GAP SERPL CALCULATED.3IONS-SCNC: 10 MMOL/L (ref 4–13)
AST SERPL W P-5'-P-CCNC: 100 U/L (ref 13–39)
BACTERIA UR QL AUTO: ABNORMAL /HPF
BARBITURATES UR QL: POSITIVE
BASOPHILS # BLD AUTO: 0.02 THOUSANDS/ΜL (ref 0–0.1)
BASOPHILS NFR BLD AUTO: 0 % (ref 0–1)
BENZODIAZ UR QL: POSITIVE
BILIRUB SERPL-MCNC: 0.96 MG/DL (ref 0.2–1)
BILIRUB UR QL STRIP: NEGATIVE
BUN SERPL-MCNC: 11 MG/DL (ref 5–25)
CALCIUM SERPL-MCNC: 8.6 MG/DL (ref 8.4–10.2)
CHLORIDE SERPL-SCNC: 104 MMOL/L (ref 96–108)
CLARITY UR: ABNORMAL
CO2 SERPL-SCNC: 24 MMOL/L (ref 21–32)
COCAINE UR QL: NEGATIVE
COLOR UR: ABNORMAL
CREAT SERPL-MCNC: 0.84 MG/DL (ref 0.6–1.3)
EOSINOPHIL # BLD AUTO: 0.15 THOUSAND/ΜL (ref 0–0.61)
EOSINOPHIL NFR BLD AUTO: 2 % (ref 0–6)
ERYTHROCYTE [DISTWIDTH] IN BLOOD BY AUTOMATED COUNT: 15.1 % (ref 11.6–15.1)
FENTANYL UR QL SCN: NEGATIVE
GFR SERPL CREATININE-BSD FRML MDRD: 114 ML/MIN/1.73SQ M
GLUCOSE SERPL-MCNC: 103 MG/DL (ref 65–140)
GLUCOSE UR STRIP-MCNC: NEGATIVE MG/DL
HCT VFR BLD AUTO: 39.3 % (ref 36.5–49.3)
HGB BLD-MCNC: 12.8 G/DL (ref 12–17)
HGB UR QL STRIP.AUTO: NEGATIVE
HYDROCODONE UR QL SCN: NEGATIVE
IMM GRANULOCYTES # BLD AUTO: 0.02 THOUSAND/UL (ref 0–0.2)
IMM GRANULOCYTES NFR BLD AUTO: 0 % (ref 0–2)
KETONES UR STRIP-MCNC: NEGATIVE MG/DL
LEUKOCYTE ESTERASE UR QL STRIP: NEGATIVE
LYMPHOCYTES # BLD AUTO: 1.66 THOUSANDS/ΜL (ref 0.6–4.47)
LYMPHOCYTES NFR BLD AUTO: 27 % (ref 14–44)
MAGNESIUM SERPL-MCNC: 2 MG/DL (ref 1.9–2.7)
MCH RBC QN AUTO: 29.5 PG (ref 26.8–34.3)
MCHC RBC AUTO-ENTMCNC: 32.6 G/DL (ref 31.4–37.4)
MCV RBC AUTO: 91 FL (ref 82–98)
METHADONE UR QL: NEGATIVE
MONOCYTES # BLD AUTO: 0.38 THOUSAND/ΜL (ref 0.17–1.22)
MONOCYTES NFR BLD AUTO: 6 % (ref 4–12)
MUCOUS THREADS UR QL AUTO: ABNORMAL
NEUTROPHILS # BLD AUTO: 3.99 THOUSANDS/ΜL (ref 1.85–7.62)
NEUTS SEG NFR BLD AUTO: 65 % (ref 43–75)
NITRITE UR QL STRIP: NEGATIVE
NON-SQ EPI CELLS URNS QL MICRO: ABNORMAL /HPF
NRBC BLD AUTO-RTO: 0 /100 WBCS
OPIATES UR QL SCN: NEGATIVE
OXYCODONE+OXYMORPHONE UR QL SCN: NEGATIVE
PCP UR QL: NEGATIVE
PH UR STRIP.AUTO: 8 [PH]
PLATELET # BLD AUTO: 125 THOUSANDS/UL (ref 149–390)
PMV BLD AUTO: 9.5 FL (ref 8.9–12.7)
POTASSIUM SERPL-SCNC: 3.9 MMOL/L (ref 3.5–5.3)
PROT SERPL-MCNC: 6.2 G/DL (ref 6.4–8.4)
PROT UR STRIP-MCNC: ABNORMAL MG/DL
RBC # BLD AUTO: 4.34 MILLION/UL (ref 3.88–5.62)
RBC #/AREA URNS AUTO: ABNORMAL /HPF
SODIUM SERPL-SCNC: 138 MMOL/L (ref 135–147)
SP GR UR STRIP.AUTO: 1.01 (ref 1–1.04)
THC UR QL: NEGATIVE
UROBILINOGEN UA: NEGATIVE MG/DL
WBC # BLD AUTO: 6.22 THOUSAND/UL (ref 4.31–10.16)
WBC #/AREA URNS AUTO: ABNORMAL /HPF

## 2025-01-09 PROCEDURE — 99232 SBSQ HOSP IP/OBS MODERATE 35: CPT

## 2025-01-09 PROCEDURE — 80053 COMPREHEN METABOLIC PANEL: CPT

## 2025-01-09 PROCEDURE — 80307 DRUG TEST PRSMV CHEM ANLYZR: CPT

## 2025-01-09 PROCEDURE — 83735 ASSAY OF MAGNESIUM: CPT

## 2025-01-09 PROCEDURE — 85025 COMPLETE CBC W/AUTO DIFF WBC: CPT

## 2025-01-09 PROCEDURE — 99232 SBSQ HOSP IP/OBS MODERATE 35: CPT | Performed by: EMERGENCY MEDICINE

## 2025-01-09 PROCEDURE — 81001 URINALYSIS AUTO W/SCOPE: CPT

## 2025-01-09 RX ORDER — NALTREXONE HYDROCHLORIDE 50 MG/1
50 TABLET, FILM COATED ORAL DAILY
Qty: 30 TABLET | Refills: 0 | Status: SHIPPED | OUTPATIENT
Start: 2025-01-09 | End: 2025-01-11

## 2025-01-09 RX ORDER — NALTREXONE HYDROCHLORIDE 50 MG/1
25 TABLET, FILM COATED ORAL ONCE
Status: COMPLETED | OUTPATIENT
Start: 2025-01-09 | End: 2025-01-09

## 2025-01-09 RX ADMIN — NALTREXONE HYDROCHLORIDE 25 MG: 50 TABLET, FILM COATED ORAL at 10:29

## 2025-01-09 RX ADMIN — FOLIC ACID 1 MG: 5 INJECTION, SOLUTION INTRAMUSCULAR; INTRAVENOUS; SUBCUTANEOUS at 08:18

## 2025-01-09 RX ADMIN — ESCITALOPRAM OXALATE 10 MG: 10 TABLET ORAL at 08:02

## 2025-01-09 RX ADMIN — PROPRANOLOL HYDROCHLORIDE 40 MG: 20 TABLET ORAL at 21:00

## 2025-01-09 RX ADMIN — THIAMINE HYDROCHLORIDE 500 MG: 100 INJECTION, SOLUTION INTRAMUSCULAR; INTRAVENOUS at 21:02

## 2025-01-09 RX ADMIN — PROPRANOLOL HYDROCHLORIDE 40 MG: 20 TABLET ORAL at 17:57

## 2025-01-09 RX ADMIN — DEXTROSE AND SODIUM CHLORIDE 125 ML/HR: 5; .9 INJECTION, SOLUTION INTRAVENOUS at 00:26

## 2025-01-09 RX ADMIN — PROPRANOLOL HYDROCHLORIDE 40 MG: 20 TABLET ORAL at 08:02

## 2025-01-09 RX ADMIN — BUSPIRONE HYDROCHLORIDE 7.5 MG: 15 TABLET ORAL at 08:02

## 2025-01-09 RX ADMIN — BUSPIRONE HYDROCHLORIDE 7.5 MG: 15 TABLET ORAL at 17:57

## 2025-01-09 RX ADMIN — THIAMINE HYDROCHLORIDE 500 MG: 100 INJECTION, SOLUTION INTRAMUSCULAR; INTRAVENOUS at 05:07

## 2025-01-09 RX ADMIN — BUSPIRONE HYDROCHLORIDE 7.5 MG: 15 TABLET ORAL at 21:01

## 2025-01-09 RX ADMIN — THIAMINE HYDROCHLORIDE 500 MG: 100 INJECTION, SOLUTION INTRAMUSCULAR; INTRAVENOUS at 13:58

## 2025-01-09 RX ADMIN — DEXTROSE AND SODIUM CHLORIDE 125 ML/HR: 5; .9 INJECTION, SOLUTION INTRAVENOUS at 08:17

## 2025-01-09 NOTE — SOCIAL WORK
CM met with pt for assessment and reviewed aftercare options. He stated he would prefer to go to [facility] for inpatient treatment but prefers to set up his own admission once he leaves the hospital. CM explained that they may want to review hospital records if he recently completed detox. He declined and stated [facility] has a detox as well. Declined all other CM services.     During course of assessment, pt stated that he recently got a job and now has [employer] insurance. He wasn't sure of the specific plan and did not have a copy of the card. He stated he thought it was active now, but wasn't completely sure. He was willing to sign a ALEXUS for [employer]. Signed no other ROIs.

## 2025-01-09 NOTE — ASSESSMENT & PLAN NOTE
Likely in setting of alcohol abuse   , ALT 16, alk phos 160, continue to improve  Continue to trend LFT

## 2025-01-09 NOTE — PLAN OF CARE

## 2025-01-09 NOTE — UTILIZATION REVIEW
Initial Clinical Review    Pt presented to [facility] for its Level IV medically managed intensive inpatient detox unit.    Admission: Date/Time/Statement:   Admission Orders (From admission, onward)       Ordered        01/08/25 0837  INPATIENT ADMISSION  Once                          Orders Placed This Encounter   Procedures    INPATIENT ADMISSION     Standing Status:   Standing     Number of Occurrences:   1     Level of Care:   Med Surg [16]              Detox Unit     Bed request comments:   Detox Unit     Estimated length of stay:   More than 2 Midnights     Certification:   I certify that inpatient services are medically necessary for this patient for a duration of greater than two midnights. See H&P and MD Progress Notes for additional information about the patient's course of treatment.     ED Arrival Information       Expected   -    Arrival   1/8/2025 05:01    Acuity   Emergent              Means of arrival   Walk-In    Escorted by   Self    Service   Hospitalist    Admission type   Emergency              Arrival complaint   detox eval/mental health             Chief Complaint   Patient presents with    Detox Evaluation     Pt requesting detox services for etoh, reports last drink 14 hours ago and cannot identify quantity. Pt C/o tremors, blurry vision, n/v, visual hallucinations of colors, and acting confused reporting that he received no treatment and saw nobody at his previous visit at [facility] ED yesterday which is why he left his room prior to transfer to detox.     Delirium Tremens (DTS)       Initial Presentation: 34 y.o. male who presented to [facility]. Patient initially presented to the [facility] ED requesting alcohol detox with sx of hallucinations, tremor, nausea and vomiting and was subsequently transferred to the [facility] medical detox unit for medical management of alcohol withdrawal. He then left AMA prior to transfer.  Inpatient admission for evaluation and treatment  of mood disorder & alcohol withdrawal syndrome. Presented w/ hallucinations, nausea, vomiting, sweating, anxiety . Exam: chills, diaphoresis, vomiting, tremors, weakness. Plan: IVF, telemetry, continuous pulse ox, continue PTA meds, trend labs, replete electrolytes as needed; I&O, fall & seizure precautions. Toxicology consulted.     Anticipated Length of Stay: Patient will be admitted on an inpatient basis with an anticipated length of stay of greater than 2 midnights secondary to severe alcohol withdrawal and alcoholic ketoacidosis.     Toxicology: Presented w/ need for detox from alcohol. Serum ETOH: 367. Reports 1.5 L of vodka daily, last drink on 1/8. Has prior rehab treatment for withdrawal. Reports no hx of withdrawal seizures. On exam, protecting airway, but fifficult to arouse. Able to follow simple commands. . SEWS 13. Plan: SEWS monitoring w/ phenobarbital management, IV thiamine/folic acid supplement.       Date: 1/9       Day 2: Pt reports feeling relatively well, but has significant diarrhea.  On exam, he is not in acute distress. SEWS 0. Plan: continue SEWS monitoring w/ phenobarbital management, IV thiamine/folic acid supplement, telemetry, continuous pulse ox, continue current meds, trend labs, replete electrolytes as needed. Received 780 mg phenobarbital since admission.     ED Treatment-Medication Administration from 01/08/2025 0500 to 01/08/2025 1103         Date/Time Order Dose Route Action     01/08/2025 0541 dextrose 5 % and sodium chloride 0.9 % infusion 125 mL/hr Intravenous New Bag     01/08/2025 0547 folic acid 1 mg in sodium chloride 0.9 % 50 mL IVPB 1 mg Intravenous New Bag     01/08/2025 0539 ondansetron (ZOFRAN) injection 4 mg 4 mg Intravenous Given     01/08/2025 0613 thiamine (VITAMIN B1) 100 mg in sodium chloride 0.9 % 50 mL IVPB 100 mg Intravenous New Bag     01/08/2025 0532 LORazepam (ATIVAN) injection 4 mg 4 mg Intravenous Given     01/08/2025 0831 LORazepam (ATIVAN) injection 4  mg 4 mg Intravenous Given            Scheduled Medications:  busPIRone, 7.5 mg, Oral, TID  escitalopram, 10 mg, Oral, Daily  folic acid 1 mg in sodium chloride 0.9 % 50 mL IVPB, 1 mg, Intravenous, Daily  propranolol, 40 mg, Oral, TID  thiamine, 500 mg, Intravenous, Q8H CANDICE      Continuous IV Infusions:     PRN Meds:  gabapentin, 300 mg, Oral, TID PRN      diazepam, 10 mg, Intravenous; x1  phenobarbital, 260 mg, Intravenous; x1  phenobarbital, 130 mg, Intravenous; x1    ED Triage Vitals   Temperature Pulse Respirations Blood Pressure SpO2 Pain Score   01/08/25 0514 01/08/25 0514 01/08/25 0514 01/08/25 0514 01/08/25 0514 01/08/25 0659   98.1 °F (36.7 °C) (!) 111 20 163/94 93 % 5     Weight (last 2 days)       Date/Time Weight    01/08/25 1118 96 (211.64)    01/08/25 0514 86 (189.6)              Vital Signs (last 3 days)       Date/Time Temp Pulse Resp BP MAP (mmHg) SpO2 O2 Device Patient Position - Orthostatic VS Bryant Coma Scale Score CIWA-Ar Total SEWS Total Score Pain    01/09/25 0806 -- -- -- -- -- -- -- -- 15 -- 0 No Pain    01/09/25 0722 97.7 °F (36.5 °C) 58 18 129/76 93 97 % None (Room air) Lying -- -- -- --    01/09/25 0452 97.2 °F (36.2 °C) 61 17 130/89 102 97 % None (Room air) Lying -- -- -- --    01/09/25 0100 -- -- -- -- -- -- -- -- 15 -- 0 --    01/08/25 2336 97.7 °F (36.5 °C) 64 18 121/87 98 96 % None (Room air) Lying -- -- -- --    01/08/25 2300 -- -- -- -- -- -- -- -- -- -- 0 --    01/08/25 2100 -- -- -- -- -- 95 % None (Room air) -- -- -- 0 No Pain    01/08/25 1949 97.5 °F (36.4 °C) 75 18 121/89 99 95 % None (Room air) Lying -- -- -- --    01/08/25 1743 -- -- -- -- -- -- -- -- -- -- 8 --    01/08/25 1739 97.8 °F (36.6 °C) 72 16 131/90 103 94 % None (Room air) Lying -- -- -- 8    01/08/25 1619 -- -- -- -- -- -- -- -- -- -- 0 --    01/08/25 1618 97.2 °F (36.2 °C) 72 18 111/82 91 -- -- Lying -- -- -- 10 - Worst Possible Pain    01/08/25 1509 -- -- -- -- -- -- -- -- -- -- 6 --    01/08/25 1507 97.6 °F  (36.4 °C) 81 18 123/83 96 -- -- Lying -- -- -- 8    01/08/25 1358 -- -- -- -- -- -- -- -- -- -- 5 --    01/08/25 1118 -- -- -- -- -- -- -- -- -- -- -- 10 - Worst Possible Pain    01/08/25 1115 -- -- -- -- -- -- -- -- -- -- 16 --    01/08/25 1110 97.5 °F (36.4 °C) 101 18 140/84 102 95 % None (Room air) Lying -- -- -- --    01/08/25 1059 -- -- -- -- -- -- -- -- -- 7 -- --    01/08/25 1030 -- 97 18 144/88 -- 97 % None (Room air) Lying -- -- -- --    01/08/25 1000 -- 102 18 138/83 -- 96 % None (Room air) Lying -- -- -- --    01/08/25 0901 -- 87 18 144/87 -- 96 % None (Room air) Lying -- 7 -- No Pain    01/08/25 0830 -- 92 18 148/92 -- 97 % None (Room air) Lying -- -- -- 5 01/08/25 0820 -- -- -- -- -- 95 % None (Room air) -- -- -- -- --    01/08/25 0800 -- 114 18 166/89 -- 94 % None (Room air) Lying -- 24 -- --    01/08/25 0659 -- 97 18 135/85 -- 96 % None (Room air) Lying -- -- -- 5 01/08/25 0630 -- 96 18 135/85 -- 97 % None (Room air) Lying -- 21 -- --    01/08/25 0605 -- -- -- -- -- -- None (Room air) -- -- -- -- --    01/08/25 0604 -- -- -- -- -- -- -- -- 15 -- -- --    01/08/25 0600 -- 93 18 140/92 -- 96 % None (Room air) Lying -- -- -- --    01/08/25 0530 -- 111 -- 163/94 -- -- -- -- -- 37 -- --    01/08/25 0525 -- 111 -- 163/94 -- -- -- -- -- 37 -- --    01/08/25 0514 98.1 °F (36.7 °C) 111 20 163/94 -- 93 % None (Room air) Sitting -- -- -- --            SEWS:   Severity of Ethanol Withdrawal Scale (SEWS)   ANXIETY: Do you feel that something bad is about to happen to you right now? 0  -TB 0  -SM 0  -SM 0  -SM 3  -TB   NAUSEA and DRY HEAVES or VOMITING? 0  -TB 0  -SM 0  -SM 0  -SM 3  -TB   SWEATING: (includes moist palms, sweating now)? Score 0 or 2 0  -TB 0  -SM 0  -SM 0  -SM 2  -TB   TREMOR: with arms extended eyes closed? 0  -TB 0  -SM 0  -SM 0  -SM 0  -TB   AGITATION: Fidgety, restless, pacing? 0  -TB 0  -SM 0  -SM 0  -SM 0  -TB   DISORIENTATION: 0  -TB 0  -SM 0  -SM 0  -SM 0  -TB   HALLUCINATIONS: 0  -TB  0  -SM 0  -SM 0  -SM 0  -TB   VITAL SIGNS: ANY (Pulse >110, Diastolic BP >90, Temp >99.6) 0  -TB 0  -SM 0  -SM 0  -SM 0  -TB   SEWS Total Score 0  -TB 0  -SM 0  -SM 0  -SM 8  -TB   Mary Agitation Sedation Scale (RASS)   Mary Agitation Sedation Scale (RASS) 0  -TB 0  -SM 0  -SM 0  -SM 0  -TB   Row Name 01/08/25 1700 01/08/25 1619 01/08/25 1509 01/08/25 1358 01/08/25 1115   Severity of Ethanol Withdrawal Scale (SEWS)   ANXIETY: Do you feel that something bad is about to happen to you right now? --  -TB 0  -TB 3  -TB 3  -TB 3  -TB   NAUSEA and DRY HEAVES or VOMITING? --  -TB 0  -TB 3  -TB 0  -TB 3  -TB   SWEATING: (includes moist palms, sweating now)? Score 0 or 2 --  -TB 0  -TB 0  -TB 0  -TB 2  -TB   TREMOR: with arms extended eyes closed? --  -TB 0  -TB 0  -TB 2  -TB 2  -TB   AGITATION: Fidgety, restless, pacing? --  -TB 0  -TB 0  -TB 0  -TB 0  -TB   DISORIENTATION: --  -TB 0  -TB 0  -TB 0  -TB 0  -TB   HALLUCINATIONS: --  -TB 0  -TB 0  -TB 0  -TB 3  -TB   VITAL SIGNS: ANY (Pulse >110, Diastolic BP >90, Temp >99.6) --  -TB 0  -TB 0  -TB 0  -TB 3  -TB   SEWS Total Score --  -TB 0  -TB 6  -TB 5  -TB 16  -TB       Pertinent Labs/Diagnostic Test Results:   Radiology:  No orders to display     Cardiology:  No orders to display     GI:  No orders to display         Results from last 7 days   Lab Units 01/09/25  0456 01/08/25  0545 01/07/25  0233 01/03/25  1118   WBC Thousand/uL 6.22 7.42 7.88 7.71   HEMOGLOBIN g/dL 12.8 13.8 15.3 14.1   HEMATOCRIT % 39.3 42.2 45.3 43.3   PLATELETS Thousands/uL 125* 161 200 317   TOTAL NEUT ABS Thousands/µL 3.99 4.69 4.33 4.90         Results from last 7 days   Lab Units 01/09/25  0456 01/08/25  0527 01/07/25  0735 01/07/25  0233 01/03/25  1118   SODIUM mmol/L 138 141 137 144 141   POTASSIUM mmol/L 3.9 4.4 3.9 4.0 4.0   CHLORIDE mmol/L 104 101 100 102 103   CO2 mmol/L 24 22 23 22 26   ANION GAP mmol/L 10 18* 14* 20* 12   BUN mg/dL 11 10 12 12 15   CREATININE mg/dL 0.84 0.92 0.87  0.99 0.83   EGFR ml/min/1.73sq m 114 108 112 98 114   CALCIUM mg/dL 8.6 8.9 7.6* 8.5 8.7   MAGNESIUM mg/dL 2.0 2.2  --  2.3 2.0     Results from last 7 days   Lab Units 01/09/25  0456 01/08/25  0527 01/07/25  0735 01/07/25  0233 01/03/25  1118   AST U/L 100* 202* 245* 305* 47*   ALT U/L 160* 245* 261* 305* 52   ALK PHOS U/L 102 118* 106* 114* 90   TOTAL PROTEIN g/dL 6.2* 7.7 6.7 7.8 7.3   ALBUMIN g/dL 4.0 4.9 4.2 5.0 4.7   TOTAL BILIRUBIN mg/dL 0.96 0.49 0.31 0.32 0.37         Results from last 7 days   Lab Units 01/09/25  0456 01/08/25  0527 01/07/25  0735 01/07/25  0233 01/03/25  1118   GLUCOSE RANDOM mg/dL 103 87 181* 87 107             Beta- Hydroxybutyrate   Date Value Ref Range Status   01/07/2025 0.34 (H) 0.02 - 0.27 mmol/L Final            Results from last 7 days   Lab Units 01/07/25  0233 01/03/25  1118   LIPASE u/L 29 15           Results from last 7 days   Lab Units 01/07/25  0625   AMPH/METH  Negative   BARBITURATE UR  Negative   BENZODIAZEPINE UR  Positive*   COCAINE UR  Negative   METHADONE URINE  Negative   OPIATE UR  Negative   PCP UR  Negative   THC UR  Negative     Results from last 7 days   Lab Units 01/08/25  0529 01/07/25  0233 01/03/25  1118   ETHANOL LVL mg/dL 367* 426* 163*           Past Medical History:   Diagnosis Date    Alcohol use disorder     Alcohol withdrawal syndrome (HCC)     Anxiety     Depression     PTSD (post-traumatic stress disorder)     Tachycardia      Present on Admission:   Alcohol withdrawal syndrome with complication (HCC)   Alcohol use disorder, severe, dependence (HCC)   Transaminitis   Unspecified mood (affective) disorder (HCC)   Alcoholic ketoacidosis      Admitting Diagnosis: Alcohol withdrawal (HCC) [F10.939]  Alcohol intoxication (HCC) [F10.929]  Delirium tremens (HCC) [F10.931]  Alcohol abuse [F10.10]  Transaminitis [R74.01]  Alcohol withdrawal syndrome with complication (HCC) [F10.939]  Encounter for assessment of alcohol and drug use [Z76.89]  Age/Sex: 34  y.o. male      SEWS PHENOBARBITAL PROTOCOL FOR ALCOHOL WITHDRAWAL  Admit patient to medical detox unit and continue supportive care and stabilization of acute ethanol withdrawal per medical toxicology/detox treatment pathway. Monitor ethanol withdrawal severity via the Severity of Ethanol Withdrawal Scale (SEWS) Q4 hours and then hourly if/when SEWS > 6. Treat withdrawal per pathway and reassess Q30-60 minutes.          Mild SEWS Score 1-6  Administer medications* (IV or PO; PO preferred):  If initial SEWS score: diazepam 10mg PO/IV x 1 AND phenobarbital 65 mg PO/IV x 1  If repeat SEWS score 1-6: phenobarbital 65 mg PO/IV q1 hour x 5 doses maximum   Reassessment:   SEWS q1 hour after each dose until SEWS 0 x 2 hours  VS q1 hours (until SEWS 0, then q4 hours)  Notify provider for bedside evaluation if 5-dose maximum is reached, RASS of -3 to -5, or SEWS score escalates to moderate or severe.   Moderate SEWS Score 7-12  Administer medications* (IV):  If initial SEWS score: diazepam 10mg IV x 1 AND phenobarbital 260 mg IV x 1  If repeat SEWS score 7-12 or score escalated from mild: phenobarbital 130 mg IV q30 minutes x 5 doses maximum   Reassessment:  SEWS q30 minutes after each dose until SEWS < 7 (then hourly until SEWS 0 x 2 hours)  VS q30 minutes until SEWS < 7 (then hourly until SEWS 0, then q4 hours)  Notify provider for bedside evaluation if 5-dose maximum is reached, RASS of -3 to -5, or SEWS score escalates to severe.   Severe SEWS Score >= 13  Administer medications* (IV):  If initial SEWS score: Diazepam 10 mg IV x 1 AND phenobarbital 650 mg IV piggyback x 1 over 15-30 minutes  If repeat SEWS score >= 13 or score escalated from mild or moderate: phenobarbital 130 mg IV q30 minutes x 5 doses maximum   Reassessment:  SEWS q30 minutes after each dose until SEWS < 7 (then hourly until SEWS 0 x 2 hours)   VS q30 minutes until SEWS < 7 (then hourly until SEWS 0, then q4 hours)  Notify provider for bedside  evaluation if 5-dose maximum is reached or RASS of -3 to -5   *Hold medications and notify provider if CNS depression, respirations < 10/min, or RASS of -3 to -5.        Network Utilization Review Department  ATTENTION: Please call with any questions or concerns to [phone] and carefully listen to the prompts so that you are directed to the right person. All voicemails are confidential.   For Discharge needs, contact Care Management DC Support Team at [phone] opt. 2  Send all requests for admission clinical reviews, approved or denied determinations and any other requests to dedicated fax number below belonging to the campus where the patient is receiving treatment. List of dedicated fax numbers for the Facilities:  FACILITY NAME UR FAX NUMBER   ADMISSION DENIALS (Administrative/Medical Necessity) [phone]   DISCHARGE SUPPORT TEAM (NETWORK) [phone]   PARENT CHILD HEALTH (Maternity/NICU/Pediatrics) [phone]   [facility] [phone]   [facility] [phone]   Critical access hospital [phone]   [facility] [phone]   [facility] [phone]   [facility] [phone]   [facility] [phone]   [facility] [phone]   [facility] [phone]   [facility] [phone]   [facility] [phone]   [facility] [phone]

## 2025-01-09 NOTE — ASSESSMENT & PLAN NOTE
Patient with a history of chronic daily alcohol use  Last drink today  Serum alcohol 367 in the ED  Received folic acid, thiamine, and Ativan in the ED PTA  Initiate SEWS protocol for medical management of alcohol withdrawal  Current alcohol withdrawal signs/symptoms include hallucinations, anxiety, tremors, nausea vomiting  SEWS score 13 upon admission  Administer 650 mg of phenobarbital as initial dose, total dosing 780, last dose 1/8  Continue monitoring under protocol and administer phenobarbital as indicated  Continuous pulse ox and telemetry monitoring   As patient having hallucinations, requiring IV thiamine, further discussion with tox recommending thiamine to be continued throughout today may be discontinued tomorrow

## 2025-01-09 NOTE — PROGRESS NOTES
Progress Note - Hospitalist   Name: Juan Luu 34 y.o. male I MRN: [id number]  Unit/Bed#: 5T DETOX 514-01 I Date of Admission: 1/8/2025   Date of Service: 1/9/2025 I Hospital Day: 1    Assessment & Plan  Alcohol withdrawal syndrome with complication (HCC)  Patient with a history of chronic daily alcohol use  Last drink today  Serum alcohol 367 in the ED  Received folic acid, thiamine, and Ativan in the ED PTA  Initiate SEWS protocol for medical management of alcohol withdrawal  Current alcohol withdrawal signs/symptoms include hallucinations, anxiety, tremors, nausea vomiting  SEWS score 13 upon admission  Administer 650 mg of phenobarbital as initial dose, total dosing 780, last dose 1/8  Continue monitoring under protocol and administer phenobarbital as indicated  Continuous pulse ox and telemetry monitoring   As patient having hallucinations, requiring IV thiamine, further discussion with tox recommending thiamine to be continued throughout today may be discontinued tomorrow  Alcohol use disorder, severe, dependence (HCC)  Pt with a h/o chronic heavy alcohol use  Drinks 1.5 L of vodka daily  Withdrawal management as above  Continue thiamine, folic acid  Consult case management/CRS for assistance with aftercare resources    Unspecified mood (affective) disorder (HCC)  Patient reports poor compliance with outpatient gabapentin, BuSpar, Lexapro  We will continue medications and monitor closely  Alcoholic ketoacidosis  On admission glucose 87 with anion gap 1  Obtain UA for ketones measurement  Will discontinue D5W as anion gap significantly improved, to continue to trend    Transaminitis  Likely in setting of alcohol abuse   , ALT 16, alk phos 160, continue to improve  Continue to trend LFT    VTE Pharmacologic Prophylaxis: VTE Score: 1 Low Risk (Score 0-2) - Encourage Ambulation.    Mobility:   Basic Mobility Inpatient Raw Score: 24  JH-HLM Goal: 8: Walk 250 feet or more  JH-HLM Achieved: 7: Walk  25 feet or more  JH-HLM Goal achieved. Continue to encourage appropriate mobility.    Patient Centered Rounds: I performed bedside rounds with nursing staff today.   Discussions with Specialists or Other Care Team Provider: CM, tox    Education and Discussions with Family / Patient: Patient declined call to .     Current Length of Stay: 1 day(s)  Current Patient Status: Inpatient   Certification Statement: The patient will continue to require additional inpatient hospital stay due to continuing IV thiamine and treatment for alcohol withdrawal  Discharge Plan: Anticipate discharge tomorrow to home with home services.    Code Status: Level 1 - Full Code    Subjective   Patient reports to be feeling relatively well.  He has significant diarrhea.  He currently denies any chest pain/pressure, palpitations, lightheadedness, nausea, shortness of breath, chills, or hallucinations.    Objective :  Temp:  [97.2 °F (36.2 °C)-97.8 °F (36.6 °C)] 97.7 °F (36.5 °C)  HR:  [58-81] 65  BP: (111-144)/(76-90) 144/90  Resp:  [16-18] 18  SpO2:  [94 %-97 %] 97 %  O2 Device: None (Room air)    Body mass index is 29.52 kg/m².     Input and Output Summary (last 24 hours):   No intake or output data in the 24 hours ending 01/09/25 1140    Physical Exam  Vitals and nursing note reviewed.   Constitutional:       General: He is not in acute distress.     Appearance: He is normal weight. He is not ill-appearing, toxic-appearing or diaphoretic.   HENT:      Head: Normocephalic.      Nose: Nose normal.      Mouth/Throat:      Mouth: Mucous membranes are moist.      Pharynx: Oropharynx is clear.   Eyes:      General: No scleral icterus.     Conjunctiva/sclera: Conjunctivae normal.      Pupils: Pupils are equal, round, and reactive to light.   Cardiovascular:      Rate and Rhythm: Normal rate and regular rhythm.      Heart sounds: No murmur heard.     No friction rub. No gallop.   Pulmonary:      Effort: Pulmonary effort is normal. No  respiratory distress.      Breath sounds: Normal breath sounds. No stridor. No wheezing, rhonchi or rales.   Abdominal:      General: Abdomen is flat.      Palpations: Abdomen is soft.   Musculoskeletal:         General: Normal range of motion.      Cervical back: Normal range of motion and neck supple.      Right lower leg: No edema.      Left lower leg: No edema.   Lymphadenopathy:      Cervical: No cervical adenopathy.   Skin:     General: Skin is warm.      Coloration: Skin is not jaundiced or pale.      Findings: No bruising, erythema or lesion.   Neurological:      General: No focal deficit present.      Mental Status: He is alert and oriented to person, place, and time. Mental status is at baseline.      Cranial Nerves: No cranial nerve deficit.      Motor: No weakness.   Psychiatric:         Mood and Affect: Mood normal.         Behavior: Behavior normal.         Thought Content: Thought content normal.           Lines/Drains:              Lab Results: I have reviewed the following results:   Results from last 7 days   Lab Units 01/09/25  0456   WBC Thousand/uL 6.22   HEMOGLOBIN g/dL 12.8   HEMATOCRIT % 39.3   PLATELETS Thousands/uL 125*   SEGS PCT % 65   LYMPHO PCT % 27   MONO PCT % 6   EOS PCT % 2     Results from last 7 days   Lab Units 01/09/25  0456   SODIUM mmol/L 138   POTASSIUM mmol/L 3.9   CHLORIDE mmol/L 104   CO2 mmol/L 24   BUN mg/dL 11   CREATININE mg/dL 0.84   ANION GAP mmol/L 10   CALCIUM mg/dL 8.6   ALBUMIN g/dL 4.0   TOTAL BILIRUBIN mg/dL 0.96   ALK PHOS U/L 102   ALT U/L 160*   AST U/L 100*   GLUCOSE RANDOM mg/dL 103                       Recent Cultures (last 7 days):         Imaging Results Review: No pertinent imaging studies reviewed.  Other Study Results Review: No additional pertinent studies reviewed.    Last 24 Hours Medication List:     Current Facility-Administered Medications:     busPIRone (BUSPAR) tablet 7.5 mg, TID    escitalopram (LEXAPRO) tablet 10 mg, Daily    folic acid 1  mg in sodium chloride 0.9 % 50 mL IVPB, Daily, Last Rate: 1 mg (01/09/25 0818)    gabapentin (NEURONTIN) capsule 300 mg, TID PRN    propranolol (INDERAL) tablet 40 mg, TID    thiamine (VITAMIN B1) 500 mg in sodium chloride 0.9 % 50 mL IVPB, Q8H CANDICE, Last Rate: 500 mg (01/09/25 0507)    Administrative Statements   Today, Patient Was Seen By: Fletcher Silva PA-C  I have spent a total time of 35 minutes in caring for this patient on the day of the visit/encounter including Counseling / Coordination of care, Documenting in the medical record, Reviewing / ordering tests, medicine, procedures  , Obtaining or reviewing history  , and Communicating with other healthcare professionals .    **Please Note: This note may have been constructed using a voice recognition system.**

## 2025-01-09 NOTE — ASSESSMENT & PLAN NOTE
On admission glucose 87 with anion gap 1  Obtain UA for ketones measurement  Will discontinue D5W as anion gap significantly improved, to continue to trend

## 2025-01-09 NOTE — ASSESSMENT & PLAN NOTE
In setting of continued alcohol use  PLAN  Continue to trend (continues to down trend)  Encourage cessation
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
## 2025-01-09 NOTE — NURSING NOTE
Nursing evaluated for SEWS. No interventions needed at this time. Pt denies pain at this time. Resting in bed and call bell within reach.

## 2025-01-09 NOTE — CERTIFIED RECOVERY SPECIALIST
Certified  Note    Patient name: Juan Luu  Location: 5T DETOX 514/5T DETOX 51*  [place]: [facility]  Attending:  Lori Jensen MD MRN [id number]  : 1990  Age: 34 y.o.    Sex: male Date 2025         Substance Use History:     Social History     Substance and Sexual Activity   Alcohol Use Yes    Comment: 1-1.5 L vodka or beer daily        Social History     Substance and Sexual Activity   Drug Use Never     CRS follow up with patient -     Patient reports feeling better and plans to go to IP rehab following detox stay       Sammi Pollard

## 2025-01-09 NOTE — ASSESSMENT & PLAN NOTE
Patient with a history of chronic daily alcohol use  Last drink reported to be prior to presentation to  in discussion with staff  On reevaluation today patient was unable to give an exact date of last drink.  Serum alcohol 367 in the ED this morning  Received lorazepam and librium yesterday morning, lorazepam 4mg and 10mg IV valium at this morning in the ED PTA.   Since admission patient has received 780 mg of phenobarbital   Patient's last four at [facility] scores have been 0    PLAN:  Discontinue [facility] protocol   Monitor vitals off of SEWS  Continue high dose thiamine 500mg TID through today 1/9/2025 for a total of 5 doses

## 2025-01-09 NOTE — SOCIAL WORK
CM attempted to meet with pt for assessment. He declined and asked CM to come back later. Will attempt again.

## 2025-01-09 NOTE — PROGRESS NOTES
Progress Note - Medical Toxicology   Name: Juan Luu 34 y.o. male I MRN: [id number]  Unit/Bed#: 5T DETOX 514-01 I Date of Admission: 1/8/2025   Date of Service: 1/9/2025 I Hospital Day: 1    Assessment & Plan  Alcohol withdrawal syndrome with complication (HCC)  Patient with a history of chronic daily alcohol use  Last drink reported to be prior to presentation to  in discussion with staff  On reevaluation today patient was unable to give an exact date of last drink.  Serum alcohol 367 in the ED this morning  Received lorazepam and librium yesterday morning, lorazepam 4mg and 10mg IV valium at this morning in the ED PTA.   Since admission patient has received 780 mg of phenobarbital   Patient's last four at [facility] scores have been 0    PLAN:  Discontinue [facility] protocol   Monitor vitals off of SEWS  Continue high dose thiamine 500mg TID through today 1/9/2025 for a total of 5 doses    Alcohol use disorder, severe, dependence (HCC)  Patient has a history of chronic alcohol use  Has been on naltrexone and Vivitrol in the past  Last Vivitrol injection was in November 2024  Patient has not been taking oral naltrexone since his last Vivitrol shot  Patient is amenable to restarting p.o. naltrexone    PLAN  Continue daily vitamin supplementation with thiamine, folic acid, and multivitamin  Ordered one time dose of naltrexone 25 mg to be given today  If patient tolerates naltrexone at 25 mg today, can increase naltrexone to 50 mg once daily tomorrow 1/10/2025  Appreciate certified  and case management consultations for recommendations regarding aftercare resources-at this time patient reports that he wants to be discharged home so that he can return to work with the future goal of pursuing outpatient rehab once his new insurance has started      Alcoholic ketoacidosis  Recent Labs     01/07/25  0735 01/08/25  0527 01/09/25  0456   AGAP 14* 18* 10     PLAN  Resolved  Transaminitis  In setting of  "continued alcohol use  PLAN  Continue to trend (continues to down trend)  Encourage cessation    Reason for current consult: Alcohol use disorder, alcohol withdrawale     Subjective   Patient was seen and examined today at bedside for continuity of care.  Patient reports improvement in withdrawal symptoms  Today, the patient reports the following symptoms: Nausea, diarrhea  Today, the patient denies the following symptoms: Difficulty voiding bladder, chest pain, shortness of breath, paresthesias, lightheadedness, dizziness, vomiting, chills, sweats, abdominal pain, tremors.  Regarding patient's appetite, patient reports that he has been \"picking\" at his food but had not attempted to eat breakfast at the time of this morning's evaluation.     Objective :  Temp:  [97.2 °F (36.2 °C)-97.8 °F (36.6 °C)] 97.7 °F (36.5 °C)  HR:  [] 58  BP: (111-144)/(76-90) 129/76  Resp:  [16-18] 18  SpO2:  [94 %-97 %] 97 %  O2 Device: None (Room air)    No intake or output data in the 24 hours ending 01/09/25 0943    Physical Exam  Constitutional:       General: He is not in acute distress.     Appearance: He is not ill-appearing.   HENT:      Mouth/Throat:      Mouth: Mucous membranes are moist.      Pharynx: Oropharynx is clear.      Comments: No tongue fasciculations observed  Eyes:      Pupils: Pupils are equal, round, and reactive to light.   Cardiovascular:      Rate and Rhythm: Normal rate and regular rhythm.      Pulses: Normal pulses.      Heart sounds: Normal heart sounds.   Pulmonary:      Effort: Pulmonary effort is normal.      Breath sounds: Normal breath sounds.   Abdominal:      General: Bowel sounds are normal.      Tenderness: There is no abdominal tenderness.   Skin:     General: Skin is warm and dry.   Neurological:      Mental Status: He is alert and oriented to person, place, and time.      Comments: No tremors observed           Lab Results: I have reviewed the following results:CBC/BMP:   .     01/09/25  0456 "   WBC 6.22   HGB 12.8   HCT 39.3   *   SODIUM 138   K 3.9      CO2 24   BUN 11   CREATININE 0.84   GLUC 103   MG 2.0    , LFTs:   .     01/09/25  0456   *   *   ALB 4.0   TBILI 0.96   ALKPHOS 102        Imaging Results Review: No pertinent imaging studies reviewed.      Administrative Statements   I have spent a total time of 30 minutes in caring for this patient on the day of the visit/encounter including Diagnostic results, Prognosis, Risks and benefits of tx options, Instructions for management, Patient and family education, Importance of tx compliance, Risk factor reductions, Impressions, Counseling / Coordination of care, Documenting in the medical record, Reviewing / ordering tests, medicine, procedures  , Obtaining or reviewing history  , and Communicating with other healthcare professionals .

## 2025-01-09 NOTE — ASSESSMENT & PLAN NOTE
Patient has a history of chronic alcohol use  Has been on naltrexone and Vivitrol in the past  Last Vivitrol injection was in November 2024  Patient has not been taking oral naltrexone since his last Vivitrol shot  Patient is amenable to restarting p.o. naltrexone    PLAN  Continue daily vitamin supplementation with thiamine, folic acid, and multivitamin  Ordered one time dose of naltrexone 25 mg to be given today  If patient tolerates naltrexone at 25 mg today, can increase naltrexone to 50 mg once daily tomorrow 1/10/2025  Appreciate certified  and case management consultations for recommendations regarding aftercare resources-at this time patient reports that he wants to be discharged home so that he can return to work with the future goal of pursuing outpatient rehab once his new insurance has started

## 2025-01-10 ENCOUNTER — APPOINTMENT (INPATIENT)
Dept: RADIOLOGY | Facility: HOSPITAL | Age: 35
DRG: 641 | End: 2025-01-10
Payer: COMMERCIAL

## 2025-01-10 PROBLEM — D69.6 THROMBOCYTOPENIA (HCC): Status: ACTIVE | Noted: 2025-01-10

## 2025-01-10 PROBLEM — M25.522 LEFT ELBOW PAIN: Status: ACTIVE | Noted: 2024-08-25

## 2025-01-10 LAB
ERYTHROCYTE [DISTWIDTH] IN BLOOD BY AUTOMATED COUNT: 14.8 % (ref 11.6–15.1)
HCT VFR BLD AUTO: 40.2 % (ref 36.5–49.3)
HGB BLD-MCNC: 13.3 G/DL (ref 12–17)
MCH RBC QN AUTO: 29.8 PG (ref 26.8–34.3)
MCHC RBC AUTO-ENTMCNC: 33.1 G/DL (ref 31.4–37.4)
MCV RBC AUTO: 90 FL (ref 82–98)
PLATELET # BLD AUTO: 120 THOUSANDS/UL (ref 149–390)
PMV BLD AUTO: 10 FL (ref 8.9–12.7)
RBC # BLD AUTO: 4.46 MILLION/UL (ref 3.88–5.62)
WBC # BLD AUTO: 7.08 THOUSAND/UL (ref 4.31–10.16)

## 2025-01-10 PROCEDURE — 99232 SBSQ HOSP IP/OBS MODERATE 35: CPT

## 2025-01-10 PROCEDURE — 85027 COMPLETE CBC AUTOMATED: CPT

## 2025-01-10 PROCEDURE — 73080 X-RAY EXAM OF ELBOW: CPT

## 2025-01-10 RX ORDER — FOLIC ACID 1 MG/1
1 TABLET ORAL DAILY
Status: DISCONTINUED | OUTPATIENT
Start: 2025-01-10 | End: 2025-01-11 | Stop reason: HOSPADM

## 2025-01-10 RX ORDER — MIRTAZAPINE 15 MG/1
15 TABLET, FILM COATED ORAL
Status: DISCONTINUED | OUTPATIENT
Start: 2025-01-10 | End: 2025-01-11 | Stop reason: HOSPADM

## 2025-01-10 RX ORDER — NALTREXONE HYDROCHLORIDE 50 MG/1
50 TABLET, FILM COATED ORAL DAILY
Status: DISCONTINUED | OUTPATIENT
Start: 2025-01-10 | End: 2025-01-11 | Stop reason: HOSPADM

## 2025-01-10 RX ORDER — LANOLIN ALCOHOL/MO/W.PET/CERES
100 CREAM (GRAM) TOPICAL DAILY
Status: DISCONTINUED | OUTPATIENT
Start: 2025-01-10 | End: 2025-01-11 | Stop reason: HOSPADM

## 2025-01-10 RX ORDER — ACETAMINOPHEN 325 MG/1
650 TABLET ORAL EVERY 8 HOURS PRN
Status: DISCONTINUED | OUTPATIENT
Start: 2025-01-10 | End: 2025-01-11 | Stop reason: HOSPADM

## 2025-01-10 RX ADMIN — BUSPIRONE HYDROCHLORIDE 7.5 MG: 15 TABLET ORAL at 16:02

## 2025-01-10 RX ADMIN — BUSPIRONE HYDROCHLORIDE 7.5 MG: 15 TABLET ORAL at 20:40

## 2025-01-10 RX ADMIN — ACETAMINOPHEN 650 MG: 325 TABLET, FILM COATED ORAL at 13:04

## 2025-01-10 RX ADMIN — THIAMINE HCL TAB 100 MG 100 MG: 100 TAB at 09:20

## 2025-01-10 RX ADMIN — BUSPIRONE HYDROCHLORIDE 7.5 MG: 15 TABLET ORAL at 08:38

## 2025-01-10 RX ADMIN — FOLIC ACID 1 MG: 1 TABLET ORAL at 09:20

## 2025-01-10 RX ADMIN — NALTREXONE HYDROCHLORIDE 50 MG: 50 TABLET, FILM COATED ORAL at 13:02

## 2025-01-10 RX ADMIN — ESCITALOPRAM OXALATE 10 MG: 10 TABLET ORAL at 08:38

## 2025-01-10 RX ADMIN — PROPRANOLOL HYDROCHLORIDE 40 MG: 20 TABLET ORAL at 16:02

## 2025-01-10 RX ADMIN — GABAPENTIN 300 MG: 300 CAPSULE ORAL at 14:57

## 2025-01-10 RX ADMIN — PROPRANOLOL HYDROCHLORIDE 40 MG: 20 TABLET ORAL at 08:38

## 2025-01-10 RX ADMIN — MIRTAZAPINE 15 MG: 15 TABLET, FILM COATED ORAL at 22:53

## 2025-01-10 RX ADMIN — PROPRANOLOL HYDROCHLORIDE 40 MG: 20 TABLET ORAL at 20:40

## 2025-01-10 NOTE — CASE MANAGEMENT
CM met with pt again this afternoon at 230p, HR had returned the email that CM had sent pt's boss. Discussed what pt was comfortable with CM disclosing, confirmed that CM would call back and just provide the date he was admitted, not the reason why. Pt will consider if he wants to file disability claim or not. CM attempted to support pt with creating a plan to get his car (which is parked at pt's garage with a dead battery), and his phone/wallet (left a pt's work and phone is dead). Pt then attempted to call his sister, she did not answer, pt left a voicemail that he would call back later.     CM called pt's HR rep back at 330pm, phone rang and went to voicemail. CM confirmed pt was hospitalized, and that he would likely d/c this weekend, and would have a return to work letter with him. CM attempted to update pt with this information, and also asked if pt wanted to call his sister again, pt was sleeping but woke up and said he didn't want to call her again right now.

## 2025-01-10 NOTE — CASE MANAGEMENT
Met with pt to discuss referral to residential treatment, pt considering options only for outpatient at this time. Explained that he started a new job in November, has not been to this job in one week and they don't know where he is. CM updated ALEXUS for pt's office, called pt's office/work and pt left voicemail for HR to notify them that he is at the hospital, asked for a return call. CM and pt could not navigate through the phone tree to call pt's boss directly.   CM provided information regarding outpatient program in the [facility], pt reports his lease is ending and he will be moving to [place] (no outpatient tx providers near there though) and will be close to the [facility] area for work. Discussed either [facility]. Pt will consider options. Pt believes his [employer] insurance through work was active as of 1/1/25.     CM normalized pt's ambivalence regarding treatment, highlighted the pros of attending residential treatment, attempted to address barriers for accessing residential treatment. Pt remains ambivalent towards his sobriety, is at a high risk of recurrence in alcohol use if he returns to the community and does not engage in residential treatment.     CM will update pt when/if HR returns call. CM discussed with [facility] provider that pt wants to address xray of left elbow. CM will confirm where pt wants to engage with outpatient, explained that work may require that he follow treatment recommendations, and if he does not, this could cause a problem with work if he still has this job.

## 2025-01-10 NOTE — ASSESSMENT & PLAN NOTE
Complaining of L elbow pain  Obvious ecchymosis to the R tricep   Patient unaware if he had a fall   Will get XR of L elbow

## 2025-01-10 NOTE — PLAN OF CARE

## 2025-01-10 NOTE — CERTIFIED RECOVERY SPECIALIST
Certified  Note    Patient name: Juan Luu  Location: 5T DETOX 514/5T DETOX 51*  [place]: [facility]  Attending:  Lori Jensen MD MRN [id number]  : 1990  Age: 34 y.o.    Sex: male Date 1/10/2025         Substance Use History:     Social History     Substance and Sexual Activity   Alcohol Use Yes    Comment: 1-1.5 L vodka or beer daily        Social History     Substance and Sexual Activity   Drug Use Never        Time spent with Patient 15    CRS follow up with patient.      CRS and patient had open discussion yesterday stating that he wanted to go to rehab. Today when CRS and Patient discussed him going into treatment patient changed his mind. Patient had multiple reasons why he can not go into treatment at this time(car battery, missing wallet, no phone, has to move, needs to help sister post op, no insurance ) CRS strongly encouraged treatment, educating patient again that withdrawal management(detox) is not AUD treatment.      Patient appeared tearful and agreed with CRS but still not wanting IP care.       Resources provided and contact information given      Sammi Pollard

## 2025-01-10 NOTE — ASSESSMENT & PLAN NOTE
On admission glucose 87 with anion gap 1  UA negative for ketones   Will discontinue D5W as anion gap significantly improved, to continue to trend  Resolved    Yes

## 2025-01-10 NOTE — CASE MANAGEMENT
"CM met with pt again, pt logged in to his \"roomies\" account to message the landlord for his pending apartment rental, wanted to notify her that he was hospitalized as he had plans with her to sign lease on a room rental in [place]. CM and pt then updated ALEXUS for [facility], called [facility], completed initial intake and scheduled LOC assessment for Friday 1/24/25 at 230p. Pt wanted to push this appointment back, rather than attending this week, to sort out if he still has a job, and then if he still has active [employer] insurance through his job, and if the rental is still happening. But at this time, pt plans to return home at discharge, go to work, and move to [place], and continue OP services through [facility]. If he loses insurance, [facility], or [place], pending his application for [place] and pending his move to [place]. Pt then requested CM email his boss at work, to confirm his presence in the hospital, because HR has not yet returned call to this CM. CM emailed boss, requested direct phone number for pt to call or direct contact in HR.     [facility] admissions will email this  pt's insurance confirmation, [employer] is active and CM will add to the chart.   "

## 2025-01-10 NOTE — DISCHARGE INSTR - OTHER ORDERS
Sammi Pollard   Certified     [facility]  [phone]    AA meeting guide   https://www.aa.org/find-aa    AA Phone apps:   Meeting Guide  Everything AA  In the Rooms    AA/24 hour hotline   [phone]    SMART Recovery Meetings    Self Management and Recovery Training (SMART)  SMART Recovery is an evidenced-informed recovery method grounded in Rational Emotive Behavioral Therapy (REBT) and Cognitive Behavioral Therapy (CBT), that supports people with substance dependencies or problem behaviors to:  Build and maintain motivation  Orwell with urges and cravings  Manage thoughts, feelings and behaviors  Live a balanced life    Find meetings and tools: https://Integrity Applications.org/    SYNC Recovery Events    Sync Recovery Adventure organizes meaningful events for people in recovery and their families. Our main goal is to have fun by connecting with nature and other people. We can then realize that there is a world of possibilities open to us, now that we are no longer in the bondage of addiction. These events are designed to provide :  Hope for those in early recovery  Tangible proof that life gets better when we’re sober  Service opportunities for people with recovery experience  Social connectedness for everyone involved    [address]  [place] [address]  Phone: [phone]  Email: info@[employer]   Website: https://AdScale.org/    Local Food Bank Locations & Contact Information:  [facility] Food Luis  Municipalities:  [facility], [place], [place], [place], [place], [place], [place],  [place], and [place]  Emergency Food Pantries  [address]  Address: [address] [place] [address]  Phone: [phone]  Hours of Operation: Fridays 10:00AM-1:00PM  [facility]  Address: [address] [place] [address]  Phone:  [phone]  Hours of Operation: Thursdays 5:30PM-6:30PM    [address] Podcast Ready  Address: [address] [place] [address]  Phone: [phone]  Hours of Operation: Monday-Friday 9:30AM-12:00PM  [facility]  Address: [address] [address]  Phone: [phone]  Hours of Operation: By appointment -- Mondays, Tuesdays, Thursdays, & Fridays 8:30AM-4:00PM;  Wednesdays 8:30AM-12:00PM  Jaquan Salvador  Address: [address] [address]  Phone: [phone]  Hours of Operation: 1st & 3rd Saturdays 10:00AM-12:00PM  [facility]  Address: [address] [place] [address]  Phone: [phone]  Hours of Operation: 2nd & 4th Thursdays 4:00PM-5:30PM (Only 4th Thursdays during the summer)  Anglican Hoahaoism Polish Yazdanism  Address: [address] [place] [address]  Phone: [phone]  Hours of Operation: By appointment -- Wednesdays 6:00PM  [facility]  Address: [address] [place] [address]  Phone: [phone]  Hours of Operation: Thursdays 11:00AM-2:00PM or By appointment  [facility]  Address: [address] [address]  Phone: [phone]  Hours of Operation: Saturdays 9:00AM-11:30AM  Lynne Adventist Yazdanism  Address: [address] [address]  Phone: [phone]  Hours of Operation: Fridays, 3rd & 4th Saturdays 9:00AM-11:00AM  Herbie Odello Pentecostales  Address: [address] [place] [address]  Phone: [phone]  Hours of Operation: Tuesdays 3:00PM-5:00PM or By appointment  Ministering Hands  Address: [address] [place] [address]  Phone: [phone]  Hours of Operation: By appointment  [facility]  Address: [address] [place] [address]  Phone: [phone]  Hours of Operation: 3rd Saturday, 8:00AM-11:00AM  RIPPLE  Address: [address] [place] [address]  Phone: [phone]  Hours of  Operation: 3rd Sunday 4:00PM-5:30PM    [place] [employer] Alaina [employer] (SAACA)  Address: [address] [address]  Phone: [phone]  Hours of Operation: 3rd Wednesday 9:00AM-4:00PM  [facility]  Address: [address] [place] [address]  Phone: [phone]  Hours of Operation: 1st & 3rd Saturdays 9:00AM-11:30AM  [facility]  Address: [address] [address]  Phone: [phone]  Hours of Operation: By appointment  [address] Victory Food Pantry  Address: [address] [address]  Phone: [phone]  Hours of Operation: 3rd Sunday 12:00PM-1:30PM  [facility]  Address: [address] [facility] [place] [address]  Phone: [phone]  Hours of Operation: 1st & 3rd Thursdays 6:30PM-7:30PM; By appointment -- Mondays  [facility]ne [facility]  Address: [address] [place] [address]  Phone: [phone]  Hours of Operation: By appointment  [facility]  Address: [address] [place] [address]  Phone: [phone]  Hours of Operation: 1st & 3rd Wednesdays 4:00PM-5:30PM  [facility]  Address: [address] [address]  Phone: [phone]  Hours of Operation: 4th Wednesday 6:30PM-7:30PM  Janet De Los Santos's Open Door Pantry  Address: [address] [place] [address]  Phone: [phone]  Hours of Operation: 2nd Tuesday 10:00AM-12:00PM; 4th Thursday 4:00PM-6:00PM  [place] - [address]  [facility] (Kosher & Non-Kosher)  Address: [address] [facility], [address]  Phone: [phone]  Hours of Operation: By appointment -- Monday-Friday 9:00AM-5:00PM  Javy Galan  [facility]  Address: [address], [place] [address]  Phone: [phone]  Hours of Operation: 1st & 3rd Tuesdays 9:00AM-10:30AM; Thursdays 6:00PM-8:00PM    Love [facility]  Address: [address] [place] [address]  [facility], [place] [address]  Phone: [phone]  Hours of Operation: Every other Saturday 10:00AM-12:00PM  [facility]  Address: [address] [facility], [place] [address]  Phone: [phone]  Hours of Operation: 3rd Monday 6:00PM-7:30PM  [place] - [address]  [facility]  Address: [address] [place] [address]  Phone: [phone]  Hours of Operation: 4th Sunday 9:00AM-11:00AM  First [address], Herbie  Address: [address] [place] [address]  Phone: [phone]  Hours of Operation: Saturdays 10:30AM-12:30PM  [facility]  Address: [address] [place] [address]  Phone: [phone]  Hours of Operation: By appointment -- Tuesday-Thursday 8:30AM-4:30PM  [facility] Pantry  Address: [address] [place] [address]  Phone: [phone]  Hours of Operation: 1st & 3rd Wednesdays 6:00PM-8:00PM  [facility] [address]  [facility]  Address: [address] [place] [address]  Phone: [phone]  Hours of Operation: Wednesdays & Fridays 3:00PM-4:00PM  [facility] [address]  Lexii Brooks’s Pantry  Address: [address] [place] [address]  Phone: [phone]  Hours of Operation: Every other Saturday 10:30AM-12:30PM  [place] - [address]  [facility]  Address: [address] [place] [address] [place] [address]  Phone: [phone]  Hours of Operation: Tuesdays & Wednesdays 10:00AM-2:00PM; Closed last week of the month  Ck Villaseñor - [address]  [facility]  Address: [facility], [place] [address]  Phone: [phone]  Hours of Operation: 1st Saturday 9:00AM-12:00PM; 3rd Thursday 4:00PM-7:00PM    [place] - [address]  [facility]  Address: [address] [facility], [place] [address]  Phone: [phone]  Hours of Operation: 3rd Monday & 3rd Wednesday 4:00PM-6:00PM; 3rd Saturday 10:00AM-12:00PM  [facility] [address]  [facility]  Address: [address] [place] [address],  [place] [address]  Phone: [phone]  Hours of Operation: 1st & 3rd Mondays 9:00AM-11:30AM  [place] - [address]  [facility]  Address: [address] [place] [address]  Phone: [phone]  Hours of Operation: 2nd Saturday 9:00AM-11:00AM  [place] - [address]  [facility]  Address: [address] [place] [address]  Phone: [phone]  Hours of Operation: 1st, 2nd, & 3rd Thursdays 4:00PM-7:00PM; Last Saturday 9:30AM-12:00PM  [place] [id number]  [facility]  Address: [facility] [place] [address]  Phone: [phone]  Hours of Operation: Tuesdays 6:00PM-7:00PM; Saturdays 4:00PM-5:00PM; Sundays 12:30PM-1:30PM  [place] [employer]  Address: [address] [place] [address]  Phone: [phone]  Hours of Operation: By appointment -- Mondays 6:00PM      Soup Mikki  [facility]  Address: [address] [place] [address]  Phone: [phone]  Hours of Operation: Monday-Friday 1:30PM-2:30PM  [facility])  Address: [address] [place] [address]  Phone: [phone]  Hours of Operation: Monday-Friday 9:00AM-5:00PM  [facility] Soup Kitchen  Address: [address] [address]  Phone: [phone]  Hours of Operation: Tuesday-Thursday 12:00PM-1:00PM    [facility]  Address: [address] [address]  Phone: [phone]  Hours of Operation: Sundays 8:00AM-9:00AM; Some holidays  [facility] Food Luis  Municipalities:  [place], [facility], [place], [place], [place], [place], and [place]  Emergency Food Pantries  [place] - [address]  [facility] Food Bank  Address: [address] [facility], [place] [address]  Phone: [phone]  Hours of Operation: 2nd Tuesday 10:00AM-12:00PM  [place] - [address]  [facility] [place]  Address: [address] [place] [address], [place] [address]  Phone: [phone]  Hours of Operation: Wednesdays 10:00AM-12:00PM  Kerry Amos  Sikh  Address: [address] [facility], [place] [address]  Phone: [phone]  Hours of Operation: 1st & 3rd Tuesdays 5:00PM-6:00PM  [facility]  Address: [address] [facility], [place], [address]  Phone: [phone]  Hours of Operation: Tuesdays 6:00PM-7:00PM  Holy Rick Scientologist  Address: [address], [place] [address]  Phone: [phone]  Hours of Operation: 1st & 2nd Saturdays 12:00PM- 4:00PM  [facility] Yulissa Miniries Pantry  Address: [address], [place] [address]  Phone: [phone]  Hours of Operation: Monday-Friday 10:30AM-11:30AM  [facility]  Address: [address], [place] [address]  Phone: [phone]  Hours of Operation: 3rd & 4th Saturdays 9:00AM-11:00AM; [facility] residents only    [place] - [address]  [place] [facility]  Address: [address] [facility], [place] [address]  Phone: [phone]  Hours of Operation: 2nd Thursday 10:00AM-12:00PM  [place] [facility] Food Pantry  Address: [address] [facility], [place] [address]  Phone: [phone]  Hours of Operation: Monday & Tuesday of the first full week of the month 9:00AM-11:00AM  [facility]  Address: [address] [facility], [place] [address]  Phone: [phone]  Hours of Operation: Mondays, Wednesdays, & Thursdays 9:30AM-11:30AM  [facility]  Address: [address] [facility], [place] [address]  Phone: [phone]  Hours of Operation: 2nd & 4th Saturdays 10:00AM-12:00PM  [place] - [address]  [place] [facility]  Address: [address] [facility], [place] [address]  Phone: [phone]  Hours of Operation: By appointment -- Tuesdays & Wednesdays 10:00AM-4:00PM, Thursdays 10:00AM-  12:00PM, & Sundays 4:00PM-7:00PM  [employer]  Address: [address] [facility] [place] [address]  Phone: [phone]  Hours of Operation: 3rd Saturday 10:00AM-12:00PM  BARB White  Address: [address] [place] [address] [place] [address]  Phone:  [phone]  Hours of Operation: 3rd Saturday 9:00AM-11:30AM or by appointment  [facility]  Address: [address] [place] [address]  Phone: [phone]  Hours of Operation: 2nd & 4thTuesdays 10:00AM-12:00PM  [facility] Pantry  Address: [address] [place] [address]  Phone: [phone]  Hours of Operation: 1st, 2nd, & 4th Wednesdays 11:00AM-1:00PM; 3rd Wednesday 5:00PM-7:00PM  [facility] Pantry  Address: [address] [place] [address]  Phone: [phone]  Hours of Operation: Wednesdays 10:00AM-12:00PM; Last Wednesday 6:00PM-8:00PM  Heron [address]  Address: [address] [place] [address]  Phone: [phone]  Hours of Operation: Fridays 8:30AM-11:30AM & 1:30PM-3:30PM    [facility]  Address: [address] [place] [address]  Phone: [phone]  Hours of Operation: By appointment--Mondays-Fridays 9:00AM -4:30PM.  [facility]  Address: [address] [place] [address]  Phone: [phone]  Hours of Operation: 1st & 3rd Tuesdays 6:00PM-7:30PM  [facility]  Address: [address] [place] [address]  Phone: [phone]  Hours of Operation: By appointment -- Mondays-Fridays 9:00AM-5:00PM  [facility] [place]  Address: [address] [place] [address]  Phone: [phone]  Hours of Operation: 3rd, 4th, & 5th Thursdays 5:00PM-7:00PM  [facility]  Address: [address] [place] [address]  Phone: [phone]  Hours of Operation: Mondays 10:00AM-12:30PM; Thursdays 10:00AM-12:30PM & 1:00PM-3:30 PM  [facility]  Address: [address] [facility], [address], [place] [address]  Phone: [phone]  Hours of Operation: Tuesdays 5:00PM-6:00PM  [facility]  Address: [address] [facility], [place] [address]  Phone: [phone]  Hours of Operation: Thursdays 6:00PM-7:30PM; Closed 5th Thursday  [facility] [address]  [facility]  Bank  Address: [address] [facility] [place] [address]  Phone: [phone]  Hours of Operation: For last names beginning with A-M: 2nd Tuesday 8:00AM-10:00AM or 6:00PM-7:00PM;  For last names beginning with N-Z: 2nd Wednesday 8:00AM-10:00AM  [place] - [address]  [facility] Food Bank  Address: [address] [place] [address]  Phone: [phone]  Hours of Operation: Wednesdays 9:30AM-12:00PM; 1st, 2nd, & 3rd Thursdays 6:00PM-8:00PM; 2nd & 3rd Saturdays 9:30AM-12:00PM  [place] - [address]  [facility]  Address: [address] [facility], [place] [address]  Phone: [phone]  Hours of Operation: 3rd Saturday 9:00AM-11:00AM    [facility] [place]  Address: [address] [facility], [place] [address]  Phone: [phone]  Hours of Operation: Tuesdays 10:00AM-2:00PM  [place] [facility]  Address: [address] [facility], [place] [address]  Phone: [phone]  Hours of Operation: Tuesdays 10:00AM-12:00PM; Closed 5th Tuesday  [place] - [address]  PUMP  Address: [address] [place] [address]  Phone: [phone]  Hours of Operation: Mondays 11:00AM-12:30PM & 7:00PM-8:30PM  [place] - [address]  [facility] Food Pantry  Address: [address] [facility] [place] [address]  Phone: [phone]  Hours of Operation: Mondays 5:00PM-7:00PM  [facility]  Address: [address] [facility] [place] [address]  Phone: [phone]  Hours of Operation: 2nd & 4th Saturdays 9:00AM-10:00AM  Soup Mikki  [place]  [facility] [place]  Address: [address] [facility], [place] [address]  Phone: [phone]  Hours of Operation: 2nd Saturday - Lunch (Call for times)  [place]  [place] [facility]  Address: [address] [place] [address], [place] [address]  Phone: [phone]  Ours of Operation: Sundays 1:00PM-2:00PM  [facility]  Address: [address] [place] [address], [place] [address]  Phone: [phone]  Hours of Operation: Saturdays 12:00PM-1:00PM  [facility]  Address: [facility] [place] [address]  Phone: [phone]  Hours of Operation: Monday-Friday 8:00AM-4:00PM  Raine Stanley Kitchen  Address: [address] [place] [address]  Phone: [phone]  Hours of Operation: Monday-Friday 12:00PM-1:00PM    [facility]  Address: [address] [place] [address]  Phone: [phone]  Hours of Operation: Call for times  Safe [place]  Address: [address] [place] [address]  Phone: [phone]  Hours of Operation: Monday-Friday 12:00PM-1:00PM    Housing Assistance Programs  [place] Housing Finance Agency ([facility])  Examples of some of [facility]'s available programs and contact information:  [place] residents who are facing foreclosure may be eligible for assistance through  [facility]'s - Homeowners' Emergency Mortgage Assistance Program- [phone]    Home Improvement and Repair Program - [phone]  -    Accessibility Home Modification  -    [place] Renovate & Repair Purchase Improvement  -    Purchase Improvement    Rental Assistance   Contact the following agencies to pursue options for rental assistance:  [employer] - [address] [facility] - [phone]   American Organization - [address] [facility] - [phone]  [facility] - [address] [facility] - [phone]  [facility] Information and Referral - [phone]           [facility] Housing Authority Executive & Accounting Offices Address:  [address] [place] [address] Phone: [phone]  TTY: [phone]  Fax: [phone]   Resident Selection Office / Waiting List office  HOUSING APPLICATIONS DISTRIBUTED AT RESIDENT SELECTION OFFICE ONLY Address:  [address] [facility] [address]  [place] [address] Phone: [phone]  TTY: [phone]  Fax: [phone]   The Housing Voucher Office / Section 8 Address:  [address] [place] [address]  [place] [address] Phone: [phone]  TTY: [phone]  Fax:  [phone]   [facility]     https://www.[facility].com/html/[place]_[facility]_assistance_progr.html    Utility Assistance Programs  [place]A Rand Offices     The Low Income Home Energy Assistance program is called LIHEAP. They assist low income people and families with their heating and cooling bills. This program is therefore, a year long program that is available 12 months a year. To qualify for the LIOhioHealth Doctors HospitalP assistance, you need to qualify. We provide more information on our listing pages where we list the current Federal Poverty Rates nationwide.    Toll-free hotline:    1-740.871.6742.    Hours of Operation:    Monday - Friday, 8:30 a.m. - 4:30 p.m.            CRISIS INFORMATION  If you are experiencing a mental health emergency, you may call the [facility] Crisis Intervention Office 24 hours a day, 7 days per week at [phone].    In [facility], call [phone].    Warmline is a confidential 24/7 telephone support service manned by trained mental health consumers.  Warmline provides support, a listening ear and can provide information about available services.Warmline specializes in the concerns of mental health consumers, their families and friends.  However, we are also here for anyone who has a mental health concern, is confused about or just doesn't know anything about mental health or where to get information.  To reach [facility], call [phone].    HOW TO GET SUBSTANCE ABUSE HELP:  If you or someone you know has a drug or alcohol problem, there is help:  [facility] Drug & Alcohol Abuse Services: [phone]  [facility] Drug & Alcohol Abuse Services: [phone]  An assessment is the first step.   In addition to those listed there are other programs available in the area but assessment is best to determine an appropriate level of care.  If you DO NOT have Medical Assistance ([place]) or Private Insurance, an assessment can be scheduled at one of these  providers:  [facility]  [address] [facility] [place] [address]  [phone]   [address] [place] [address]  [phone]   [address] [place] [address]  [phone]   [facility]  [address] [place] [address]  [phone]   [facility] [employer]  [address] [facility] [place] [address]  [phone]   [facility]  [address] [place] [address] [place] [address]  [phone]   [employer]  [address], [place] [address]  [phone]     If you HAVE Medical Assistance, an assessment can be scheduled at one of these providers:  [place] on Alcohol & Drug Abuse  [address] [facility] [place] [address]  [phone]   [facility]  [address] [facility] [place] [address]  [phone]   [facility] D[facility]A Intake Unit  [address], 1st Floor, [place] [address]  [phone]  [address] [place] [address] [phone]   [address] [place] [address]  [phone]   [address] [place] [address]  [phone]   NET ([facility])  [address] [place] [place] [address]  [phone]   [facility]  [address] [place] [address]  [phone]   [facility]  [address] [place] [address] [place] [address]  [phone]   [facility]  [address] [place] [address] [place] [address]  [phone]   [employer]  [address], [place] [address]  [phone]     If you HAVE Private Insurance, an assessment can be scheduled at one of these providers.  Please contact these Providers to determine if they are in your network plan:  [facility] D&A Intake Unit  [address] [facility], 1st Floor, [place] [address]  [phone]   [place] [address] [address], [place] [address]  [phone]   [facility]  Rehabilitations Services  [address] [facility] [place] [address]  [phone]   NET ([facility])  [address] , [place] [address]  [phone]   [facility]  [address] [facility] [address] [place] [address]  [phone]   [place] BonBroadersheet.  [address] [facility], [place], [place] [address] [phone]     From the [facility] website www.[place].gov/guides/opioid-epidemic/#GetNaloxone    How do I get naloxone?  Family members and friends can access naloxone by:    Obtaining a prescription from their family doctor  Using the standing order issued by Acting Physician General Akilah Gould. A standing order is a prescription written for the general public, rather than specifically for an individual.  To use the standing order, print it and take it with you to the pharmacy or have the digital version on your phone. Download the standing order from the Department of Health (PDF).    If you are unable to print it or use the digital version, the standing order is kept on file at many pharmacies. If a pharmacy does not have it on file, they may have the ability to look it up.    Naloxone prescriptions can be filled at most pharmacies. Although the medication might not be available for same-day pickup, it often can be ordered and available within a day or two.

## 2025-01-10 NOTE — ASSESSMENT & PLAN NOTE
Patient with a history of chronic daily alcohol use  Last drink today  Serum alcohol 367 in the ED  Received folic acid, thiamine, and Ativan in the ED PTA  Initiate SEWS protocol for medical management of alcohol withdrawal  Current alcohol withdrawal signs/symptoms include hallucinations, anxiety, tremors, nausea vomiting  SEWS score 13 upon admission  Administer 650 mg of phenobarbital as initial dose, total dosing 780, last dose 1/8  Continue monitoring under protocol and administer phenobarbital as indicated  Continuous pulse ox and telemetry monitoring   There was concern for possible hallucinations so high dose thiamine was initiated. He received 5 doses. Now continued on thiamine 100mg daily   Tolerated naltrexone 25mg. Will continue with naltrexone 50mg daily

## 2025-01-10 NOTE — PROGRESS NOTES
Progress Note - Hospitalist   Name: Juan Luu 34 y.o. male I MRN: [id number]  Unit/Bed#: 5T DETOX 514-01 I Date of Admission: 1/8/2025   Date of Service: 1/10/2025 I Hospital Day: 2    Assessment & Plan  Alcohol withdrawal syndrome with complication (HCC)  Patient with a history of chronic daily alcohol use  Last drink today  Serum alcohol 367 in the ED  Received folic acid, thiamine, and Ativan in the ED PTA  Initiate SEWS protocol for medical management of alcohol withdrawal  Current alcohol withdrawal signs/symptoms include hallucinations, anxiety, tremors, nausea vomiting  SEWS score 13 upon admission  Administer 650 mg of phenobarbital as initial dose, total dosing 780, last dose 1/8  Continue monitoring under protocol and administer phenobarbital as indicated  Continuous pulse ox and telemetry monitoring   There was concern for possible hallucinations so high dose thiamine was initiated. He received 5 doses. Now continued on thiamine 100mg daily   Tolerated naltrexone 25mg. Will continue with naltrexone 50mg daily   Alcohol use disorder, severe, dependence (HCC)  Pt with a h/o chronic heavy alcohol use  Drinks 1.5 L of vodka daily  Withdrawal management as above  Continue thiamine, folic acid  Consult case management/CRS for assistance with aftercare resources  Unspecified mood (affective) disorder (HCC)  Patient reports poor compliance with outpatient gabapentin, BuSpar, Lexapro  We will continue medications and monitor closely  Alcoholic ketoacidosis  On admission glucose 87 with anion gap 1  UA negative for ketones   Will discontinue D5W as anion gap significantly improved, to continue to trend  Resolved   Transaminitis  Likely in setting of alcohol abuse   , ALT 16, alk phos 160, continue to improve  Continue to trend LFT  Thrombocytopenia (HCC)  New finding   No evidence of bleeding   Likely in the setting of alcohol use disorder   Recommend outpatient follow up with PCP   Could  "consider RUQ US or CT abd to assess liver given thrombocytopenia and elevated LFTs  Left elbow pain  Complaining of L elbow pain  Obvious ecchymosis to the R tricep   Patient unaware if he had a fall   Will get XR of L elbow     VTE Pharmacologic Prophylaxis: VTE Score: 1 Low Risk (Score 0-2) - Encourage Ambulation.    Mobility:   Basic Mobility Inpatient Raw Score: 24  JH-HLM Goal: 8: Walk 250 feet or more  JH-HLM Achieved: 8: Walk 250 feet ot more  JH-HLM Goal achieved. Continue to encourage appropriate mobility.    Patient Centered Rounds: I performed bedside rounds with nursing staff today.   Discussions with Specialists or Other Care Team Provider: Medical toxicology    Education and Discussions with Family / Patient:  detox unit, confidential encounter.     Current Length of Stay: 2 day(s)  Current Patient Status: Inpatient   Certification Statement: The patient will continue to require additional inpatient hospital stay due to continued medical management of alcohol withdrawal, pending safe dispo  Discharge Plan: Anticipate discharge later today or tomorrow to home.    Code Status: Level 1 - Full Code    Subjective   Seen and examined. Tearful on exam regarding his dispo plans. States he wants to go to rehab but is unsure if he will be able to go directly there as he has to be out of his apartment by Feb 1st. Additionally states he lost his cellphone and his wallet. Reports he hasn't eaten in 2 days. States he \"feels like crap.\" Otherwise no acute complaints.    Objective :  Temp:  [97.3 °F (36.3 °C)] 97.3 °F (36.3 °C)  HR:  [55-75] 75  BP: (124-129)/(57-82) 124/79  Resp:  [16-18] 18  SpO2:  [95 %-96 %] 96 %  O2 Device: None (Room air)    Body mass index is 29.52 kg/m².     Input and Output Summary (last 24 hours):     Intake/Output Summary (Last 24 hours) at 1/10/2025 1209  Last data filed at 1/9/2025 2200  Gross per 24 hour   Intake 240 ml   Output --   Net 240 ml       Physical Exam  Constitutional:       " General: He is not in acute distress.     Appearance: He is ill-appearing (chronically).   HENT:      Head: Normocephalic.      Nose: Nose normal.      Mouth/Throat:      Mouth: Mucous membranes are moist.   Eyes:      Conjunctiva/sclera: Conjunctivae normal.   Cardiovascular:      Heart sounds: Normal heart sounds.   Pulmonary:      Breath sounds: Normal breath sounds. No wheezing, rhonchi or rales.   Abdominal:      General: There is no distension.      Palpations: Abdomen is soft.      Tenderness: There is no abdominal tenderness.   Musculoskeletal:      Right lower leg: No edema.      Left lower leg: No edema.   Skin:     General: Skin is warm.      Findings: Bruising (R tricep) present.   Neurological:      Mental Status: Mental status is at baseline.         Lines/Drains:      Lab Results: I have reviewed the following results:   Results from last 7 days   Lab Units 01/10/25  0749 01/09/25  0456   WBC Thousand/uL 7.08 6.22   HEMOGLOBIN g/dL 13.3 12.8   HEMATOCRIT % 40.2 39.3   PLATELETS Thousands/uL 120* 125*   SEGS PCT %  --  65   LYMPHO PCT %  --  27   MONO PCT %  --  6   EOS PCT %  --  2     Results from last 7 days   Lab Units 01/09/25  0456   SODIUM mmol/L 138   POTASSIUM mmol/L 3.9   CHLORIDE mmol/L 104   CO2 mmol/L 24   BUN mg/dL 11   CREATININE mg/dL 0.84   ANION GAP mmol/L 10   CALCIUM mg/dL 8.6   ALBUMIN g/dL 4.0   TOTAL BILIRUBIN mg/dL 0.96   ALK PHOS U/L 102   ALT U/L 160*   AST U/L 100*   GLUCOSE RANDOM mg/dL 103                       Recent Cultures (last 7 days):         Imaging Results Review: No pertinent imaging studies reviewed.  Other Study Results Review: EKG was reviewed.     Last 24 Hours Medication List:     Current Facility-Administered Medications:     busPIRone (BUSPAR) tablet 7.5 mg, TID    escitalopram (LEXAPRO) tablet 10 mg, Daily    folic acid (FOLVITE) tablet 1 mg, Daily    gabapentin (NEURONTIN) capsule 300 mg, TID PRN    propranolol (INDERAL) tablet 40 mg, TID    thiamine  (VITAMIN B1) 500 mg in sodium chloride 0.9 % 50 mL IVPB, Q8H CANDICE, Last Rate: Stopped (01/10/25 0918)    thiamine tablet 100 mg, Daily    Administrative Statements   Today, Patient Was Seen By: Viviana Wellington PA-C    **Please Note: This note may have been constructed using a voice recognition system.**

## 2025-01-10 NOTE — ASSESSMENT & PLAN NOTE
New finding   No evidence of bleeding   Likely in the setting of alcohol use disorder   Recommend outpatient follow up with PCP   Could consider RUQ US or CT abd to assess liver given thrombocytopenia and elevated LFTs

## 2025-01-10 NOTE — PLAN OF CARE
Problem: Potential for Falls  Goal: Patient will remain free of falls  Description: INTERVENTIONS:  - Educate patient/family on patient safety including physical limitations  - Instruct patient to call for assistance with activity   - Consult OT/PT to assist with strengthening/mobility   - Keep Call bell within reach  - Keep bed low and locked with side rails adjusted as appropriate  - Keep care items and personal belongings within reach  - Initiate and maintain comfort rounds  - Make Fall Risk Sign visible to staff    - Apply yellow socks and bracelet for high fall risk patients  - Consider moving patient to room near nurses station  Outcome: Adequate for Discharge

## 2025-01-11 ENCOUNTER — APPOINTMENT (INPATIENT)
Dept: ULTRASOUND IMAGING | Facility: HOSPITAL | Age: 35
DRG: 641 | End: 2025-01-11
Payer: COMMERCIAL

## 2025-01-11 VITALS
DIASTOLIC BLOOD PRESSURE: 68 MMHG | RESPIRATION RATE: 18 BRPM | SYSTOLIC BLOOD PRESSURE: 130 MMHG | OXYGEN SATURATION: 96 % | HEART RATE: 64 BPM | TEMPERATURE: 97.7 F | BODY MASS INDEX: 29.63 KG/M2 | HEIGHT: 71 IN | WEIGHT: 211.64 LBS

## 2025-01-11 PROBLEM — F41.9 ANXIETY DISORDER, UNSPECIFIED: Status: ACTIVE | Noted: 2025-01-11

## 2025-01-11 PROBLEM — R44.3 HALLUCINATIONS: Status: ACTIVE | Noted: 2025-01-11

## 2025-01-11 LAB
ALBUMIN SERPL BCG-MCNC: 4.3 G/DL (ref 3.5–5)
ALP SERPL-CCNC: 119 U/L (ref 34–104)
ALT SERPL W P-5'-P-CCNC: 172 U/L (ref 7–52)
ANION GAP SERPL CALCULATED.3IONS-SCNC: 10 MMOL/L (ref 4–13)
AST SERPL W P-5'-P-CCNC: 138 U/L (ref 13–39)
BILIRUB SERPL-MCNC: 0.51 MG/DL (ref 0.2–1)
BUN SERPL-MCNC: 13 MG/DL (ref 5–25)
CALCIUM SERPL-MCNC: 9.3 MG/DL (ref 8.4–10.2)
CHLORIDE SERPL-SCNC: 103 MMOL/L (ref 96–108)
CO2 SERPL-SCNC: 24 MMOL/L (ref 21–32)
CREAT SERPL-MCNC: 0.93 MG/DL (ref 0.6–1.3)
GFR SERPL CREATININE-BSD FRML MDRD: 106 ML/MIN/1.73SQ M
GLUCOSE SERPL-MCNC: 97 MG/DL (ref 65–140)
POTASSIUM SERPL-SCNC: 3.8 MMOL/L (ref 3.5–5.3)
PROT SERPL-MCNC: 7.1 G/DL (ref 6.4–8.4)
SODIUM SERPL-SCNC: 137 MMOL/L (ref 135–147)

## 2025-01-11 PROCEDURE — 76705 ECHO EXAM OF ABDOMEN: CPT

## 2025-01-11 PROCEDURE — 99239 HOSP IP/OBS DSCHRG MGMT >30: CPT

## 2025-01-11 PROCEDURE — 80053 COMPREHEN METABOLIC PANEL: CPT

## 2025-01-11 PROCEDURE — 99254 IP/OBS CNSLTJ NEW/EST MOD 60: CPT | Performed by: PSYCHIATRY & NEUROLOGY

## 2025-01-11 RX ORDER — PROPRANOLOL HYDROCHLORIDE 40 MG/1
40 TABLET ORAL 3 TIMES DAILY
Qty: 90 TABLET | Refills: 0 | Status: SHIPPED | OUTPATIENT
Start: 2025-01-11 | End: 2025-02-10

## 2025-01-11 RX ORDER — GABAPENTIN 300 MG/1
300 CAPSULE ORAL EVERY 8 HOURS PRN
Qty: 90 CAPSULE | Refills: 0 | Status: SHIPPED | OUTPATIENT
Start: 2025-01-11 | End: 2025-02-10

## 2025-01-11 RX ORDER — MIRTAZAPINE 15 MG/1
15 TABLET, FILM COATED ORAL
Qty: 30 TABLET | Refills: 0 | Status: SHIPPED | OUTPATIENT
Start: 2025-01-11 | End: 2025-02-10

## 2025-01-11 RX ORDER — ESCITALOPRAM OXALATE 10 MG/1
10 TABLET ORAL DAILY
Qty: 30 TABLET | Refills: 0 | Status: SHIPPED | OUTPATIENT
Start: 2025-01-11 | End: 2025-02-10

## 2025-01-11 RX ORDER — BUSPIRONE HYDROCHLORIDE 7.5 MG/1
7.5 TABLET ORAL 3 TIMES DAILY
Qty: 90 TABLET | Refills: 0 | Status: SHIPPED | OUTPATIENT
Start: 2025-01-11 | End: 2025-02-10

## 2025-01-11 RX ORDER — NALTREXONE HYDROCHLORIDE 50 MG/1
50 TABLET, FILM COATED ORAL DAILY
Qty: 30 TABLET | Refills: 0 | Status: SHIPPED | OUTPATIENT
Start: 2025-01-11 | End: 2025-02-10

## 2025-01-11 RX ADMIN — BUSPIRONE HYDROCHLORIDE 7.5 MG: 15 TABLET ORAL at 15:56

## 2025-01-11 RX ADMIN — BUSPIRONE HYDROCHLORIDE 7.5 MG: 15 TABLET ORAL at 08:50

## 2025-01-11 RX ADMIN — THIAMINE HCL TAB 100 MG 100 MG: 100 TAB at 08:50

## 2025-01-11 RX ADMIN — NALTREXONE HYDROCHLORIDE 50 MG: 50 TABLET, FILM COATED ORAL at 08:50

## 2025-01-11 RX ADMIN — FOLIC ACID 1 MG: 1 TABLET ORAL at 08:50

## 2025-01-11 RX ADMIN — ESCITALOPRAM OXALATE 10 MG: 10 TABLET ORAL at 08:50

## 2025-01-11 RX ADMIN — PROPRANOLOL HYDROCHLORIDE 40 MG: 20 TABLET ORAL at 15:56

## 2025-01-11 NOTE — DISCHARGE SUMMARY
Discharge Summary - Hospitalist   Name: Juan Luu 34 y.o. male I MRN: [id number]  Unit/Bed#: 5T DETOX 514-01 I Date of Admission: 1/8/2025   Date of Service: 1/11/2025 I Hospital Day: 3     Assessment & Plan  Alcohol withdrawal syndrome with complication (HCC)  Patient with a history of chronic daily alcohol use  Last drink today  Serum alcohol 367 in the ED  Received folic acid, thiamine, and Ativan in the ED PTA  Initiate SEWS protocol for medical management of alcohol withdrawal  Current alcohol withdrawal signs/symptoms include hallucinations, anxiety, tremors, nausea vomiting  SEWS score 13 upon admission  Administer 650 mg of phenobarbital as initial dose, total dosing 780, last dose 1/8  Continue monitoring under protocol and administer phenobarbital as indicated  Continuous pulse ox and telemetry monitoring   There was concern for possible hallucinations so high dose thiamine was initiated. He received 5 doses. Now continued on thiamine 100mg daily   Tolerated naltrexone 25mg. Will continue with naltrexone 50mg daily   Alcohol use disorder, severe, dependence (HCC)  Pt with a h/o chronic heavy alcohol use  Drinks 1.5 L of vodka daily  Withdrawal management as above  Continue thiamine, folic acid  Consult case management/CRS for assistance with aftercare resources  Unspecified mood (affective) disorder (HCC)  Patient reports poor compliance with outpatient gabapentin, BuSpar, Lexapro  We will continue medications and monitor closely  Alcoholic ketoacidosis  On admission glucose 87 with anion gap 1  UA negative for ketones   Will discontinue D5W as anion gap significantly improved, to continue to trend  Resolved   Transaminitis  Likely in setting of alcohol abuse   , ALT 16, alk phos 160, continue to improve  Continue to trend LFT  RUQ US: Mild hepatic steatosis  Recommend lifestyle modification and exercise.  Discussed importance of complete alcohol cessation.  Recommend outpatient  follow-up with PCP  Thrombocytopenia (HCC)  New finding   No evidence of bleeding   Likely in the setting of alcohol use disorder   Recommend outpatient follow up with PCP   Could consider RUQ US or CT abd to assess liver given thrombocytopenia and elevated LFTs  Left elbow pain  Complaining of L elbow pain  Obvious ecchymosis to the R tricep   Patient unaware if he had a fall   Will get XR of L elbow   Hallucinations  Was started on high dose thiamine 1/8 to cover for Warnicke's in the setting of confusion.  He received 5 doses of IV thiamine 500 mg which was discontinued as it was felt his confusion was more likely related to prior medication administration prior to conversation   Complaints of visual hallucinations again last night   Psych consulted  Patient not acutely psychotic. Possibly related to alcohol vs. Poor sleep   Resume current regimen of BuSpar 7.5mg 3 times daily, Remeron 15 mg nightly, and lexapro 10mg daily   Recommending outpatient follow up - ambulatory referral placed on discharge      Medical Problems       Resolved Problems  Date Reviewed: 11/14/2024   None       Discharging Physician / Practitioner: Viviana Wellington PA-C  PCP: No primary care provider on file.  Admission Date:   Admission Orders (From admission, onward)       Ordered        01/08/25 0837  INPATIENT ADMISSION  Once                          Discharge Date: 01/11/25    Consultations During Hospital Stay:  Medical toxicology    Procedures Performed:   None    Significant Findings / Test Results:   US right upper quadrant: 1/11/2025  Impression: Mild hepatic steatosis.     XR elbow 3+ vw left 1/10/2025  Impression: No acute osseous abnormality.     Incidental Findings:   None      Test Results Pending at Discharge (will require follow up):   None     Outpatient Tests Requested:  None    Complications:  None    Reason for Admission: Alcohol withdrawal     Hospital Course:   Juan Luu is a 34 y.o. male patient [facility]  PTSD, depression/anxiety, alcohol use disorder who originally presented to the hospital on 1/8/2025 requesting alcohol detox.  Patient was admitted to [facility] detox unit under the internal medicine team and evaluated by medical toxicology.  On arrival patient had elevated anion gap consistent with alcoholic ketoacidosis that improved with IV hydration.  Additionally found to have transaminitis that down trended however remained elevated on day of discharge.  Right upper quadrant ultrasound was ordered revealing mild hepatic steatosis.  An x-ray of his left shoulder was also taking during his stay due to a fall prior without any osseous abnormality.  On day of admission patient was started on IV thiamine 500 mg due to concern for confusion however confusion since subsided and was thought to be secondary to pheno and diazepam prior to evaluation.  He received 5 doses of high-dose IV thiamine.  Patient continued to report visual hallucinations with eyes closed.  Psychiatry was consulted and evaluated him.  At this time patient is not in acute psychosis and visual hallucinations thought to be maybe multifactorial in the setting of alcohol, poor sleep.  He was resumed on Remeron 12.5 mg nightly.  Patient is medically stable at this time for discharge.  An ambulatory referral has been placed for psychiatry who will arrange outpatient follow-up. He is recommended to get a CMP in 1 week to ensure complete resolutions of LFTs. His medications were refilled for 30 days on discharge. He has naltrexone at home which he will continue daily as this script was unable to be filled until Feb 1.    Please see above list of diagnoses and related plan for additional information.     Condition at Discharge: stable    Discharge Day Visit / Exam:   Subjective:  Seen and examined. No acute events overnight. Reports being restless and sweaty last night. Also states he had another hallucination - reports he is seeing people interacting  "on the wall when his eyes are closed.  Vitals: Blood Pressure: 130/68 (01/11/25 1452)  Pulse: 64 (01/11/25 1452)  Temperature: 97.7 °F (36.5 °C) (01/11/25 1452)  Temp Source: Temporal (01/11/25 1452)  Respirations: 18 (01/11/25 1452)  Height: 5' 11\" (180.3 cm) (01/08/25 1118)  Weight - Scale: 96 kg (211 lb 10.3 oz) (01/08/25 1118)  SpO2: 96 % (01/11/25 1452)  Physical Exam  Constitutional:       General: He is not in acute distress.     Appearance: He is not toxic-appearing.   HENT:      Head: Normocephalic and atraumatic.      Mouth/Throat:      Mouth: Mucous membranes are moist.   Eyes:      Conjunctiva/sclera: Conjunctivae normal.   Cardiovascular:      Heart sounds: Normal heart sounds.   Pulmonary:      Breath sounds: Normal breath sounds. No wheezing, rhonchi or rales.   Abdominal:      General: There is no distension.      Palpations: Abdomen is soft.      Tenderness: There is no abdominal tenderness.   Musculoskeletal:      Right lower leg: No edema.      Left lower leg: No edema.   Skin:     General: Skin is warm.   Neurological:      General: No focal deficit present.      Mental Status: Mental status is at baseline.      Sensory: No sensory deficit.      Motor: No weakness.        Discussion with Family:  detox unit, confidential encounter.     Discharge instructions/Information to patient and family:   See after visit summary for information provided to patient and family.      Provisions for Follow-Up Care:  See after visit summary for information related to follow-up care and any pertinent home health orders.      Mobility at time of Discharge:   Basic Mobility Inpatient Raw Score: 24  JH-HLM Goal: 8: Walk 250 feet or more  JH-HLM Achieved: 8: Walk 250 feet ot more  HLM Goal achieved. Continue to encourage appropriate mobility.     Disposition:   Home    Planned Readmission: None     Discharge Medications:  See after visit summary for reconciled discharge medications provided to patient and/or family.  "     Administrative Statements   Discharge Statement:  I have spent a total time of 42 minutes in caring for this patient on the day of the visit/encounter. >30 minutes of time was spent on: Diagnostic results, Risks and benefits of tx options, Patient and family education, Documenting in the medical record, Reviewing / ordering tests, medicine, procedures  , and Communicating with other healthcare professionals .    **Please Note: This note may have been constructed using a voice recognition system**

## 2025-01-11 NOTE — DISCHARGE INSTR - APPOINTMENTS
January 24, 2025  2:30 pm @ [facility], [address] [place] [address],  [place] [address].      Phone:  [phone]

## 2025-01-11 NOTE — ASSESSMENT & PLAN NOTE
Was started on high dose thiamine 1/8 to cover for Warnicke's in the setting of confusion.  He received 5 doses of IV thiamine 500 mg which was discontinued as it was felt his confusion was more likely related to prior medication administration prior to conversation   Complaints of visual hallucinations again last night   Psych consulted  Patient not acutely psychotic. Possibly related to alcohol vs. Poor sleep   Resume current regimen of BuSpar 7.5mg 3 times daily, Remeron 15 mg nightly, and lexapro 10mg daily   Recommending outpatient follow up - ambulatory referral placed on discharge

## 2025-01-11 NOTE — ASSESSMENT & PLAN NOTE
Likely in setting of alcohol abuse   , ALT 16, alk phos 160, continue to improve  Continue to trend LFT  RUQ US: Mild hepatic steatosis  Recommend lifestyle modification and exercise.  Discussed importance of complete alcohol cessation.  Recommend outpatient follow-up with PCP   99.6

## 2025-01-11 NOTE — SOCIAL WORK
5/6 of his prescriptions were given to him upon discharge  He is not eligible for the naltrexone until Feb 1, per his insurance. (Per pharmacy staff)

## 2025-01-11 NOTE — ASSESSMENT & PLAN NOTE
SLEEP STUDY REPORT    PATIENT NAME:  JARROD SHAIHK        MR#: [id number]    ACCT#:  [id number]    STUDY DATE:  08/14/2019    REFERRING PHYSICIAN:  MARICEL MOARLES DO    OBJECT:  Polysomnogram, preceding multiple sleep latency testing, of a 37-year-old man with hypersomnia.  Body mass index 25, [place] Sleepiness Score 4.    CNS ACTIVE MEDICATIONS:  None.    TEST RESULTS:  Sleep architecture was remarkable for a short sleep onset latency of 1 minute, a prolonged REM latency of 124 minutes, an increased number of nonspecific arousals with characteristically brief awakenings resulting in wakefulness after sleep onset of 15 minutes, relatively normal stage proportions (17% REM for total REM of 75 minutes), with a sleep efficiency of 96%.     The patient slept in supine and right lateral positions, achieving REM sleep in either.  Awake baseline SaO2 was 98%, averaging 97% and remaining above 94%.  The patient exhibited soft snoring without respiratory events.     The cardiac rhythm was sinus, 40-88, averaging 48 beats per minute without frequent ectopy.  Periodic limb movements are not observed.  Video and EEG portions of recording were unremarkable.    IMPRESSION:  Hypersomnia.    COMMENTS AND RECOMMENDATIONS:  This is an essentially normal nocturnal polysomnogram, adequate for interpretation of multiple sleep latency testing.    ICD-10 CODE:       _______________________________________  Wayne Rubinstein, M.D.                                     JARROD SHAIKH                                     ANA M: SLP                                   RM#:     DCdate: 08/14/2                                   019    DD: 08/17/2019 DT: 08/17/2019 TD: 14:25 TT: 21:58 K#: [id number]/[id number]       Pt with a h/o chronic heavy alcohol use  Drinks 1.5 L of vodka daily  Withdrawal management as above  Continue thiamine, folic acid  Consult case management/CRS for assistance with aftercare resources

## 2025-01-11 NOTE — CONSULTS
PSYCHIATRY CONSULTATION NOTE    Juan Luu 34 y.o. male MRN: [id number]  Unit/Bed#: 5T [facility] 514-01 Encounter: [id number]     Assessment & Plan     Assessment & Plan  Unspecified mood (affective) disorder (HCC)  Patient has a history of unspecified mood disorder  Patient reports that it has been hard to obtain refills and consistent/reliable outpatient medication management.  Recently patient has been getting his refills from an online provider.  PTA medications include: Lexapro 10 mg once daily for mood; mirtazapine 15 mg nightly for sleep and mood  Patient also reports poor compliance with Lexapro at times because he is concerned with the long-term effects of the medication  In the patient's current setting of recent alcohol use and withdrawal, it is difficult to discern whether or not patient's mood related symptoms are secondary to alcohol versus a primary mood disorder  Denies SI/HI at the time of today's evaluation    PLAN:  Encouraged the patient to continue with his Lexapro 10 mg once daily and mirtazapine 15 mg QHS   Provided medication psycho education  At this time patient does not meet criteria for psychiatric admission.  The patient is cleared for discharge from a psychiatric standpoint  Recommend patient establish care with outpatient therapist and psychiatric provider for better medication management and establishment of coping skills  Referrals for outpatient psychiatric medication management and therapist were placed by [facility]    Anxiety disorder, unspecified  Patient has a history of anxiety  On evaluation today patient reports that he has dealt with chronic anxiety for many years.   Patient's prior to admission medications included BuSpar 7.5 mg 3 times daily for anxiety, gabapentin 300 mg PRN every 8 hours for anxiety, propranolol 40 mg 3 times daily for anxiety.  These medications were continued by [facility] upon admission to [facility]  Based on the record review it is unclear which of  "these medications have actually been the most effective for the patient.  Patient states that he has been getting these medications outpatient from an online provider.  In the patient's current setting of recent alcohol use and withdrawal, it is difficult to discern whether or not patient's anxiety related symptoms are secondary to alcohol use/withdrawal versus a primary anxiety disorder    PLAN:  Continue current regimen at this time   Recommend patient establish care with outpatient therapist and psychiatric provider for better medication management and establishment of coping skills  Referrals for outpatient psychiatric medication management and therapist were placed by [facility]  Hallucinations  Psychiatry was consulted for concern of visual hallucinations.  The primary team was concerned that the patient's hallucinations might be outside of alcohol withdrawal  On evaluation today patient reports seeing \"shapes\"' both when his eyes are open and closed especially in the evening  Patient also reports that a few days ago he was experiencing visual hallucinations in the form of people.No auditory hallucinations were experienced.  Denies other psychotic symptoms.  Expresses insight into the fact that his visual hallucinations are not real.  Patient was recently treated for alcohol withdrawal at the time that these hallucinations were occurring  Patient also reports that he has not been sleeping well while in the hospital.  Patient states that he has never had hallucinations outside of alcohol withdrawal or alcohol intoxication  At this time, these hallucinations appear to be a factor of the patient's recent alcohol withdrawal and possibly decreased sleep while in the hospital.  These hallucinations do not appear to be a primary psychiatric disorder    PLAN:   Patient does not require psychiatric admission at this time  Because these hallucinations do not appear to be a primary psychiatric disorder, no medication additions " "are recommended at this time  Patient is cleared for discharge from a psychiatric standpoint  Alcohol withdrawal syndrome with complication (HCC)  Patient has a history of alcohol use disorder   Patient has had multiple detox admissions in the past  Patient has been on propranolol for anxiety in the past was admitted to [facility] on 1/8/2025 for alcohol withdrawal with phenobarbital on a SEWS protocol    PLAN:  Per Detox/ [facility]  Alcohol use disorder, severe, dependence (HCC)  See \"alcohol withdrawal\" above         History of Present Illness      Inpatient consult to Psychiatry  Consult performed by: Geneveive Smith DO  Consult ordered by: Viviana Wellington PA-C        Physician Requesting Consult: Lori Jensen MD  Principal Problem:Alcohol withdrawal syndrome with complication (HCC)    Reason for Consult: Hallucinations    Chief Complaint: \"I have visual hallucinations sometimes\"    Patient is a 34 y.o. male with a history of alcohol use disorder, unspecified mood disorder, ADHD, anxiety who  was admitted to the  detox medical service on 1/8/2025 due to alcohol withdrawal. Psychiatric consultation was requested on the patient's last day of admission due to concern of visual hallucinations despite being done with alcohol detox management.     Regarding the patient's visual hallucinations, he states his first VH was last time when here in 11/2024, saw silouettes of people interacting at that time.  Most recently, he started seeing black spots and shapes, which started before he got into the hospital, but when he came to  detox this past week they amplified when in the midst of withdrawal. Patient reports that he has never had silhouettes or visual hallucinations in general when sober or  outside of being in withdrawal.  His 2nd night in the detox unit, he states he closed his eyes, saw \"people on the wall\". These \"people\" have not reappeared since the second day on detox.  However, patient began " "seeing \"shapes\" in the evening.  Patient states that he has not been sleeping well since arriving to detox until he was restarted on his home Remeron 15 mg last night which significantly helped with his sleep.  The VH have never spoke to him.  No personal history of psychosis symptoms in his life  He currently doesn't feel these visual hallucinations are affecting his ability to live normally.    Patient reports that he has been experiencing the other psychiatric symptoms prior to today's evaluation: Poor sleep (improved with Remeron), chronic concentration difficulties, decreased memory in the setting of alcohol use, chronic anxiety.  See ROS below for pertinent negatives.      Patient is interested in establishing care with an outpatient therapist and psychiatric provider. Patient reports that his medications have been working recently.  He says that his lexapro is effective but reports occasional poor compliance secondary to concern for long-term effects.  This writer provided psychoeducation regarding Lexapro as a safe and effective medication.  Patient appeared receptive to this education.      Psychiatric Review of Systems     Mood: Denies any low mood or concerns.  Sleep: poor sleep, uses sleep aid to help (z-quil), poor sleep while admitted to the hospital.  Home Remeron was restarted in the hospital last night which he reports helped with sleep   Loss of Interest/Anhedonia: no, states just got drunk and watch movies  Low energy/anergy: up and down, usually good energy  Poor Concentration/focus: yes, he states due to ADHD hx  Memory: Yes, decreased he states especially with drinking.  Appetite changes: Denies  Weight changes: Denies  Somatic symptoms: no  Anxiety: Yes, always around, mild/moderate  Panic attacks: yes in the past, rare at this time  Mariaelena: no  Paranoia: no  Delusions: no  SI: no  HI: no  AH: no  VH: yes, on/off in the setting of alcohol use and alcohol withdrawal, see hpi for more " "details  Self Injurious behavior: no  Eating Disorder Hx: no      Historical Information      Past Psychiatric History:   Past Inpatient Psychiatric management:   No history of past inpatient psychiatric admissions  Past Outpatient Psychiatric management:   No history of past outpatient psychiatric treatment in recent years.  Had psychiatrist when younger who prescribed ADHD meds.  Past Medication trials:   gabapentin, buspar, lexapro, remeron, hydroxyzine, propranolol  Past Suicide attempts:   no  History of non-suicidal self injury:   denies  Past Violent behavior:   denies    Substance Abuse History:  I have assessed this patient for substance use within the past 12 months   Alcohol use: Yes, extensive hx of chronic heavy alcohol use, hx of rehabs and detoxes.   - Currently in detox after drinking 1.5 L of vodka daily  - Per chart review: \". Pt endorses daily ETOH use to include 1/5 vodka daily, last use: date of admission. Age of first use: 14 yrs old, longest sobriety: 2.5 yrs. Heavy use started approx 8-9 mons ago. Pt admits previous naltrexone use - prescribed by ED - no follow up. Pt reports history of withdrawal symptoms to include vomiting, anxious, sweats, shakes\"  Recreational drug use:   Cocaine:  denies use  Heroin:  denies use  Marijuana:  denies use  Other drugs: denies use  History of Inpatient/Outpatient rehabilitation program: yes rehabs and detoxes  Smoking history: denies use      Family Psychiatric History:   Psychiatric Illness: unknown  Substance Abuse: He states \"alcoholism in half brother\"  Suicide Attempts: unknown    Social History:  Education: some college  Learning Disabilities:  no  Marital history: single  Children: none  Living Arrangement: lives alone in a house   Access to firearms/weapons: denies  Occupational History: .   Functioning Relationships: good support system, sister lives nearby, supportive  Legal History: yes several DUIs      Traumatic History: " "  Abuse:  denies  Other Traumatic Events: denies  PTSD-related symptoms: Per record review patient has a history of PTSD but when this writer asked the patient about that diagnosis he was unclear as to why this was in his record    Past Medical History:   Diagnosis Date    Alcohol use disorder     Alcohol withdrawal syndrome (HCC)     Anxiety     Depression     PTSD (post-traumatic stress disorder)     Tachycardia      History of Seizures: per chart review, no hx of seizures. Denies hx of withdrawal seizures  History of Head injury with loss of consciousness: no    Meds/Allergies   all current active meds have been reviewed  No Known Allergies      Objective      Vital signs in last 24 hours:  Temp:  [97.2 °F (36.2 °C)-97.7 °F (36.5 °C)] 97.7 °F (36.5 °C)  HR:  [54-64] 64  BP: ()/(50-85) 130/68  Resp:  [18] 18  SpO2:  [95 %-99 %] 96 %  O2 Device: None (Room air)  No intake or output data in the 24 hours ending 01/11/25 1504    Mental Status Evaluation:  Appearance:  adequate grooming, dressed in hospital attire, bearded, tattooed   Behavior:  pleasant, cooperative, calm   Speech:  normal rate, normal volume, normal pitch, fluent, clear   Mood:  \"good\"   Affect:  Reactive   Thought Process:  organized, logical, goal directed   Associations: intact associations   Thought Content:  normal, no overt delusions   Perceptual Disturbances: Denied AVH at the time of interview, recent visual hallucinations in the form of shapes last seen last night, does not appear to be responding to internal stimuli at the time of interview   Risk Potential: Suicidal ideation - None  Homicidal ideation - None  Potential for aggression - No   Sensorium:  oriented to person, place, and time/date   Memory:  recent and remote memory grossly intact   Consciousness:  alert and awake   Attention/Concentration: attention span and concentration are age appropriate   Intellect: within normal limits   Fund of Knowledge: awareness of current " events: yes   Insight:  fair   Judgment: fair   Gait/Station: normal gait/station, normal balance   Motor Activity: no abnormal movements     ACTIVE MEDICATIONS     Current Medications:  Current Facility-Administered Medications   Medication Dose Route Frequency Provider Last Rate    acetaminophen  650 mg Oral Q8H PRN Viviana Wellington PA-C      busPIRone  7.5 mg Oral TID Fletcher Silva PA-C      escitalopram  10 mg Oral Daily Fletcher Silva PA-C      folic acid  1 mg Oral Daily Viviana Wellington PA-C      gabapentin  300 mg Oral TID PRN Fletcher Silva PA-C      mirtazapine  15 mg Oral HS RYAN Lyons      naltrexone  50 mg Oral Daily Viviana Wellington PA-C      propranolol  40 mg Oral TID Fletcher Silva PA-C      thiamine  100 mg Oral Daily Viviana Wellington PA-C         Behavioral Health Medications: I have personally reviewed all current active medications.      ADDITIONAL DATA     Lab Results: CBC/BMP:   .     01/11/25  0430   SODIUM 137   K 3.8      CO2 24   BUN 13   CREATININE 0.93   GLUC 97    , Creatinine Clearance: Estimated Creatinine Clearance: 132.3 mL/min (by C-G formula based on SCr of 0.93 mg/dL)., LFTs:   .     01/11/25 0430   *   *   ALB 4.3   TBILI 0.51   ALKPHOS 119*    .     Most Recent Labs:   Lab Results   Component Value Date    WBC 7.08 01/10/2025    RBC 4.46 01/10/2025    HGB 13.3 01/10/2025    HCT 40.2 01/10/2025     (L) 01/10/2025    RDW 14.8 01/10/2025    NEUTROABS 3.99 01/09/2025    SODIUM 137 01/11/2025    K 3.8 01/11/2025     01/11/2025    CO2 24 01/11/2025    BUN 13 01/11/2025    CREATININE 0.93 01/11/2025    GLUC 97 01/11/2025    GLUF 181 (H) 01/07/2025    CALCIUM 9.3 01/11/2025     (H) 01/11/2025     (H) 01/11/2025    ALKPHOS 119 (H) 01/11/2025    TP 7.1 01/11/2025    ALB 4.3 01/11/2025    TBILI 0.51 01/11/2025       Imaging Studies: US right upper quadrant  Result Date: 1/11/2025  Narrative:  RIGHT UPPER QUADRANT ULTRASOUND INDICATION: transaminitis. COMPARISON: None TECHNIQUE: Real-time ultrasound of the right upper quadrant was performed with a curvilIt wasinear transducer with both volumetric sweeps and still imaging techniques. FINDINGS: PANCREAS: Mostly obscured by bowel gas. Visualized portions of the pancreas are unremarkable. AORTA AND IVC: Visualized portions are normal for patient age. Portions obscured by shadowing from bowel gas. LIVER: Size: Within normal range. The liver measures 14.4 cm in the midclavicular line. Contour: Surface contour is smooth. Parenchyma: There is mild diffuse increased echogenicity with smooth echotexture, without significant beam attenuation or loss of periportal echogenicity. Most consistent with mild hepatic steatosis. No liver mass identified. Limited imaging of the main portal vein shows it to be patent and hepatopetal. BILIARY: The gallbladder is normal in caliber. No wall thickening or pericholecystic fluid. No stones or sludge identified. No sonographic Claire's sign. No intrahepatic biliary dilatation. CBD measures 2.0 mm. No choledocholithiasis. KIDNEY: Right kidney measures 11.9 x 5.3 x 5.0 cm. Volume 165.7 mL Kidney within normal limits. ASCITES: None.     Impression: Mild hepatic steatosis. Resident: Trey Montilla I, the attending radiologist, have reviewed the images and agree with the final report above. Workstation performed: [id number]     XR elbow 3+ vw left  Result Date: 1/10/2025  Narrative: XR ELBOW 3+ VW LEFT INDICATION: elbow pain/bruising. COMPARISON: None FINDINGS: No acute fracture or dislocation. No joint effusion. No significant degenerative changes. No lytic or blastic osseous lesion. Unremarkable soft tissues.     Impression: No acute osseous abnormality. Computerized Assisted Algorithm (CAA) may have been used to analyze all applicable images. Workstation performed: [id number]     EKG/Pathology/Other Studies:   Lab Results   Component  Value Date    VENTRATE 115 01/07/2025    ATRIALRATE 115 01/07/2025    PRINT 134 01/07/2025    QRSDINT 92 01/07/2025    QTINT 308 01/07/2025    QTCINT 426 01/07/2025    PAXIS 65 01/07/2025    QRSAXIS 80 01/07/2025    TWAVEAXIS 34 01/07/2025        Code Status: Level 1 - Full Code        This note was not shared with the patient due to reasonable likelihood of causing patient harm      This note has been constructed in part using a voice recognition system.   There may be translation, syntax,  or grammatical errors. If you have any questions, please contact the dictating provider.    Genevieve Smith DO  Department of Psychiatry and Behavioral Health  [facility]

## 2025-01-11 NOTE — SOCIAL WORK
CM saw him at 10 am to ascertain if he needs a ride upon discharge and to see where he will get his medicines upon discharge.  He expressed needing a ride.

## 2025-01-11 NOTE — UTILIZATION REVIEW
"Continued Stay Review    Date: 01/11/2025                          Current Patient Class: Inpatient  Current Level of Care: Med Surg  Detox Unit    HPI:34 y.o. male initially admitted on 01/08/2025     Assessment/Plan: Alcohol withdrawal syndrome with complications.    Tearful on exam regarding his dispo plans. States he wants to go to rehab but is unsure if he will be able to go directly there as he has to be out of his apartment by Feb 1st. Additionally states he lost his cellphone and his wallet. Reports he hasn't eaten in 2 days. States he \"feels like crap.\" Otherwise no acute complaints.       Medications:   Scheduled Medications:  busPIRone, 7.5 mg, Oral, TID  escitalopram, 10 mg, Oral, Daily  folic acid, 1 mg, Oral, Daily  mirtazapine, 15 mg, Oral, HS  naltrexone, 50 mg, Oral, Daily  propranolol, 40 mg, Oral, TID  [Held by provider] thiamine, 500 mg, Intravenous, Q8H CANDICE  thiamine, 100 mg, Oral, Daily      Continuous IV Infusions:     PRN Meds:  acetaminophen, 650 mg, Oral, Q8H PRN  gabapentin, 300 mg, Oral, TID PRN      Discharge Plan:   Pt to discharge today. Pt denies any withdrawal symptoms at this time. Pt to discharge to home and a Lyft will pick him up upon discharge. Pt to follow up with appointment at 2:30pm January 24, 2025 at [facility] in [place], [place] .     Vital Signs (last 3 days)       Date/Time Temp Pulse Resp BP MAP (mmHg) SpO2 O2 Device Patient Position - Orthostatic VS Kee Coma Scale Score CIWA-Ar Total SEWS Total Score Pain    01/11/25 0849 97.6 °F (36.4 °C) 60 18 96/50 -- 96 % None (Room air) Lying -- -- -- No Pain    01/10/25 2232 97.3 °F (36.3 °C) 54 18 127/76 93 95 % None (Room air) Lying -- -- -- --    01/10/25 1944 97.2 °F (36.2 °C) 56 18 150/85 106 99 % None (Room air) Lying 15 -- -- No Pain    01/10/25 1455 97.7 °F (36.5 °C) 60 18 126/70 -- 98 % None (Room air) Lying -- -- -- 6    01/10/25 1304 -- -- -- -- -- -- -- -- -- -- -- 8    01/10/25 0724 97.3 °F (36.3 °C) 75 18 " 124/79 -- 96 % None (Room air) Lying -- -- -- No Pain    01/10/25 0400 97.3 °F (36.3 °C) 61 17 128/57 -- -- -- -- -- -- -- --    01/09/25 1940 -- -- -- -- -- -- -- -- 15 -- -- No Pain    01/09/25 1615 97.3 °F (36.3 °C) 55 16 129/82 -- 95 % None (Room air) Lying -- -- -- --    01/09/25 1114 -- 65 18 144/90 108 97 % None (Room air) Lying -- -- -- --    01/09/25 0806 -- -- -- -- -- -- -- -- 15 -- 0 No Pain    01/09/25 0722 97.7 °F (36.5 °C) 58 18 129/76 93 97 % None (Room air) Lying -- -- -- --    01/09/25 0452 97.2 °F (36.2 °C) 61 17 130/89 102 97 % None (Room air) Lying -- -- -- --    01/09/25 0100 -- -- -- -- -- -- -- -- 15 -- 0 --    01/08/25 2336 97.7 °F (36.5 °C) 64 18 121/87 98 96 % None (Room air) Lying -- -- -- --    01/08/25 2300 -- -- -- -- -- -- -- -- -- -- 0 --    01/08/25 2100 -- -- -- -- -- 95 % None (Room air) -- -- -- 0 No Pain    01/08/25 1949 97.5 °F (36.4 °C) 75 18 121/89 99 95 % None (Room air) Lying -- -- -- --    01/08/25 1743 -- -- -- -- -- -- -- -- -- -- 8 --    01/08/25 1739 97.8 °F (36.6 °C) 72 16 131/90 103 94 % None (Room air) Lying -- -- -- 8    01/08/25 1619 -- -- -- -- -- -- -- -- -- -- 0 --    01/08/25 1618 97.2 °F (36.2 °C) 72 18 111/82 91 -- -- Lying -- -- -- 10 - Worst Possible Pain    01/08/25 1509 -- -- -- -- -- -- -- -- -- -- 6 --    01/08/25 1507 97.6 °F (36.4 °C) 81 18 123/83 96 -- -- Lying -- -- -- 8    01/08/25 1358 -- -- -- -- -- -- -- -- -- -- 5 --    01/08/25 1118 -- -- -- -- -- -- -- -- -- -- -- 10 - Worst Possible Pain    01/08/25 1115 -- -- -- -- -- -- -- -- -- -- 16 --    01/08/25 1110 97.5 °F (36.4 °C) 101 18 140/84 102 95 % None (Room air) Lying -- -- -- --    01/08/25 1059 -- -- -- -- -- -- -- -- -- 7 -- --    01/08/25 1030 -- 97 18 144/88 -- 97 % None (Room air) Lying -- -- -- --    01/08/25 1000 -- 102 18 138/83 -- 96 % None (Room air) Lying -- -- -- --    01/08/25 0901 -- 87 18 144/87 -- 96 % None (Room air) Lying -- 7 -- No Pain    01/08/25 0830 -- 92 18 148/92  -- 97 % None (Room air) Lying -- -- -- 5    01/08/25 0820 -- -- -- -- -- 95 % None (Room air) -- -- -- -- --    01/08/25 0800 -- 114 18 166/89 -- 94 % None (Room air) Lying -- 24 -- --    01/08/25 0659 -- 97 18 135/85 -- 96 % None (Room air) Lying -- -- -- 5    01/08/25 0630 -- 96 18 135/85 -- 97 % None (Room air) Lying -- 21 -- --    01/08/25 0605 -- -- -- -- -- -- None (Room air) -- -- -- -- --    01/08/25 0604 -- -- -- -- -- -- -- -- 15 -- -- --    01/08/25 0600 -- 93 18 140/92 -- 96 % None (Room air) Lying -- -- -- --    01/08/25 0530 -- 111 -- 163/94 -- -- -- -- -- 37 -- --    01/08/25 0525 -- 111 -- 163/94 -- -- -- -- -- 37 -- --    01/08/25 0514 98.1 °F (36.7 °C) 111 20 163/94 -- 93 % None (Room air) Sitting -- -- -- --          Weight (last 2 days)       None            Pertinent Labs/Diagnostic Results:   Radiology:  US right upper quadrant   Final Interpretation by Ligia Robin MD (01/11 1147)      Mild hepatic steatosis.      Resident: Trey Montilla I, the attending radiologist, have reviewed the images and agree with the final report above.      Workstation performed: [id number]         XR elbow 3+ vw left   Final Interpretation by James Lowe MD (01/10 9355)      No acute osseous abnormality.         Computerized Assisted Algorithm (CAA) may have been used to analyze all applicable images.         Workstation performed: [id number]           Cardiology:  No orders to display     GI:  No orders to display     Results from last 7 days   Lab Units 01/10/25  0749 01/09/25  0456 01/08/25  0545 01/07/25  0233   WBC Thousand/uL 7.08 6.22 7.42 7.88   HEMOGLOBIN g/dL 13.3 12.8 13.8 15.3   HEMATOCRIT % 40.2 39.3 42.2 45.3   PLATELETS Thousands/uL 120* 125* 161 200   TOTAL NEUT ABS Thousands/µL  --  3.99 4.69 4.33     Results from last 7 days   Lab Units 01/11/25  0430 01/09/25  0456 01/08/25  0527 01/07/25  0735 01/07/25  0233   SODIUM mmol/L 137 138 141 137 144   POTASSIUM mmol/L 3.8 3.9 4.4 3.9 4.0    CHLORIDE mmol/L 103 104 101 100 102   CO2 mmol/L 24 24 22 23 22   ANION GAP mmol/L 10 10 18* 14* 20*   BUN mg/dL 13 11 10 12 12   CREATININE mg/dL 0.93 0.84 0.92 0.87 0.99   EGFR ml/min/1.73sq m 106 114 108 112 98   CALCIUM mg/dL 9.3 8.6 8.9 7.6* 8.5   MAGNESIUM mg/dL  --  2.0 2.2  --  2.3     Results from last 7 days   Lab Units 01/11/25  0430 01/09/25  0456 01/08/25  0527 01/07/25  0735 01/07/25  0233   AST U/L 138* 100* 202* 245* 305*   ALT U/L 172* 160* 245* 261* 305*   ALK PHOS U/L 119* 102 118* 106* 114*   TOTAL PROTEIN g/dL 7.1 6.2* 7.7 6.7 7.8   ALBUMIN g/dL 4.3 4.0 4.9 4.2 5.0   TOTAL BILIRUBIN mg/dL 0.51 0.96 0.49 0.31 0.32     Results from last 7 days   Lab Units 01/11/25  0430 01/09/25  0456 01/08/25  0527 01/07/25  0735 01/07/25  0233   GLUCOSE RANDOM mg/dL 97 103 87 181* 87     Beta- Hydroxybutyrate   Date Value Ref Range Status   01/07/2025 0.34 (H) 0.02 - 0.27 mmol/L Final      Results from last 7 days   Lab Units 01/07/25  0233   LIPASE u/L 29     Results from last 7 days   Lab Units 01/09/25  1042   CLARITY UA  Slightly Cloudy*   COLOR UA  Griselda*   SPEC GRAV UA  1.015   PH UA  8.0   GLUCOSE UA mg/dl Negative   KETONES UA mg/dl Negative   BLOOD UA  Negative   PROTEIN UA mg/dl 15 (Trace)*   NITRITE UA  Negative   BILIRUBIN UA  Negative   UROBILINOGEN UA mg/dL Negative   LEUKOCYTES UA  Negative   WBC UA /hpf None Seen   RBC UA /hpf None Seen   BACTERIA UA /hpf Occasional   EPITHELIAL CELLS WET PREP /hpf Occasional   MUCUS THREADS  Occasional*     Results from last 7 days   Lab Units 01/09/25  1042   AMPH/METH  Negative   BARBITURATE UR  Positive*   BENZODIAZEPINE UR  Positive*   COCAINE UR  Negative   METHADONE URINE  Negative   OPIATE UR  Negative   PCP UR  Negative   THC UR  Negative     Results from last 7 days   Lab Units 01/08/25  0529 01/07/25  0233   ETHANOL LVL mg/dL 367* 426*     Network Utilization Review Department  ATTENTION: Please call with any questions or concerns to [phone] and  carefully listen to the prompts so that you are directed to the right person. All voicemails are confidential.   For Discharge needs, contact Care Management DC Support Team at [phone] opt. 2  Send all requests for admission clinical reviews, approved or denied determinations and any other requests to dedicated fax number below belonging to the campus where the patient is receiving treatment. List of dedicated fax numbers for the Facilities:  FACILITY NAME UR FAX NUMBER   ADMISSION DENIALS (Administrative/Medical Necessity) [phone]   DISCHARGE SUPPORT TEAM (NETWORK) [phone]   PARENT CHILD HEALTH (Maternity/NICU/Pediatrics) [phone]   General acute hospital [phone]   [facility] [phone]   [facility] [phone]   [facility] [phone]   [facility] [phone]   [facility] [phone]   [facility] [phone]   [facility] [phone]   [facility] [phone]   [facility] [phone]   [facility] [phone]   [facility] [phone]

## 2025-01-11 NOTE — SOCIAL WORK
Pt to discharge today. Pt denies any withdrawal symptoms at this time. Pt to discharge to home and a Lyft will pick him up upon discharge. Pt to follow up with appointment at 2:30pm January 24, 2025 at [facility] in [place], [place] .     Discharge Address: [address] [place] [address]  ([address])  Phone Number:[phone] ()

## 2025-01-11 NOTE — ASSESSMENT & PLAN NOTE
On admission glucose 87 with anion gap 1  UA negative for ketones   Will discontinue D5W as anion gap significantly improved, to continue to trend  Resolved

## 2025-01-12 NOTE — ASSESSMENT & PLAN NOTE
Patient has a history of unspecified mood disorder  Patient reports that it has been hard to obtain refills and consistent/reliable outpatient medication management.  Recently patient has been getting his refills from an online provider.  PTA medications include: Lexapro 10 mg once daily for mood; mirtazapine 15 mg nightly for sleep and mood  Patient also reports poor compliance with Lexapro at times because he is concerned with the long-term effects of the medication  In the patient's current setting of recent alcohol use and withdrawal, it is difficult to discern whether or not patient's mood related symptoms are secondary to alcohol versus a primary mood disorder  Denies SI/HI at the time of today's evaluation    PLAN:  Encouraged the patient to continue with his Lexapro 10 mg once daily and mirtazapine 15 mg QHS   Provided medication psycho education  At this time patient does not meet criteria for psychiatric admission.  The patient is cleared for discharge from a psychiatric standpoint  Recommend patient establish care with outpatient therapist and psychiatric provider for better medication management and establishment of coping skills  Referrals for outpatient psychiatric medication management and therapist were placed by [facility]

## 2025-01-12 NOTE — ASSESSMENT & PLAN NOTE
"Psychiatry was consulted for concern of visual hallucinations.  The primary team was concerned that the patient's hallucinations might be outside of alcohol withdrawal  On evaluation today patient reports seeing \"shapes\"' both when his eyes are open and closed especially in the evening  Patient also reports that a few days ago he was experiencing visual hallucinations in the form of people.No auditory hallucinations were experienced.  Denies other psychotic symptoms.  Expresses insight into the fact that his visual hallucinations are not real.  Patient was recently treated for alcohol withdrawal at the time that these hallucinations were occurring  Patient also reports that he has not been sleeping well while in the hospital.  Patient states that he has never had hallucinations outside of alcohol withdrawal or alcohol intoxication  At this time, these hallucinations appear to be a factor of the patient's recent alcohol withdrawal and possibly decreased sleep while in the hospital.  These hallucinations do not appear to be a primary psychiatric disorder    PLAN:   Patient does not require psychiatric admission at this time  Because these hallucinations do not appear to be a primary psychiatric disorder, no medication additions are recommended at this time  Patient is cleared for discharge from a psychiatric standpoint  "

## 2025-01-12 NOTE — UTILIZATION REVIEW
NOTIFICATION OF INPATIENT MEDICAL ADMISSION   AUTHORIZATION REQUEST   SERVICING FACILITY:   [address] [address]  Tax ID: [id number]  NPI: [id number] ATTENDING PROVIDER:  Attending Name and NPI#: Lori Jensen Md [[id number]]  Address: [address] [place] [address]  Phone: [phone]     ADMISSION INFORMATION:  Place of Service: Inpatient Acute [facility]  Place of Service Code: 21  Inpatient Admission Date/Time: 1/8/25  8:37 AM  Discharge Date/Time: 1/11/2025  5:05 PM  Admitting Diagnosis Code/Description:  Alcohol withdrawal (HCC) [F10.939]  Alcohol intoxication (HCC) [F10.929]  Delirium tremens (HCC) [F10.931]  Alcohol abuse [F10.10]  Transaminitis [R74.01]  Alcohol withdrawal syndrome with complication (HCC) [F10.939]  Encounter for assessment of alcohol and drug use [Z76.89]     UTILIZATION REVIEW CONTACT:  Lupe Klein, Utilization   Network Utilization Review Department  Phone: [phone]  Fax [phone]  Email: [email or web address]  Contact for approvals/pending authorizations, clinical reviews, and discharge.     PHYSICIAN ADVISORY SERVICES:  Medical Necessity Denial & Npje-am-Hqvc Review  Phone: [phone]  Fax: [phone]  Email: [email or web address]     DISCHARGE SUPPORT TEAM:  For Patients Discharge Needs & Updates  Phone: [phone] opt. 2 Fax: [phone]  Email: [email or web address]

## 2025-01-12 NOTE — ASSESSMENT & PLAN NOTE
Patient has a history of alcohol use disorder   Patient has had multiple detox admissions in the past  Patient has been on propranolol for anxiety in the past was admitted to [facility] on 1/8/2025 for alcohol withdrawal with phenobarbital on a [facility] protocol    PLAN:  Per Detox/ [facility]

## 2025-01-13 ENCOUNTER — TELEPHONE (OUTPATIENT)
Age: 35
End: 2025-01-13

## 2025-01-13 NOTE — UTILIZATION REVIEW
Ava Alberts RN   Utilization Review  Specialty: Utilization Review     Utilization Review      Signed     Date of Service: 1/9/2025 10:58 AM     Signed       Expand All Collapse All    Initial Clinical Review     Pt presented to [facility] for its Level IV medically managed intensive inpatient detox unit.     Admission: Date/Time/Statement:   Admission Orders (From admission, onward)          Ordered         01/08/25 0837   INPATIENT ADMISSION  Once                                     Orders Placed This Encounter   Procedures    INPATIENT ADMISSION       Standing Status:   Standing       Number of Occurrences:   1       Level of Care:   Med Surg [16]                Detox Unit       Bed request comments:   Detox Unit       Estimated length of stay:   More than 2 Midnights       Certification:   I certify that inpatient services are medically necessary for this patient for a duration of greater than two midnights. See H&P and MD Progress Notes for additional information about the patient's course of treatment.      ED Arrival Information         Expected   -    Arrival   1/8/2025 05:01    Acuity   Emergent                 Means of arrival   Walk-In    Escorted by   Self    Service   Hospitalist    Admission type   Emergency                 Arrival complaint   detox eval/mental health                     Chief Complaint   Patient presents with    Detox Evaluation       Pt requesting detox services for etoh, reports last drink 14 hours ago and cannot identify quantity. Pt C/o tremors, blurry vision, n/v, visual hallucinations of colors, and acting confused reporting that he received no treatment and saw nobody at his previous visit at [facility] ED yesterday which is why he left his room prior to transfer to detox.     Delirium Tremens (DTS)         Initial Presentation: 34 y.o. male who presented to [facility]. Patient initially presented to the [facility] ED requesting alcohol detox with  sx of hallucinations, tremor, nausea and vomiting and was subsequently transferred to the [facility] medical detox unit for medical management of alcohol withdrawal. He then left AMA prior to transfer.  Inpatient admission for evaluation and treatment of mood disorder & alcohol withdrawal syndrome. Presented w/ hallucinations, nausea, vomiting, sweating, anxiety . Exam: chills, diaphoresis, vomiting, tremors, weakness. Plan: IVF, telemetry, continuous pulse ox, continue PTA meds, trend labs, replete electrolytes as needed; I&O, fall & seizure precautions. Toxicology consulted.      Anticipated Length of Stay: Patient will be admitted on an inpatient basis with an anticipated length of stay of greater than 2 midnights secondary to severe alcohol withdrawal and alcoholic ketoacidosis.      Toxicology: Presented w/ need for detox from alcohol. Serum ETOH: 367. Reports 1.5 L of vodka daily, last drink on 1/8. Has prior rehab treatment for withdrawal. Reports no hx of withdrawal seizures. On exam, protecting airway, but fifficult to arouse. Able to follow simple commands. . SEWS 13. Plan: SEWS monitoring w/ phenobarbital management, IV thiamine/folic acid supplement.         Date: 1/9       Day 2: Pt reports feeling relatively well, but has significant diarrhea.  On exam, he is not in acute distress. SEWS 0. Plan: continue SEWS monitoring w/ phenobarbital management, IV thiamine/folic acid supplement, telemetry, continuous pulse ox, continue current meds, trend labs, replete electrolytes as needed. Received 780 mg phenobarbital since admission.      ED Treatment-Medication Administration from 01/08/2025 0500 to 01/08/2025 1103           Date/Time Order Dose Route Action       01/08/2025 0541 dextrose 5 % and sodium chloride 0.9 % infusion 125 mL/hr Intravenous New Bag       01/08/2025 0547 folic acid 1 mg in sodium chloride 0.9 % 50 mL IVPB 1 mg Intravenous New Bag       01/08/2025 0539 ondansetron (ZOFRAN) injection 4 mg 4 mg  Intravenous Given       01/08/2025 0613 thiamine (VITAMIN B1) 100 mg in sodium chloride 0.9 % 50 mL IVPB 100 mg Intravenous New Bag       01/08/2025 0532 LORazepam (ATIVAN) injection 4 mg 4 mg Intravenous Given       01/08/2025 0831 LORazepam (ATIVAN) injection 4 mg 4 mg Intravenous Given                Scheduled Medications:    Scheduled Medications ONLY (does not pull in infusions nor PRN medications order   busPIRone, 7.5 mg, Oral, TID  escitalopram, 10 mg, Oral, Daily  folic acid 1 mg in sodium chloride 0.9 % 50 mL IVPB, 1 mg, Intravenous, Daily  propranolol, 40 mg, Oral, TID  thiamine, 500 mg, Intravenous, Q8H CANDICE         Continuous IV Infusions:  Infusions Meds - Displays dose, route, & frequency only         PRN Meds:    PRN Medications - displays dose, route, and frequency   gabapentin, 300 mg, Oral, TID PRN         diazepam, 10 mg, Intravenous; x1  phenobarbital, 260 mg, Intravenous; x1  phenobarbital, 130 mg, Intravenous; x1             ED Triage Vitals   Temperature Pulse Respirations Blood Pressure SpO2 Pain Score   01/08/25 0514 01/08/25 0514 01/08/25 0514 01/08/25 0514 01/08/25 0514 01/08/25 0659   98.1 °F (36.7 °C) (!) 111 20 163/94 93 % 5      Weight (last 2 days)         Date/Time Weight     01/08/25 1118 96 (211.64)     01/08/25 0514 86 (189.6)                   Vital Signs (last 3 days)         Date/Time Temp Pulse Resp BP MAP (mmHg) SpO2 O2 Device Patient Position - Orthostatic VS Rose Coma Scale Score CIWA-Ar Total SEWS Total Score Pain     01/09/25 0806 -- -- -- -- -- -- -- -- 15 -- 0 No Pain     01/09/25 0722 97.7 °F (36.5 °C) 58 18 129/76 93 97 % None (Room air) Lying -- -- -- --     01/09/25 0452 97.2 °F (36.2 °C) 61 17 130/89 102 97 % None (Room air) Lying -- -- -- --     01/09/25 0100 -- -- -- -- -- -- -- -- 15 -- 0 --     01/08/25 2336 97.7 °F (36.5 °C) 64 18 121/87 98 96 % None (Room air) Lying -- -- -- --     01/08/25 2300 -- -- -- -- -- -- -- -- -- -- 0 --     01/08/25 2100 -- -- --  -- -- 95 % None (Room air) -- -- -- 0 No Pain     01/08/25 1949 97.5 °F (36.4 °C) 75 18 121/89 99 95 % None (Room air) Lying -- -- -- --     01/08/25 1743 -- -- -- -- -- -- -- -- -- -- 8 --     01/08/25 1739 97.8 °F (36.6 °C) 72 16 131/90 103 94 % None (Room air) Lying -- -- -- 8     01/08/25 1619 -- -- -- -- -- -- -- -- -- -- 0 --     01/08/25 1618 97.2 °F (36.2 °C) 72 18 111/82 91 -- -- Lying -- -- -- 10 - Worst Possible Pain     01/08/25 1509 -- -- -- -- -- -- -- -- -- -- 6 --     01/08/25 1507 97.6 °F (36.4 °C) 81 18 123/83 96 -- -- Lying -- -- -- 8     01/08/25 1358 -- -- -- -- -- -- -- -- -- -- 5 --     01/08/25 1118 -- -- -- -- -- -- -- -- -- -- -- 10 - Worst Possible Pain     01/08/25 1115 -- -- -- -- -- -- -- -- -- -- 16 --     01/08/25 1110 97.5 °F (36.4 °C) 101 18 140/84 102 95 % None (Room air) Lying -- -- -- --     01/08/25 1059 -- -- -- -- -- -- -- -- -- 7 -- --     01/08/25 1030 -- 97 18 144/88 -- 97 % None (Room air) Lying -- -- -- --     01/08/25 1000 -- 102 18 138/83 -- 96 % None (Room air) Lying -- -- -- --     01/08/25 0901 -- 87 18 144/87 -- 96 % None (Room air) Lying -- 7 -- No Pain     01/08/25 0830 -- 92 18 148/92 -- 97 % None (Room air) Lying -- -- -- 5 01/08/25 0820 -- -- -- -- -- 95 % None (Room air) -- -- -- -- --     01/08/25 0800 -- 114 18 166/89 -- 94 % None (Room air) Lying -- 24 -- --     01/08/25 0659 -- 97 18 135/85 -- 96 % None (Room air) Lying -- -- -- 5 01/08/25 0630 -- 96 18 135/85 -- 97 % None (Room air) Lying -- 21 -- --     01/08/25 0605 -- -- -- -- -- -- None (Room air) -- -- -- -- --     01/08/25 0604 -- -- -- -- -- -- -- -- 15 -- -- --     01/08/25 0600 -- 93 18 140/92 -- 96 % None (Room air) Lying -- -- -- --     01/08/25 0530 -- 111 -- 163/94 -- -- -- -- -- 37 -- --     01/08/25 0525 -- 111 -- 163/94 -- -- -- -- -- 37 -- --     01/08/25 0514 98.1 °F (36.7 °C) 111 20 163/94 -- 93 % None (Room air) Sitting -- -- -- --                SEWS:                Severity  of Ethanol Withdrawal Scale (SEWS)   ANXIETY: Do you feel that something bad is about to happen to you right now? 0  -TB 0  -SM 0  -SM 0  -SM 3  -TB   NAUSEA and DRY HEAVES or VOMITING? 0  -TB 0  -SM 0  -SM 0  -SM 3  -TB   SWEATING: (includes moist palms, sweating now)? Score 0 or 2 0  -TB 0  -SM 0  -SM 0  -SM 2  -TB   TREMOR: with arms extended eyes closed? 0  -TB 0  -SM 0  -SM 0  -SM 0  -TB   AGITATION: Fidgety, restless, pacing? 0  -TB 0  -SM 0  -SM 0  -SM 0  -TB   DISORIENTATION: 0  -TB 0  -SM 0  -SM 0  -SM 0  -TB   HALLUCINATIONS: 0  -TB 0  -SM 0  -SM 0  -SM 0  -TB   VITAL SIGNS: ANY (Pulse >110, Diastolic BP >90, Temp >99.6) 0  -TB 0  -SM 0  -SM 0  -SM 0  -TB   SEWS Total Score 0  -TB 0  -SM 0  -SM 0  -SM 8  -TB   Mary Agitation Sedation Scale (RASS)   Mary Agitation Sedation Scale (RASS) 0  -TB 0  -SM 0  -SM 0  -SM 0  -TB   Row Name 01/08/25 1700 01/08/25 1619 01/08/25 1509 01/08/25 1358 01/08/25 1115   Severity of Ethanol Withdrawal Scale (SEWS)   ANXIETY: Do you feel that something bad is about to happen to you right now? --  -TB 0  -TB 3  -TB 3  -TB 3  -TB   NAUSEA and DRY HEAVES or VOMITING? --  -TB 0  -TB 3  -TB 0  -TB 3  -TB   SWEATING: (includes moist palms, sweating now)? Score 0 or 2 --  -TB 0  -TB 0  -TB 0  -TB 2  -TB   TREMOR: with arms extended eyes closed? --  -TB 0  -TB 0  -TB 2  -TB 2  -TB   AGITATION: Fidgety, restless, pacing? --  -TB 0  -TB 0  -TB 0  -TB 0  -TB   DISORIENTATION: --  -TB 0  -TB 0  -TB 0  -TB 0  -TB   HALLUCINATIONS: --  -TB 0  -TB 0  -TB 0  -TB 3  -TB   VITAL SIGNS: ANY (Pulse >110, Diastolic BP >90, Temp >99.6) --  -TB 0  -TB 0  -TB 0  -TB 3  -TB   SEWS Total Score --  -TB 0  -TB 6  -TB 5  -TB 16  -TB         Pertinent Labs/Diagnostic Test Results:   Radiology:  No orders to display      Cardiology:  No orders to display      GI:  No orders to display                  Results from last 7 days   Lab Units 01/09/25  0456 01/08/25  0545 01/07/25  0233 01/03/25  1118    WBC Thousand/uL 6.22 7.42 7.88 7.71   HEMOGLOBIN g/dL 12.8 13.8 15.3 14.1   HEMATOCRIT % 39.3 42.2 45.3 43.3   PLATELETS Thousands/uL 125* 161 200 317   TOTAL NEUT ABS Thousands/µL 3.99 4.69 4.33 4.90                   Results from last 7 days   Lab Units 01/09/25  0456 01/08/25  0527 01/07/25  0735 01/07/25 0233 01/03/25  1118   SODIUM mmol/L 138 141 137 144 141   POTASSIUM mmol/L 3.9 4.4 3.9 4.0 4.0   CHLORIDE mmol/L 104 101 100 102 103   CO2 mmol/L 24 22 23 22 26   ANION GAP mmol/L 10 18* 14* 20* 12   BUN mg/dL 11 10 12 12 15   CREATININE mg/dL 0.84 0.92 0.87 0.99 0.83   EGFR ml/min/1.73sq m 114 108 112 98 114   CALCIUM mg/dL 8.6 8.9 7.6* 8.5 8.7   MAGNESIUM mg/dL 2.0 2.2  --  2.3 2.0               Results from last 7 days   Lab Units 01/09/25 0456 01/08/25 0527 01/07/25 0735 01/07/25 0233 01/03/25  1118   AST U/L 100* 202* 245* 305* 47*   ALT U/L 160* 245* 261* 305* 52   ALK PHOS U/L 102 118* 106* 114* 90   TOTAL PROTEIN g/dL 6.2* 7.7 6.7 7.8 7.3   ALBUMIN g/dL 4.0 4.9 4.2 5.0 4.7   TOTAL BILIRUBIN mg/dL 0.96 0.49 0.31 0.32 0.37                   Results from last 7 days   Lab Units 01/09/25  0456 01/08/25 0527 01/07/25 0735 01/07/25 0233 01/03/25  1118   GLUCOSE RANDOM mg/dL 103 87 181* 87 107                    Beta- Hydroxybutyrate   Date Value Ref Range Status   01/07/2025 0.34 (H) 0.02 - 0.27 mmol/L Final                  Results from last 7 days   Lab Units 01/07/25 0233 01/03/25  1118   LIPASE u/L 29 15                 Results from last 7 days   Lab Units 01/07/25  0625   AMPH/METH   Negative   BARBITURATE UR   Negative   BENZODIAZEPINE UR   Positive*   COCAINE UR   Negative   METHADONE URINE   Negative   OPIATE UR   Negative   PCP UR   Negative   THC UR   Negative             Results from last 7 days   Lab Units 01/08/25  0529 01/07/25  0233 01/03/25  1118   ETHANOL LVL mg/dL 367* 426* 163*             Medical History        Past Medical History:   Diagnosis Date    Alcohol use disorder       Alcohol withdrawal syndrome (HCC)      Anxiety      Depression      PTSD (post-traumatic stress disorder)      Tachycardia           Present on Admission:   Alcohol withdrawal syndrome with complication (HCC)   Alcohol use disorder, severe, dependence (HCC)   Transaminitis   Unspecified mood (affective) disorder (HCC)   Alcoholic ketoacidosis        Admitting Diagnosis: Alcohol withdrawal (HCC) [F10.939]  Alcohol intoxication (HCC) [F10.929]  Delirium tremens (HCC) [F10.931]  Alcohol abuse [F10.10]  Transaminitis [R74.01]  Alcohol withdrawal syndrome with complication (HCC) [F10.939]  Encounter for assessment of alcohol and drug use [Z76.89]  Age/Sex: 34 y.o. male        SEWS PHENOBARBITAL PROTOCOL FOR ALCOHOL WITHDRAWAL  Admit patient to medical detox unit and continue supportive care and stabilization of acute ethanol withdrawal per medical toxicology/detox treatment pathway. Monitor ethanol withdrawal severity via the Severity of Ethanol Withdrawal Scale (SEWS) Q4 hours and then hourly if/when SEWS > 6. Treat withdrawal per pathway and reassess Q30-60 minutes.          Mild SEWS Score 1-6  Administer medications* (IV or PO; PO preferred):  If initial SEWS score: diazepam 10mg PO/IV x 1 AND phenobarbital 65 mg PO/IV x 1  If repeat SEWS score 1-6: phenobarbital 65 mg PO/IV q1 hour x 5 doses maximum   Reassessment:   SEWS q1 hour after each dose until SEWS 0 x 2 hours  VS q1 hours (until SEWS 0, then q4 hours)  Notify provider for bedside evaluation if 5-dose maximum is reached, RASS of -3 to -5, or SEWS score escalates to moderate or severe.   Moderate SEWS Score 7-12  Administer medications* (IV):  If initial SEWS score: diazepam 10mg IV x 1 AND phenobarbital 260 mg IV x 1  If repeat SEWS score 7-12 or score escalated from mild: phenobarbital 130 mg IV q30 minutes x 5 doses maximum   Reassessment:  SEWS q30 minutes after each dose until SEWS < 7 (then hourly until SEWS 0 x 2 hours)  VS q30 minutes until SEWS < 7  (then hourly until SEWS 0, then q4 hours)  Notify provider for bedside evaluation if 5-dose maximum is reached, RASS of -3 to -5, or SEWS score escalates to severe.   Severe SEWS Score >= 13  Administer medications* (IV):  If initial SEWS score: Diazepam 10 mg IV x 1 AND phenobarbital 650 mg IV piggyback x 1 over 15-30 minutes  If repeat SEWS score >= 13 or score escalated from mild or moderate: phenobarbital 130 mg IV q30 minutes x 5 doses maximum   Reassessment:  SEWS q30 minutes after each dose until SEWS < 7 (then hourly until SEWS 0 x 2 hours)   VS q30 minutes until SEWS < 7 (then hourly until SEWS 0, then q4 hours)  Notify provider for bedside evaluation if 5-dose maximum is reached or RASS of -3 to -5   *Hold medications and notify provider if CNS depression, respirations < 10/min, or RASS of -3 to -5.         Network Utilization Review Department  ATTENTION: Please call with any questions or concerns to [phone] and carefully listen to the prompts so that you are directed to the right person. All voicemails are confidential.   For Discharge needs, contact Care Management DC Support Team at [phone] opt. 2  Send all requests for admission clinical reviews, approved or denied determinations and any other requests to dedicated fax number below belonging to the campus where the patient is receiving treatment. List of dedicated fax numbers for the Facilities:  FACILITY NAME UR FAX NUMBER   ADMISSION DENIALS (Administrative/Medical Necessity) [phone]   DISCHARGE SUPPORT TEAM (NETWORK) [phone]   PARENT CHILD HEALTH (Maternity/NICU/Pediatrics) [phone]   [facility] [phone]   [facility] [phone]   [facility] [phone]   [facility] [phone]   [facility] [phone]   [facility] [phone]   [facility]  "[place] [phone]   [facility] [phone]   [facility] [phone]   [facility] [phone]   [facility] [phone]   [facility] [phone]                              Halley Orosco, RN   Registered Nurse  Specialty: Utilization Review     Utilization Review      Signed     Date of Service: 1/11/2025  1:04 PM     Signed       Expand All Collapse All    Continued Stay Review     Date: 01/11/2025                           Current Patient Class: Inpatient                   Current Level of Care: Med Surg  Detox Unit     HPI:34 y.o. male initially admitted on 01/08/2024      Assessment/Plan: Alcohol withdrawal syndrome with complications.    Tearful on exam regarding his dispo plans. States he wants to go to rehab but is unsure if he will be able to go directly there as he has to be out of his apartment by Feb 1st. Additionally states he lost his cellphone and his wallet. Reports he hasn't eaten in 2 days. States he \"feels like crap.\" Otherwise no acute complaints.         Medications:   Scheduled Medications:    Scheduled Medications ONLY (does not pull in infusions nor PRN medications order   busPIRone, 7.5 mg, Oral, TID  escitalopram, 10 mg, Oral, Daily  folic acid, 1 mg, Oral, Daily  mirtazapine, 15 mg, Oral, HS  naltrexone, 50 mg, Oral, Daily  propranolol, 40 mg, Oral, TID  [Held by provider] thiamine, 500 mg, Intravenous, Q8H CANDICE  thiamine, 100 mg, Oral, Daily         Continuous IV Infusions:  Infusions Meds - Displays dose, route, & frequency only         PRN Meds:    PRN Medications - displays dose, route, and frequency   acetaminophen, 650 mg, Oral, Q8H PRN  gabapentin, 300 mg, Oral, TID PRN         Discharge Plan:   Pt to discharge today. Pt denies any withdrawal symptoms at this time. Pt to discharge to home and a [employer] will " pick him up upon discharge. Pt to follow up with appointment at 2:30pm January 24, 2025 at [facility] in [place], [place] .      Vital Signs (last 3 days)         Date/Time Temp Pulse Resp BP MAP (mmHg) SpO2 O2 Device Patient Position - Orthostatic VS [place] Coma Scale Score CIWA-Ar Total SEWS Total Score Pain     01/11/25 0849 97.6 °F (36.4 °C) 60 18 96/50 -- 96 % None (Room air) Lying -- -- -- No Pain     01/10/25 2232 97.3 °F (36.3 °C) 54 18 127/76 93 95 % None (Room air) Lying -- -- -- --     01/10/25 1944 97.2 °F (36.2 °C) 56 18 150/85 106 99 % None (Room air) Lying 15 -- -- No Pain     01/10/25 1455 97.7 °F (36.5 °C) 60 18 126/70 -- 98 % None (Room air) Lying -- -- -- 6     01/10/25 1304 -- -- -- -- -- -- -- -- -- -- -- 8     01/10/25 0724 97.3 °F (36.3 °C) 75 18 124/79 -- 96 % None (Room air) Lying -- -- -- No Pain     01/10/25 0400 97.3 °F (36.3 °C) 61 17 128/57 -- -- -- -- -- -- -- --     01/09/25 1940 -- -- -- -- -- -- -- -- 15 -- -- No Pain     01/09/25 1615 97.3 °F (36.3 °C) 55 16 129/82 -- 95 % None (Room air) Lying -- -- -- --     01/09/25 1114 -- 65 18 144/90 108 97 % None (Room air) Lying -- -- -- --     01/09/25 0806 -- -- -- -- -- -- -- -- 15 -- 0 No Pain     01/09/25 0722 97.7 °F (36.5 °C) 58 18 129/76 93 97 % None (Room air) Lying -- -- -- --     01/09/25 0452 97.2 °F (36.2 °C) 61 17 130/89 102 97 % None (Room air) Lying -- -- -- --     01/09/25 0100 -- -- -- -- -- -- -- -- 15 -- 0 --     01/08/25 2336 97.7 °F (36.5 °C) 64 18 121/87 98 96 % None (Room air) Lying -- -- -- --     01/08/25 2300 -- -- -- -- -- -- -- -- -- -- 0 --     01/08/25 2100 -- -- -- -- -- 95 % None (Room air) -- -- -- 0 No Pain     01/08/25 1949 97.5 °F (36.4 °C) 75 18 121/89 99 95 % None (Room air) Lying -- -- -- --     01/08/25 1743 -- -- -- -- -- -- -- -- -- -- 8 --     01/08/25 1739 97.8 °F (36.6 °C) 72 16 131/90 103 94 % None (Room air) Lying -- -- -- 8     01/08/25 1619 -- -- -- -- -- -- -- -- -- -- 0 --      01/08/25 1618 97.2 °F (36.2 °C) 72 18 111/82 91 -- -- Lying -- -- -- 10 - Worst Possible Pain     01/08/25 1509 -- -- -- -- -- -- -- -- -- -- 6 --     01/08/25 1507 97.6 °F (36.4 °C) 81 18 123/83 96 -- -- Lying -- -- -- 8     01/08/25 1358 -- -- -- -- -- -- -- -- -- -- 5 --     01/08/25 1118 -- -- -- -- -- -- -- -- -- -- -- 10 - Worst Possible Pain     01/08/25 1115 -- -- -- -- -- -- -- -- -- -- 16 --     01/08/25 1110 97.5 °F (36.4 °C) 101 18 140/84 102 95 % None (Room air) Lying -- -- -- --     01/08/25 1059 -- -- -- -- -- -- -- -- -- 7 -- --     01/08/25 1030 -- 97 18 144/88 -- 97 % None (Room air) Lying -- -- -- --     01/08/25 1000 -- 102 18 138/83 -- 96 % None (Room air) Lying -- -- -- --     01/08/25 0901 -- 87 18 144/87 -- 96 % None (Room air) Lying -- 7 -- No Pain     01/08/25 0830 -- 92 18 148/92 -- 97 % None (Room air) Lying -- -- -- 5 01/08/25 0820 -- -- -- -- -- 95 % None (Room air) -- -- -- -- --     01/08/25 0800 -- 114 18 166/89 -- 94 % None (Room air) Lying -- 24 -- --     01/08/25 0659 -- 97 18 135/85 -- 96 % None (Room air) Lying -- -- -- 5 01/08/25 0630 -- 96 18 135/85 -- 97 % None (Room air) Lying -- 21 -- --     01/08/25 0605 -- -- -- -- -- -- None (Room air) -- -- -- -- --     01/08/25 0604 -- -- -- -- -- -- -- -- 15 -- -- --     01/08/25 0600 -- 93 18 140/92 -- 96 % None (Room air) Lying -- -- -- --     01/08/25 0530 -- 111 -- 163/94 -- -- -- -- -- 37 -- --     01/08/25 0525 -- 111 -- 163/94 -- -- -- -- -- 37 -- --     01/08/25 0514 98.1 °F (36.7 °C) 111 20 163/94 -- 93 % None (Room air) Sitting -- -- -- --             Weight (last 2 days)         None                Pertinent Labs/Diagnostic Results:   Radiology:  US right upper quadrant   Final Interpretation by Ligia Robin MD (01/11 1147)       Mild hepatic steatosis.       Resident: Trey Montilla I, the attending radiologist, have reviewed the images and agree with the final report above.       Workstation performed:  [id number]           XR elbow 3+ vw left   Final Interpretation by James Lowe MD (01/10 1255)       No acute osseous abnormality.           Computerized Assisted Algorithm (CAA) may have been used to analyze all applicable images.           Workstation performed: [id number]              Cardiology:  No orders to display      GI:  No orders to display              Results from last 7 days   Lab Units 01/10/25  0749 01/09/25  0456 01/08/25  0545 01/07/25  0233   WBC Thousand/uL 7.08 6.22 7.42 7.88   HEMOGLOBIN g/dL 13.3 12.8 13.8 15.3   HEMATOCRIT % 40.2 39.3 42.2 45.3   PLATELETS Thousands/uL 120* 125* 161 200   TOTAL NEUT ABS Thousands/µL  --  3.99 4.69 4.33               Results from last 7 days   Lab Units 01/11/25  0430 01/09/25  0456 01/08/25  0527 01/07/25  0735 01/07/25  0233   SODIUM mmol/L 137 138 141 137 144   POTASSIUM mmol/L 3.8 3.9 4.4 3.9 4.0   CHLORIDE mmol/L 103 104 101 100 102   CO2 mmol/L 24 24 22 23 22   ANION GAP mmol/L 10 10 18* 14* 20*   BUN mg/dL 13 11 10 12 12   CREATININE mg/dL 0.93 0.84 0.92 0.87 0.99   EGFR ml/min/1.73sq m 106 114 108 112 98   CALCIUM mg/dL 9.3 8.6 8.9 7.6* 8.5   MAGNESIUM mg/dL  --  2.0 2.2  --  2.3               Results from last 7 days   Lab Units 01/11/25  0430 01/09/25  0456 01/08/25  0527 01/07/25  0735 01/07/25  0233   AST U/L 138* 100* 202* 245* 305*   ALT U/L 172* 160* 245* 261* 305*   ALK PHOS U/L 119* 102 118* 106* 114*   TOTAL PROTEIN g/dL 7.1 6.2* 7.7 6.7 7.8   ALBUMIN g/dL 4.3 4.0 4.9 4.2 5.0   TOTAL BILIRUBIN mg/dL 0.51 0.96 0.49 0.31 0.32               Results from last 7 days   Lab Units 01/11/25  0430 01/09/25  0456 01/08/25  0527 01/07/25  0735 01/07/25  0233   GLUCOSE RANDOM mg/dL 97 103 87 181* 87            Beta- Hydroxybutyrate   Date Value Ref Range Status   01/07/2025 0.34 (H) 0.02 - 0.27 mmol/L Final           Results from last 7 days   Lab Units 01/07/25  0233   LIPASE u/L 29           Results from last 7 days   Lab Units 01/09/25  1042    CLARITY UA   Slightly Cloudy*   COLOR UA   Griselda*   SPEC GRAV UA   1.015   PH UA   8.0   GLUCOSE UA mg/dl Negative   KETONES UA mg/dl Negative   BLOOD UA   Negative   PROTEIN UA mg/dl 15 (Trace)*   NITRITE UA   Negative   BILIRUBIN UA   Negative   UROBILINOGEN UA mg/dL Negative   LEUKOCYTES UA   Negative   WBC UA /hpf None Seen   RBC UA /hpf None Seen   BACTERIA UA /hpf Occasional   EPITHELIAL CELLS WET PREP /hpf Occasional   MUCUS THREADS   Occasional*           Results from last 7 days   Lab Units 01/09/25  1042   AMPH/METH   Negative   BARBITURATE UR   Positive*   BENZODIAZEPINE UR   Positive*   COCAINE UR   Negative   METHADONE URINE   Negative   OPIATE UR   Negative   PCP UR   Negative   THC UR   Negative            Results from last 7 days   Lab Units 01/08/25  0529 01/07/25  0233   ETHANOL LVL mg/dL 367* 426*      Network Utilization Review Department  ATTENTION: Please call with any questions or concerns to [phone] and carefully listen to the prompts so that you are directed to the right person. All voicemails are confidential.   For Discharge needs, contact Care Management DC Support Team at [phone] opt. 2  Send all requests for admission clinical reviews, approved or denied determinations and any other requests to dedicated fax number below belonging to the campus where the patient is receiving treatment. List of dedicated fax numbers for the Facilities:  FACILITY NAME UR FAX NUMBER   ADMISSION DENIALS (Administrative/Medical Necessity) [phone]   DISCHARGE SUPPORT TEAM (NETWORK) [phone]   PARENT CHILD HEALTH (Maternity/NICU/Pediatrics) [phone]   [facility] [phone]   [facility] [phone]   [facility] [phone]   [facility] [phone]   [facility] [phone]   [facility] [phone]   [facility]  [facility] [phone]   [facility] [phone]   [facility] [phone]   [facility] [phone]   [facility] [phone]   [facility] [phone]

## 2025-01-13 NOTE — TELEPHONE ENCOUNTER
Contacted patient in regards to Routine Referral in attempts to verify patient's needs of services and add patient to proper wait list. Writer left vm to call intake at [phone]    Please add patient to proper WL(s)    Attempt #1

## 2025-01-14 NOTE — UTILIZATION REVIEW
"Continued Stay Review  Insurance requesting additional information  Date: 01/10/2025                          Current Patient Class: Inpatient  Current Level of Care: Med/Surg Detox Unit    HPI:35 y.o. male initially admitted on 01/08/2025     Assessment/Plan: Alcohol withdrawal syndrome with complications. Tearful on exam regarding his dispo plans. States he wants to go to rehab but is unsure if he will be able to go directly there as he has to be out of his apartment by Feb 1st. Additionally states he lost his cellphone and his wallet. Reports he hasn't eaten in 2 days. States he \"feels like crap.\"   Administer 650 mg of phenobarbital as initial dose, total dosing 780, last dose 1/8.  Continue monitoring under protocol and administer phenobarbital as indicated.  Continuous pulse ox and telemetry monitoring.  There was concern for possible hallucinations so high dose thiamine was initiated. He received 5 doses. Now continued on thiamine 100mg daily.  Tolerated naltrexone 25mg. Will continue with naltrexone 50mg daily.  Continue to trend LFT.  Reports L elbow pain > will get L elbow X.      Medications:     busPIRone (BUSPAR) tablet 7.5 mg, TID    escitalopram (LEXAPRO) tablet 10 mg, Daily    folic acid (FOLVITE) tablet 1 mg, Daily    gabapentin (NEURONTIN) capsule 300 mg, TID PRN   x 1 dose 1/10    propranolol (INDERAL) tablet 40 mg, TID    thiamine (VITAMIN B1) 500 mg in sodium chloride 0.9 % 50 mL IVPB, Q8H CANDICE, Last Rate: Stopped (01/10/25 0918)    thiamine tablet 100 mg, Daily   acetaminophen (TYLENOL) tablet 650 mg  Dose: 650 mg      x 1 dose 1/10  Freq: Every 8 hours PRN Route: PO  PRN Reason: mild pain  Start: 01/10/25 1259 End: 01/11/25 1905        Discharge Plan: TBD    Vital Signs (last 3 days) before discharge       Date/Time Temp Pulse Resp BP MAP (mmHg) SpO2 O2 Device Patient Position - Orthostatic VS [place] Coma Scale Score CIWA-Ar Total SEWS Total Score Pain    01/11/25 1452 97.7 °F (36.5 °C) 64 18 " 130/68 -- 96 % None (Room air) Lying -- -- -- No Pain    01/11/25 0849 97.6 °F (36.4 °C) 60 18 96/50 -- 96 % None (Room air) Lying -- -- -- No Pain    01/10/25 2232 97.3 °F (36.3 °C) 54 18 127/76 93 95 % None (Room air) Lying -- -- -- --    01/10/25 1944 97.2 °F (36.2 °C) 56 18 150/85 106 99 % None (Room air) Lying 15 -- -- No Pain    01/10/25 1455 97.7 °F (36.5 °C) 60 18 126/70 -- 98 % None (Room air) Lying -- -- -- 6    01/10/25 1304 -- -- -- -- -- -- -- -- -- -- -- 8    01/10/25 0724 97.3 °F (36.3 °C) 75 18 124/79 -- 96 % None (Room air) Lying -- -- -- No Pain    01/10/25 0400 97.3 °F (36.3 °C) 61 17 128/57 -- -- -- -- -- -- -- --    01/09/25 1940 -- -- -- -- -- -- -- -- 15 -- -- No Pain    01/09/25 1615 97.3 °F (36.3 °C) 55 16 129/82 -- 95 % None (Room air) Lying -- -- -- --    01/09/25 1114 -- 65 18 144/90 108 97 % None (Room air) Lying -- -- -- --    01/09/25 0806 -- -- -- -- -- -- -- -- 15 -- 0 No Pain    01/09/25 0722 97.7 °F (36.5 °C) 58 18 129/76 93 97 % None (Room air) Lying -- -- -- --    01/09/25 0452 97.2 °F (36.2 °C) 61 17 130/89 102 97 % None (Room air) Lying -- -- -- --    01/09/25 0100 -- -- -- -- -- -- -- -- 15 -- 0 --    01/08/25 2336 97.7 °F (36.5 °C) 64 18 121/87 98 96 % None (Room air) Lying -- -- -- --    01/08/25 2300 -- -- -- -- -- -- -- -- -- -- 0 --    01/08/25 2100 -- -- -- -- -- 95 % None (Room air) -- -- -- 0 No Pain    01/08/25 1949 97.5 °F (36.4 °C) 75 18 121/89 99 95 % None (Room air) Lying -- -- -- --    01/08/25 1743 -- -- -- -- -- -- -- -- -- -- 8 --    01/08/25 1739 97.8 °F (36.6 °C) 72 16 131/90 103 94 % None (Room air) Lying -- -- -- 8    01/08/25 1619 -- -- -- -- -- -- -- -- -- -- 0 --    01/08/25 1618 97.2 °F (36.2 °C) 72 18 111/82 91 -- -- Lying -- -- -- 10 - Worst Possible Pain    01/08/25 1509 -- -- -- -- -- -- -- -- -- -- 6 --    01/08/25 1507 97.6 °F (36.4 °C) 81 18 123/83 96 -- -- Lying -- -- -- 8    01/08/25 1358 -- -- -- -- -- -- -- -- -- -- 5 --    01/08/25 1118 --  -- -- -- -- -- -- -- -- -- -- 10 - Worst Possible Pain    01/08/25 1115 -- -- -- -- -- -- -- -- -- -- 16 --    01/08/25 1110 97.5 °F (36.4 °C) 101 18 140/84 102 95 % None (Room air) Lying -- -- -- --    01/08/25 1059 -- -- -- -- -- -- -- -- -- 7 -- --    01/08/25 1030 -- 97 18 144/88 -- 97 % None (Room air) Lying -- -- -- --    01/08/25 1000 -- 102 18 138/83 -- 96 % None (Room air) Lying -- -- -- --    01/08/25 0901 -- 87 18 144/87 -- 96 % None (Room air) Lying -- 7 -- No Pain    01/08/25 0830 -- 92 18 148/92 -- 97 % None (Room air) Lying -- -- -- 5 01/08/25 0820 -- -- -- -- -- 95 % None (Room air) -- -- -- -- --    01/08/25 0800 -- 114 18 166/89 -- 94 % None (Room air) Lying -- 24 -- --    01/08/25 0659 -- 97 18 135/85 -- 96 % None (Room air) Lying -- -- -- 5 01/08/25 0630 -- 96 18 135/85 -- 97 % None (Room air) Lying -- 21 -- --    01/08/25 0605 -- -- -- -- -- -- None (Room air) -- -- -- -- --    01/08/25 0604 -- -- -- -- -- -- -- -- 15 -- -- --    01/08/25 0600 -- 93 18 140/92 -- 96 % None (Room air) Lying -- -- -- --    01/08/25 0530 -- 111 -- 163/94 -- -- -- -- -- 37 -- --    01/08/25 0525 -- 111 -- 163/94 -- -- -- -- -- 37 -- --    01/08/25 0514 98.1 °F (36.7 °C) 111 20 163/94 -- 93 % None (Room air) Sitting -- -- -- --          Weight (last 2 days) before discharge       None            Pertinent Labs/Diagnostic Results:   Radiology:  US right upper quadrant   Final Interpretation by iLgia Robin MD (01/11 1147)   Mild hepatic steatosis.      XR elbow 3+ vw left   Final Interpretation by James Lowe MD (01/10 1255)   No acute osseous abnormality.        Cardiology:  No orders to display     GI:  No orders to display     Results from last 7 days   Lab Units 01/10/25  0749 01/09/25  0456 01/08/25  0545   WBC Thousand/uL 7.08 6.22 7.42   HEMOGLOBIN g/dL 13.3 12.8 13.8   HEMATOCRIT % 40.2 39.3 42.2   PLATELETS Thousands/uL 120* 125* 161   TOTAL NEUT ABS Thousands/µL  --  3.99 4.69     Results  from last 7 days   Lab Units 01/11/25  0430 01/09/25  0456 01/08/25  0527   SODIUM mmol/L 137 138 141   POTASSIUM mmol/L 3.8 3.9 4.4   CHLORIDE mmol/L 103 104 101   CO2 mmol/L 24 24 22   ANION GAP mmol/L 10 10 18*   BUN mg/dL 13 11 10   CREATININE mg/dL 0.93 0.84 0.92   EGFR ml/min/1.73sq m 106 114 108   CALCIUM mg/dL 9.3 8.6 8.9   MAGNESIUM mg/dL  --  2.0 2.2     Results from last 7 days   Lab Units 01/11/25  0430 01/09/25  0456 01/08/25  0527   AST U/L 138* 100* 202*   ALT U/L 172* 160* 245*   ALK PHOS U/L 119* 102 118*   TOTAL PROTEIN g/dL 7.1 6.2* 7.7   ALBUMIN g/dL 4.3 4.0 4.9   TOTAL BILIRUBIN mg/dL 0.51 0.96 0.49     Results from last 7 days   Lab Units 01/11/25  0430 01/09/25  0456 01/08/25  0527   GLUCOSE RANDOM mg/dL 97 103 87     Beta- Hydroxybutyrate   Date Value Ref Range Status   01/07/2025 0.34 (H) 0.02 - 0.27 mmol/L Final      Results from last 7 days   Lab Units 01/09/25  1042   CLARITY UA  Slightly Cloudy*   COLOR UA  Griselda*   SPEC GRAV UA  1.015   PH UA  8.0   GLUCOSE UA mg/dl Negative   KETONES UA mg/dl Negative   BLOOD UA  Negative   PROTEIN UA mg/dl 15 (Trace)*   NITRITE UA  Negative   BILIRUBIN UA  Negative   UROBILINOGEN UA mg/dL Negative   LEUKOCYTES UA  Negative   WBC UA /hpf None Seen   RBC UA /hpf None Seen   BACTERIA UA /hpf Occasional   EPITHELIAL CELLS WET PREP /hpf Occasional   MUCUS THREADS  Occasional*     Results from last 7 days   Lab Units 01/09/25  1042   AMPH/METH  Negative   BARBITURATE UR  Positive*   BENZODIAZEPINE UR  Positive*   COCAINE UR  Negative   METHADONE URINE  Negative   OPIATE UR  Negative   PCP UR  Negative   THC UR  Negative     Results from last 7 days   Lab Units 01/08/25  0529   ETHANOL LVL mg/dL 367*     Network Utilization Review Department  ATTENTION: Please call with any questions or concerns to [phone] and carefully listen to the prompts so that you are directed to the right person. All voicemails are confidential.   For Discharge needs, contact  Care Management DC Support Team at [phone] opt. 2  Send all requests for admission clinical reviews, approved or denied determinations and any other requests to dedicated fax number below belonging to the campus where the patient is receiving treatment. List of dedicated fax numbers for the Facilities:  FACILITY NAME UR FAX NUMBER   ADMISSION DENIALS (Administrative/Medical Necessity) [phone]   DISCHARGE SUPPORT TEAM (NETWORK) [phone]   PARENT CHILD HEALTH (Maternity/NICU/Pediatrics) [phone]   [facility] [phone]   [facility] [phone]   [facility] [phone]   [facility] [phone]   [facility] [phone]   [facility] [phone]   [facility] [phone]   [facility] [phone]   [facility] [phone]   [facility] [phone]   [facility] [phone]   [facility] [phone]

## 2025-01-16 NOTE — TELEPHONE ENCOUNTER
Contacted patient in regards to ROUTINE Referral, LVM to contact [phone] to discuss services needed at this time in order to be added to proper wait list.    2nd attempt

## 2025-01-17 ENCOUNTER — TELEPHONE (OUTPATIENT)
Age: 35
End: 2025-01-17

## 2025-01-17 NOTE — TELEPHONE ENCOUNTER
New patient seeking services for Med management. Routine referral was sent over. Writer added patient to the wait list and closed referral.

## 2025-01-29 LAB
ATRIAL RATE: 92 BPM
P AXIS: 51 DEGREES
PR INTERVAL: 146 MS
QRS AXIS: 51 DEGREES
QRSD INTERVAL: 98 MS
QT INTERVAL: 350 MS
QTC INTERVAL: 433 MS
T WAVE AXIS: 26 DEGREES
VENTRICULAR RATE: 92 BPM

## 2025-02-15 ENCOUNTER — APPOINTMENT (EMERGENCY)
Dept: RADIOLOGY | Facility: HOSPITAL | Age: 35
End: 2025-02-15
Payer: COMMERCIAL

## 2025-02-15 ENCOUNTER — HOSPITAL ENCOUNTER (INPATIENT)
Facility: HOSPITAL | Age: 35
LOS: 1 days | Discharge: HOME | DRG: 948 | End: 2025-02-16
Attending: INTERNAL MEDICINE | Admitting: INTERNAL MEDICINE
Payer: COMMERCIAL

## 2025-02-15 ENCOUNTER — HOSPITAL ENCOUNTER (EMERGENCY)
Facility: HOSPITAL | Age: 35
End: 2025-02-15
Attending: EMERGENCY MEDICINE | Admitting: EMERGENCY MEDICINE
Payer: COMMERCIAL

## 2025-02-15 VITALS
BODY MASS INDEX: 27.3 KG/M2 | WEIGHT: 195 LBS | HEIGHT: 71 IN | RESPIRATION RATE: 18 BRPM | OXYGEN SATURATION: 94 % | DIASTOLIC BLOOD PRESSURE: 69 MMHG | HEART RATE: 96 BPM | SYSTOLIC BLOOD PRESSURE: 123 MMHG | TEMPERATURE: 98.5 F

## 2025-02-15 DIAGNOSIS — E87.29 ALCOHOLIC KETOACIDOSIS: ICD-10-CM

## 2025-02-15 DIAGNOSIS — F10.10 ALCOHOL ABUSE: ICD-10-CM

## 2025-02-15 DIAGNOSIS — M25.522 LEFT ELBOW PAIN: ICD-10-CM

## 2025-02-15 DIAGNOSIS — F10.20 ALCOHOL USE DISORDER, SEVERE, DEPENDENCE (HCC): Primary | ICD-10-CM

## 2025-02-15 DIAGNOSIS — F10.929 ALCOHOL INTOXICATION (HCC): Primary | ICD-10-CM

## 2025-02-15 DIAGNOSIS — K29.20 ALCOHOLIC GASTRITIS: ICD-10-CM

## 2025-02-15 DIAGNOSIS — F10.939 ALCOHOL WITHDRAWAL SYNDROME WITH COMPLICATION (HCC): ICD-10-CM

## 2025-02-15 LAB
ALBUMIN SERPL BCG-MCNC: 4.8 G/DL (ref 3.5–5)
ALP SERPL-CCNC: 110 U/L (ref 34–104)
ALT SERPL W P-5'-P-CCNC: 86 U/L (ref 7–52)
ANION GAP SERPL CALCULATED.3IONS-SCNC: 22 MMOL/L (ref 4–13)
ANISOCYTOSIS BLD QL SMEAR: PRESENT
AST SERPL W P-5'-P-CCNC: 93 U/L (ref 13–39)
BASOPHILS # BLD MANUAL: 0 THOUSAND/UL (ref 0–0.1)
BASOPHILS NFR MAR MANUAL: 0 % (ref 0–1)
BILIRUB SERPL-MCNC: 0.49 MG/DL (ref 0.2–1)
BUN SERPL-MCNC: 15 MG/DL (ref 5–25)
CALCIUM SERPL-MCNC: 8.6 MG/DL (ref 8.4–10.2)
CARDIAC TROPONIN I PNL SERPL HS: 7 NG/L (ref ?–50)
CHLORIDE SERPL-SCNC: 90 MMOL/L (ref 96–108)
CO2 SERPL-SCNC: 22 MMOL/L (ref 21–32)
CREAT SERPL-MCNC: 0.94 MG/DL (ref 0.6–1.3)
EOSINOPHIL # BLD MANUAL: 0 THOUSAND/UL (ref 0–0.4)
EOSINOPHIL NFR BLD MANUAL: 0 % (ref 0–6)
ERYTHROCYTE [DISTWIDTH] IN BLOOD BY AUTOMATED COUNT: 14.7 % (ref 11.6–15.1)
ETHANOL EXG-MCNC: 0.3 MG/DL
FLUAV AG UPPER RESP QL IA.RAPID: NEGATIVE
FLUAV RNA RESP QL NAA+PROBE: NEGATIVE
FLUBV AG UPPER RESP QL IA.RAPID: NEGATIVE
FLUBV RNA RESP QL NAA+PROBE: NEGATIVE
GFR SERPL CREATININE-BSD FRML MDRD: 104 ML/MIN/1.73SQ M
GLUCOSE SERPL-MCNC: 105 MG/DL (ref 65–140)
HCT VFR BLD AUTO: 45.2 % (ref 36.5–49.3)
HGB BLD-MCNC: 15 G/DL (ref 12–17)
LIPASE SERPL-CCNC: 40 U/L (ref 11–82)
LYMPHOCYTES # BLD AUTO: 12 % (ref 14–44)
LYMPHOCYTES # BLD AUTO: 2.24 THOUSAND/UL (ref 0.6–4.47)
MAGNESIUM SERPL-MCNC: 2.3 MG/DL (ref 1.9–2.7)
MCH RBC QN AUTO: 29.5 PG (ref 26.8–34.3)
MCHC RBC AUTO-ENTMCNC: 33.2 G/DL (ref 31.4–37.4)
MCV RBC AUTO: 89 FL (ref 82–98)
MONOCYTES # BLD AUTO: 0.45 THOUSAND/UL (ref 0–1.22)
MONOCYTES NFR BLD: 3 % (ref 4–12)
NEUTROPHILS # BLD MANUAL: 12.23 THOUSAND/UL (ref 1.85–7.62)
NEUTS SEG NFR BLD AUTO: 82 % (ref 43–75)
PLATELET # BLD AUTO: 162 THOUSANDS/UL (ref 149–390)
PLATELET BLD QL SMEAR: ADEQUATE
PMV BLD AUTO: 9.1 FL (ref 8.9–12.7)
POTASSIUM SERPL-SCNC: 5.2 MMOL/L (ref 3.5–5.3)
PROT SERPL-MCNC: 7.8 G/DL (ref 6.4–8.4)
RBC # BLD AUTO: 5.08 MILLION/UL (ref 3.88–5.62)
RBC MORPH BLD: PRESENT
RSV RNA RESP QL NAA+PROBE: NEGATIVE
SARS-COV+SARS-COV-2 AG RESP QL IA.RAPID: NEGATIVE
SARS-COV-2 RNA RESP QL NAA+PROBE: NEGATIVE
SODIUM SERPL-SCNC: 134 MMOL/L (ref 135–147)
VARIANT LYMPHS # BLD AUTO: 3 %
WBC # BLD AUTO: 14.91 THOUSAND/UL (ref 4.31–10.16)

## 2025-02-15 PROCEDURE — 93005 ELECTROCARDIOGRAM TRACING: CPT

## 2025-02-15 PROCEDURE — 99284 EMERGENCY DEPT VISIT MOD MDM: CPT

## 2025-02-15 PROCEDURE — 99291 CRITICAL CARE FIRST HOUR: CPT | Performed by: EMERGENCY MEDICINE

## 2025-02-15 PROCEDURE — 96368 THER/DIAG CONCURRENT INF: CPT

## 2025-02-15 PROCEDURE — 99255 IP/OBS CONSLTJ NEW/EST HI 80: CPT | Performed by: EMERGENCY MEDICINE

## 2025-02-15 PROCEDURE — 83690 ASSAY OF LIPASE: CPT | Performed by: EMERGENCY MEDICINE

## 2025-02-15 PROCEDURE — 96375 TX/PRO/DX INJ NEW DRUG ADDON: CPT

## 2025-02-15 PROCEDURE — 99223 1ST HOSP IP/OBS HIGH 75: CPT | Performed by: INTERNAL MEDICINE

## 2025-02-15 PROCEDURE — 0241U HB NFCT DS VIR RESP RNA 4 TRGT: CPT | Performed by: INTERNAL MEDICINE

## 2025-02-15 PROCEDURE — 85027 COMPLETE CBC AUTOMATED: CPT | Performed by: EMERGENCY MEDICINE

## 2025-02-15 PROCEDURE — 84484 ASSAY OF TROPONIN QUANT: CPT | Performed by: EMERGENCY MEDICINE

## 2025-02-15 PROCEDURE — 87804 INFLUENZA ASSAY W/OPTIC: CPT | Performed by: EMERGENCY MEDICINE

## 2025-02-15 PROCEDURE — 87811 SARS-COV-2 COVID19 W/OPTIC: CPT | Performed by: EMERGENCY MEDICINE

## 2025-02-15 PROCEDURE — 71045 X-RAY EXAM CHEST 1 VIEW: CPT

## 2025-02-15 PROCEDURE — 80053 COMPREHEN METABOLIC PANEL: CPT | Performed by: EMERGENCY MEDICINE

## 2025-02-15 PROCEDURE — 85007 BL SMEAR W/DIFF WBC COUNT: CPT | Performed by: EMERGENCY MEDICINE

## 2025-02-15 PROCEDURE — HZ2ZZZZ DETOXIFICATION SERVICES FOR SUBSTANCE ABUSE TREATMENT: ICD-10-PCS | Performed by: EMERGENCY MEDICINE

## 2025-02-15 PROCEDURE — 96365 THER/PROPH/DIAG IV INF INIT: CPT

## 2025-02-15 PROCEDURE — 96361 HYDRATE IV INFUSION ADD-ON: CPT

## 2025-02-15 PROCEDURE — 96367 TX/PROPH/DG ADDL SEQ IV INF: CPT

## 2025-02-15 PROCEDURE — 83735 ASSAY OF MAGNESIUM: CPT | Performed by: EMERGENCY MEDICINE

## 2025-02-15 PROCEDURE — 82075 ASSAY OF BREATH ETHANOL: CPT | Performed by: EMERGENCY MEDICINE

## 2025-02-15 PROCEDURE — 36415 COLL VENOUS BLD VENIPUNCTURE: CPT | Performed by: EMERGENCY MEDICINE

## 2025-02-15 RX ORDER — CALCIUM CARBONATE 500 MG/1
1000 TABLET, CHEWABLE ORAL DAILY PRN
Status: DISCONTINUED | OUTPATIENT
Start: 2025-02-15 | End: 2025-02-16 | Stop reason: HOSPADM

## 2025-02-15 RX ORDER — PHENOBARBITAL SODIUM 130 MG/ML
130 INJECTION, SOLUTION INTRAMUSCULAR; INTRAVENOUS ONCE
Status: COMPLETED | OUTPATIENT
Start: 2025-02-15 | End: 2025-02-15

## 2025-02-15 RX ORDER — TRAZODONE HYDROCHLORIDE 50 MG/1
50 TABLET ORAL
Status: DISCONTINUED | OUTPATIENT
Start: 2025-02-15 | End: 2025-02-15

## 2025-02-15 RX ORDER — MAGNESIUM SULFATE HEPTAHYDRATE 40 MG/ML
2 INJECTION, SOLUTION INTRAVENOUS ONCE
Status: COMPLETED | OUTPATIENT
Start: 2025-02-15 | End: 2025-02-15

## 2025-02-15 RX ORDER — FAMOTIDINE 10 MG/ML
40 INJECTION, SOLUTION INTRAVENOUS ONCE
Status: COMPLETED | OUTPATIENT
Start: 2025-02-15 | End: 2025-02-15

## 2025-02-15 RX ORDER — KETOROLAC TROMETHAMINE 30 MG/ML
15 INJECTION, SOLUTION INTRAMUSCULAR; INTRAVENOUS ONCE
Status: COMPLETED | OUTPATIENT
Start: 2025-02-15 | End: 2025-02-15

## 2025-02-15 RX ORDER — AZELASTINE 1 MG/ML
1 SPRAY, METERED NASAL 2 TIMES DAILY
Status: DISCONTINUED | OUTPATIENT
Start: 2025-02-15 | End: 2025-02-15

## 2025-02-15 RX ORDER — ENOXAPARIN SODIUM 100 MG/ML
40 INJECTION SUBCUTANEOUS DAILY
Status: DISCONTINUED | OUTPATIENT
Start: 2025-02-15 | End: 2025-02-16 | Stop reason: HOSPADM

## 2025-02-15 RX ORDER — METOCLOPRAMIDE HYDROCHLORIDE 5 MG/ML
10 INJECTION INTRAMUSCULAR; INTRAVENOUS ONCE
Status: COMPLETED | OUTPATIENT
Start: 2025-02-15 | End: 2025-02-15

## 2025-02-15 RX ORDER — ONDANSETRON 2 MG/ML
4 INJECTION INTRAMUSCULAR; INTRAVENOUS EVERY 6 HOURS PRN
Status: DISCONTINUED | OUTPATIENT
Start: 2025-02-15 | End: 2025-02-16 | Stop reason: HOSPADM

## 2025-02-15 RX ORDER — ACETAMINOPHEN 325 MG/1
650 TABLET ORAL EVERY 6 HOURS PRN
Status: DISCONTINUED | OUTPATIENT
Start: 2025-02-15 | End: 2025-02-16 | Stop reason: HOSPADM

## 2025-02-15 RX ORDER — AZELASTINE 1 MG/ML
1 SPRAY, METERED NASAL 2 TIMES DAILY
Status: DISCONTINUED | OUTPATIENT
Start: 2025-02-15 | End: 2025-02-16 | Stop reason: HOSPADM

## 2025-02-15 RX ORDER — BUSPIRONE HYDROCHLORIDE 15 MG/1
7.5 TABLET ORAL 3 TIMES DAILY
Status: DISCONTINUED | OUTPATIENT
Start: 2025-02-15 | End: 2025-02-16 | Stop reason: HOSPADM

## 2025-02-15 RX ORDER — SIMETHICONE 80 MG
80 TABLET,CHEWABLE ORAL 4 TIMES DAILY PRN
Status: DISCONTINUED | OUTPATIENT
Start: 2025-02-15 | End: 2025-02-16 | Stop reason: HOSPADM

## 2025-02-15 RX ORDER — NALTREXONE HYDROCHLORIDE 50 MG/1
50 TABLET, FILM COATED ORAL DAILY
Status: DISCONTINUED | OUTPATIENT
Start: 2025-02-15 | End: 2025-02-16 | Stop reason: HOSPADM

## 2025-02-15 RX ORDER — IBUPROFEN 600 MG/1
600 TABLET, FILM COATED ORAL EVERY 6 HOURS PRN
Status: DISCONTINUED | OUTPATIENT
Start: 2025-02-15 | End: 2025-02-16 | Stop reason: HOSPADM

## 2025-02-15 RX ORDER — MAGNESIUM HYDROXIDE/ALUMINUM HYDROXICE/SIMETHICONE 120; 1200; 1200 MG/30ML; MG/30ML; MG/30ML
30 SUSPENSION ORAL EVERY 6 HOURS PRN
Status: DISCONTINUED | OUTPATIENT
Start: 2025-02-15 | End: 2025-02-16 | Stop reason: HOSPADM

## 2025-02-15 RX ORDER — DIPHENHYDRAMINE HYDROCHLORIDE AND LIDOCAINE HYDROCHLORIDE AND ALUMINUM HYDROXIDE AND MAGNESIUM HYDRO
10 KIT EVERY 4 HOURS PRN
Status: DISCONTINUED | OUTPATIENT
Start: 2025-02-15 | End: 2025-02-16 | Stop reason: HOSPADM

## 2025-02-15 RX ORDER — ONDANSETRON 2 MG/ML
4 INJECTION INTRAMUSCULAR; INTRAVENOUS ONCE
Status: COMPLETED | OUTPATIENT
Start: 2025-02-15 | End: 2025-02-15

## 2025-02-15 RX ORDER — PHENOBARBITAL 64.8 MG/1
64.8 TABLET ORAL ONCE
Status: COMPLETED | OUTPATIENT
Start: 2025-02-15 | End: 2025-02-15

## 2025-02-15 RX ORDER — SODIUM CHLORIDE, SODIUM LACTATE, POTASSIUM CHLORIDE, CALCIUM CHLORIDE 600; 310; 30; 20 MG/100ML; MG/100ML; MG/100ML; MG/100ML
100 INJECTION, SOLUTION INTRAVENOUS CONTINUOUS
Status: DISPENSED | OUTPATIENT
Start: 2025-02-15 | End: 2025-02-15

## 2025-02-15 RX ORDER — ESCITALOPRAM OXALATE 10 MG/1
10 TABLET ORAL DAILY
Status: DISCONTINUED | OUTPATIENT
Start: 2025-02-15 | End: 2025-02-16 | Stop reason: HOSPADM

## 2025-02-15 RX ORDER — DIAZEPAM 10 MG/2ML
10 INJECTION, SOLUTION INTRAMUSCULAR; INTRAVENOUS ONCE
Status: COMPLETED | OUTPATIENT
Start: 2025-02-15 | End: 2025-02-15

## 2025-02-15 RX ORDER — PROPRANOLOL HCL 20 MG
40 TABLET ORAL 3 TIMES DAILY
Status: DISCONTINUED | OUTPATIENT
Start: 2025-02-15 | End: 2025-02-16 | Stop reason: HOSPADM

## 2025-02-15 RX ORDER — MAGNESIUM HYDROXIDE/ALUMINUM HYDROXICE/SIMETHICONE 120; 1200; 1200 MG/30ML; MG/30ML; MG/30ML
30 SUSPENSION ORAL ONCE
Status: COMPLETED | OUTPATIENT
Start: 2025-02-15 | End: 2025-02-15

## 2025-02-15 RX ORDER — GABAPENTIN 300 MG/1
300 CAPSULE ORAL EVERY 8 HOURS PRN
Status: DISCONTINUED | OUTPATIENT
Start: 2025-02-15 | End: 2025-02-16 | Stop reason: HOSPADM

## 2025-02-15 RX ORDER — DOCUSATE SODIUM 100 MG/1
100 CAPSULE, LIQUID FILLED ORAL 2 TIMES DAILY
Status: DISCONTINUED | OUTPATIENT
Start: 2025-02-15 | End: 2025-02-16 | Stop reason: HOSPADM

## 2025-02-15 RX ORDER — MIRTAZAPINE 15 MG/1
15 TABLET, FILM COATED ORAL
Status: DISCONTINUED | OUTPATIENT
Start: 2025-02-15 | End: 2025-02-16 | Stop reason: HOSPADM

## 2025-02-15 RX ORDER — LIDOCAINE HYDROCHLORIDE 20 MG/ML
15 SOLUTION OROPHARYNGEAL ONCE
Status: COMPLETED | OUTPATIENT
Start: 2025-02-15 | End: 2025-02-15

## 2025-02-15 RX ADMIN — PHENOBARBITAL 64.8 MG: 64.8 TABLET ORAL at 09:46

## 2025-02-15 RX ADMIN — PHENOBARBITAL SODIUM 130 MG: 130 INJECTION INTRAMUSCULAR; INTRAVENOUS at 16:03

## 2025-02-15 RX ADMIN — IBUPROFEN 600 MG: 600 TABLET, FILM COATED ORAL at 14:23

## 2025-02-15 RX ADMIN — THIAMINE HYDROCHLORIDE: 100 INJECTION, SOLUTION INTRAMUSCULAR; INTRAVENOUS at 02:59

## 2025-02-15 RX ADMIN — MAGNESIUM SULFATE HEPTAHYDRATE 2 G: 40 INJECTION, SOLUTION INTRAVENOUS at 03:00

## 2025-02-15 RX ADMIN — DIAZEPAM 10 MG: 5 INJECTION INTRAMUSCULAR; INTRAVENOUS at 08:40

## 2025-02-15 RX ADMIN — PROPRANOLOL HYDROCHLORIDE 40 MG: 20 TABLET ORAL at 09:27

## 2025-02-15 RX ADMIN — ACETAMINOPHEN 650 MG: 325 TABLET, FILM COATED ORAL at 12:38

## 2025-02-15 RX ADMIN — PHENOBARBITAL SODIUM 130 MG: 130 INJECTION INTRAMUSCULAR; INTRAVENOUS at 12:48

## 2025-02-15 RX ADMIN — MIRTAZAPINE 15 MG: 15 TABLET, FILM COATED ORAL at 21:48

## 2025-02-15 RX ADMIN — PROPRANOLOL HYDROCHLORIDE 40 MG: 20 TABLET ORAL at 15:48

## 2025-02-15 RX ADMIN — DIPHENHYDRAMINE HYDROCHLORIDE AND LIDOCAINE HYDROCHLORIDE AND ALUMINUM HYDROXIDE AND MAGNESIUM HYDRO 10 ML: KIT at 12:48

## 2025-02-15 RX ADMIN — METOCLOPRAMIDE 10 MG: 5 INJECTION, SOLUTION INTRAMUSCULAR; INTRAVENOUS at 04:55

## 2025-02-15 RX ADMIN — PHENOBARBITAL SODIUM 650 MG: 130 INJECTION INTRAMUSCULAR; INTRAVENOUS at 04:12

## 2025-02-15 RX ADMIN — GABAPENTIN 300 MG: 300 CAPSULE ORAL at 21:48

## 2025-02-15 RX ADMIN — PROPRANOLOL HYDROCHLORIDE 40 MG: 20 TABLET ORAL at 20:21

## 2025-02-15 RX ADMIN — THIAMINE HYDROCHLORIDE 500 MG: 100 INJECTION, SOLUTION INTRAMUSCULAR; INTRAVENOUS at 21:48

## 2025-02-15 RX ADMIN — BUSPIRONE HYDROCHLORIDE 7.5 MG: 15 TABLET ORAL at 09:27

## 2025-02-15 RX ADMIN — SODIUM CHLORIDE 1000 ML: 0.9 INJECTION, SOLUTION INTRAVENOUS at 03:00

## 2025-02-15 RX ADMIN — ALUMINUM HYDROXIDE, MAGNESIUM HYDROXIDE, AND SIMETHICONE 30 ML: 200; 200; 20 SUSPENSION ORAL at 14:23

## 2025-02-15 RX ADMIN — AZELASTINE HYDROCHLORIDE 1 SPRAY: 137 SPRAY, METERED NASAL at 15:48

## 2025-02-15 RX ADMIN — BUSPIRONE HYDROCHLORIDE 7.5 MG: 15 TABLET ORAL at 20:21

## 2025-02-15 RX ADMIN — SODIUM CHLORIDE, SODIUM LACTATE, POTASSIUM CHLORIDE, AND CALCIUM CHLORIDE 100 ML/HR: .6; .31; .03; .02 INJECTION, SOLUTION INTRAVENOUS at 08:33

## 2025-02-15 RX ADMIN — ESCITALOPRAM OXALATE 10 MG: 10 TABLET ORAL at 09:27

## 2025-02-15 RX ADMIN — ALUMINUM HYDROXIDE, MAGNESIUM HYDROXIDE, AND DIMETHICONE 30 ML: 200; 20; 200 SUSPENSION ORAL at 03:07

## 2025-02-15 RX ADMIN — KETOROLAC TROMETHAMINE 15 MG: 30 INJECTION, SOLUTION INTRAMUSCULAR; INTRAVENOUS at 03:05

## 2025-02-15 RX ADMIN — PHENOBARBITAL SODIUM 130 MG: 130 INJECTION INTRAMUSCULAR; INTRAVENOUS at 08:38

## 2025-02-15 RX ADMIN — BUSPIRONE HYDROCHLORIDE 7.5 MG: 15 TABLET ORAL at 15:48

## 2025-02-15 RX ADMIN — ONDANSETRON 4 MG: 2 INJECTION INTRAMUSCULAR; INTRAVENOUS at 09:46

## 2025-02-15 RX ADMIN — THIAMINE HYDROCHLORIDE 500 MG: 100 INJECTION, SOLUTION INTRAMUSCULAR; INTRAVENOUS at 14:22

## 2025-02-15 RX ADMIN — PHENOBARBITAL 64.8 MG: 64.8 TABLET ORAL at 14:23

## 2025-02-15 RX ADMIN — LIDOCAINE HYDROCHLORIDE 15 ML: 20 SOLUTION ORAL at 03:08

## 2025-02-15 RX ADMIN — THIAMINE HYDROCHLORIDE 500 MG: 100 INJECTION, SOLUTION INTRAMUSCULAR; INTRAVENOUS at 09:28

## 2025-02-15 RX ADMIN — NALTREXONE HYDROCHLORIDE 50 MG: 50 TABLET, FILM COATED ORAL at 15:48

## 2025-02-15 RX ADMIN — FAMOTIDINE 40 MG: 10 INJECTION, SOLUTION INTRAVENOUS at 02:59

## 2025-02-15 RX ADMIN — ONDANSETRON 4 MG: 2 INJECTION INTRAMUSCULAR; INTRAVENOUS at 02:59

## 2025-02-15 RX ADMIN — IBUPROFEN 600 MG: 600 TABLET, FILM COATED ORAL at 21:48

## 2025-02-15 NOTE — ASSESSMENT & PLAN NOTE
Continue supportive care measures, including airway monitoring, head of bed elevation, aspiration precautions, cardiac telemetry, and continuous pulse oximetry.    Initiate SEWS protocol with symptom-triggered, as needed phenobarbital. Has received 845 mg of phenobarbital thus far.    Continue phenobarbital as indicated with recommend maximum total dose of 2 grams.

## 2025-02-15 NOTE — PLAN OF CARE
Problem: SUBSTANCE USE/ABUSE  Goal: By discharge, will develop insight into their chemical dependency and sustain motivation to continue in recovery  Description: INTERVENTIONS:  - Attends all daily group sessions and scheduled AA groups  - Actively practices coping skills through participation in the therapeutic community and adherence to program rules  - Reviews and completes assignments from individual treatment plan  - Assist patient development of understanding of their personal cycle of addiction and relapse triggers  Outcome: Not Progressing  Goal: By discharge, patient will have ongoing treatment plan addressing chemical dependency  Description: INTERVENTIONS:  - Assist patient with resources and/or appointments for ongoing recovery based living  Outcome: Not Progressing

## 2025-02-15 NOTE — LETTER
[facility] [address] [place] INPATIENT DETOX  [address] [facility] [address]  [phone]  Dept: [phone]    February 16, 2025     Patient: Juan Luu   YOB: 1990   Date of Visit: 2/15/2025       To Whom it May Concern:    Juan Luu is under my professional care. He was seen in the hospital from 2/14/25 to 02/16/25. He may return to work on 2/16/2025  with the following limitations no operating heavy machinery or driving for at least 24 hours.  He will need close outpatient follow-up with limitations to be determined following those evaluations. .    If you have any questions or concerns, please don't hesitate to call.         Sincerely,      //Digital Signature//    Hugo Stratton, DO

## 2025-02-15 NOTE — H&P
H&P - Hospitalist   Name: Juan Luu 35 y.o. male I MRN: [id number]  Unit/Bed#: 5T DETOX 512-01 I Date of Admission: 2/15/2025   Date of Service: 2/15/2025 I Hospital Day: 0         Assessment and Plan  * Alcohol withdrawal syndrome with complication (HCC)  Assessment & Plan  History of alcohol abuse disorder which is severe  Patient relapsed and is currently drinking  Previous admission to [place] detox January 8, 2025 for alcohol withdrawal for which she received phenobarbital and was discharged on [facility]  Now with worsening symptoms of agitation and high risk for alcohol withdrawal  Continue SEWS  Management of alcohol withdrawal symptoms per the toxicology service  Patient is medically complex and based on previous admission requires 24-hour monitoring to prevent clinical deterioration and death    Elevated liver enzymes  Assessment & Plan  Secondary to alcohol use      Alcoholic ketoacidosis  Assessment & Plan  Anion gap of 22  Continue IV fluid and repeat BMP in a.m.    Unspecified mood (affective) disorder (HCC)  Assessment & Plan  Continue Remeron at bedtime  Continue gabapentin as needed  Continue Lexapro  Continue BuSpar 3 times daily    Alcohol use disorder, severe, dependence (HCC)  Assessment & Plan  History of chronic heavy alcohol use with multiple ER visits as well as previous visits to withdrawal unit   1 L -1.5 of vodka daily  History of treatment with naltrexone in the past I did receive Vivitrol injection November 14, 2024  Continue thiamine, folic acid and multivitamin  CRS consultation to assist with aftercare resources        Code Status: Level 1 - Full Code     VTE Prophylaxis: Enoxaparin (Lovenox)  / sequential compression device     Discussion with family: Patient declines update  Anticipated Length of Stay:  Patient will be admitted on an Inpatient basis with an anticipated length of stay of greater than 2 midnights.   Justification for Hospital Stay: Alcohol withdrawal  syndrome with complication (HCC)     Total Time for Visit, including Counseling / Coordination of Care: 76 minutes.  Greater than 50% of this total time spent on direct patient counseling and coordination of care.    Chief Complaint:     No chief complaint on file.      History of Present Illness:    Juan Luu is a 35 y.o. male who presents with alcohol withdrawal symptoms with desire for alcohol detoxification as well as management of withdrawal symptoms.  Patient has a past medical history of alcohol disuse disorder which is severe, anxiety disorder, PTSD as well as longstanding history of alcohol use with previous admissions for detoxification with phenobarbital administration.  The patient has recent escalation of alcohol intake drinking a minimum of 1-1-1/2 bottles of vodka daily.  He initially presented to the [facility].  He was noted to have significant psychomotor agitation and was given phenobarbital in the emergency room to help control impending withdrawal symptoms.  He was agreeable to transport to [facility] for detoxification.    On my evaluation the patient denies any chest pain, no shortness of breath or difficulty breathing.  Denies any nausea vomiting.  He denies any medication changes.     Review of Systems:    A complete and comprehensive 14 point organ system review was performed and all other systems are negative other than stated above in the HPI    Past Medical and Surgical History:     Past Medical History:   Diagnosis Date    Alcohol use disorder     Alcohol withdrawal syndrome (HCC)     Anxiety     Depression     PTSD (post-traumatic stress disorder)     Tachycardia        Past Surgical History:   Procedure Laterality Date    LUMBAR LAMINECTOMY         Meds/Allergies:    Prior to Admission medications    Medication Sig Start Date End Date Taking? Authorizing Provider   busPIRone (BUSPAR) 7.5 mg tablet Take 1 tablet (7.5 mg  "total) by mouth 3 (three) times a day 1/11/25 2/10/25  Viviana Wellington PA-C   escitalopram (LEXAPRO) 10 mg tablet Take 1 tablet (10 mg total) by mouth daily 1/11/25 2/10/25  Viviana Wellington PA-C   folic acid (FOLVITE) 1 mg tablet Take 1 tablet (1 mg total) by mouth daily 11/15/24   Whitney Lemus PA-C   gabapentin (NEURONTIN) 300 mg capsule Take 1 capsule (300 mg total) by mouth every 8 (eight) hours as needed (anxiety) 1/11/25 2/10/25  Viviana Wellington PA-C   mirtazapine (REMERON) 15 mg tablet Take 1 tablet (15 mg total) by mouth daily at bedtime 1/11/25 2/10/25  Viviana Wellington PA-C   naltrexone (REVIA) 50 mg tablet Take 1 tablet (50 mg total) by mouth daily 1/11/25 2/10/25  Viviana Wellington PA-C   propranolol (INDERAL) 40 mg tablet Take 1 tablet (40 mg total) by mouth 3 (three) times a day 1/11/25 2/10/25  Viviana Wellington PA-C   thiamine 100 MG tablet Take 1 tablet (100 mg total) by mouth daily 11/15/24   Whitney Lemus PA-C     I have reviewed home medications with patient personally.    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    Physical Exam:     Vitals:   Blood Pressure: 142/78 (02/15/25 0926)  Pulse: 94 (02/15/25 0926)  Temperature: 98.3 °F (36.8 °C) (02/15/25 0926)  Temp Source: Temporal (02/15/25 1045)  Respirations: 20 (02/15/25 0926)  Height: 5' 11\" (180.3 cm) (02/15/25 0801)  Weight - Scale: 85.9 kg (189 lb 6 oz) (02/15/25 0801)  SpO2: 94 % (02/15/25 0926)      General: well appearing, no acute " distress  HEENT: atraumatic, PERRLA, moist mucosa, normal pharynx, normal tonsils and adenoids, normal tongue, no fluid in sinuses  Neck: Trachea midline, no carotid bruit, no masses  Respiratory: normal chest wall expansion, CTA B, no r/r/w, no rubs  Cardiovascular: RRR, no m/r/g, Normal S1 and S2  Abdomen: Soft, non-tender, non-distended, normal bowel sounds in all quadrants, no hepatosplenomegaly, no tympany  Rectal: deferred  Musculoskeletal: normal ROM in upper and lower extremities  Integumentary: warm, dry, and pink, with no rash, purpura, or petechia  Heme/Lymph: no lymphadenopathy, no bruises  Neurological: Cranial Nerves II-XII grossly intact  Psychiatric: Anxious, tremulous  Additional Data:     Lab Results: Laboratory studies reviewed for today    Results from last 7 days   Lab Units 02/15/25  0303   WBC Thousand/uL 14.91*   HEMOGLOBIN g/dL 15.0   HEMATOCRIT % 45.2   PLATELETS Thousands/uL 162   LYMPHO PCT % 12*   MONO PCT % 3*   EOS PCT % 0     Results from last 7 days   Lab Units 02/15/25  0303   SODIUM mmol/L 134*   POTASSIUM mmol/L 5.2   CHLORIDE mmol/L 90*   CO2 mmol/L 22   BUN mg/dL 15   CREATININE mg/dL 0.94   ANION GAP mmol/L 22*   CALCIUM mg/dL 8.6   ALBUMIN g/dL 4.8   TOTAL BILIRUBIN mg/dL 0.49   ALK PHOS U/L 110*   ALT U/L 86*   AST U/L 93*   GLUCOSE RANDOM mg/dL 105                     Results from last 7 days   Lab Units 02/15/25  0303   HS TNI 0HR ng/L 7       Imaging: Results Review Statement: I personally reviewed the following image studies/reports in PACS and discussed pertinent findings with Radiology: chest xray. My interpretation of the radiology images/reports is: Increased vasculature markings, no acute infiltrate.    No orders to display       EKG, Pathology, and Other Studies Reviewed on Admission:   Previous detoxification correspondence    Allscripts / Epic Records Reviewed: Yes    ** Please Note: This note was completed in part utilizing [employer] Dragon Medical One Software.   Grammatical errors, random word insertions, spelling mistakes, and incomplete sentences may be an occasional consequence of this system secondary to software limitations, ambient noise, and hardware issues.  If you have any questions or concerns about the content, text, or information contained within the body of this dictation, please contact the provider for clarification.**

## 2025-02-15 NOTE — ASSESSMENT & PLAN NOTE
History of chronic heavy alcohol use with multiple ER visits as well as previous visits to withdrawal unit   1 L -1.5 of vodka daily  Continue thiamine, folic acid and multivitamin  CRS consultation to assist with aftercare resources

## 2025-02-15 NOTE — ED CARE HANDOFF
[facility] (Contact: Lina) confirmed pt. to have been assessed by ED staff and to have been determined as needing admission with and pending transfer to [facility] due to Ketoacidosis on 2/15/2025.

## 2025-02-15 NOTE — LETTER
[address] INPATIENT DETOX  [address] [facility] [address]  [phone]  Dept: [phone]    February 16, 2025     Patient: Juan Luu   YOB: 1990   Date of Visit: 2/15/2025       To Whom it May Concern:    Juan Luu is under my professional care. He was seen in the hospital from 2/15/2025 to 02/16/25. He {Return to school/sport/work:32902}.    If you have any questions or concerns, please don't hesitate to call.         Sincerely,          Malissa Jara

## 2025-02-15 NOTE — ASSESSMENT & PLAN NOTE
Initiate daily thiamine, folate, and multivitamin supplementation.    Patient is interested in naltrexone and has been on IM naltrexone previously. Denies recent opioid use. Will start naltrexone 50 mg daily with plan for IM naltrexone 380 mg prior to discharge.    Recommend CRS consult for recovery support.    Recommend case management consult for disposition planning; patient is contemplating inpatient rehabilitation.

## 2025-02-15 NOTE — ASSESSMENT & PLAN NOTE
Continue Remeron at bedtime  Continue gabapentin as needed  Continue Lexapro  Continue BuSpar 3 times daily

## 2025-02-15 NOTE — LETTER
[facility] [address] INPATIENT DETOX  [address] [facility] [address]  [phone]  Dept: [phone]    February 16, 2025     Patient: Juan Luu   YOB: 1990   Date of Visit: 2/15/2025       To Whom it May Concern:    Juan Luu is under my professional care. He was seen in the hospital from 2/15/2025 to 02/16/25. He may return to work on 2/16/2025 with the following limitations no operating heavy machinery and caution with driving for the next 24 to 48 hours.  I recommended he continue close follow-up with his scheduled providers .    If you have any questions or concerns, please don't hesitate to call.         Sincerely,      //Digital Signature//    Hugo Stratton, DO

## 2025-02-15 NOTE — ED PROVIDER NOTES
Time reflects when diagnosis was documented in both MDM as applicable and the Disposition within this note       Time User Action Codes Description Comment    2/15/2025  2:24 AM Alonso Chen Add [F10.929] Alcohol intoxication (HCC)     2/15/2025  2:24 AM Alonso Chen Add [F10.10] Alcohol abuse           ED Disposition       ED Disposition   Transfer to Another Facility-In Network    Condition   --    Date/Time   Sat Feb 15, 2025  3:55 AM    Comment   Juan Luu should be transferred out to [facility] under Dr. Hugo Stratton.               Assessment & Plan       Medical Decision Making  Obtain blood work, EKG, chest x-ray  Give IV fluids, banana bag, antiemetics, pain medication  Consult warm handoff for detox placement    Patient is alcohol breath test was elevated.  Patient started to have some jittery sensation while in the emergency department.  Phenobarb started to control withdrawal symptoms.  At this time patient accepted to [facility] detox unit for further evaluation and management.  Patient agreeable to transport.  EMTALA completed by patient.  Patient stable at time of transport.    Amount and/or Complexity of Data Reviewed  Labs: ordered.  Radiology: ordered.    Risk  OTC drugs.  Prescription drug management.        ED Course as of 02/15/25 0553   Sat Feb 15, 2025   0400 Patient accepted to [facility] detox under Dr. Hugo Stratton.  EMTALA completed by patient.  Patient currently awaiting transport.   0456 Transport time scheduled for 7 AM.       Medications   sodium chloride 0.9 % bolus 1,000 mL (1,000 mL Intravenous New Bag 2/15/25 0300)   magnesium sulfate 2 g/50 mL IVPB (premix) 2 g (0 g Intravenous Stopped 2/15/25 0400)   ondansetron (ZOFRAN) injection 4 mg (4 mg Intravenous Given 2/15/25 0259)   Famotidine (PF) (PEPCID) injection 40 mg (40 mg Intravenous Given 2/15/25 0259)   thiamine (VITAMIN B1) 100 mg, folic acid 1 mg in sodium chloride 0.9 % 50 mL IVPB (  Intravenous Stopped 2/15/25 0358)   ketorolac (TORADOL) injection 15 mg (15 mg Intravenous Given 2/15/25 0305)   aluminum-magnesium hydroxide-simethicone (MAALOX) oral suspension 30 mL (30 mL Oral Given 2/15/25 0307)   Lidocaine Viscous HCl (XYLOCAINE) 2 % mucosal solution 15 mL (15 mL Swish & Spit Given 2/15/25 0308)   PHENobarbital 650 mg in sodium chloride 0.9 % 100 mL IVPB (0 mg Intravenous Stopped 2/15/25 0442)   metoclopramide (REGLAN) injection 10 mg ( Intravenous Canceled Entry 2/15/25 0500)       ED Risk Strat Scores                            SBIRT 20yo+      Flowsheet Row Most Recent Value   Initial Alcohol Screen: US AUDIT-C     1. How often do you have a drink containing alcohol? 6 Filed at: 02/15/2025 0206   2. How many drinks containing alcohol do you have on a typical day you are drinking?  6 Filed at: 02/15/2025 0206   3a. Male UNDER 65: How often do you have five or more drinks on one occasion? 6 Filed at: 02/15/2025 0206   Audit-C Score 18 Filed at: 02/15/2025 0206   Full Alcohol Screen: US AUDIT    4. How often during the last year have you found that you were not able to stop drinking once you had started? 4 Filed at: 02/15/2025 0206   5. How often during past year have you failed to do what was normally expected of you because of drinking?  4 Filed at: 02/15/2025 0206   6. How often in past year have you needed a first drink in the morning to get yourself going after a heavy drinking session?  4 Filed at: 02/15/2025 0206   7. How often in past year have you had feeling of guilt or remorse after drinking?  4 Filed at: 02/15/2025 0206   8. How often in past year have you been unable to remember what happened night before because you had been drinking?  3 Filed at: 02/15/2025 0206   9. Have you or someone else been injured as a result of your drinking?  0 Filed at: 02/15/2025 0206   10. Has a relative, friend, doctor or other health worker been concerned about your drinking and suggested you cut  down?  4 Filed at: 02/15/2025 0206   AUDIT Total Score 41 Filed at: 02/15/2025 0206   JEANNETTE: How many times in the past year have you...    Used an illegal drug or used a prescription medication for non-medical reasons? Never Filed at: 02/15/2025 0206                            History of Present Illness       Chief Complaint   Patient presents with    Alcohol Problem     From home via EMS for ETOH withdrawal. Pt states having last drink 24 hours ago and is now having N/V, dizziness,  and decreased appetite. Pt states that he wants to explore detox options.Denies SI/HI.        Past Medical History:   Diagnosis Date    Alcohol use disorder     Alcohol withdrawal syndrome (HCC)     Anxiety     Depression     PTSD (post-traumatic stress disorder)     Tachycardia       Past Surgical History:   Procedure Laterality Date    LUMBAR LAMINECTOMY        History reviewed. No pertinent family history.   Social History     Tobacco Use    Smoking status: Never     Passive exposure: Past    Smokeless tobacco: Never   Vaping Use    Vaping status: Never Used   Substance Use Topics    Alcohol use: Yes     Comment: 1-1.5 L vodka or beer daily    Drug use: Never      E-Cigarette/Vaping    E-Cigarette Use Never User       E-Cigarette/Vaping Substances    Nicotine No     THC No     CBD No     Flavoring No     Other No     Unknown No       I have reviewed and agree with the history as documented.     35-year-old male with past history of heavy alcohol use, PTSD, depression, anxiety, presents to the ED for evaluation of alcohol withdrawal.  Patient states that he drinks about 8 bottles of wine on a daily basis.  Patient states that his last drink was over 24 hours ago.  Patient is now starting to shake and become jittery.  Patient denies any previous history of alcohol withdrawal seizures.  Patient has had visual hallucinations with his alcohol withdrawals.  Patient was admitted to multiple times at [place] detox unit for alcohol  withdrawals.  Patient denies any suicidal or homicidal ideations.  Patient is requesting inpatient alcohol detox again.      History provided by:  Patient  Alcohol Problem  Associated symptoms: no abdominal pain, no chest pain, no cough, no ear pain, no fever, no rash, no shortness of breath, no sore throat and no vomiting        Review of Systems   Constitutional:  Negative for chills and fever.   HENT:  Negative for ear pain and sore throat.    Eyes:  Negative for pain and visual disturbance.   Respiratory:  Negative for cough and shortness of breath.    Cardiovascular:  Negative for chest pain and palpitations.   Gastrointestinal:  Negative for abdominal pain and vomiting.   Genitourinary:  Negative for dysuria and hematuria.   Musculoskeletal:  Negative for arthralgias and back pain.   Skin:  Negative for color change and rash.   Neurological:  Negative for seizures and syncope.   All other systems reviewed and are negative.          Objective       ED Triage Vitals [02/15/25 0205]   Temperature Pulse Blood Pressure Respirations SpO2 Patient Position - Orthostatic VS   98.5 °F (36.9 °C) (!) 118 145/73 18 93 % Lying      Temp Source Heart Rate Source BP Location FiO2 (%) Pain Score    Oral Monitor Left arm -- 10 - Worst Possible Pain      Vitals      Date and Time Temp Pulse SpO2 Resp BP Pain Score FACES Pain Rating User   02/15/25 0500 -- 113 94 % 20 130/74 -- -- NB   02/15/25 0305 -- -- -- -- -- 10 - Worst Possible Pain -- SV   02/15/25 0205 98.5 °F (36.9 °C) 118 93 % 18 145/73 10 - Worst Possible Pain -- CA            Physical Exam  Vitals and nursing note reviewed.   Constitutional:       General: He is not in acute distress.     Appearance: He is well-developed.   HENT:      Head: Normocephalic and atraumatic.   Eyes:      Conjunctiva/sclera: Conjunctivae normal.   Cardiovascular:      Rate and Rhythm: Regular rhythm. Tachycardia present.      Heart sounds: No murmur heard.  Pulmonary:      Effort: Pulmonary  effort is normal. No respiratory distress.      Breath sounds: Normal breath sounds.   Abdominal:      Palpations: Abdomen is soft.      Tenderness: There is no abdominal tenderness.   Musculoskeletal:         General: No swelling.      Cervical back: Neck supple.   Skin:     General: Skin is warm and dry.      Capillary Refill: Capillary refill takes less than 2 seconds.   Neurological:      Mental Status: He is alert.   Psychiatric:         Mood and Affect: Mood normal.         Results Reviewed       Procedure Component Value Units Date/Time    Manual Differential(PHLEBS Do Not Order) [[id number]]  (Abnormal) Collected: 02/15/25 0303    Lab Status: Final result Specimen: Blood from Hand, Left Updated: 02/15/25 0436     Segmented % 82 %      Lymphocytes % 12 %      Monocytes % 3 %      Eosinophils % 0 %      Basophils % 0 %      Atypical Lymphocytes % 3 %      Absolute Neutrophils 12.23 Thousand/uL      Absolute Lymphocytes 2.24 Thousand/uL      Absolute Monocytes 0.45 Thousand/uL      Absolute Eosinophils 0.00 Thousand/uL      Absolute Basophils 0.00 Thousand/uL      Total Counted --     RBC Morphology Present     Platelet Estimate Adequate     Anisocytosis Present    HS Troponin I 4hr [[id number]]     Lab Status: No result Specimen: Blood     FLU/COVID Rapid Antigen (30 min. TAT) - Preferred screening test in ED [[id number]]  (Normal) Collected: 02/15/25 0248    Lab Status: Final result Specimen: Nares from Nose Updated: 02/15/25 0341     SARS COV Rapid Antigen Negative     Influenza A Rapid Antigen Negative     Influenza B Rapid Antigen Negative    Narrative:      This test has been performed using the Quidel Kim 2 FLU+SARS Antigen test under the Emergency Use Authorization (EUA). This test has been validated by the  and verified by the performing laboratory. The Kim uses lateral flow immunofluorescent sandwich assay to detect SARS-COV, Influenza A and Influenza B Antigen.     The Quidel Kim 2  SARS Antigen test does not differentiate between SARS-CoV and SARS-CoV-2.     Negative results are presumptive and may be confirmed with a molecular assay, if necessary, for patient management. Negative results do not rule out SARS-CoV-2 or influenza infection and should not be used as the sole basis for treatment or patient management decisions. A negative test result may occur if the level of antigen in a sample is below the limit of detection of this test.     Positive results are indicative of the presence of viral antigens, but do not rule out bacterial infection or co-infection with other viruses.     All test results should be used as an adjunct to clinical observations and other information available to the provider.    FOR PEDIATRIC PATIENTS - copy/paste COVID Guidelines URL to browser: [email or web address]    HS Troponin 0hr (reflex protocol) [[id number]]  (Normal) Collected: 02/15/25 0303    Lab Status: Final result Specimen: Blood from Hand, Left Updated: 02/15/25 0333     hs TnI 0hr 7 ng/L     HS Troponin I 2hr [[id number]]     Lab Status: No result Specimen: Blood     Comprehensive metabolic panel [[id number]]  (Abnormal) Collected: 02/15/25 0303    Lab Status: Final result Specimen: Blood from Hand, Left Updated: 02/15/25 0326     Sodium 134 mmol/L      Potassium 5.2 mmol/L      Chloride 90 mmol/L      CO2 22 mmol/L      ANION GAP 22 mmol/L      BUN 15 mg/dL      Creatinine 0.94 mg/dL      Glucose 105 mg/dL      Calcium 8.6 mg/dL      AST 93 U/L      ALT 86 U/L      Alkaline Phosphatase 110 U/L      Total Protein 7.8 g/dL      Albumin 4.8 g/dL      Total Bilirubin 0.49 mg/dL      eGFR 104 ml/min/1.73sq m     Narrative:      National Kidney Disease Foundation guidelines for Chronic Kidney Disease (CKD):     Stage 1 with normal or high GFR (GFR > 90 mL/min/1.73 square meters)    Stage 2 Mild CKD (GFR = 60-89 mL/min/1.73 square meters)    Stage 3A  Moderate CKD (GFR = 45-59 mL/min/1.73 square meters)    Stage 3B Moderate CKD (GFR = 30-44 mL/min/1.73 square meters)    Stage 4 Severe CKD (GFR = 15-29 mL/min/1.73 square meters)    Stage 5 End Stage CKD (GFR <15 mL/min/1.73 square meters)  Note: GFR calculation is accurate only with a steady state creatinine    Lipase [[id number]]  (Normal) Collected: 02/15/25 0303    Lab Status: Final result Specimen: Blood from Hand, Left Updated: 02/15/25 0326     Lipase 40 u/L     Magnesium [[id number]]  (Normal) Collected: 02/15/25 0303    Lab Status: Final result Specimen: Blood from Hand, Left Updated: 02/15/25 0326     Magnesium 2.3 mg/dL     CBC and differential [[id number]]  (Abnormal) Collected: 02/15/25 0303    Lab Status: Final result Specimen: Blood from Hand, Left Updated: 02/15/25 0314     WBC 14.91 Thousand/uL      RBC 5.08 Million/uL      Hemoglobin 15.0 g/dL      Hematocrit 45.2 %      MCV 89 fL      MCH 29.5 pg      MCHC 33.2 g/dL      RDW 14.7 %      MPV 9.1 fL      Platelets 162 Thousands/uL     Narrative:      This is an appended report.  These results have been appended to a previously verified report.    POCT alcohol breath test [[id number]]  (Normal) Resulted: 02/15/25 0231    Lab Status: Final result Updated: 02/15/25 0231     EXTBreath Alcohol 0.305    UA w Reflex to Microscopic w Reflex to Culture [[id number]]     Lab Status: No result Specimen: Urine     Rapid drug screen, urine [[id number]]     Lab Status: No result Specimen: Urine             XR chest 1 view portable    (Results Pending)       ECG 12 Lead Documentation Only    Date/Time: 2/15/2025 2:23 AM    Performed by: Alonso Chen DO  Authorized by: Alonso Chen DO    Indications / Diagnosis:  Alcohol withdrawal  ECG reviewed by me, the ED Provider: yes    Patient location:  ED  Previous ECG:     Previous ECG:  Compared to current    Similarity:  Changes noted    Comparison to cardiac monitor: Yes    Interpretation:     Interpretation:  abnormal    Comments:      Sinus rhythm, rate 116, normal axis, normal intervals, no acute ST/T wave abnormalities noted, sinus tachycardia noted that is new compared to previous EKG.    Critical Care Time Statement: Upon my evaluation, this patient had a high probability of imminent or life-threatening deterioration due to alcohol intoxication and alcohol withdrawal, which required my direct attention, intervention, and personal management.  I spent a total of 65 minutes directly providing critical care services, including interpretation of complex medical databases, evaluating for the presence of life-threatening injuries or illnesses, complex medical decision making (to support/prevent further life-threatening deterioration)., and interpretation of hemodynamic data. This time is exclusive of procedures, teaching, treating other patients, family meetings, and any prior time recorded by providers other than myself.       ED Medication and Procedure Management   Prior to Admission Medications   Prescriptions Last Dose Informant Patient Reported? Taking?   busPIRone (BUSPAR) 7.5 mg tablet   No No   Sig: Take 1 tablet (7.5 mg total) by mouth 3 (three) times a day   escitalopram (LEXAPRO) 10 mg tablet   No No   Sig: Take 1 tablet (10 mg total) by mouth daily   folic acid (FOLVITE) 1 mg tablet   No No   Sig: Take 1 tablet (1 mg total) by mouth daily   gabapentin (NEURONTIN) 300 mg capsule   No No   Sig: Take 1 capsule (300 mg total) by mouth every 8 (eight) hours as needed (anxiety)   mirtazapine (REMERON) 15 mg tablet   No No   Sig: Take 1 tablet (15 mg total) by mouth daily at bedtime   naltrexone (REVIA) 50 mg tablet   No No   Sig: Take 1 tablet (50 mg total) by mouth daily   propranolol (INDERAL) 40 mg tablet   No No   Sig: Take 1 tablet (40 mg total) by mouth 3 (three) times a day   thiamine 100 MG tablet   No No   Sig: Take 1 tablet (100 mg total) by mouth daily      Facility-Administered Medications: None      Patient's Medications   Discharge Prescriptions    No medications on file     No discharge procedures on file.  ED SEPSIS DOCUMENTATION   Time reflects when diagnosis was documented in both MDM as applicable and the Disposition within this note       Time User Action Codes Description Comment    2/15/2025  2:24 AM Alonso Chen [F10.929] Alcohol intoxication (HCC)     2/15/2025  2:24 AM Alonso Chen [F10.10] Alcohol abuse                  Alonso Chen DO  02/15/25 0554

## 2025-02-15 NOTE — ASSESSMENT & PLAN NOTE
History of alcohol abuse disorder which is severe  Patient relapsed and is currently drinking  Previous admission to [place] detox January 8, 2025 for alcohol withdrawal for which she received phenobarbital and was discharged on Re[facility]  Clinically improved, medically stabilized for rehab if needed  Patient states he will go to his job just for short-term disability to go to inpatient rehab  Received Vivitrol, provided follow-up

## 2025-02-15 NOTE — CONSULTS
Consultation - Medical Toxicology   Name: Juan Luu 35 y.o. male I MRN: [id number]  Unit/Bed#: 5T DETOX 512-01 I Date of Admission: 2/15/2025   Date of Service: 2/15/2025 I Hospital Day: 0   Inpatient consult to Toxicology  Consult performed by: Parvin Rodriguez MD  Consult ordered by: Hugo Stratton DO        Physician Requesting Evaluation: Hugo Stratton DO   Reason for Evaluation / Principal Problem: Alcohol withdrawal, alcohol use disorder    Assessment & Plan  Alcohol withdrawal syndrome with complication (HCC)  Continue supportive care measures, including airway monitoring, head of bed elevation, aspiration precautions, cardiac telemetry, and continuous pulse oximetry.    Initiate SEWS protocol with symptom-triggered, as needed phenobarbital. Has received 845 mg of phenobarbital thus far.    Continue phenobarbital as indicated with recommend maximum total dose of 2 grams.  Alcohol use disorder, severe, dependence (HCC)  Initiate daily thiamine, folate, and multivitamin supplementation.    Patient is interested in naltrexone and has been on IM naltrexone previously. Denies recent opioid use. Will start naltrexone 50 mg daily with plan for IM naltrexone 380 mg prior to discharge.    Recommend CRS consult for recovery support.    Recommend case management consult for disposition planning; patient is contemplating inpatient rehabilitation.    I have discussed the above management plan in detail with the primary service.   Medical Toxicology service will follow.    Please see additional teaching note below (if available):    For further questions, please contact the medical  on call via SecureChat between 8am and 9pm. If between 9pm and 8am, please reach out to the [facility]ison [facility] at [phone].     History of Present Illness   Juan Luu is a 35 y.o. year old male who presents with acute alcohol intoxication and request for medically supervised alcohol  withdrawal management. Patient endorses drinking 6-8 bottles of wine daily with last alcohol intake yesterday prior to ED evaluation. Denies other substance use including benzodiazepines and tobacco. He is interested in IM naltrexone and has tolerated it previously. He is considering inpatient rehabilitation. Currently endorses anxiety/restlessness, diaphoresis, headache.    Review of Systems   Constitutional:  Positive for chills and diaphoresis.   HENT:  Positive for congestion.    Gastrointestinal:  Positive for nausea. Negative for abdominal pain and vomiting.   Musculoskeletal:  Positive for arthralgias and myalgias.   Neurological:  Positive for headaches.   Psychiatric/Behavioral:  Positive for agitation. The patient is nervous/anxious.        Historical Information   Medical History Review: I have reviewed the patient's PMH, PSH, Social History, Family History, Meds, and Allergies   Social History     Tobacco Use    Smoking status: Never     Passive exposure: Past    Smokeless tobacco: Never   Vaping Use    Vaping status: Never Used   Substance and Sexual Activity    Alcohol use: Yes     Comment: 1-1.5 L vodka or beer daily    Drug use: Never    Sexual activity: Not Currently     No family history on file.    Meds/Allergies   Prior to Admission medications    Medication Sig Start Date End Date Taking? Authorizing Provider   busPIRone (BUSPAR) 7.5 mg tablet Take 1 tablet (7.5 mg total) by mouth 3 (three) times a day 1/11/25 2/10/25  Viviana Wellington PA-C   escitalopram (LEXAPRO) 10 mg tablet Take 1 tablet (10 mg total) by mouth daily 1/11/25 2/10/25  Viviana Wellington PA-C   folic acid (FOLVITE) 1 mg tablet Take 1 tablet (1 mg total) by mouth daily 11/15/24   Whitney Lemus PA-C   gabapentin (NEURONTIN) 300 mg capsule Take 1 capsule (300 mg total) by mouth every 8 (eight) hours as needed (anxiety) 1/11/25 2/10/25  Viviana Wellington PA-C   mirtazapine (REMERON) 15 mg tablet Take 1 tablet (15 mg total) by  mouth daily at bedtime 1/11/25 2/10/25  Viviana Wellington PA-C   naltrexone (REVIA) 50 mg tablet Take 1 tablet (50 mg total) by mouth daily 1/11/25 2/10/25  Viviana Wellington PA-C   propranolol (INDERAL) 40 mg tablet Take 1 tablet (40 mg total) by mouth 3 (three) times a day 1/11/25 2/10/25  Viviana Wellington PA-C   thiamine 100 MG tablet Take 1 tablet (100 mg total) by mouth daily 11/15/24   Whitney Lemus PA-C     Current Facility-Administered Medications:     acetaminophen (TYLENOL) tablet 650 mg, 650 mg, Oral, Q6H PRN, Hugo Stratton DO, 650 mg at 02/15/25 1238    aluminum-magnesium hydroxide-simethicone (MAALOX) oral suspension 30 mL, 30 mL, Oral, Q6H PRN, Hugo Stratton DO    azelastine (ASTELIN) 0.1 % nasal spray 1 spray, 1 spray, Each Nare, BID, Hugo Stratton DO    bismuth subsalicylate (PEPTO BISMOL) oral suspension 524 mg, 524 mg, Oral, Once, Hugo Stratton DO    busPIRone (BUSPAR) tablet 7.5 mg, 7.5 mg, Oral, TID, Hugo Stratton DO, 7.5 mg at 02/15/25 0927    calcium carbonate (TUMS) chewable tablet 1,000 mg, 1,000 mg, Oral, Daily PRN, Hugo Stratton DO    diphenhydramine, lidocaine, Al/Mg hydroxide, simethicone (Magic Mouthwash) oral solution 10 mL, 10 mL, Swish & Spit, Q4H PRN, Hugo Stratton DO, 10 mL at 02/15/25 1248    docusate sodium (COLACE) capsule 100 mg, 100 mg, Oral, BID, Hugo Stratton DO    enoxaparin (LOVENOX) subcutaneous injection 40 mg, 40 mg, Subcutaneous, Daily, Hugo Stratton DO    escitalopram (LEXAPRO) tablet 10 mg, 10 mg, Oral, Daily, Hugo Stratton DO, 10 mg at 02/15/25 0927    gabapentin (NEURONTIN) capsule 300 mg, 300 mg, Oral, Q8H PRN, Hugo Stratton DO    ibuprofen (MOTRIN) tablet 600 mg, 600 mg, Oral, Q6H PRN, Hugo Stratton DO    lactated ringers infusion, 100 mL/hr, Intravenous, Continuous, Hugo Stratton DO, Last Rate: 100 mL/hr at 02/15/25 0833, 100 mL/hr at 02/15/25 0833    mirtazapine (REMERON) tablet 15 mg, 15 mg, Oral, HS,  Hugo Stratton DO    naloxone (NARCAN) 0.04 mg/mL syringe 0.04 mg, 0.04 mg, Intravenous, Q1MIN PRN, Hugo Stratton DO    naltrexone (REVIA) tablet 50 mg, 50 mg, Oral, Daily, Parvin Rodriguez MD    ondansetron (ZOFRAN) injection 4 mg, 4 mg, Intravenous, Q6H PRN, Hugo Stratton DO, 4 mg at 02/15/25 0946    PHENobarbital tablet 64.8 mg, 64.8 mg, Oral, Once, Parvin Rodriguez MD    propranolol (INDERAL) tablet 40 mg, 40 mg, Oral, TID, Hugo Stratton DO, 40 mg at 02/15/25 0927    simethicone (MYLICON) chewable tablet 80 mg, 80 mg, Oral, 4x Daily PRN, Hugo Stratton DO    thiamine (VITAMIN B1) 500 mg in sodium chloride 0.9 % 50 mL IVPB, 500 mg, Intravenous, Q8H CANDICE, Hugo Stratton DO, Last Rate: 100 mL/hr at 02/15/25 0928, 500 mg at 02/15/25 0928   No Known Allergies    Objective :  Temp:  [98 °F (36.7 °C)-98.7 °F (37.1 °C)] 98.7 °F (37.1 °C)  HR:  [] 80  BP: (116-160)/(69-84) 147/74  Resp:  [18-20] 18  SpO2:  [93 %-98 %] 94 %  O2 Device: None (Room air)    No intake or output data in the 24 hours ending 02/15/25 1420    Physical Exam  Vitals and nursing note reviewed.   Constitutional:       General: He is not in acute distress.     Appearance: He is ill-appearing and diaphoretic.   HENT:      Head: Normocephalic and atraumatic.      Nose: Congestion present.   Eyes:      General: No scleral icterus.  Pulmonary:      Effort: Pulmonary effort is normal. No respiratory distress.   Musculoskeletal:      Cervical back: Neck supple.   Neurological:      General: No focal deficit present.      Mental Status: He is alert and oriented to person, place, and time.      Comments: Mild intention tremor, no tongue fasciculations   Psychiatric:      Comments: anxious           Lab Results: I have reviewed the following results:  Results from last 7 days   Lab Units 02/15/25  0303   WBC Thousand/uL 14.91*   HEMOGLOBIN g/dL 15.0   HEMATOCRIT % 45.2   PLATELETS Thousands/uL 162   LYMPHO PCT %  12*   MONO PCT % 3*   EOS PCT % 0      Results from last 7 days   Lab Units 02/15/25  0303   POTASSIUM mmol/L 5.2   CHLORIDE mmol/L 90*   CO2 mmol/L 22   BUN mg/dL 15   CREATININE mg/dL 0.94   CALCIUM mg/dL 8.6   ALBUMIN g/dL 4.8   ALK PHOS U/L 110*   ALT U/L 86*   AST U/L 93*   MAGNESIUM mg/dL 2.3              Results from last 7 days   Lab Units 02/15/25  0303   HS TNI 0HR ng/L 7              Imaging Results Review: I reviewed radiology reports from this admission including: chest xray.  Other Study Results Review: No additional pertinent studies reviewed.    Administrative Statements   I have spent a total time of 25 minutes in caring for this patient on the day of the visit/encounter including Risks and benefits of tx options, Instructions for management, Patient and family education, Importance of tx compliance, Risk factor reductions, Counseling / Coordination of care, Documenting in the medical record, Obtaining or reviewing history  , and Communicating with other healthcare professionals .

## 2025-02-15 NOTE — EMTALA/ACUTE CARE TRANSFER
[facility] EMERGENCY DEPARTMENT  [address] [facility] [address]  Dept: [phone]      EMTALA TRANSFER CONSENT    NAME Juan Luu                                         1990                              MRN [id number]    I have been informed of my rights regarding examination, treatment, and transfer   by Dr. Alonso Chen DO    Benefits: Specialized equipment and/or services available at the receiving facility (Include comment)________________________    Risks: Potential for delay in receiving treatment, Potential deterioration of medical condition, Loss of IV, Increased discomfort during transfer, Possible worsening of condition or death during transfer      Consent for Transfer:  I acknowledge that my medical condition has been evaluated and explained to me by the emergency department physician or other qualified medical person and/or my attending physician, who has recommended that I be transferred to the service of  Accepting Physician: Dr. Hugo Stratton at Accepting Facility Name, City & State : [facility] unit. The above potential benefits of such transfer, the potential risks associated with such transfer, and the probable risks of not being transferred have been explained to me, and I fully understand them.  The doctor has explained that, in my case, the benefits of transfer outweigh the risks.  I agree to be transferred.    I authorize the performance of emergency medical procedures and treatments upon me in both transit and upon arrival at the receiving facility.  Additionally, I authorize the release of any and all medical records to the receiving facility and request they be transported with me, if possible.  I understand that the safest mode of transportation during a medical emergency is an ambulance and that the Hospital advocates the use of this mode of transport. Risks of traveling to the receiving facility by car,  including absence of medical control, life sustaining equipment, such as oxygen, and medical personnel has been explained to me and I fully understand them.    (ANCA CORRECT BOX BELOW)  [  ]  I consent to the stated transfer and to be transported by ambulance/helicopter.  [  ]  I consent to the stated transfer, but refuse transportation by ambulance and accept full responsibility for my transportation by car.  I understand the risks of non-ambulance transfers and I exonerate the Hospital and its staff from any deterioration in my condition that results from this refusal.    X___________________________________________    DATE  02/15/25  TIME________  Signature of patient or legally responsible individual signing on patient behalf           RELATIONSHIP TO PATIENT_________________________          Provider Certification    NAME Juan Luu                                         1990                              MRN [id number]    A medical screening exam was performed on the above named patient.  Based on the examination:    Condition Necessitating Transfer The primary encounter diagnosis was Alcohol intoxication (HCC). A diagnosis of Alcohol abuse was also pertinent to this visit.    Patient Condition: The patient has been stabilized such that within reasonable medical probability, no material deterioration of the patient condition or the condition of the unborn child(leticia) is likely to result from the transfer    Reason for Transfer: Level of Care needed not available at this facility    Transfer Requirements: Facility [facility] detox unit   Space available and qualified personnel available for treatment as acknowledged by    Agreed to accept transfer and to provide appropriate medical treatment as acknowledged by       Dr. Hugo Stratton  Appropriate medical records of the examination and treatment of the patient are provided at the time of transfer   STAFF INITIAL WHEN COMPLETED  _______  Transfer will be performed by qualified personnel from [facility]  and appropriate transfer equipment as required, including the use of necessary and appropriate life support measures.    Provider Certification: I have examined the patient and explained the following risks and benefits of being transferred/refusing transfer to the patient/family:  General risk, such as traffic hazards, adverse weather conditions, rough terrain or turbulence, possible failure of equipment (including vehicle or aircraft), or consequences of actions of persons outside the control of the transport personnel, Unanticipated needs of medical equipment and personnel during transport, Risk of worsening condition, The possibility of a transport vehicle being unavailable      Based on these reasonable risks and benefits to the patient and/or the unborn child(leticia), and based upon the information available at the time of the patient’s examination, I certify that the medical benefits reasonably to be expected from the provision of appropriate medical treatments at another medical facility outweigh the increasing risks, if any, to the individual’s medical condition, and in the case of labor to the unborn child, from effecting the transfer.    X____________________________________________ DATE 02/15/25        TIME_______      ORIGINAL - SEND TO MEDICAL RECORDS   COPY - SEND WITH PATIENT DURING TRANSFER

## 2025-02-16 VITALS
OXYGEN SATURATION: 98 % | DIASTOLIC BLOOD PRESSURE: 82 MMHG | RESPIRATION RATE: 18 BRPM | TEMPERATURE: 97.7 F | SYSTOLIC BLOOD PRESSURE: 124 MMHG | BODY MASS INDEX: 26.51 KG/M2 | HEART RATE: 69 BPM | WEIGHT: 189.38 LBS | HEIGHT: 71 IN

## 2025-02-16 LAB
ALBUMIN SERPL BCG-MCNC: 4.2 G/DL (ref 3.5–5)
ALP SERPL-CCNC: 100 U/L (ref 34–104)
ALT SERPL W P-5'-P-CCNC: 55 U/L (ref 7–52)
ANION GAP SERPL CALCULATED.3IONS-SCNC: 11 MMOL/L (ref 4–13)
AST SERPL W P-5'-P-CCNC: 57 U/L (ref 13–39)
BILIRUB SERPL-MCNC: 1.17 MG/DL (ref 0.2–1)
BUN SERPL-MCNC: 13 MG/DL (ref 5–25)
CALCIUM SERPL-MCNC: 9.1 MG/DL (ref 8.4–10.2)
CHLORIDE SERPL-SCNC: 97 MMOL/L (ref 96–108)
CO2 SERPL-SCNC: 27 MMOL/L (ref 21–32)
CREAT SERPL-MCNC: 0.95 MG/DL (ref 0.6–1.3)
GFR SERPL CREATININE-BSD FRML MDRD: 103 ML/MIN/1.73SQ M
GLUCOSE SERPL-MCNC: 91 MG/DL (ref 65–140)
POTASSIUM SERPL-SCNC: 3.7 MMOL/L (ref 3.5–5.3)
PROT SERPL-MCNC: 7 G/DL (ref 6.4–8.4)
SODIUM SERPL-SCNC: 135 MMOL/L (ref 135–147)

## 2025-02-16 PROCEDURE — NC001 PR NO CHARGE: Performed by: EMERGENCY MEDICINE

## 2025-02-16 PROCEDURE — 80053 COMPREHEN METABOLIC PANEL: CPT | Performed by: INTERNAL MEDICINE

## 2025-02-16 PROCEDURE — 99239 HOSP IP/OBS DSCHRG MGMT >30: CPT | Performed by: INTERNAL MEDICINE

## 2025-02-16 RX ADMIN — AZELASTINE HYDROCHLORIDE 1 SPRAY: 137 SPRAY, METERED NASAL at 08:27

## 2025-02-16 RX ADMIN — THIAMINE HYDROCHLORIDE 500 MG: 100 INJECTION, SOLUTION INTRAMUSCULAR; INTRAVENOUS at 05:19

## 2025-02-16 RX ADMIN — ESCITALOPRAM OXALATE 10 MG: 10 TABLET ORAL at 08:26

## 2025-02-16 RX ADMIN — NALTREXONE 380 MG: KIT at 12:18

## 2025-02-16 RX ADMIN — PROPRANOLOL HYDROCHLORIDE 40 MG: 20 TABLET ORAL at 08:26

## 2025-02-16 RX ADMIN — BUSPIRONE HYDROCHLORIDE 7.5 MG: 15 TABLET ORAL at 08:26

## 2025-02-16 RX ADMIN — NALTREXONE HYDROCHLORIDE 50 MG: 50 TABLET, FILM COATED ORAL at 08:26

## 2025-02-16 NOTE — UTILIZATION REVIEW
NOTIFICATION OF INPATIENT MEDICAL ADMISSION   AUTHORIZATION REQUEST   SERVICING FACILITY:   [facility]  [address] [place] [address]  Tax ID: [id number]  NPI: [id number] ATTENDING PROVIDER:  Attending Name and NPI#: Hugo Stratton Do [[id number]]  Address: [address] [place] [address]  Phone: [phone]     ADMISSION INFORMATION:  Place of Service: Inpatient [facility]  Place of Service Code: 21  Inpatient Admission Date/Time: 2/15/25  7:55 AM  Discharge Date/Time: No discharge date for patient encounter.  Admitting Diagnosis Code/Description:  Alcohol intoxication (HCC) [F10.929]     UTILIZATION REVIEW CONTACT:  Lupe Klein, Utilization   Network Utilization Review Department  Phone: [phone]  Fax [phone]  Email: [email or web address]  Contact for approvals/pending authorizations, clinical reviews, and discharge.     PHYSICIAN ADVISORY SERVICES:  Medical Necessity Denial & [facility] Review  Phone: [phone]  Fax: [phone]  Email: [email or web address]     DISCHARGE SUPPORT TEAM:  For Patients Discharge Needs & Updates  Phone: [phone] opt. 2 Fax: [phone]  Email: [email or web address]    \\\

## 2025-02-16 NOTE — PROGRESS NOTES
Progress Note - Medical Toxicology   Name: Juan Luu 35 y.o. male I MRN: [id number]  Unit/Bed#: 5T DETOX 512-01 I Date of Admission: 2/15/2025   Date of Service: 2/16/2025 I Hospital Day: 1    Assessment & Plan  Alcohol withdrawal syndrome with complication (HCC)  SEWS scores have been zero, and patient has not received any phenobarbital since yesterday.    Discontinue SEWS protocol  Alcohol use disorder, severe, dependence (HCC)  Continue daily thiamine, folate, and multivitamin supplementation.    Patient tolerated 2 doses of PO naltrexone; will give IM naltrexone prior to discharge today.    Recommend CRS consult for recovery support.    Recommend case management consult for disposition planning; patient is planning to attend inpatient rehabilitation after arranging short term disability through work.    Patient is appropriate for disposition from a toxicology perspective. Medical toxicology will sign off. Thank you for the consult. Please contact the medical  on call with questions or concerns.    Reason for current consult: Alcohol withdrawal, alcohol use disorder     Subjective   Patient states he feels well today. Requesting discharge so he can go to work tomorrow and arrange STD to attend inpatient rehabilitation    Objective :  Temp:  [97.2 °F (36.2 °C)-98.7 °F (37.1 °C)] 97.7 °F (36.5 °C)  HR:  [56-80] 69  BP: (118-160)/(74-93) 124/82  Resp:  [18-20] 18  SpO2:  [93 %-98 %] 98 %  O2 Device: None (Room air)    No intake or output data in the 24 hours ending 02/16/25 1040    Physical Exam  Vitals and nursing note reviewed.   Constitutional:       General: He is not in acute distress.     Appearance: Normal appearance. He is not ill-appearing or diaphoretic.   HENT:      Head: Normocephalic and atraumatic.   Eyes:      General: No scleral icterus.  Pulmonary:      Effort: Pulmonary effort is normal. No respiratory distress.   Musculoskeletal:      Cervical back: Neck supple.    Neurological:      General: No focal deficit present.      Mental Status: He is alert and oriented to person, place, and time.      Comments: No tongue fasciculations or intention tremor   Psychiatric:         Behavior: Behavior normal.           Lab Results: I have reviewed the following results:CBC/BMP:   .     02/16/25  0524   SODIUM 135   K 3.7   CL 97   CO2 27   BUN 13   CREATININE 0.95   GLUC 91    , LFTs:   .     02/16/25  0524   AST 57*   ALT 55*   ALB 4.2   TBILI 1.17*   ALKPHOS 100        Imaging Results Review: No pertinent imaging studies reviewed.  Other Study Results Review: No additional pertinent studies reviewed.    Administrative Statements   I have spent a total time of 15 minutes in caring for this patient on the day of the visit/encounter including Instructions for management, Patient and family education, Importance of tx compliance, Risk factor reductions, Counseling / Coordination of care, Documenting in the medical record, and Communicating with other healthcare professionals .

## 2025-02-16 NOTE — SOCIAL WORK
Pt to discharge today. Pt denies any withdrawal symptoms at this time. Pt to discharge to his  home.   Pt to follow up with  the [facility] on Monday, February 17, 2025.  . Pt to follow up with referral to inpatient rehab as well as his primary care physician.     Discharge Address: [address] [place] [address]  Phone Number:[phone]

## 2025-02-16 NOTE — ASSESSMENT & PLAN NOTE
Continue daily thiamine, folate, and multivitamin supplementation.    Patient tolerated 2 doses of PO naltrexone; will give IM naltrexone prior to discharge today.    Recommend CRS consult for recovery support.    Recommend case management consult for disposition planning; patient is planning to attend inpatient rehabilitation after arranging short term disability through work.

## 2025-02-16 NOTE — DISCHARGE INSTR - AVS FIRST PAGE
Dear Juan Luu,     It was our pleasure to care for you here at [facility].  It is our hope that we were always able to exceed the expected standards for your care during your stay.  You were hospitalized due to alcohol withdrawal within intent to detox.  You were cared for on the fifth floor by Hugo Stratton,  with the [facility] who covers for your primary care physician (PCP), No primary care provider on file., while you were hospitalized.  If you have any questions or concerns related to this hospitalization, you may contact us at .  For follow up as well as any medication refills, we recommend that you follow up with your primary care physician.  A registered nurse will reach out to you by phone within a few days after your discharge to answer any additional questions that you may have after going home.  However, at this time we provide for you here, the most important instructions / recommendations at discharge:     Notable Medication Adjustments -   Thiamine 100 mg once a day  No other changes to chronic home medication  Testing Required after Discharge -   None  Important follow up information -   PCP follow-up in 1 week I have sent a referral to [facility] which is across the street from the hospital  Other Instructions -   Avoid driving for 7 days after discharge since phenobarbital can stay in your system for a prolonged period of time and can make you sleepy. Avoiding drinking any alcohol or using other sedatives (such as benzodiazepines) during this time period since these substances can make you sleepy when combined with the phenobarbital in your system. Please follow up with your PCP as well as an outpatient counseling provider to ensure you have the support you need to continue your recovery journey. Please return with any worsening withdrawal such as seizures, tremors, hallucinations or any other  concerns.  Please review this entire after visit summary as additional general instructions including medication list, appointments, activity, diet, any pertinent wound care, and other additional recommendations from your care team that may be provided for you.      Sincerely,     Hugo Stratton DO and Nurse Jackson

## 2025-02-16 NOTE — UTILIZATION REVIEW
Initial Clinical Review    Pt initially presented to [facility] ED. Pt was transferred by EMS to [facility] for its Level IV medically managed intensive inpatient detox unit, not available at [facility].    Admission: Date/Time/Statement:   Admission Orders (From admission, onward)       Ordered        02/15/25 0817  INPATIENT ADMISSION  Once                          Orders Placed This Encounter   Procedures    INPATIENT ADMISSION     Standing Status:   Standing     Number of Occurrences:   1     Level of Care:   Med Surg [16]     Bed Type:   [place] [4]     Estimated length of stay:   More than 2 Midnights     Certification:   I certify that inpatient services are medically necessary for this patient for a duration of greater than two midnights. See H&P and MD Progress Notes for additional information about the patient's course of treatment.       Initial Presentation: 35 y.o. male who presented to [facility]. Inpatient admission for evaluation and treatment of  alcohol withdrawal syndrome. Presented w/ tremors. Exam: tremulous, anxious. WBCs 14.91 Plan: SEWS, phenobarbital, trend lfts. ; IVF, telemetry, continuous pulse ox, continue PTA meds except anti-hypertensives, trend labs, replete electrolytes as needed; I&O, fall & seizure precautions. Toxicology consulted.   H/O alcohol disuse disorder which is severe, anxiety disorder, PTSD as well as longstanding history of alcohol use with previous admissions for detoxification with phenobarbital administration.   Anticipated Length of Stay:   Patient will be admitted on an Inpatient basis with an anticipated length of stay of greater than 2 midnights.   Justification for Hospital Stay: Alcohol withdrawal syndrome with complication (HCC)     Toxicology: Presented w/ need for detox from alcohol.  Reports 6-8 bottles wine daily, last drink on 2/14 @ PTA. . Has prior rehab treatment for withdrawal. Reports  no hx of withdrawal seizures. On exam, diaphoretic with tremors. . SEWS 13. Plan: SEWS monitoring w/ phenobarbital management, IV thiamine/folic acid supplement.       Date: 2/16       Day 2: Pt reports feeling well today.  On exam, LUngs clear, GCS 15. . SEWS 0. Plan: continue SEWS monitoring w/ phenobarbital management, IV thiamine/folic acid supplement, telemetry, continuous pulse ox, continue current meds, trend labs, replete electrolytes as needed. Has tolerated 2 doses of po naltrexone.  Case management working on safe dc plan to inpatient rehab stay.     Scheduled Medications:  azelastine, 1 spray, Each Nare, BID  bismuth subsalicylate, 524 mg, Oral, Once  busPIRone, 7.5 mg, Oral, TID  docusate sodium, 100 mg, Oral, BID  enoxaparin, 40 mg, Subcutaneous, Daily  escitalopram, 10 mg, Oral, Daily  mirtazapine, 15 mg, Oral, HS  naltrexone, 50 mg, Oral, Daily  propranolol, 40 mg, Oral, TID  thiamine, 500 mg, Intravenous, Q8H CANDICE      Continuous IV Infusions:     PRN Meds:  acetaminophen, 650 mg, Oral, Q6H PRN  aluminum-magnesium hydroxide-simethicone, 30 mL, Oral, Q6H PRN  calcium carbonate, 1,000 mg, Oral, Daily PRN  diphenhydramine, lidocaine, Al/Mg hydroxide, simethicone, 10 mL, Swish & Spit, Q4H PRN  gabapentin, 300 mg, Oral, Q8H PRN  ibuprofen, 600 mg, Oral, Q6H PRN  naloxone, 0.04 mg, Intravenous, Q1MIN PRN  ondansetron, 4 mg, Intravenous, Q6H PRN  simethicone, 80 mg, Oral, 4x Daily PRN          ED Triage Vitals [02/15/25 0801]   Temperature Pulse Respirations Blood Pressure SpO2 Pain Score   98 °F (36.7 °C) (!) 110 18 151/84 98 % 9     Weight (last 2 days)       Date/Time Weight    02/15/25 0801 85.9 (189.38)              Vital Signs (last 3 days)       Date/Time Temp Pulse Resp BP MAP (mmHg) SpO2 O2 Device Patient Position - Orthostatic VS [place] Coma Scale Score SEWS Total Score Pain    02/16/25 0816 -- -- -- -- -- -- -- -- -- -- No Pain    02/16/25 0800 -- -- -- -- -- -- -- -- -- 0 --    02/16/25 0712  97.7 °F (36.5 °C) 69 18 124/82 96 98 % None (Room air) Lying -- -- --    02/16/25 0500 97.7 °F (36.5 °C) 56 18 141/93 109 96 % None (Room air) Lying -- 0 No Pain    02/15/25 2247 -- -- -- -- -- -- -- -- -- 0 --    02/15/25 2228 97.7 °F (36.5 °C) 74 18 122/88 99 95 % None (Room air) Lying -- -- --    02/15/25 2148 -- -- -- -- -- -- -- -- -- -- 4    02/15/25 2055 -- -- -- -- -- -- -- -- 15 -- No Pain    02/15/25 2000 -- -- -- -- -- -- -- -- -- 0 --    02/15/25 1831 97.4 °F (36.3 °C) 63 18 143/89 107 96 % None (Room air) Lying -- -- --    02/15/25 1700 -- 64 -- -- -- 95 % -- -- -- 0 --    02/15/25 1555 -- -- -- -- -- -- -- -- -- 8 --    02/15/25 1532 97.2 °F (36.2 °C) 80 18 118/83 104 93 % None (Room air) Lying -- -- --    02/15/25 1423 -- -- -- -- -- -- -- -- -- -- 6    02/15/25 1406 98.7 °F (37.1 °C) 70 18 147/74 -- 98 % None (Room air) Lying -- -- 6    02/15/25 1400 -- -- -- -- -- -- -- -- -- 6 --    02/15/25 1243 -- -- -- -- -- -- -- -- -- 8 --    02/15/25 1238 -- -- -- -- -- -- -- -- -- -- 10 - Worst Possible Pain    02/15/25 1236 98.4 °F (36.9 °C) 80 20 160/80 -- 94 % None (Room air) Lying -- -- --    02/15/25 1109 98.3 °F (36.8 °C) 75 18 135/83 100 94 % None (Room air) Lying -- -- --    02/15/25 1045 98.5 °F (36.9 °C) 77 18 135/75 -- 94 % None (Room air) Lying -- 0 --    02/15/25 1000 -- -- -- -- -- -- -- -- -- 0 --    02/15/25 0930 -- -- -- -- -- -- -- -- 15 5 --    02/15/25 0926 98.3 °F (36.8 °C) 94 20 142/78 -- 94 % None (Room air) Lying -- -- --    02/15/25 0819 -- -- -- -- -- -- -- -- -- 13 --    02/15/25 0801 98 °F (36.7 °C) 110 18 151/84 106 98 % None (Room air) Sitting -- -- 9            SEWS: SEWS    Row Name 02/16/25 0800 02/16/25 0500 02/15/25 2247 02/15/25 2000 02/15/25 1700   Severity of Ethanol Withdrawal Scale (SEWS)   ANXIETY: Do you feel that something bad is about to happen to you right now? 0  -LL 0  -TB 0  -TB 0  -TB 0  -TD   NAUSEA and DRY HEAVES or VOMITING? 0  -LL 0  -TB 0  -TB 0  -TB 0   -TD   SWEATING: (includes moist palms, sweating now)? Score 0 or 2 0  -LL 0  -TB 0  -TB 0  -TB 0  -TD   TREMOR: with arms extended eyes closed? 0  -LL 0  -TB 0  -TB 0  -TB 0  -TD   AGITATION: Fidgety, restless, pacing? 0  -LL 0  -TB 0  -TB 0  -TB 0  -TD   DISORIENTATION: 0  -LL 0  -TB 0  -TB 0  -TB 0  -TD   HALLUCINATIONS: 0  -LL 0  -TB 0  -TB 0  -TB 0  -TD   VITAL SIGNS: ANY (Pulse >110, Diastolic BP >90, Temp >99.6) 0  -LL 0  -TB 0  -TB 0  -TB 0  per provider, allow pt to sleep appears comfortable. will continue to assess.  -TD   SEWS Total Score 0  -LL 0  -TB 0  -TB 0  -TB 0  -TD   Mary Agitation Sedation Scale (RASS)   Mary Agitation Sedation Scale (RASS) 0  -LL -1  -TB -2  -TB 0  -TB 0  -TD   Row Name 02/15/25 1555 02/15/25 1400 02/15/25 1243 02/15/25 1200 02/15/25 1045   Severity of Ethanol Withdrawal Scale (SEWS)   ANXIETY: Do you feel that something bad is about to happen to you right now? 3  -TD 3  -TD 3  -TD --  -TD 0  -TD   NAUSEA and DRY HEAVES or VOMITING? 3  -TD 3  -TD 3  -TD --  -TD 0  -TD   SWEATING: (includes moist palms, sweating now)? Score 0 or 2 2  -TD 0  -TD 2  -TD --  -TD 0  -TD   TREMOR: with arms extended eyes closed? 0  -TD 0  -TD 0  -TD --  -TD 0  -TD   AGITATION: Fidgety, restless, pacing? 0  -TD 0  -TD 0  -TD --  -TD 0  -TD   DISORIENTATION: 0  -TD 0  -TD 0  -TD --  -TD 0  -TD   HALLUCINATIONS: 0  -TD 0  -TD 0  -TD --  -TD 0  -TD   VITAL SIGNS: ANY (Pulse >110, Diastolic BP >90, Temp >99.6) 0  -TD 0  -TD 0  -TD --  -TD 0  -TD   SEWS Total Score 8  -TD 6  -TD 8  -TD --  -TD 0  -TD   Mary Agitation Sedation Scale (RASS)   Mary Agitation Sedation Scale (RASS) 0  -TD 0  -TD 0  -TD --  -TD 0  -TD     Row Name 02/15/25 1000 02/15/25 0930 02/15/25 0819     Severity of Ethanol Withdrawal Scale (SEWS)   ANXIETY: Do you feel that something bad is about to happen to you right now? 0  -TD 3  -TD 3  -TD     NAUSEA and DRY HEAVES or VOMITING? 0  -TD 0  -TD 3  -TD     SWEATING:  (includes moist palms, sweating now)? Score 0 or 2 0  -TD 0  -TD 2  -TD     TREMOR: with arms extended eyes closed? 0  -TD 2  -TD 2  -TD     AGITATION: Fidgety, restless, pacing? 0  -TD 0  -TD 3  -TD     DISORIENTATION: 0  -TD 0  -TD 0  -TD     HALLUCINATIONS: 0  -TD 0  -TD 0  -TD     VITAL SIGNS: ANY (Pulse >110, Diastolic BP >90, Temp >99.6) 0  -TD 0  -TD 0  -TD     SEWS Total Score 0  -TD 5  -TD 13  -TD     Mary Agitation Sedation Scale (RASS)   Mary Agitation Sedation Scale (RASS) 0  -TD 0  -TD 0  -TD         Pertinent Labs/Diagnostic Test Results:     Results from last 7 days   Lab Units 02/15/25  1132   SARS-COV-2  Negative     Results from last 7 days   Lab Units 02/15/25  0303   WBC Thousand/uL 14.91*   HEMOGLOBIN g/dL 15.0   HEMATOCRIT % 45.2   PLATELETS Thousands/uL 162         Results from last 7 days   Lab Units 02/16/25  0524 02/15/25  0303   SODIUM mmol/L 135 134*   POTASSIUM mmol/L 3.7 5.2   CHLORIDE mmol/L 97 90*   CO2 mmol/L 27 22   ANION GAP mmol/L 11 22*   BUN mg/dL 13 15   CREATININE mg/dL 0.95 0.94   EGFR ml/min/1.73sq m 103 104   CALCIUM mg/dL 9.1 8.6   MAGNESIUM mg/dL  --  2.3     Results from last 7 days   Lab Units 02/16/25  0524 02/15/25  0303   AST U/L 57* 93*   ALT U/L 55* 86*   ALK PHOS U/L 100 110*   TOTAL PROTEIN g/dL 7.0 7.8   ALBUMIN g/dL 4.2 4.8   TOTAL BILIRUBIN mg/dL 1.17* 0.49         Results from last 7 days   Lab Units 02/16/25  0524 02/15/25  0303   GLUCOSE RANDOM mg/dL 91 105             Beta- Hydroxybutyrate   Date Value Ref Range Status   01/07/2025 0.34 (H) 0.02 - 0.27 mmol/L Final                      Results from last 7 days   Lab Units 02/15/25  0303   HS TNI 0HR ng/L 7           Results from last 7 days   Lab Units 02/15/25  0303   LIPASE u/L 40           Results from last 7 days   Lab Units 02/15/25  1132   INFLUENZA A PCR  Negative   INFLUENZA B PCR  Negative   RSV PCR  Negative       Past Medical History:   Diagnosis Date    Alcohol use disorder     Alcohol  withdrawal syndrome (HCC)     Anxiety     Depression     PTSD (post-traumatic stress disorder)     Tachycardia      Present on Admission:   Unspecified mood (affective) disorder (HCC)   Elevated liver enzymes   Alcohol withdrawal syndrome with complication (HCC)   Alcohol use disorder, severe, dependence (HCC)   Alcoholic ketoacidosis      Admitting Diagnosis: Alcohol intoxication (HCC) [F10.929]  Age/Sex: 35 y.o. male      SEWS PHENOBARBITAL PROTOCOL FOR ALCOHOL WITHDRAWAL  Admit patient to medical detox unit and continue supportive care and stabilization of acute ethanol withdrawal per medical toxicology/detox treatment pathway. Monitor ethanol withdrawal severity via the Severity of Ethanol Withdrawal Scale (SEWS) Q4 hours and then hourly if/when SEWS > 6. Treat withdrawal per pathway and reassess Q30-60 minutes.          Mild SEWS Score 1-6  Administer medications* (IV or PO; PO preferred):  If initial SEWS score: diazepam 10mg PO/IV x 1 AND phenobarbital 65 mg PO/IV x 1  If repeat SEWS score 1-6: phenobarbital 65 mg PO/IV q1 hour x 5 doses maximum   Reassessment:   SEWS q1 hour after each dose until SEWS 0 x 2 hours  VS q1 hours (until SEWS 0, then q4 hours)  Notify provider for bedside evaluation if 5-dose maximum is reached, RASS of -3 to -5, or SEWS score escalates to moderate or severe.   Moderate SEWS Score 7-12  Administer medications* (IV):  If initial SEWS score: diazepam 10mg IV x 1 AND phenobarbital 260 mg IV x 1  If repeat SEWS score 7-12 or score escalated from mild: phenobarbital 130 mg IV q30 minutes x 5 doses maximum   Reassessment:  SEWS q30 minutes after each dose until SEWS < 7 (then hourly until SEWS 0 x 2 hours)  VS q30 minutes until SEWS < 7 (then hourly until SEWS 0, then q4 hours)  Notify provider for bedside evaluation if 5-dose maximum is reached, RASS of -3 to -5, or SEWS score escalates to severe.   Severe SEWS Score >= 13  Administer medications* (IV):  If initial SEWS score:  Diazepam 10 mg IV x 1 AND phenobarbital 650 mg IV piggyback x 1 over 15-30 minutes  If repeat SEWS score >= 13 or score escalated from mild or moderate: phenobarbital 130 mg IV q30 minutes x 5 doses maximum   Reassessment:  SEWS q30 minutes after each dose until SEWS < 7 (then hourly until SEWS 0 x 2 hours)   VS q30 minutes until SEWS < 7 (then hourly until SEWS 0, then q4 hours)  Notify provider for bedside evaluation if 5-dose maximum is reached or RASS of -3 to -5   *Hold medications and notify provider if CNS depression, respirations < 10/min, or RASS of -3 to -5.        Network Utilization Review Department  ATTENTION: Please call with any questions or concerns to [phone] and carefully listen to the prompts so that you are directed to the right person. All voicemails are confidential.   For Discharge needs, contact Care Management DC Support Team at [phone] opt. 2  Send all requests for admission clinical reviews, approved or denied determinations and any other requests to dedicated fax number below belonging to the campus where the patient is receiving treatment. List of dedicated fax numbers for the Facilities:  FACILITY NAME UR FAX NUMBER   ADMISSION DENIALS (Administrative/Medical Necessity) [phone]   DISCHARGE SUPPORT TEAM (NETWORK) [phone]   PARENT CHILD HEALTH (Maternity/NICU/Pediatrics) [phone]   [facility] [phone]   [facility] [phone]   [facility] [phone]   [facility] [phone]   [facility] [phone]   [facility] [phone]   General acute hospital [phone]   [facility] [phone]   [facility] [phone]   [facility] [phone]   [facility] [phone]    [facility] [phone]

## 2025-02-16 NOTE — SOCIAL WORK
02/16/25 0805   Referral Data   Referral Reason Drug/Alcohol Abuse   County Information   County of Residence [facility]   Readmission Root Cause   30 Day Readmission No   Patient Information   Mental Status Alert   Primary Caregiver Self   Accompanied by/Relationship no   Support System Friends   Activities of Daily Living Prior to Admission   Functional Status Independent   Assistive Device No device needed   Living Arrangement Apartment   Ambulation Independent   Access to Firearms   Access to Firearms No   Income Information   Income Source Employed   Means of Transportation   Means of Transport to Appts: Drives Self           SUBSTANCE ABUSE    Stressor/Trigger    Had a lot of anxiety; alcohol withdrawal was extremely tough   UDS:   Audit: 41  PAWSS:5  Nicotine:  Ethanol:   Prior D&A treatment   [facility] in [place]   AA/NA Meetings   Tried and liked them but did not do it enough; willing to recommit   Withdrawal  Symptoms   Shakes, headache   History of OD/blackouts or Withdrawal Seizures   denies   MENTAL HEALTH INFORMATION    Hx of Mental Health Dx   denies   Outpatient Mental Health Tx   denies   Inpatient Hospitalizations (Mental Health)   denies   Family History of Mental Health    Did not know parents at all   Trauma History    denies   Current MH Symptoms   denies   Access to Firearms    no   PHYSICAL HEALTH INFORMATION    Physical Health Conditions (Chronic)   unknown   PCP   none   Insurance   [employer] commercial managed care   Preferred Pharmacy   none   LEGAL ISSUES    Past or present legal issues      Probation/[place]      EMPLOYMENT/INCOME/NEEDS    Education   HS---in [place]?   Employment Works fulltime      History   no   Spiritual/Taoist Beliefs   no   Transportation/Transport Home   Needs Uber   DEMOGRAPHICS    Discharge Address   [address] [place] [address]   Living Arrangement   2 roommates   Can pt return home   yes   External  Supports     Sister Nohemy Luu   [phone]  NO RELEASE SIGNED FOR HER   Phone Number   [phone] CELL   Email      Marital Status/Children   SINGLE     Substance First use Last Use Frequency Amount Used How long Longest period of sobriety and when Method of use   THC            Heroin            Cocaine            ETOH   14 Feb 14, 2025 DAILY 6 bottles wine 2 weeks 2 months drink   Meth            Benzos            Other:                02/16/25 0807   Substance Abuse Addendum Details   History of Withdrawal Symptoms Denies past symptoms   Sober Supports sister emily Luu   Present Treatment [facility] detox unit   Substance Abuse Treatment Hx Past Tx, Inpatient;Past detox;Attends AA/NA   ASAM Level & Criteria 4.0   Stage of Change   Stage of Change Contemplation     Additional Substance Use Detail    Questions Responses   Problems Due to Past Use of Alcohol? Yes   Problems Due to Past Use of Substances? No   Alcohol Use Frequency Daily   Alcohol Drink of Choice Vodka, beer, wine   1st Use of Alcohol 15 yo   Last Use of Alcohol & Amount 2/14/25   Longest Abstinence from Alcohol 2.5 yrs     The pt is a 35 year old male who presented to the ED after drinking approximately 6 bottles of wine a day for two weeks.  CM met with the pt for the purpose of defining the role of the  and to determine services he will require upon discharge.  The pt signed releases of information for his insurance company as well as for [facility] treatment centers.  Pt explained that he needs to go home upon discharge and his employer will work with him to get him back into a rehab facility.  Pt explained that he will need a Lyft later today in order to get to his job tomorrow morning, Feb 17, 2025.      02/16/25 0817   Financial Resource Strain   How hard is it for you to pay for the very basics like food, housing, medical care, and heating? Not hard   Housing Stability   In the last 12 months, was there  a time when you were not able to pay the mortgage or rent on time? N   In the past 12 months, how many times have you moved where you were living? 1   At any time in the past 12 months, were you homeless or living in a shelter (including now)? N   Transportation Needs   In the past 12 months, has lack of transportation kept you from medical appointments or from getting medications? no   In the past 12 months, has lack of transportation kept you from meetings, work, or from getting things needed for daily living? No   Food Insecurity   Within the past 12 months, you worried that your food would run out before you got the money to buy more. Never true   Within the past 12 months, the food you bought just didn't last and you didn't have money to get more. Never true   Stress   Do you feel stress - tense, restless, nervous, or anxious, or unable to sleep at night because your mind is troubled all the time - these days? Rather much   Social Connections   In a typical week, how many times do you talk on the phone with family, friends, or neighbors? Three   How often do you get together with friends or relatives? Once   How often do you attend Yarsani or Temple services? Never   Do you belong to any clubs or organizations such as Yarsani groups, unions, fraternal or athletic groups, or school groups? No   How often do you attend meetings of the clubs or organizations you belong to? Never   Are you , , , , never , or living with a partner? Never marrie   Intimate Partner Violence   Within the last year, have you been afraid of your partner or ex-partner? No   Within the last year, have you been humiliated or emotionally abused in other ways by your partner or ex-partner? No   Within the last year, have you been kicked, hit, slapped, or otherwise physically hurt by your partner or ex-partner? No   Within the last year, have you been raped or forced to have any kind of sexual activity by  your partner or ex-partner? No   Alcohol Use   Q1: How often do you have a drink containing alcohol? 4 or more ti   Q2: How many drinks containing alcohol do you have on a typical day when you are drinking? 10 or more   Q3: How often do you have six or more drinks on one occasion? Daily   Utilities   In the past 12 months has the electric, gas, oil, or water company threatened to shut off services in your home? No   Health Literacy   How often do you need to have someone help you when you read instructions, pamphlets, or other written material from your doctor or pharmacy? Never   Follow-Ups   We make community resources available to all of our patients to assist with everyday needs. We may be able to connect you with those resources. Would you be interested? N

## 2025-02-16 NOTE — DISCHARGE INSTR - OTHER ORDERS
As you are discharging on a weekend, no appointments have been made in your behalf.      The following is treatment you might have interest in:       [facility]     [address],      [place] [address] [phone]        [facility] Drug and Alcohol Resources     If you have health insurance, including medical assistance, there should be a phone number on your insurance card that you can call to find out how to access services. The card may say, “For Behavioral Health Services” or “For Drug and Alcohol Services” or “For Substance Abuse Services” call the number provided. OR [place] Get Help Now (3-036-927-HELP)    OFFICE OF DRUG & ALCOHOL  MISSION STATEMENT  The Office of Drug and Alcohol is committed to the prevention and treatment of substance abuse problems in [facility]. Services are delivered in partnership with qualified providers and are guided by a philosophy that embraces hope, respect, and support for recovery.  The [facility] Office of Drug and Alcohol provides a wide range of drug and alcohol services to [facility] residents in the areas of Prevention, Intervention, Treatment, and Recovery Support.  Prevention and intervention services are provided to the community at large regardless of personal income.  Treatment services require a comprehensive clinical and financial assessment. Trained  perform these assessments in order to determine the most appropriate funding resource and level of care, and to find the provider best suited to meet an individual’s treatment needs.  Recovery support services are non-clinical services that assist individuals and their family members to recover from substance use disorders. These services are supplemental to a treatment program and focus on outreach, engagement, education and other strategies to assist individuals in engaging, maintaining and sustaining long-term recovery. Recovery Support Services are also  available to family members of individuals in various stages of recovery, through the utilization of Certified Family  services and/or parent support groups.   CONTACT US  Luma Capellan Office of Drug & Alcohol [facility] [address] [place] [address] Phone: [phone] HOURS 8:00am - 4:30pm Monday - Friday    [facility]  The [facility] ([facility] is a peer-operated center in [place], [place] that offers recovery support services, workshops and trainings for the recovery community. The [facility] is a project of [employer] - [facility] Community Together, which is hosted by [facility]. Funding for the [place] is through the [facility] Offices of Drug and Alcohol and [employer]. In response to the COVID-19 pandemic and to continue to support our community,recovery support groups are currently being offered at the [facility] and on Zoom. 1:1 services are now being offered face-to-face in the community or at the center, while enforcing and following strict CDC guidelines. Telehealth also continues to be an option for those that prefer that form of support services.   For more information on programming, contact Jia Og, Recovery Programs Specialist, at [email or web address] or [phone].    AA meeting list can be found at [email or web address]  NA meeting list can be found at [email or web address]    Outpatient Treatment Providers  [facility]   [address] [place] [address] [phone]   Dahiana Bacon   [address] [place] [address] [phone]   Tari Bacon/Randi   [address] [facility]   [address]   [place] [address] [phone]   [address]    [place] [address] [phone]   [facility]   [address], [address]   [place] [address] [phone]

## 2025-02-16 NOTE — SOCIAL WORK
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

## 2025-02-16 NOTE — DISCHARGE SUMMARY
Discharge Summary - Hospitalist   Name: Juan Luu 35 y.o. male I MRN: [id number]  Unit/Bed#: 5T DETOX 512-01 I Date of Admission: 2/15/2025   Date of Service: 2/16/2025 I Hospital Day: 1         Admitting Provider:  Hugo Stratton DO  Discharge Provider:  Hugo Stratton DO  Admission Date: 2/15/2025       Discharge Date: 02/16/25   LOS: 1  Primary Care Physician at Discharge: No primary care provider on file. None    HOSPITAL COURSE:  Juan Luu is a 35 y.o. male who presented alcohol withdrawal symptoms, the patient has had previous admission to the detox unit for which she has received phenobarbital and was discharged on ReVia.  On this occasion he had significant psychomotor agitation, he was started on phenobarbital and his symptoms improved.  He was evaluated urgently in consultation by the toxicology service.  He subsequently improved and was given Vivitrol prior to discharge.    Will need close PCP follow-up as well as outpatient addiction medicine follow-up.  Referrals for both were sent and the patient was agreeable to discuss outpatient rehab services.    At the time of discharge the patient was tolerating oral diet they were without acute complaint and they were medically cleared for discharge.  All questions were answered the patient's satisfaction and they were in agreement with the discharge plan.    The patient, initially admitted to the hospital as inpatient, was discharged earlier than expected given the following: Rapid unexpected improvement.  DISCHARGE DIAGNOSES  * Alcohol withdrawal syndrome with complication (HCC)  Assessment & Plan  History of alcohol abuse disorder which is severe  Patient relapsed and is currently drinking  Previous admission to [facility] detox January 8, 2025 for alcohol withdrawal for which she received phenobarbital and was discharged on ReVia  Clinically improved, medically stabilized for rehab if needed  Patient states he will go to his  "job just for short-term disability to go to inpatient rehab  Received Vivitrol, provided follow-up    Elevated liver enzymes  Assessment & Plan  Secondary to alcohol use      Alcoholic ketoacidosis  Assessment & Plan  Anion gap of 22, now improved with IV fluid and hydration.    Unspecified mood (affective) disorder (HCC)  Assessment & Plan  Continue Remeron at bedtime  Continue gabapentin as needed  Continue Lexapro  Continue BuSpar 3 times daily    Alcohol use disorder, severe, dependence (HCC)  Assessment & Plan  History of chronic heavy alcohol use with multiple ER visits as well as previous visits to withdrawal unit   1 L -1.5 of vodka daily  Continue thiamine, folic acid and multivitamin  CRS consultation to assist with aftercare resources      CONSULTING PROVIDERS   CONSULT TO CERTIFIED   IP CONSULT TO TOXICOLOGY    PROCEDURES PERFORMED  * No surgery found *    RADIOLOGY RESULTS  XR chest 1 view portable  Result Date: 2/15/2025  Impression: No acute cardiopulmonary disease. Workstation performed: [id number]       LABS  Results from last 7 days   Lab Units 02/15/25  0303   WBC Thousand/uL 14.91*   HEMOGLOBIN g/dL 15.0   HEMATOCRIT % 45.2   MCV fL 89   TOTAL NEUT ABS Thousand/uL 12.23*   PLATELETS Thousands/uL 162     Results from last 7 days   Lab Units 02/16/25  0524 02/15/25  0303   SODIUM mmol/L 135 134*   POTASSIUM mmol/L 3.7 5.2   CHLORIDE mmol/L 97 90*   CO2 mmol/L 27 22   BUN mg/dL 13 15   CREATININE mg/dL 0.95 0.94   CALCIUM mg/dL 9.1 8.6   ALBUMIN g/dL 4.2 4.8   TOTAL BILIRUBIN mg/dL 1.17* 0.49   ALK PHOS U/L 100 110*   ALT U/L 55* 86*   AST U/L 57* 93*   EGFR ml/min/1.73sq m 103 104   GLUCOSE RANDOM mg/dL 91 105     Results from last 7 days   Lab Units 02/15/25  0303   HS TNI 0HR ng/L 7                                    Cultures:         Invalid input(s): \"URIBILINOGEN\"        Results from last 7 days   Lab Units 02/15/25  1132   INFLUENZA A PCR  Negative     Results from last 7 " "days   Lab Units 02/15/25  1132   SARS-COV-2  Negative   INFLUENZA A PCR  Negative   INFLUENZA B PCR  Negative   RSV PCR  Negative         PHYSICAL EXAM:  Vitals:   Blood Pressure: 124/82 (02/16/25 0712)  Pulse: 69 (02/16/25 0712)  Temperature: 97.7 °F (36.5 °C) (02/16/25 0712)  Temp Source: Temporal (02/16/25 0712)  Respirations: 18 (02/16/25 0712)  Height: 5' 11\" (180.3 cm) (02/15/25 0801)  Weight - Scale: 85.9 kg (189 lb 6 oz) (02/15/25 0801)  SpO2: 98 % (02/16/25 0712)      General: well appearing, no acute distress  HEENT: atraumatic, PERRLA, moist mucosa, normal pharynx, normal tonsils and adenoids, normal tongue, no fluid in sinuses  Neck: Trachea midline, no carotid bruit, no masses  Respiratory: normal chest wall expansion, CTA B  Cardiovascular: RRR, no m/r/g, Normal S1 and S2  Abdomen: Soft, non-tender, non-distended, normal bowel sounds in all quadrants, no hepatosplenomegaly, no tympany  Rectal: deferred  Musculoskeletal: Moves all  Integumentary: warm, dry, and pink, with no visible rash, purpura, or petechia  Heme/Lymph: no lymphadenopathy, no bruises  Neurological: Cranial Nerves II-XII grossly intact  Psychiatric: cooperative with normal mood, affect, and cognition       Discharge Disposition: Home  AM-PAC Basic Mobility:  Basic Mobility Inpatient Raw Score: 24    JH-HLM Achieved: 8: Walk 250 feet ot more  JH-HLM Goal: 8: Walk 250 feet or more    HLM Goal listed above. Continue with ongoing physical therapy and encourage appropriate mobility to improve upon HLM goals.      Test Results Pending at Discharge:           Medications   Summary of Medication Adjustments made as a result of this hospitalization: See discharge summary and AVS for medication changes  Medication Dosing Tapers - Please refer to Discharge Medication List for details on any medication dosing tapers (if applicable to patient).  Discharge Medication List: See after visit summary for reconciled discharge medications.     Diet " restrictions:         Diet Orders   (From admission, onward)                 Start     Ordered    02/15/25 0818  Diet Regular; Regular House  Diet effective now        References:    Adult Nutrition Support Algorithm    RD Therapeutic Diet Order Protocol   Question Answer Comment   Diet Type Regular    Regular Regular House    RD to adjust diet per protocol? Yes        02/15/25 0819                  Activity restrictions: No strenuous activity  Discharge Condition: good    Outpatient Follow-Up and Discharge Instructions  See after visit summary section titled Discharge Instructions for information provided to patient and family.      Code Status: Level 1 - Full Code  Discharge Statement   I spent 86 minutes discharging the patient. This time was spent on the day of discharge. Greater than 50% of total time was spent with the patient and / or family counseling and / or coordination of care.    ** Please Note: This note was completed in part utilizing [employer] Dragon [employer] One Software.  Grammatical errors, random word insertions, spelling mistakes, and incomplete sentences may be an occasional consequence of this system secondary to software limitations, ambient noise, and hardware issues.  If you have any questions or concerns about the content, text, or information contained within the body of this dictation, please contact the provider for clarification.**

## 2025-02-16 NOTE — ASSESSMENT & PLAN NOTE
SEWS scores have been zero, and patient has not received any phenobarbital since yesterday.    Discontinue SEWS protocol

## 2025-02-17 NOTE — UTILIZATION REVIEW
NOTIFICATION OF ADMISSION DISCHARGE   This is a Notification of Discharge from [facility]. Please be advised that this patient has been discharge from our facility. Below you will find the admission and discharge date and time including the patient’s disposition.   UTILIZATION REVIEW CONTACT:  Bebeto Klein  Utilization   Network Utilization Review Department  Phone: [phone] x carefully listen to the prompts. All voicemails are confidential.  Email: [email or web address]     ADMISSION INFORMATION  PRESENTATION DATE: 2/15/2025  7:55 AM  OBERVATION ADMISSION DATE: N/A  INPATIENT ADMISSION DATE: 2/15/25  7:55 AM   DISCHARGE DATE: 2/16/2025  2:59 PM   DISPOSITION:Home/Self Care    Network Utilization Review Department  ATTENTION: Please call with any questions or concerns to [phone] and carefully listen to the prompts so that you are directed to the right person. All voicemails are confidential.   For Discharge needs, contact Care Management DC Support Team at [phone] opt. 2  Send all requests for admission clinical reviews, approved or denied determinations and any other requests to dedicated fax number below belonging to the campus where the patient is receiving treatment. List of dedicated fax numbers for the Facilities:  FACILITY NAME UR FAX NUMBER   ADMISSION DENIALS (Administrative/Medical Necessity) [phone]   DISCHARGE SUPPORT TEAM ([facility]) [phone]   PARENT CHILD HEALTH (Maternity/NICU/Pediatrics) [phone]   [facility] [phone]   [facility] [phone]   [facility] [phone]   [facility] [phone]   [facility] [phone]   [facility] [phone]   [facility] [phone]   [facility] [phone]    [facility] [phone]   [facility] [phone]   [facility] [phone]   [facility] [phone]

## 2025-02-21 LAB
ATRIAL RATE: 116 BPM
P AXIS: 63 DEGREES
PR INTERVAL: 114 MS
QRS AXIS: 87 DEGREES
QRSD INTERVAL: 90 MS
QT INTERVAL: 292 MS
QTC INTERVAL: 405 MS
T WAVE AXIS: 34 DEGREES
VENTRICULAR RATE: 116 BPM

## 2025-02-21 PROCEDURE — 93010 ELECTROCARDIOGRAM REPORT: CPT | Performed by: INTERNAL MEDICINE

## 2025-03-17 ENCOUNTER — TELEPHONE (OUTPATIENT)
Dept: PSYCHIATRY | Facility: CLINIC | Age: 35
End: 2025-03-17